# Patient Record
Sex: MALE | Race: WHITE | Employment: OTHER | URBAN - NONMETROPOLITAN AREA
[De-identification: names, ages, dates, MRNs, and addresses within clinical notes are randomized per-mention and may not be internally consistent; named-entity substitution may affect disease eponyms.]

---

## 2019-05-18 ENCOUNTER — HOSPITAL ENCOUNTER (EMERGENCY)
Age: 84
Discharge: HOME | End: 2019-05-18
Payer: MEDICARE

## 2019-05-18 VITALS
HEART RATE: 86 BPM | OXYGEN SATURATION: 94 % | HEIGHT: 69 IN | RESPIRATION RATE: 18 BRPM | WEIGHT: 203 LBS | BODY MASS INDEX: 30.07 KG/M2 | DIASTOLIC BLOOD PRESSURE: 83 MMHG | SYSTOLIC BLOOD PRESSURE: 150 MMHG | TEMPERATURE: 98.2 F

## 2019-05-18 DIAGNOSIS — L03.012 PARONYCHIA OF FINGER OF LEFT HAND: Primary | ICD-10-CM

## 2019-05-18 PROCEDURE — 87205 SMEAR GRAM STAIN: CPT

## 2019-05-18 PROCEDURE — 87186 SC STD MICRODIL/AGAR DIL: CPT

## 2019-05-18 PROCEDURE — 2709999900 HC NON-CHARGEABLE SUPPLY

## 2019-05-18 PROCEDURE — 87070 CULTURE OTHR SPECIMN AEROBIC: CPT

## 2019-05-18 PROCEDURE — 87184 SC STD DISK METHOD PER PLATE: CPT

## 2019-05-18 PROCEDURE — 99204 OFFICE O/P NEW MOD 45 MIN: CPT

## 2019-05-18 PROCEDURE — 10060 I&D ABSCESS SIMPLE/SINGLE: CPT

## 2019-05-18 PROCEDURE — 26010 DRAINAGE OF FINGER ABSCESS: CPT | Performed by: NURSE PRACTITIONER

## 2019-05-18 PROCEDURE — 87077 CULTURE AEROBIC IDENTIFY: CPT

## 2019-05-18 RX ORDER — LISINOPRIL 20 MG/1
20 TABLET ORAL 2 TIMES DAILY
Status: ON HOLD | COMMUNITY
End: 2020-07-23 | Stop reason: HOSPADM

## 2019-05-18 RX ORDER — CLONIDINE HYDROCHLORIDE 0.3 MG/1
0.3 TABLET ORAL 2 TIMES DAILY
Status: ON HOLD | COMMUNITY
End: 2020-07-23 | Stop reason: HOSPADM

## 2019-05-18 RX ORDER — METOPROLOL SUCCINATE 100 MG/1
100 TABLET, EXTENDED RELEASE ORAL 2 TIMES DAILY
Status: ON HOLD | COMMUNITY
End: 2020-07-23 | Stop reason: HOSPADM

## 2019-05-18 RX ORDER — SIMVASTATIN 40 MG
40 TABLET ORAL NIGHTLY
COMMUNITY

## 2019-05-18 RX ORDER — CEPHALEXIN 500 MG/1
500 CAPSULE ORAL 3 TIMES DAILY
Qty: 30 CAPSULE | Refills: 0 | Status: SHIPPED | OUTPATIENT
Start: 2019-05-18 | End: 2019-05-21

## 2019-05-18 RX ORDER — METFORMIN HYDROCHLORIDE 750 MG/1
1500 TABLET, EXTENDED RELEASE ORAL
COMMUNITY

## 2019-05-18 ASSESSMENT — PAIN - FUNCTIONAL ASSESSMENT: PAIN_FUNCTIONAL_ASSESSMENT: ACTIVITIES ARE NOT PREVENTED

## 2019-05-18 ASSESSMENT — PAIN DESCRIPTION - ORIENTATION: ORIENTATION: LEFT

## 2019-05-18 ASSESSMENT — ENCOUNTER SYMPTOMS
DIARRHEA: 0
VOMITING: 0

## 2019-05-18 ASSESSMENT — PAIN SCALES - WONG BAKER: WONGBAKER_NUMERICALRESPONSE: 4

## 2019-05-18 ASSESSMENT — PAIN DESCRIPTION - PAIN TYPE: TYPE: ACUTE PAIN

## 2019-05-18 ASSESSMENT — PAIN DESCRIPTION - LOCATION: LOCATION: FINGER (COMMENT WHICH ONE)

## 2019-05-18 NOTE — ED PROVIDER NOTES
[facility] 36  Urgent Care Encounter      CHIEF COMPLAINT       Chief Complaint   Patient presents with    Other     redness, swelling and white area around nail on left ring finger       Nurses Notes reviewed and I agree except as noted in the HPI. HISTORY OF PRESENT ILLNESS   Yuri Wilson is a 81 y.o. male who presents with pain, redness, and swelling around the base of the finger nail on the left hand 4th digit. Patient denies any injury or trauma prior to the onset of the symptoms. States he was working in the bushes over the weekend but does not recall any injury. States the area is tender and throbbing. It is red and swollen. He noticed a white patch along the nail cuticle. Denies any fever, nausea, vomiting, diarrhea. Denies any numbness or tingling in the left hand or the digits of the left hand. He has not taken any medications for his symptoms. They have been present for the past couple of days, but the white spot appeared this morning. REVIEW OF SYSTEMS     Review of Systems   Constitutional: Negative for fever. Gastrointestinal: Negative for diarrhea and vomiting. Musculoskeletal: Negative for joint swelling. Skin:        Redness and swelling around the nailbed of the 4th digit left hand   Neurological: Negative for numbness. PAST MEDICAL HISTORY         Diagnosis Date    Cerebral artery occlusion with cerebral infarction [facility])        SURGICAL HISTORY     Patient  has no past surgical history on file.     CURRENT MEDICATIONS       Discharge Medication List as of 5/18/2019  2:21 PM      CONTINUE these medications which have NOT CHANGED    Details   metoprolol succinate (TOPROL XL) 100 MG extended release tablet Take 100 mg by mouth 2 times dailyHistorical Med      lisinopril (PRINIVIL;ZESTRIL) 20 MG tablet Take 20 mg by mouth 2 times dailyHistorical Med      simvastatin (ZOCOR) 40 MG tablet Take 40 mg by mouth nightlyHistorical Med      cloNIDine (CATAPRES) 0.3 MG tablet Take 0.3 mg by mouth 2 times dailyHistorical Med      Pioglitazone HCl (ACTOS PO) Take by mouthHistorical Med      metFORMIN (GLUCOPHAGE-XR) 750 MG extended release tablet Take 750 mg by mouth daily (with breakfast)Historical Med             ALLERGIES     Patient is has No Known Allergies. FAMILY HISTORY     Patient'sfamily history is not on file. SOCIAL HISTORY     Patient  reports that he has never smoked. He has never used smokeless tobacco. He reports that he drinks alcohol. He reports that he does not use drugs. PHYSICAL EXAM     ED TRIAGE VITALS  BP: (!) 150/83, Temp: 98.2 °F (36.8 °C), Pulse: 86, Resp: 18, SpO2: 94 %  Physical Exam   Constitutional: He is oriented to person, place, and time. He appears well-developed and well-nourished. HENT:   Head: Normocephalic and atraumatic. Cardiovascular: Normal rate, regular rhythm and normal heart sounds. Pulmonary/Chest: Effort normal and breath sounds normal.   Musculoskeletal:        Hands:  Neurological: He is alert and oriented to person, place, and time. Nursing note and vitals reviewed. DIAGNOSTIC RESULTS   Labs: No results found for this visit on 05/18/19.     IMAGING:    URGENT CARE COURSE:     Vitals:    05/18/19 1348   BP: (!) 150/83   Pulse: 86   Resp: 18   Temp: 98.2 °F (36.8 °C)   TempSrc: Oral   SpO2: 94%   Weight: 203 lb (92.1 kg)   Height: 5' 9\" (1.753 m)       Medications - No data to display  PROCEDURES:  Incision/Drainage  Date/Time: 5/18/2019 2:37 PM  Performed by: FINN Waters CNP  Authorized by: FINN Waters CNP     Consent:     Consent obtained:  Verbal    Consent given by:  Patient    Risks discussed:  Bleeding, incomplete drainage, pain and infection    Alternatives discussed:  Alternative treatment, no treatment and observation  Location:     Type:  Abscess    Location:  Upper extremity    Upper extremity location:  Finger    Finger location:  L ring finger  Pre-procedure details: Skin preparation:  Betadine  Anesthesia (see MAR for exact dosages): Anesthesia method:  None  Procedure type:     Complexity:  Simple  Procedure details:     Incision types:  Stab incision    Incision depth:  Dermal    Scalpel blade:  11    Drainage:  Purulent    Drainage amount:  Scant    Wound treatment:  Wound left open    Packing materials:  None  Post-procedure details:     Patient tolerance of procedure: Tolerated well, no immediate complications        FINAL IMPRESSION      1. Paronychia of finger of left hand        DISPOSITION/PLAN   DISPOSITION Decision To Discharge 05/18/2019 02:19:45 PM    Patient will be discharged home. He is to keep the area clean and dry. Monitor for worsening infection, which was reviewed in detail. Take medication as prescribed. Reviewed signs and symptoms that require immediate emergency room evaluation and treatment. Patient voiced understanding of all information and is agreeable to the treatment plan.      PATIENT REFERRED TO:  [address] [place] [address]    Schedule an appointment as soon as possible for a visit in 3 days  As needed, If symptoms worsen go to emergency room    DISCHARGE MEDICATIONS:  Discharge Medication List as of 5/18/2019  2:21 PM      START taking these medications    Details   cephALEXin (KEFLEX) 500 MG capsule Take 1 capsule by mouth 3 times daily for 10 days, Disp-30 capsule, R-0Normal           Discharge Medication List as of 5/18/2019  2:21 PM          Kasia Mcgee, [facility] 105, APRN - CNP  05/31/19 9945

## 2019-05-20 LAB
AEROBIC CULTURE: ABNORMAL
AEROBIC CULTURE: ABNORMAL
GRAM STAIN RESULT: ABNORMAL
ORGANISM: ABNORMAL

## 2019-05-21 RX ORDER — SULFAMETHOXAZOLE AND TRIMETHOPRIM 800; 160 MG/1; MG/1
1 TABLET ORAL 2 TIMES DAILY
Qty: 20 TABLET | Refills: 0 | Status: SHIPPED | OUTPATIENT
Start: 2019-05-21 | End: 2019-05-31

## 2020-02-18 ENCOUNTER — OFFICE VISIT (OUTPATIENT)
Dept: UROLOGY | Age: 85
End: 2020-02-18
Payer: MEDICARE

## 2020-02-18 VITALS
SYSTOLIC BLOOD PRESSURE: 112 MMHG | BODY MASS INDEX: 30.62 KG/M2 | WEIGHT: 202 LBS | HEIGHT: 68 IN | DIASTOLIC BLOOD PRESSURE: 64 MMHG

## 2020-02-18 LAB
BILIRUBIN URINE: NEGATIVE
BLOOD URINE, POC: NEGATIVE
CHARACTER, URINE: CLEAR
COLOR, URINE: YELLOW
GLUCOSE URINE: 100 MG/DL
KETONES, URINE: ABNORMAL
LEUKOCYTE CLUMPS, URINE: NEGATIVE
NITRITE, URINE: NEGATIVE
PH, URINE: 5 (ref 5–9)
POST VOID RESIDUAL (PVR): 218 ML
PROTEIN, URINE: NEGATIVE MG/DL
SPECIFIC GRAVITY, URINE: 1.02 (ref 1–1.03)
UROBILINOGEN, URINE: 1 EU/DL (ref 0–1)

## 2020-02-18 PROCEDURE — 99204 OFFICE O/P NEW MOD 45 MIN: CPT | Performed by: UROLOGY

## 2020-02-18 PROCEDURE — 81003 URINALYSIS AUTO W/O SCOPE: CPT | Performed by: UROLOGY

## 2020-02-18 PROCEDURE — 51798 US URINE CAPACITY MEASURE: CPT | Performed by: UROLOGY

## 2020-02-18 RX ORDER — GLIMEPIRIDE 2 MG/1
2 TABLET ORAL
Status: ON HOLD | COMMUNITY
End: 2020-07-23 | Stop reason: HOSPADM

## 2020-02-18 NOTE — PROGRESS NOTES
Alvin Navarro MD        [address] [facility] [address]  Dept: [phone]  Dept Fax: [phone]: [address] [place] [address] Urology Office Note -     Patient:  Jake Zaragoza  YOB: 1935  Date: 2/29/2020    The patient is a 81 y.o. male who presents today for evaluation of the following problems: gross hematuria  Chief Complaint   Patient presents with    Advice Only     New Patient - Gross hematuria     referred/consultation requested by Tahira Reyes DO.    HISTORY OF PRESENT ILLNESS:     Gross hematuria  Onset was  Months ago  Overall, the problem(s) are unchanged. Severity is described as moderate. Associated Symptoms: No dysuria, ++ gross hematuria. Current Pain Severity: 0    Intermittent gross hematuria in jan  Improving in feb.  Less hematuria episodes  pvr 200>+  Former smoker  No problems voiding    Ct urogram  Cystoscopy in office            Requested/reviewed records from Tahira Reyes DO office and/or outside [de-identified]    (Patient's old records have been requested, reviewed and pertinent findings summarized in today's note.)    Procedures Today: N/A      Last several PSA's:  No results found for: PSA    Last total testosterone:  No results found for: TESTOSTERONE    Urinalysis today:  Results for POC orders placed in visit on 02/18/20   POCT Urinalysis No Micro (Auto)   Result Value Ref Range    Glucose, Ur 100 (A) NEGATIVE mg/dl    Bilirubin Urine Negative     Ketones, Urine Trace (A) NEGATIVE    Specific Gravity, Urine 1.025 1.002 - 1.03    Blood, UA POC Negative NEGATIVE    pH, Urine 5.00 5.0 - 9.0    Protein, Urine Negative NEGATIVE mg/dl    Urobilinogen, Urine 1.00 0.0 - 1.0 eu/dl    Nitrite, Urine Negative NEGATIVE    Leukocyte Clumps, Urine Negative NEGATIVE    Color, Urine Yellow YELLOW-STR    Character, Urine Clear [email or web address]   poct post void residual Result Value Ref Range    post void residual 218 ml       Last BUN and creatinine:  No results found for: BUN  No results found for: CREATININE      Imaging Reviewed during this Office Visit:   Adali Singleton MD independently reviewed the images and verified the radiology reports from:    No results found. PAST MEDICAL, FAMILY AND SOCIAL HISTORY:  Past Medical History:   Diagnosis Date    [facility]     DM (diabetes mellitus) [facility]     Hypertension     Stroke [facility])      History reviewed. No pertinent surgical history. No family history on file. Outpatient Medications Marked as Taking for the 2/18/20 encounter (Office Visit) with Netta Escobar MD   Medication Sig Dispense Refill    glimepiride (AMARYL) 2 MG tablet Take 2 mg by mouth every morning (before breakfast)      metoprolol succinate (TOPROL XL) 100 MG extended release tablet Take 100 mg by mouth 2 times daily      lisinopril (PRINIVIL;ZESTRIL) 20 MG tablet Take 20 mg by mouth 2 times daily      simvastatin (ZOCOR) 40 MG tablet Take 40 mg by mouth nightly      cloNIDine (CATAPRES) 0.3 MG tablet Take 0.3 mg by mouth 2 times daily      Pioglitazone HCl (ACTOS PO) Take by mouth      metFORMIN (GLUCOPHAGE-XR) 750 MG extended release tablet Take 750 mg by mouth daily (with breakfast)         Patient has no known allergies. Social History     Tobacco Use   Smoking Status Never Smoker   Smokeless Tobacco Never Used      (If patient a smoker, smoking cessation counseling offered)   Social History     Substance and Sexual Activity   Alcohol Use Yes    Comment: once a year       REVIEW OF SYSTEMS:  Constitutional: negative  Eyes: negative  Respiratory: negative  Cardiovascular: negative  Gastrointestinal: negative  Genitourinary: see HPI  Musculoskeletal: negative  Skin: negative   Neurological: negative  Hematological/Lymphatic: negative  Psychological: negative    Physical Exam:    This a 81 y.o.

## 2020-02-20 LAB
ORGANISM: ABNORMAL
URINE CULTURE, ROUTINE: ABNORMAL

## 2020-02-26 ENCOUNTER — HOSPITAL ENCOUNTER (OUTPATIENT)
Dept: CT IMAGING | Age: 85
Discharge: HOME | End: 2020-02-26
Payer: MEDICARE

## 2020-02-26 PROCEDURE — 3209999900 CT COMPARISON OF OUTSIDE FILMS

## 2020-03-03 ENCOUNTER — TELEPHONE (OUTPATIENT)
Dept: UROLOGY | Age: 85
End: 2020-03-03

## 2020-03-03 ENCOUNTER — PROCEDURE VISIT (OUTPATIENT)
Dept: UROLOGY | Age: 85
End: 2020-03-03
Payer: MEDICARE

## 2020-03-03 ENCOUNTER — INITIAL CONSULT (OUTPATIENT)
Dept: NEUROLOGY | Age: 85
End: 2020-03-03
Payer: MEDICARE

## 2020-03-03 VITALS
HEIGHT: 67 IN | BODY MASS INDEX: 31.74 KG/M2 | HEART RATE: 68 BPM | WEIGHT: 202.2 LBS | DIASTOLIC BLOOD PRESSURE: 62 MMHG | SYSTOLIC BLOOD PRESSURE: 116 MMHG

## 2020-03-03 VITALS
BODY MASS INDEX: 31.86 KG/M2 | WEIGHT: 203 LBS | DIASTOLIC BLOOD PRESSURE: 62 MMHG | SYSTOLIC BLOOD PRESSURE: 110 MMHG | HEIGHT: 67 IN

## 2020-03-03 LAB
BILIRUBIN URINE: NEGATIVE
BLOOD URINE, POC: NEGATIVE
CHARACTER, URINE: CLEAR
COLOR, URINE: YELLOW
GLUCOSE URINE: NEGATIVE MG/DL
KETONES, URINE: NEGATIVE
LEUKOCYTE CLUMPS, URINE: NEGATIVE
NITRITE, URINE: NEGATIVE
PH, URINE: 5.5 (ref 5–9)
PROTEIN, URINE: NEGATIVE MG/DL
SPECIFIC GRAVITY, URINE: >= 1.03 (ref 1–1.03)
UROBILINOGEN, URINE: 1 EU/DL (ref 0–1)

## 2020-03-03 PROCEDURE — 52000 CYSTOURETHROSCOPY: CPT | Performed by: UROLOGY

## 2020-03-03 PROCEDURE — 81003 URINALYSIS AUTO W/O SCOPE: CPT | Performed by: UROLOGY

## 2020-03-03 PROCEDURE — 99204 OFFICE O/P NEW MOD 45 MIN: CPT | Performed by: PSYCHIATRY & NEUROLOGY

## 2020-03-03 NOTE — PATIENT INSTRUCTIONS
1. Neuropsychology evaluation  2. Vitamin B12, folate  3. Vitamin B6 level  4. Vitamin D level  5. Call with any new symptoms or concerns.    6. Follow up after completion of evaluation with neuropsychology

## 2020-03-04 LAB
FOLATE: 18.4 NG/ML
VITAMIN B-12: 160 PG/ML (ref 180–914)
VITAMIN B6: 38 NMOL/L (ref 20–125)
VITAMIN D 25-HYDROXY: 12.3 NG/ML (ref 30–100)

## 2020-03-05 LAB
ORGANISM: ABNORMAL
URINE CULTURE, ROUTINE: ABNORMAL

## 2020-03-09 ENCOUNTER — TELEPHONE (OUTPATIENT)
Dept: UROLOGY | Age: 85
End: 2020-03-09

## 2020-03-09 ENCOUNTER — TELEPHONE (OUTPATIENT)
Dept: NEUROLOGY | Age: 85
End: 2020-03-09

## 2020-03-10 ENCOUNTER — TELEPHONE (OUTPATIENT)
Dept: UROLOGY | Age: 85
End: 2020-03-10

## 2020-03-19 NOTE — PROGRESS NOTES
Cystoscopy Operative Note    Patient:  Trice Brown  MRN: [id number]  YOB: 1935  Surgeon: Christi Stapleton MD  Anesthesia: Urethral 2% Xylocaine   Indications: gross hematuria  Position: Supine    Findings:   The patient was prepped and draped in the usual sterile fashion. The flexible cystoscope was advanced through the urethra and into the bladder. The bladder was thoroughly inspected and the following was noted:    Residual Urine:  Significant  Urethra: No abnormalities of the urethra are noted. Prostate: bilobar hyperplasia++  Bladder: No tumors or CIS noted. No bladder diverticulum. significant trabeculation noted. Ureters: Clear efflux from both ureters. Orifices with normal configuration and location. The cystoscope was removed. The patient tolerated the procedure well.   Will schedule for cystoscopy with greenlight

## 2020-04-07 ENCOUNTER — TELEPHONE (OUTPATIENT)
Dept: UROLOGY | Age: 85
End: 2020-04-07

## 2020-04-07 NOTE — TELEPHONE ENCOUNTER
[place] medical benefits [phone]. Per electronic message, no precert is req for the outpatient [id number]. Scheduled with Dr Loulou Javier at [facility] on 4/21/20.

## 2020-04-08 ENCOUNTER — TELEPHONE (OUTPATIENT)
Dept: UROLOGY | Age: 85
End: 2020-04-08

## 2020-05-18 ENCOUNTER — TELEPHONE (OUTPATIENT)
Dept: UROLOGY | Age: 85
End: 2020-05-18

## 2020-05-19 ENCOUNTER — TELEPHONE (OUTPATIENT)
Dept: UROLOGY | Age: 85
End: 2020-05-19

## 2020-05-19 LAB
A/G RATIO: NORMAL
ALBUMIN SERPL-MCNC: 4.3 G/DL
ALP BLD-CCNC: 38 U/L
ALT SERPL-CCNC: 16 U/L
AST SERPL-CCNC: 17 U/L
AVERAGE GLUCOSE: 166
BILIRUB SERPL-MCNC: 0.6 MG/DL (ref 0.1–1.4)
BILIRUBIN DIRECT: 0.1 MG/DL
BILIRUBIN, INDIRECT: NORMAL
BUN BLDV-MCNC: 18 MG/DL
CALCIUM SERPL-MCNC: 9.4 MG/DL
CHLORIDE BLD-SCNC: 104 MMOL/L
CHOLESTEROL, TOTAL: 147 MG/DL
CHOLESTEROL/HDL RATIO: 4
CO2: 26 MMOL/L
CREAT SERPL-MCNC: 0.85 MG/DL
GFR CALCULATED: 60
GLOBULIN: NORMAL
GLUCOSE BLD-MCNC: 141 MG/DL
HBA1C MFR BLD: 7.4 %
HDLC SERPL-MCNC: 37 MG/DL (ref 35–70)
LDL CHOLESTEROL CALCULATED: 75 MG/DL (ref 0–160)
POTASSIUM SERPL-SCNC: 4.1 MMOL/L
PROSTATE SPECIFIC ANTIGEN: 0.88 NG/ML
PROTEIN TOTAL: 7.4 G/DL
SODIUM BLD-SCNC: 142 MMOL/L
TRIGL SERPL-MCNC: 177 MG/DL
VLDLC SERPL CALC-MCNC: 35 MG/DL

## 2020-05-19 NOTE — TELEPHONE ENCOUNTER
SURGERY 8[address]  [address] [place] [address] [address], [address]      Phone *[phone]   Surgical Scheduling Direct Line Phone *[phone] Fax *[phone]      Phuc Lowe 1935 male    [address] [place] [address]   Marital Status:          Home Phone: [phone]      Cell Phone:    Telephone Information:   Mobile [phone]          Surgeon: Dr. Nerissa Mckeon  Surgery Date: 05-    Time: [facility] [place]    Procedure: Cystoscopy with Greenlight photovaporization of prostate    Diagnosis: BPH with obstruction     Important Medical History: In [facility]    Special Inst/Equip: NATALIE Farmer Conf# [id number] per Unruly Ford    CPT Codes:    74936  Latex Allergy:  No     Cardiac Device:  No     Anesthesia:  Anesthesiologist (General/Spinal)          Admission Type:  Same Day                             Admit Prior to Day of Surgery: No    Case Location:  Main OR           Preadmission Testing:Phone Call              PAT Date and Time:______________________________________________________    PAT Confirmation #: ______________________________________________________    Post Op Visit: ___________________________________________________________    Need Preop Cardiac Clearance: No    Does Patient have Cardiologist/physician?      Medical clearance per [facility]    Surgery Confirmation #: __________________________________________________    Martin Bowen: ________________________   Date: __________________________     [employer] Name: [facility]

## 2020-05-21 ENCOUNTER — PREP FOR PROCEDURE (OUTPATIENT)
Dept: UROLOGY | Age: 85
End: 2020-05-21

## 2020-05-21 RX ORDER — SODIUM CHLORIDE 9 MG/ML
INJECTION, SOLUTION INTRAVENOUS CONTINUOUS
Status: CANCELLED | OUTPATIENT
Start: 2020-05-30

## 2020-05-27 ENCOUNTER — HOSPITAL ENCOUNTER (OUTPATIENT)
Age: 85
Discharge: HOME | End: 2020-05-27
Payer: MEDICARE

## 2020-05-27 ENCOUNTER — HOSPITAL ENCOUNTER (OUTPATIENT)
Dept: GENERAL RADIOLOGY | Age: 85
Discharge: HOME | End: 2020-05-27
Payer: MEDICARE

## 2020-05-27 LAB
BILIRUBIN URINE: NEGATIVE
BLOOD, URINE: NEGATIVE
CHARACTER, URINE: CLEAR
COLOR: YELLOW
EKG ATRIAL RATE: 66 BPM
EKG P AXIS: 75 DEGREES
EKG P-R INTERVAL: 156 MS
EKG Q-T INTERVAL: 446 MS
EKG QRS DURATION: 86 MS
EKG QTC CALCULATION (BAZETT): 467 MS
EKG R AXIS: 53 DEGREES
EKG T AXIS: 26 DEGREES
EKG VENTRICULAR RATE: 66 BPM
GLUCOSE URINE: NEGATIVE MG/DL
KETONES, URINE: NEGATIVE
LEUKOCYTE ESTERASE, URINE: NEGATIVE
NITRITE, URINE: NEGATIVE
PH UA: 5 (ref 5–9)
PROTEIN UA: NEGATIVE
SPECIFIC GRAVITY, URINE: 1.02 (ref 1–1.03)
UROBILINOGEN, URINE: 1 EU/DL (ref 0–1)

## 2020-05-27 PROCEDURE — 81003 URINALYSIS AUTO W/O SCOPE: CPT

## 2020-05-27 PROCEDURE — 93005 ELECTROCARDIOGRAM TRACING: CPT

## 2020-05-27 PROCEDURE — 93010 ELECTROCARDIOGRAM REPORT: CPT | Performed by: INTERNAL MEDICINE

## 2020-05-27 PROCEDURE — 71046 X-RAY EXAM CHEST 2 VIEWS: CPT

## 2020-05-27 PROCEDURE — U0002 COVID-19 LAB TEST NON-CDC: HCPCS

## 2020-05-28 ENCOUNTER — TELEPHONE (OUTPATIENT)
Dept: UROLOGY | Age: 85
End: 2020-05-28

## 2020-05-28 LAB
PERFORMING LAB: NORMAL
REPORT: NORMAL
SARS-COV-2: NOT DETECTED

## 2020-05-28 NOTE — TELEPHONE ENCOUNTER
Patient notified of Dr. Nicole Hard appointment on 05- at 9:30am.  He voiced understanding and will be there 15-minutes early.

## 2020-05-29 ENCOUNTER — OFFICE VISIT (OUTPATIENT)
Dept: CARDIOLOGY CLINIC | Age: 85
End: 2020-05-29
Payer: MEDICARE

## 2020-05-29 VITALS
DIASTOLIC BLOOD PRESSURE: 72 MMHG | HEIGHT: 67 IN | BODY MASS INDEX: 31.52 KG/M2 | HEART RATE: 84 BPM | SYSTOLIC BLOOD PRESSURE: 136 MMHG | WEIGHT: 200.8 LBS

## 2020-05-29 PROCEDURE — 99204 OFFICE O/P NEW MOD 45 MIN: CPT | Performed by: NUCLEAR MEDICINE

## 2020-05-29 ASSESSMENT — ENCOUNTER SYMPTOMS
CHEST TIGHTNESS: 0
PHOTOPHOBIA: 0
SHORTNESS OF BREATH: 1
ABDOMINAL PAIN: 0
VOMITING: 0
BLOOD IN STOOL: 0
ABDOMINAL DISTENTION: 0
DIARRHEA: 0
BACK PAIN: 0
NAUSEA: 0
ANAL BLEEDING: 0
RECTAL PAIN: 0
CONSTIPATION: 0
COLOR CHANGE: 0

## 2020-05-29 NOTE — TELEPHONE ENCOUNTER
Patient's surgery needs cancelled for tomorrow. Dr. Elena Catherine wants patient to have Stress test and echo done.

## 2020-05-29 NOTE — PROGRESS NOTES
[address] [facility],[address]  [address] [place] [address]  Dept: [phone]  Dept Fax: [phone]  Loc: [phone]    Visit Date: 2020    Michelle Smyth is a 81 y.o. male who presents todayfor:  Chief Complaint   Patient presents with    New Patient     abnormal ekg    Establish Cardiologist    Cardiac Clearance    Hypertension    Diabetes    Hyperlipidemia     Here for the first time  Going for prostate surgery   To be Done tomorrow  Had abnormal ECG   Does have st t changes and septal q waves  Does have dyspnea on exertion   Gradual worsening   Denies chest pain   No jaw pain   Very in active  Severe obesity   Possible sleep apnea  Does have HTn for many years   Does have Dm for many years  Does have previous stroke a while back   Does have hyperlipidemia  No know CAD before  No stents before  Does have family history of CAD  No smoking       HPI:  HPI  Past Medical History:   Diagnosis Date    [facility]     DM [facility]diabetes mellitus) [facility]     Hyperlipidemia     Hypertension     Stroke [facility])       Past Surgical History:   Procedure Laterality Date    KNEE ARTHROPLASTY Right      Family History   Problem Relation Age of Onset    Heart Disease Father     Heart Attack Father     High Blood Pressure Sister     Cancer Neg Hx     Diabetes Neg Hx     Stroke Neg Hx      Social History     Tobacco Use    Smoking status: Former Smoker     Last attempt to quit: 1980     Years since quittin.4    Smokeless tobacco: Never Used   Substance Use Topics    Alcohol use: Yes     Comment: rare      Current Outpatient Medications   Medication Sig Dispense Refill    Cholecalciferol (VITAMIN D3) 50 MCG ( UT) CAPS Take by mouth three times daily      pioglitazone (ACTOS) 30 MG tablet Take 30 mg by mouth daily      Cyanocobalamin (VITAMIN B-12 IJ) Inject as directed every 30 days      glimepiride (AMARYL) 2 MG tablet Take 2 mg by mouth every morning (before breakfast)      metoprolol succinate (TOPROL XL) 100 MG extended release tablet Take 100 mg by mouth 2 times daily      lisinopril (PRINIVIL;ZESTRIL) 20 MG tablet Take 20 mg by mouth 2 times daily      simvastatin (ZOCOR) 40 MG tablet Take 40 mg by mouth nightly      cloNIDine (CATAPRES) 0.3 MG tablet Take 0.3 mg by mouth 2 times daily      metFORMIN (GLUCOPHAGE-XR) 750 MG extended release tablet Take by mouth daily (with breakfast) 2 tab       No current facility-administered medications for this visit. No Known Allergies  Health Maintenance   Topic Date Due    DTaP/Tdap/Td vaccine (1 - Tdap) 10/13/1955    Shingles Vaccine (1 of 2) 10/13/1986    Pneumococcal 65+ years Vaccine (1 of 1 - PPSV23) 10/13/2001    Annual Wellness Visit (AWV)  06/23/2019    Flu vaccine (Season Ended) 09/01/2020    Lipid screen  05/19/2021    Potassium monitoring  05/19/2021    Creatinine monitoring  05/19/2021    Hepatitis A vaccine  Aged Out    Hepatitis B vaccine  Aged Out    Hib vaccine  Aged Out    Meningococcal (ACWY) vaccine  Aged Out       Subjective:  Review of Systems   Constitutional: Positive for fatigue. Negative for diaphoresis. HENT: Negative for ear pain and nosebleeds. Eyes: Negative for photophobia. Respiratory: Positive for shortness of breath. Negative for chest tightness. Cardiovascular: Negative for chest pain, palpitations and leg swelling. Gastrointestinal: Negative for abdominal distention, abdominal pain, anal bleeding, blood in stool, constipation, diarrhea, nausea, rectal pain and vomiting. Endocrine: Negative for polyphagia. Genitourinary: Negative for enuresis and hematuria. Musculoskeletal: Negative for arthralgias, back pain, gait problem, joint swelling, myalgias, neck pain and neck stiffness. Skin: Negative for color change, pallor, rash and wound.    Neurological: Negative for dizziness, syncope and risk patient  Abnormal ECG   Likely underlying CAD  Will need ischemia work up before surgery   Symptoms as above    Plan:  No follow-ups on file. Discussed  Would hold on surgery   Non invasive cardiac testing will be ordered to further evaluate for any ischemic or structural heart disease as a cause of the patient symptoms. We will proceed with a Stress Cardiolite test and echo soon. Continue risk factor modification and medical management  Thank you for allowing me to participate in the care of your patient. Please don't hesitate to contact me regarding any further issues related to the patient care    Orders Placed:  No orders of the defined types were placed in this encounter. Medications Prescribed:  No orders of the defined types were placed in this encounter. Discussed use, benefit, and side effects of prescribed medications. All patient questions answered. Pt voicedunderstanding. Instructed to continue current medications, diet and exercise. Continue risk factor modification and medical management. Patient agreed with treatment plan. Follow up as directed.     Electronically signedby Sinan Vega MD on 5/29/2020 at 9:46 AM

## 2020-05-29 NOTE — TELEPHONE ENCOUNTER
Reached out to patient to advise that once Clementine Mazariegos clears him for surgery I will reschedule him.

## 2020-06-15 ENCOUNTER — HOSPITAL ENCOUNTER (OUTPATIENT)
Dept: NON INVASIVE DIAGNOSTICS | Age: 85
Discharge: HOME | End: 2020-06-15
Payer: MEDICARE

## 2020-06-15 ENCOUNTER — TELEPHONE (OUTPATIENT)
Dept: CARDIOLOGY CLINIC | Age: 85
End: 2020-06-15

## 2020-06-15 LAB
LV EF: 60 %
LVEF MODALITY: NORMAL

## 2020-06-15 PROCEDURE — 93017 CV STRESS TEST TRACING ONLY: CPT | Performed by: NUCLEAR MEDICINE

## 2020-06-15 PROCEDURE — A9500 TC99M SESTAMIBI: HCPCS | Performed by: NUCLEAR MEDICINE

## 2020-06-15 PROCEDURE — 6360000002 HC RX W HCPCS

## 2020-06-15 PROCEDURE — 78452 HT MUSCLE IMAGE SPECT MULT: CPT

## 2020-06-15 PROCEDURE — 3430000000 HC RX DIAGNOSTIC RADIOPHARMACEUTICAL: Performed by: NUCLEAR MEDICINE

## 2020-06-15 PROCEDURE — 93306 TTE W/DOPPLER COMPLETE: CPT

## 2020-06-15 RX ADMIN — Medication 9 MILLICURIE: at 12:37

## 2020-06-15 RX ADMIN — Medication 34.3 MILLICURIE: at 13:31

## 2020-06-16 ENCOUNTER — HOSPITAL ENCOUNTER (OUTPATIENT)
Age: 85
Discharge: HOME | End: 2020-06-16
Payer: MEDICARE

## 2020-06-16 PROCEDURE — U0002 COVID-19 LAB TEST NON-CDC: HCPCS

## 2020-06-17 ENCOUNTER — PRE-PROCEDURE TELEPHONE (OUTPATIENT)
Dept: INPATIENT UNIT | Age: 85
End: 2020-06-17

## 2020-06-17 LAB
PERFORMING LAB: NORMAL
REPORT: NORMAL
SARS-COV-2: NOT DETECTED

## 2020-06-18 ENCOUNTER — PREP FOR PROCEDURE (OUTPATIENT)
Dept: CARDIOLOGY | Age: 85
End: 2020-06-18

## 2020-06-18 RX ORDER — NITROGLYCERIN 0.4 MG/1
0.4 TABLET SUBLINGUAL EVERY 5 MIN PRN
Status: CANCELLED | OUTPATIENT
Start: 2020-06-18

## 2020-06-18 RX ORDER — SODIUM CHLORIDE 0.9 % (FLUSH) 0.9 %
10 SYRINGE (ML) INJECTION EVERY 12 HOURS SCHEDULED
Status: CANCELLED | OUTPATIENT
Start: 2020-06-18

## 2020-06-18 RX ORDER — SODIUM CHLORIDE 9 MG/ML
INJECTION, SOLUTION INTRAVENOUS CONTINUOUS
Status: CANCELLED | OUTPATIENT
Start: 2020-06-18

## 2020-06-18 RX ORDER — ASPIRIN 325 MG
325 TABLET ORAL ONCE
Status: CANCELLED | OUTPATIENT
Start: 2020-06-18 | End: 2020-06-18

## 2020-06-18 RX ORDER — SODIUM CHLORIDE 0.9 % (FLUSH) 0.9 %
10 SYRINGE (ML) INJECTION PRN
Status: CANCELLED | OUTPATIENT
Start: 2020-06-18

## 2020-06-18 RX ORDER — DIPHENHYDRAMINE HYDROCHLORIDE 50 MG/ML
50 INJECTION INTRAMUSCULAR; INTRAVENOUS ONCE
Status: CANCELLED | OUTPATIENT
Start: 2020-06-18 | End: 2020-06-18

## 2020-06-19 ENCOUNTER — HOSPITAL ENCOUNTER (OUTPATIENT)
Dept: INPATIENT UNIT | Age: 85
Discharge: HOME | End: 2020-06-19
Attending: NUCLEAR MEDICINE | Admitting: NUCLEAR MEDICINE
Payer: MEDICARE

## 2020-06-19 VITALS
DIASTOLIC BLOOD PRESSURE: 76 MMHG | OXYGEN SATURATION: 93 % | TEMPERATURE: 98.3 F | WEIGHT: 200 LBS | HEART RATE: 100 BPM | RESPIRATION RATE: 20 BRPM | HEIGHT: 67 IN | SYSTOLIC BLOOD PRESSURE: 128 MMHG | BODY MASS INDEX: 31.39 KG/M2

## 2020-06-19 LAB
ABO: NORMAL
ANION GAP SERPL CALCULATED.3IONS-SCNC: 11 MEQ/L (ref 8–16)
ANTIBODY SCREEN: NORMAL
APTT: 31.4 SECONDS (ref 22–38)
BUN BLDV-MCNC: 14 MG/DL (ref 7–22)
CALCIUM SERPL-MCNC: 9.4 MG/DL (ref 8.5–10.5)
CHLORIDE BLD-SCNC: 103 MEQ/L (ref 98–111)
CHOLESTEROL, TOTAL: 158 MG/DL (ref 100–199)
CO2: 25 MEQ/L (ref 23–33)
CREAT SERPL-MCNC: 0.6 MG/DL (ref 0.4–1.2)
EKG ATRIAL RATE: 66 BPM
EKG P AXIS: 40 DEGREES
EKG P-R INTERVAL: 160 MS
EKG Q-T INTERVAL: 438 MS
EKG QRS DURATION: 90 MS
EKG QTC CALCULATION (BAZETT): 459 MS
EKG R AXIS: 9 DEGREES
EKG T AXIS: 57 DEGREES
EKG VENTRICULAR RATE: 66 BPM
ERYTHROCYTE [DISTWIDTH] IN BLOOD BY AUTOMATED COUNT: 12.7 % (ref 11.5–14.5)
ERYTHROCYTE [DISTWIDTH] IN BLOOD BY AUTOMATED COUNT: 47.8 FL (ref 35–45)
GFR SERPL CREATININE-BSD FRML MDRD: > 90 ML/MIN/1.73M2
GLUCOSE BLD-MCNC: 132 MG/DL (ref 70–108)
HCT VFR BLD CALC: 40.2 % (ref 42–52)
HDLC SERPL-MCNC: 38 MG/DL
HEMOGLOBIN: 12.8 GM/DL (ref 14–18)
INR BLD: 0.97 (ref 0.85–1.13)
LDL CHOLESTEROL CALCULATED: 81 MG/DL
MCH RBC QN AUTO: 32.3 PG (ref 26–33)
MCHC RBC AUTO-ENTMCNC: 31.8 GM/DL (ref 32.2–35.5)
MCV RBC AUTO: 101.5 FL (ref 80–94)
PLATELET # BLD: 290 THOU/MM3 (ref 130–400)
PMV BLD AUTO: 9.9 FL (ref 9.4–12.4)
POTASSIUM REFLEX MAGNESIUM: 4.1 MEQ/L (ref 3.5–5.2)
RBC # BLD: 3.96 MILL/MM3 (ref 4.7–6.1)
RH FACTOR: NORMAL
SODIUM BLD-SCNC: 139 MEQ/L (ref 135–145)
TRIGL SERPL-MCNC: 197 MG/DL (ref 0–199)
WBC # BLD: 9 THOU/MM3 (ref 4.8–10.8)

## 2020-06-19 PROCEDURE — 85027 COMPLETE CBC AUTOMATED: CPT

## 2020-06-19 PROCEDURE — 36415 COLL VENOUS BLD VENIPUNCTURE: CPT

## 2020-06-19 PROCEDURE — C1725 CATH, TRANSLUMIN NON-LASER: HCPCS

## 2020-06-19 PROCEDURE — 6370000000 HC RX 637 (ALT 250 FOR IP): Performed by: NURSE PRACTITIONER

## 2020-06-19 PROCEDURE — 2709999900 HC NON-CHARGEABLE SUPPLY

## 2020-06-19 PROCEDURE — 93005 ELECTROCARDIOGRAM TRACING: CPT | Performed by: NURSE PRACTITIONER

## 2020-06-19 PROCEDURE — 85610 PROTHROMBIN TIME: CPT

## 2020-06-19 PROCEDURE — 6360000002 HC RX W HCPCS

## 2020-06-19 PROCEDURE — 2500000003 HC RX 250 WO HCPCS

## 2020-06-19 PROCEDURE — 93458 L HRT ARTERY/VENTRICLE ANGIO: CPT | Performed by: NUCLEAR MEDICINE

## 2020-06-19 PROCEDURE — 86900 BLOOD TYPING SEROLOGIC ABO: CPT

## 2020-06-19 PROCEDURE — 6360000004 HC RX CONTRAST MEDICATION: Performed by: NUCLEAR MEDICINE

## 2020-06-19 PROCEDURE — 80048 BASIC METABOLIC PNL TOTAL CA: CPT

## 2020-06-19 PROCEDURE — 86901 BLOOD TYPING SEROLOGIC RH(D): CPT

## 2020-06-19 PROCEDURE — 80061 LIPID PANEL: CPT

## 2020-06-19 PROCEDURE — C1894 INTRO/SHEATH, NON-LASER: HCPCS

## 2020-06-19 PROCEDURE — 86850 RBC ANTIBODY SCREEN: CPT

## 2020-06-19 PROCEDURE — C1769 GUIDE WIRE: HCPCS

## 2020-06-19 PROCEDURE — 85730 THROMBOPLASTIN TIME PARTIAL: CPT

## 2020-06-19 PROCEDURE — 2580000003 HC RX 258: Performed by: NURSE PRACTITIONER

## 2020-06-19 RX ORDER — SODIUM CHLORIDE 9 MG/ML
INJECTION, SOLUTION INTRAVENOUS CONTINUOUS
Status: DISCONTINUED | OUTPATIENT
Start: 2020-06-19 | End: 2020-06-19 | Stop reason: SDUPTHER

## 2020-06-19 RX ORDER — ACETAMINOPHEN 325 MG/1
650 TABLET ORAL EVERY 4 HOURS PRN
Status: DISCONTINUED | OUTPATIENT
Start: 2020-06-19 | End: 2020-06-20 | Stop reason: HOSPADM

## 2020-06-19 RX ORDER — ATROPINE SULFATE 0.4 MG/ML
0.5 AMPUL (ML) INJECTION
Status: DISCONTINUED | OUTPATIENT
Start: 2020-06-19 | End: 2020-06-19 | Stop reason: HOSPADM

## 2020-06-19 RX ORDER — DIPHENHYDRAMINE HYDROCHLORIDE 50 MG/ML
50 INJECTION INTRAMUSCULAR; INTRAVENOUS ONCE
Status: DISCONTINUED | OUTPATIENT
Start: 2020-06-19 | End: 2020-06-20 | Stop reason: HOSPADM

## 2020-06-19 RX ORDER — SODIUM CHLORIDE 0.9 % (FLUSH) 0.9 %
10 SYRINGE (ML) INJECTION PRN
Status: DISCONTINUED | OUTPATIENT
Start: 2020-06-19 | End: 2020-06-19 | Stop reason: SDUPTHER

## 2020-06-19 RX ORDER — SODIUM CHLORIDE 0.9 % (FLUSH) 0.9 %
10 SYRINGE (ML) INJECTION EVERY 12 HOURS SCHEDULED
Status: DISCONTINUED | OUTPATIENT
Start: 2020-06-19 | End: 2020-06-19 | Stop reason: SDUPTHER

## 2020-06-19 RX ORDER — SODIUM CHLORIDE 0.9 % (FLUSH) 0.9 %
10 SYRINGE (ML) INJECTION PRN
Status: DISCONTINUED | OUTPATIENT
Start: 2020-06-19 | End: 2020-06-20 | Stop reason: HOSPADM

## 2020-06-19 RX ORDER — ASPIRIN 325 MG
325 TABLET ORAL ONCE
Status: COMPLETED | OUTPATIENT
Start: 2020-06-19 | End: 2020-06-19

## 2020-06-19 RX ORDER — SODIUM CHLORIDE 0.9 % (FLUSH) 0.9 %
10 SYRINGE (ML) INJECTION EVERY 12 HOURS SCHEDULED
Status: DISCONTINUED | OUTPATIENT
Start: 2020-06-19 | End: 2020-06-20 | Stop reason: HOSPADM

## 2020-06-19 RX ORDER — SODIUM CHLORIDE 9 MG/ML
INJECTION, SOLUTION INTRAVENOUS CONTINUOUS
Status: DISCONTINUED | OUTPATIENT
Start: 2020-06-19 | End: 2020-06-20 | Stop reason: HOSPADM

## 2020-06-19 RX ORDER — NITROGLYCERIN 0.4 MG/1
0.4 TABLET SUBLINGUAL EVERY 5 MIN PRN
Status: DISCONTINUED | OUTPATIENT
Start: 2020-06-19 | End: 2020-06-20 | Stop reason: HOSPADM

## 2020-06-19 RX ADMIN — ASPIRIN 325 MG: 325 TABLET, FILM COATED ORAL at 15:17

## 2020-06-19 RX ADMIN — IOPAMIDOL 130 ML: 755 INJECTION, SOLUTION INTRAVENOUS at 17:02

## 2020-06-19 RX ADMIN — SODIUM CHLORIDE: 9 INJECTION, SOLUTION INTRAVENOUS at 14:54

## 2020-06-19 ASSESSMENT — PAIN SCALES - GENERAL
PAINLEVEL_OUTOF10: 0
PAINLEVEL_OUTOF10: 0

## 2020-06-19 NOTE — H&P
hydrocortisone sodium succinate PF (SOLU-CORTEF) injection 200 mg, 200 mg, Intravenous, Once, FINN Lopez CNP    nitroGLYCERIN (NITROSTAT) SL tablet 0.4 mg, 0.4 mg, Sublingual, Q5 Min PRN, FINN Lopez CNP    sodium chloride flush 0.9 % injection 10 mL, 10 mL, Intravenous, 2 times per day, FINN Lopez CNP    sodium chloride flush 0.9 % injection 10 mL, 10 mL, Intravenous, PRN, FINN Lopez CNP  Prior to Admission medications    Medication Sig Start Date End Date Taking?  Authorizing Provider   Cholecalciferol (VITAMIN D3) 50 MCG (2000 UT) CAPS Take by mouth three times daily   Yes Historical Provider, MD   pioglitazone (ACTOS) 30 MG tablet Take 30 mg by mouth daily   Yes Historical Provider, MD   Cyanocobalamin (VITAMIN B-12 IJ) Inject as directed every 30 days   Yes Historical Provider, MD   glimepiride (AMARYL) 2 MG tablet Take 2 mg by mouth every morning (before breakfast)   Yes Historical Provider, MD   metoprolol succinate (TOPROL XL) 100 MG extended release tablet Take 100 mg by mouth 2 times daily   Yes Historical Provider, MD   lisinopril (PRINIVIL;ZESTRIL) 20 MG tablet Take 20 mg by mouth 2 times daily   Yes Historical Provider, MD   simvastatin (ZOCOR) 40 MG tablet Take 40 mg by mouth nightly   Yes Historical Provider, MD   cloNIDine (CATAPRES) 0.3 MG tablet Take 0.3 mg by mouth 2 times daily   Yes Historical Provider, MD   metFORMIN (GLUCOPHAGE-XR) 750 MG extended release tablet Take 750 mg by mouth 2 times daily 2 tab   Yes Historical Provider, MD     Additional information:       VITAL SIGNS   Vitals:    06/19/20 1456   SpO2: 96%       PHYSICAL:   General: No acute distress  HEENT:  Unremarkable for age  Neck: without increased JVD, carotid pulses 2+ bilaterally without bruits  Heart: RRR, S1 & S2 WNL, S4 gallop, without murmurs or rubs    Lungs: Clear to auscultation    Abdomen: BS present, without HSM, masses, or tenderness    Extremities: without C,C,E.  Pulses 2+ bilaterally  Mental Status: Alert & Oriented        PLANNED PROCEDURE   [x]Cath  []PCI                []Pacemaker/AICD  []RAS             []Cardioversion []Peripheral angiography/PTA  []Other:      SEDATION  Planned agent:[x]Midazolam []Meperidine [x]Sublimaze []Morphine  []Diazepam  []Other:     ASA Classification:  []1 [x]2 []3 []4 []5  Class 1: A normal healthy patient  Class 2: Pt with mild to moderate systemic disease  Class 3: Severe systemic disease or disturbance  Class 4: Severe systemic disorders that are already life threatening. Class 5: Moribund pt with little chances of survival, for more than 24 hours. Mallampati I Airway Classification:   []1 [x]2 []3 []4    [x]Pre-procedure diagnostic studies complete and results available. Comment:    [x]Previous sedation/anesthesia experiences assessed. Comment:    [x]The patient is an appropriate candidate to undergo the planned procedure sedation and anesthesia. (Refer to nursing sedation/analgesia documentation record)  [x]Formulation and discussion of sedation/procedure plan, risks, and expectations with patient and/or responsible adult completed. [x]Patient examined immediately prior to the procedure.  (Refer to nursing sedation/analgesia documentation record)    Allyson Ortega MD [facility]  Electronically signed 6/19/2020 at 3:29 PM

## 2020-06-19 NOTE — PROCEDURES
stenosis which is  possibly moderate 50% to 60%. 2.  Normal LV function. 3.  No complications. RECOMMENDATIONS:  At this point, case discussed at length with  interventional team, Dr. Geovanna Lopez was present. It was felt that the LAD  disease was not significantly diseased, especially that there was no  significant ischemia in the LAD territory on the stress test.  At this  point, the plan was to continue with medical therapy, give the patient  clearance for his surgery, reassess clinically and decide accordingly. He will be considered for further assessment of the LAD if clinically  indicated.         Will Link M.D.    D: 06/19/2020 17:01:40       T: 06/19/2020 17:11:00     [email or web address]  Job#: [id number]     Doc#: [id number]    CC:

## 2020-06-22 ENCOUNTER — TELEPHONE (OUTPATIENT)
Dept: CARDIOLOGY CLINIC | Age: 85
End: 2020-06-22

## 2020-06-22 NOTE — TELEPHONE ENCOUNTER
Patient had a heart cath 06/19/20 and daughter Matias López is requesting a follow up appointment.  Please advise

## 2020-07-02 ENCOUNTER — TELEPHONE (OUTPATIENT)
Dept: NEUROLOGY | Age: 85
End: 2020-07-02

## 2020-07-02 ENCOUNTER — OFFICE VISIT (OUTPATIENT)
Dept: CARDIOLOGY CLINIC | Age: 85
End: 2020-07-02
Payer: MEDICARE

## 2020-07-02 VITALS
WEIGHT: 199.8 LBS | BODY MASS INDEX: 29.59 KG/M2 | DIASTOLIC BLOOD PRESSURE: 70 MMHG | HEIGHT: 69 IN | SYSTOLIC BLOOD PRESSURE: 124 MMHG | HEART RATE: 66 BPM

## 2020-07-02 PROCEDURE — 99213 OFFICE O/P EST LOW 20 MIN: CPT | Performed by: NUCLEAR MEDICINE

## 2020-07-02 NOTE — TELEPHONE ENCOUNTER
[facility], pt's referral is deferred due to COVID, LM for pt to call back to let us know if he would like us to send referral somewhere else.

## 2020-07-02 NOTE — PROGRESS NOTES
[address] [facility],[place] [address]  [address] [facility] [address]  [facility] [place] [address]  Dept: [phone]  Dept Fax: [phone]  Loc: [phone]    Visit Date: 2020    Kaylyn Felipe is a 81 y.o. male who presents todayfor:  Chief Complaint   Patient presents with    Coronary Artery Disease    Hypertension    Hyperlipidemia   had a cath   Moderate CAD in the LAD   No stents  Going for prostate surgery   No cath complications  Does have Dm and HTN   Numbers are stable  No dizziness  No syncope        HPI:  HPI  Past Medical History:   Diagnosis Date    [facility]     DM [facility]diabetes mellitus) [facility]     Hyperlipidemia     Hypertension     Stroke ([facility]       Past Surgical History:   Procedure Laterality Date    CARDIOVASCULAR STRESS TEST      EYE SURGERY      KNEE ARTHROPLASTY Right      Family History   Problem Relation Age of Onset    Heart Disease Father     Heart Attack Father     High Blood Pressure Sister     Cancer Neg Hx     Diabetes Neg Hx     Stroke Neg Hx      Social History     Tobacco Use    Smoking status: Former Smoker     Types: Pipe     Last attempt to quit: 1980     Years since quittin.5    Smokeless tobacco: Never Used   Substance Use Topics    Alcohol use: Yes     Comment: rare      Current Outpatient Medications   Medication Sig Dispense Refill    Cholecalciferol (VITAMIN D3) 50 MCG (2000) CAPS Take by mouth three times daily      pioglitazone (ACTOS) 30 MG tablet Take 30 mg by mouth daily      Cyanocobalamin (VITAMIN B-12 IJ) Inject as directed every 30 days      glimepiride (AMARYL) 2 MG tablet Take 2 mg by mouth every morning (before breakfast)      metoprolol succinate (TOPROL XL) 100 MG extended release tablet Take 100 mg by mouth 2 times daily      lisinopril (PRINIVIL;ZESTRIL) 20 MG tablet Take 20 mg by mouth 2 times daily      simvastatin (ZOCOR) 40 MG tablet Take 40 mg by mouth modification and medical management  Thank you for allowing me to participate in the care of your patient. Please don't hesitate to contact me regarding any further issues related to the patient care    Orders Placed:  No orders of the defined types were placed in this encounter. Medications Prescribed:  No orders of the defined types were placed in this encounter. Discussed use, benefit, and side effects of prescribed medications. All patient questions answered. Pt voicedunderstanding. Instructed to continue current medications, diet and exercise. Continue risk factor modification and medical management. Patient agreed with treatment plan. Follow up as directed.     Electronically signedby Pema Borjas MD on 7/2/2020 at 9:48 AM

## 2020-07-13 ENCOUNTER — TELEPHONE (OUTPATIENT)
Dept: UROLOGY | Age: 85
End: 2020-07-13

## 2020-07-13 NOTE — TELEPHONE ENCOUNTER
SURGERY [address], [address]      Phone *[phone] *[phone]   Surgical Scheduling Direct Line Phone *[phone] Fax *[phone]      [facility] Guard 1935 male    [address]  [place] [address]   Marital Status:          Home Phone: [phone]      Cell Phone:    Telephone Information:   Mobile [phone]          Surgeon: Dr. Phuong Gomez  Surgery Date: 08-   Time: Luc Yeboah     Procedure: Cystoscopy, Greenlight photovaporization of prostate    Diagnosis: Urinary frequency/ Incomplete bladder emptying     Important Medical History: In [facility]    Special Inst/Equip: NATALIE Farmer Conf# [id number] per Regis Mcgrath    CPT Codes:    36501  Latex Allergy:  No     Cardiac Device:  No     Anesthesia:  Anesthesiologist (General/Spinal)          Admission Type:  Same Day                             Admit Prior to Day of Surgery: No    Case Location:  Main OR           Preadmission Testing:Phone Call              PAT Date and Time:______________________________________________________    PAT Confirmation #: ______________________________________________________    Post Op Visit: ___________________________________________________________    Need Preop Cardiac Clearance: Yes    Does Patient have Cardiologist/physician?      Done per Dennie Beals    Surgery Confirmation #: __________________________________________________    Keisha Sharp: ________________________   Date: __________________________     [employer] Name: [facility]

## 2020-07-13 NOTE — TELEPHONE ENCOUNTER
DO NOT TAKE ASPIRIN, MULTIVITAMINS, IBUPROFEN OR MOTRIN-LIKE DRUGS 5 DAYS PRIOR TO SURGERY AND 3 DAYS FOLLOWING    HOLD YOUR GLIMEPIRIDE THE MORNING OF SURGERY     Cosme Champagne 1935 Diagnosis: Incomplete bladder emptying/ urinary frequency     Surgical Physician: Dr. Rocha Anchors have been scheduled for the procedure marked below:      Surgery: Cystoscopy, Greenlight photovaporization of prostate         Date: 08-     Anesthesia: Anesthesiologist (General/Spinal)     Place of Service: [address] [place] [address] 49 Second Floor Same Day Surgery           Arrive to same day surgery by:  8:45am  (Surgery time is subject to change)      INSTRUCTIONS AS MARKED BELOW:    1. DO NOT eat or drink anything after midnight before surgery. 2. We prefer you shower or bathe with an antibacterial soap (Dial) the morning of surgery. 3.  Please ensure to have a  with you to transport you home. 4.  Please bring a current medication list, photo ID and insurance card(s) with you  5. Okay to take Tylenol  6. If you take Glucophage, Metformin or Janumet, hold 48-hours prior to surgery  7. Take blood pressure or heart medication as directed, if taken in the morning take with a small sip of water  8. PLEASE BRING THIS LETTER WITH YOU AND SHOW IT TO THE  AT [place] [address]. 9.  You are scheduled for an appointment to have your catheter removed on 08- at 10:15am.     On 07- you will need to report to [facility] to have a Covid-19 screening and urinalysis done.     (Covid-19 screening is date sensitive and can only be done 5 to 7 days prior to your procedure)    Date: 7/13/2020

## 2020-07-13 NOTE — TELEPHONE ENCOUNTER
Patient is scheduled for surgery with Dr. Joellen Sandifer on 08- at 10:45am with an arrival of 8:45am.  Preop orders and instructions mailed to patient.   Surgery consent to be signed on arrival.

## 2020-07-17 ENCOUNTER — TELEPHONE (OUTPATIENT)
Dept: UROLOGY | Age: 85
End: 2020-07-17

## 2020-07-17 ENCOUNTER — OFFICE VISIT (OUTPATIENT)
Dept: UROLOGY | Age: 85
End: 2020-07-17
Payer: MEDICARE

## 2020-07-17 ENCOUNTER — APPOINTMENT (OUTPATIENT)
Dept: GENERAL RADIOLOGY | Age: 85
DRG: 871 | End: 2020-07-17
Payer: MEDICARE

## 2020-07-17 ENCOUNTER — HOSPITAL ENCOUNTER (INPATIENT)
Age: 85
LOS: 6 days | Discharge: HOME HEALTH | DRG: 871 | End: 2020-07-23
Attending: EMERGENCY MEDICINE | Admitting: OPHTHALMOLOGY
Payer: MEDICARE

## 2020-07-17 VITALS — BODY MASS INDEX: 30.31 KG/M2 | TEMPERATURE: 97.9 F | WEIGHT: 193.1 LBS | HEIGHT: 67 IN

## 2020-07-17 PROBLEM — A41.9 SEPSIS SECONDARY TO UTI (HCC): Status: ACTIVE | Noted: 2020-07-17

## 2020-07-17 PROBLEM — R41.0 DELIRIOUS: Status: ACTIVE | Noted: 2020-07-17

## 2020-07-17 PROBLEM — R79.89 ELEVATED LACTIC ACID LEVEL: Status: ACTIVE | Noted: 2020-07-17

## 2020-07-17 PROBLEM — N39.0 SEPSIS SECONDARY TO UTI (HCC): Status: ACTIVE | Noted: 2020-07-17

## 2020-07-17 LAB
ALBUMIN SERPL-MCNC: 4 G/DL (ref 3.5–5.1)
ALP BLD-CCNC: 82 U/L (ref 38–126)
ALT SERPL-CCNC: 15 U/L (ref 11–66)
ANION GAP SERPL CALCULATED.3IONS-SCNC: 17 MEQ/L (ref 8–16)
AST SERPL-CCNC: 23 U/L (ref 5–40)
BACTERIA: ABNORMAL /HPF
BASE EXCESS (CALCULATED): -2.2 MMOL/L (ref -2.5–2.5)
BASOPHILS # BLD: 0.1 %
BASOPHILS ABSOLUTE: 0 THOU/MM3 (ref 0–0.1)
BILIRUB SERPL-MCNC: 0.5 MG/DL (ref 0.3–1.2)
BILIRUBIN DIRECT: 0.3 MG/DL (ref 0–0.3)
BILIRUBIN URINE: NEGATIVE
BLOOD, URINE: ABNORMAL
BUN BLDV-MCNC: 29 MG/DL (ref 7–22)
C-REACTIVE PROTEIN: 1.66 MG/DL (ref 0–1)
CALCIUM SERPL-MCNC: 9.3 MG/DL (ref 8.5–10.5)
CASTS 2: ABNORMAL /LPF
CASTS UA: ABNORMAL /LPF
CHARACTER, URINE: ABNORMAL
CHLORIDE BLD-SCNC: 101 MEQ/L (ref 98–111)
CO2: 19 MEQ/L (ref 23–33)
COLLECTED BY:: ABNORMAL
COLOR: YELLOW
CREAT SERPL-MCNC: 1.1 MG/DL (ref 0.4–1.2)
CRYSTALS, UA: ABNORMAL
DEVICE: ABNORMAL
EKG ATRIAL RATE: 144 BPM
EKG Q-T INTERVAL: 350 MS
EKG QRS DURATION: 70 MS
EKG QTC CALCULATION (BAZETT): 547 MS
EKG R AXIS: -9 DEGREES
EKG T AXIS: 54 DEGREES
EKG VENTRICULAR RATE: 147 BPM
EOSINOPHIL # BLD: 0.2 %
EOSINOPHILS ABSOLUTE: 0 THOU/MM3 (ref 0–0.4)
EPITHELIAL CELLS, UA: ABNORMAL /HPF
ERYTHROCYTE [DISTWIDTH] IN BLOOD BY AUTOMATED COUNT: 13.4 % (ref 11.5–14.5)
ERYTHROCYTE [DISTWIDTH] IN BLOOD BY AUTOMATED COUNT: 49.8 FL (ref 35–45)
GFR SERPL CREATININE-BSD FRML MDRD: 64 ML/MIN/1.73M2
GLUCOSE BLD-MCNC: 160 MG/DL (ref 70–108)
GLUCOSE URINE: NEGATIVE MG/DL
HCO3: 21 MMOL/L (ref 23–28)
HCT VFR BLD CALC: 38.7 % (ref 42–52)
HEMOCCULT STL QL: POSITIVE
HEMOGLOBIN: 12.3 GM/DL (ref 14–18)
IFIO2: 2
IMMATURE GRANS (ABS): 0.03 THOU/MM3 (ref 0–0.07)
IMMATURE GRANULOCYTES: 0.3 %
KETONES, URINE: NEGATIVE
LACTIC ACID: 4 MMOL/L (ref 0.5–2.2)
LEUKOCYTE ESTERASE, URINE: ABNORMAL
LIPASE: 132.4 U/L (ref 5.6–51.3)
LYMPHOCYTES # BLD: 4 %
LYMPHOCYTES ABSOLUTE: 0.4 THOU/MM3 (ref 1–4.8)
MAGNESIUM: 1.7 MG/DL (ref 1.6–2.4)
MCH RBC QN AUTO: 32.4 PG (ref 26–33)
MCHC RBC AUTO-ENTMCNC: 31.8 GM/DL (ref 32.2–35.5)
MCV RBC AUTO: 101.8 FL (ref 80–94)
MISCELLANEOUS 2: ABNORMAL
MONOCYTES # BLD: 0.6 %
MONOCYTES ABSOLUTE: 0.1 THOU/MM3 (ref 0.4–1.3)
NITRITE, URINE: POSITIVE
NUCLEATED RED BLOOD CELLS: 0 /100 WBC
O2 SATURATION: 96 %
OSMOLALITY CALCULATION: 283.1 MOSMOL/KG (ref 275–300)
PCO2: 29 MMHG (ref 35–45)
PH BLOOD GAS: 7.46 (ref 7.35–7.45)
PH UA: 5 (ref 5–9)
PLATELET # BLD: 361 THOU/MM3 (ref 130–400)
PMV BLD AUTO: 9.4 FL (ref 9.4–12.4)
PO2: 74 MMHG (ref 71–104)
POST VOID RESIDUAL (PVR): 208 ML
POTASSIUM SERPL-SCNC: 3.9 MEQ/L (ref 3.5–5.2)
PROCALCITONIN: 1.51 NG/ML (ref 0.01–0.09)
PROTEIN UA: 100
RBC # BLD: 3.8 MILL/MM3 (ref 4.7–6.1)
RBC URINE: > 200 /HPF
RENAL EPITHELIAL, UA: ABNORMAL
SARS-COV-2, NAAT: NOT DETECTED
SEG NEUTROPHILS: 94.8 %
SEGMENTED NEUTROPHILS ABSOLUTE COUNT: 9.3 THOU/MM3 (ref 1.8–7.7)
SODIUM BLD-SCNC: 137 MEQ/L (ref 135–145)
SPECIFIC GRAVITY, URINE: 1.01 (ref 1–1.03)
TOTAL PROTEIN: 7.4 G/DL (ref 6.1–8)
TROPONIN T: < 0.01 NG/ML
UROBILINOGEN, URINE: 1 EU/DL (ref 0–1)
WBC # BLD: 9.8 THOU/MM3 (ref 4.8–10.8)
WBC UA: > 200 /HPF
YEAST: ABNORMAL

## 2020-07-17 PROCEDURE — 51798 US URINE CAPACITY MEASURE: CPT | Performed by: UROLOGY

## 2020-07-17 PROCEDURE — 87077 CULTURE AEROBIC IDENTIFY: CPT

## 2020-07-17 PROCEDURE — U0002 COVID-19 LAB TEST NON-CDC: HCPCS

## 2020-07-17 PROCEDURE — 71045 X-RAY EXAM CHEST 1 VIEW: CPT

## 2020-07-17 PROCEDURE — 84484 ASSAY OF TROPONIN QUANT: CPT

## 2020-07-17 PROCEDURE — 6360000002 HC RX W HCPCS: Performed by: EMERGENCY MEDICINE

## 2020-07-17 PROCEDURE — 87186 SC STD MICRODIL/AGAR DIL: CPT

## 2020-07-17 PROCEDURE — 99285 EMERGENCY DEPT VISIT HI MDM: CPT

## 2020-07-17 PROCEDURE — 93005 ELECTROCARDIOGRAM TRACING: CPT | Performed by: NURSE PRACTITIONER

## 2020-07-17 PROCEDURE — 83735 ASSAY OF MAGNESIUM: CPT

## 2020-07-17 PROCEDURE — 87500 VANOMYCIN DNA AMP PROBE: CPT

## 2020-07-17 PROCEDURE — 82803 BLOOD GASES ANY COMBINATION: CPT

## 2020-07-17 PROCEDURE — 36415 COLL VENOUS BLD VENIPUNCTURE: CPT

## 2020-07-17 PROCEDURE — 36600 WITHDRAWAL OF ARTERIAL BLOOD: CPT

## 2020-07-17 PROCEDURE — 87641 MR-STAPH DNA AMP PROBE: CPT

## 2020-07-17 PROCEDURE — 87081 CULTURE SCREEN ONLY: CPT

## 2020-07-17 PROCEDURE — 6370000000 HC RX 637 (ALT 250 FOR IP): Performed by: NURSE PRACTITIONER

## 2020-07-17 PROCEDURE — 99223 1ST HOSP IP/OBS HIGH 75: CPT | Performed by: OPHTHALMOLOGY

## 2020-07-17 PROCEDURE — 2060000000 HC ICU INTERMEDIATE R&B

## 2020-07-17 PROCEDURE — 83690 ASSAY OF LIPASE: CPT

## 2020-07-17 PROCEDURE — 2580000003 HC RX 258: Performed by: EMERGENCY MEDICINE

## 2020-07-17 PROCEDURE — 99213 OFFICE O/P EST LOW 20 MIN: CPT | Performed by: UROLOGY

## 2020-07-17 PROCEDURE — 81001 URINALYSIS AUTO W/SCOPE: CPT

## 2020-07-17 PROCEDURE — 86140 C-REACTIVE PROTEIN: CPT

## 2020-07-17 PROCEDURE — 83605 ASSAY OF LACTIC ACID: CPT

## 2020-07-17 PROCEDURE — 82272 OCCULT BLD FECES 1-3 TESTS: CPT

## 2020-07-17 PROCEDURE — 83615 LACTATE (LD) (LDH) ENZYME: CPT

## 2020-07-17 PROCEDURE — 87086 URINE CULTURE/COLONY COUNT: CPT

## 2020-07-17 PROCEDURE — 82248 BILIRUBIN DIRECT: CPT

## 2020-07-17 PROCEDURE — 84145 PROCALCITONIN (PCT): CPT

## 2020-07-17 PROCEDURE — 87040 BLOOD CULTURE FOR BACTERIA: CPT

## 2020-07-17 PROCEDURE — 80053 COMPREHEN METABOLIC PANEL: CPT

## 2020-07-17 PROCEDURE — 96365 THER/PROPH/DIAG IV INF INIT: CPT

## 2020-07-17 PROCEDURE — 51701 INSERT BLADDER CATHETER: CPT

## 2020-07-17 PROCEDURE — 87801 DETECT AGNT MULT DNA AMPLI: CPT

## 2020-07-17 PROCEDURE — 85025 COMPLETE CBC W/AUTO DIFF WBC: CPT

## 2020-07-17 PROCEDURE — 2580000003 HC RX 258: Performed by: NURSE PRACTITIONER

## 2020-07-17 PROCEDURE — 2580000003 HC RX 258: Performed by: OPHTHALMOLOGY

## 2020-07-17 RX ORDER — 0.9 % SODIUM CHLORIDE 0.9 %
500 INTRAVENOUS SOLUTION INTRAVENOUS ONCE
Status: COMPLETED | OUTPATIENT
Start: 2020-07-17 | End: 2020-07-17

## 2020-07-17 RX ORDER — IPRATROPIUM BROMIDE AND ALBUTEROL SULFATE 2.5; .5 MG/3ML; MG/3ML
1 SOLUTION RESPIRATORY (INHALATION)
Status: DISCONTINUED | OUTPATIENT
Start: 2020-07-18 | End: 2020-07-23 | Stop reason: HOSPADM

## 2020-07-17 RX ORDER — 0.9 % SODIUM CHLORIDE 0.9 %
30 INTRAVENOUS SOLUTION INTRAVENOUS ONCE
Status: COMPLETED | OUTPATIENT
Start: 2020-07-17 | End: 2020-07-17

## 2020-07-17 RX ORDER — ACETAMINOPHEN 650 MG/1
650 SUPPOSITORY RECTAL ONCE
Status: COMPLETED | OUTPATIENT
Start: 2020-07-17 | End: 2020-07-17

## 2020-07-17 RX ORDER — ACETAMINOPHEN 650 MG/1
SUPPOSITORY RECTAL
Status: DISPENSED
Start: 2020-07-17 | End: 2020-07-18

## 2020-07-17 RX ORDER — SODIUM CHLORIDE 9 MG/ML
INJECTION, SOLUTION INTRAVENOUS CONTINUOUS
Status: DISCONTINUED | OUTPATIENT
Start: 2020-07-17 | End: 2020-07-20

## 2020-07-17 RX ORDER — DIAPER,BRIEF,INFANT-TODD,DISP
EACH MISCELLANEOUS
Status: DISPENSED
Start: 2020-07-17 | End: 2020-07-18

## 2020-07-17 RX ORDER — LISINOPRIL 10 MG/1
20 TABLET ORAL 2 TIMES DAILY
Status: DISCONTINUED | OUTPATIENT
Start: 2020-07-17 | End: 2020-07-23 | Stop reason: HOSPADM

## 2020-07-17 RX ORDER — ACETAMINOPHEN 325 MG/1
650 TABLET ORAL EVERY 4 HOURS PRN
Status: DISCONTINUED | OUTPATIENT
Start: 2020-07-17 | End: 2020-07-23 | Stop reason: HOSPADM

## 2020-07-17 RX ORDER — ONDANSETRON 2 MG/ML
4 INJECTION INTRAMUSCULAR; INTRAVENOUS EVERY 6 HOURS PRN
Status: DISCONTINUED | OUTPATIENT
Start: 2020-07-17 | End: 2020-07-23 | Stop reason: HOSPADM

## 2020-07-17 RX ORDER — METOPROLOL SUCCINATE 100 MG/1
100 TABLET, EXTENDED RELEASE ORAL 2 TIMES DAILY
Status: DISCONTINUED | OUTPATIENT
Start: 2020-07-17 | End: 2020-07-23 | Stop reason: HOSPADM

## 2020-07-17 RX ADMIN — ACETAMINOPHEN 650 MG: 650 SUPPOSITORY RECTAL at 20:48

## 2020-07-17 RX ADMIN — SODIUM CHLORIDE 1983 ML: 900 INJECTION, SOLUTION INTRAVENOUS at 21:26

## 2020-07-17 RX ADMIN — CEFEPIME HYDROCHLORIDE 2 G: 2 INJECTION, POWDER, FOR SOLUTION INTRAVENOUS at 21:25

## 2020-07-17 RX ADMIN — SODIUM CHLORIDE: 9 INJECTION, SOLUTION INTRAVENOUS at 23:18

## 2020-07-17 RX ADMIN — SODIUM CHLORIDE 500 ML: 900 INJECTION, SOLUTION INTRAVENOUS at 20:16

## 2020-07-17 ASSESSMENT — PAIN SCALES - GENERAL
PAINLEVEL_OUTOF10: 0

## 2020-07-17 NOTE — PROGRESS NOTES
Tyra Loo MD        [address] [place], [place] [address] [address] [place] [address]  Dept: [phone]  Dept Fax: [phone]: [address] [place] [address] Urology Office Note -     Patient:  Lila Houston  YOB: 1935    The patient is a 81 y.o. male who presents today for evaluation of the following problems: difficulty urinating  Chief Complaint   Patient presents with    Follow-up     unable to urinate- patient states he has urinated since he called         HISTORY OF PRESENT ILLNESS:     Difficulty urinating  Onset was months ago  Overall, the problem(s) are worse. Severity is described as moderate. Associated Symptoms: No dysuria, no gross hematuria. Current Pain Severity: 4        PVR today is 208 mL. Patient has a Greenlight scheduled for 8/4/2020. We will send urine for culture today. Patient is experiencing abdominal pain. We will give wife some straight catheters to do when he feels discomfort. We will teach her how to do that today. Requested/reviewed records from Duy West DO office and/or outside [de-identified]    (Patient's old records have been requested, reviewed and pertinent findings summarized in today's note.)    Procedures Today: N/A    Last several PSA's:  Lab Results   Component Value Date    PSA 0.88 05/19/2020       Last total testosterone:  No results found for: TESTOSTERONE    Urinalysis today:  Results for POC orders placed in visit on 07/17/20   poct post void residual   Result Value Ref Range    post void residual 208 ml       Last BUN and creatinine:  Lab Results   Component Value Date    BUN 14 06/19/2020     Lab Results   Component Value Date    CREATININE 0.6 06/19/2020       Imaging Reviewed during this Office Visit:   Tyra Loo MD independently reviewed the images and verified the radiology reports from:    No results found.     PAST MEDICAL, FAMILY AND SOCIAL negative  Eyes: negative  Respiratory: negative  Cardiovascular: negative  Gastrointestinal: negative  Genitourinary: see HPI  Musculoskeletal: negative  Skin: negative   Neurological: negative  Hematological/Lymphatic: negative  Psychological: negative          Physical Exam:    This a 81 y.o. male  Vitals:    07/17/20 1146   Temp: 97.9 °F (36.6 °C)     Body mass index is 30.24 kg/m². Constitutional: Patient in no acute distress;       Assessment and Plan        1. Urinary retention               Plan:        Urinary retention send urine for culture. Hold off on jorge today. Teach wife self cath today for pt when he feels discomfort. Continue with Greenlight on 8/4/2020        Prescriptions Ordered:  No orders of the defined types were placed in this encounter.      Orders Placed:  Orders Placed This Encounter   Procedures    poct post void residual     Bladder scan            PATRICK Dale MD

## 2020-07-17 NOTE — ED NOTES
Bed: 015A  Expected date: 7/17/20  Expected time: 7:35 PM  Means of arrival: [facility]  Comments:     Jacey Manuel RN  07/17/20 1939

## 2020-07-18 PROBLEM — N40.0 BPH (BENIGN PROSTATIC HYPERPLASIA): Status: ACTIVE | Noted: 2020-07-18

## 2020-07-18 PROBLEM — D72.829 LEUKOCYTOSIS: Status: ACTIVE | Noted: 2020-07-18

## 2020-07-18 PROBLEM — R33.8 URINARY RETENTION DUE TO BENIGN PROSTATIC HYPERPLASIA: Status: ACTIVE | Noted: 2020-07-18

## 2020-07-18 PROBLEM — N40.1 URINARY RETENTION DUE TO BENIGN PROSTATIC HYPERPLASIA: Status: ACTIVE | Noted: 2020-07-18

## 2020-07-18 LAB
ACINETOBACTER BAUMANNII FILM ARRAY: NOT DETECTED
ANION GAP SERPL CALCULATED.3IONS-SCNC: 14 MEQ/L (ref 8–16)
BOTTLE TYPE: ABNORMAL
BUN BLDV-MCNC: 25 MG/DL (ref 7–22)
CALCIUM SERPL-MCNC: 8.7 MG/DL (ref 8.5–10.5)
CANDIDA ALBICANS FILM ARRAY: NOT DETECTED
CANDIDA GLABRATA FILM ARRAY: NOT DETECTED
CANDIDA KRUSEI FILM ARRAY: NOT DETECTED
CANDIDA PARAPSILOSIS FILM ARRAY: NOT DETECTED
CANDIDA TROPICALIS FILM ARRAY: NOT DETECTED
CARBAPENEM RESITANT FILM ARRAY: NOT DETECTED
CHLORIDE BLD-SCNC: 106 MEQ/L (ref 98–111)
CO2: 22 MEQ/L (ref 23–33)
CREAT SERPL-MCNC: 0.9 MG/DL (ref 0.4–1.2)
ENTERBACTER CLOACAE FILM ARRAY: NOT DETECTED
ENTERBACTERIACEAE FILM ARRAY: DETECTED
ENTEROCOCCUS FILM ARRAY: NOT DETECTED
ERYTHROCYTE [DISTWIDTH] IN BLOOD BY AUTOMATED COUNT: 13.4 % (ref 11.5–14.5)
ERYTHROCYTE [DISTWIDTH] IN BLOOD BY AUTOMATED COUNT: 50.3 FL (ref 35–45)
ESCHERICHIA COLI FILM ARRAY: DETECTED
GFR SERPL CREATININE-BSD FRML MDRD: 80 ML/MIN/1.73M2
GLUCOSE BLD-MCNC: 147 MG/DL (ref 70–108)
GLUCOSE BLD-MCNC: 150 MG/DL (ref 70–108)
GLUCOSE BLD-MCNC: 156 MG/DL (ref 70–108)
GLUCOSE BLD-MCNC: 165 MG/DL (ref 70–108)
GLUCOSE BLD-MCNC: 194 MG/DL (ref 70–108)
GLUCOSE BLD-MCNC: 264 MG/DL (ref 70–108)
HAEMOPHILUS INFLUENZA FILM ARRAY: NOT DETECTED
HCT VFR BLD CALC: 34 % (ref 42–52)
HEMOGLOBIN: 10.9 GM/DL (ref 14–18)
KLEBSIELLA OXYTOCA FILM ARRAY: NOT DETECTED
KLEBSIELLA PNEUMONIAE FILM ARRAY: NOT DETECTED
LACTIC ACID: 3.3 MMOL/L (ref 0.5–2.2)
LACTIC ACID: 4.8 MMOL/L (ref 0.5–2.2)
LD: 179 U/L (ref 100–190)
LIPASE: 41.3 U/L (ref 5.6–51.3)
LISTERIA MONOCYTOGENES FILM ARRAY: NOT DETECTED
MAGNESIUM: 1.8 MG/DL (ref 1.6–2.4)
MCH RBC QN AUTO: 32.6 PG (ref 26–33)
MCHC RBC AUTO-ENTMCNC: 32.1 GM/DL (ref 32.2–35.5)
MCV RBC AUTO: 101.8 FL (ref 80–94)
METHICILLIN RESISTANT FILM ARRAY: ABNORMAL
MRSA SCREEN RT-PCR: NEGATIVE
NEISSERIA MENIGITIDIS FILM ARRAY: NOT DETECTED
PLATELET # BLD: 289 THOU/MM3 (ref 130–400)
PMV BLD AUTO: 9.4 FL (ref 9.4–12.4)
POTASSIUM SERPL-SCNC: 3.8 MEQ/L (ref 3.5–5.2)
PROTEUS FILM ARRAY: NOT DETECTED
PSEUDOMONAS AERUGINOSA FILM ARRAY: NOT DETECTED
RBC # BLD: 3.34 MILL/MM3 (ref 4.7–6.1)
SERRATIA MARCESCENS FILM ARRAY: NOT DETECTED
SODIUM BLD-SCNC: 142 MEQ/L (ref 135–145)
SOURCE OF BLOOD CULTURE: ABNORMAL
STAPH AUREUS FILM ARRAY: NOT DETECTED
STAPHYLOCOCCUS FILM ARRAY: NOT DETECTED
STREP AGALACTIAE FILM ARRAY: NOT DETECTED
STREP PNEUMONIAE FILM ARRAY: NOT DETECTED
STREP PYOCGENES FILM ARRAY: NOT DETECTED
STREPTOCOCCUS FILM ARRAY: NOT DETECTED
TROPONIN T: < 0.01 NG/ML
VANCOMYCIN RESISTANT ENTEROCOCCUS: NEGATIVE
VANCOMYCIN RESISTANT FILM ARRAY: ABNORMAL
WBC # BLD: 21.2 THOU/MM3 (ref 4.8–10.8)

## 2020-07-18 PROCEDURE — 83735 ASSAY OF MAGNESIUM: CPT

## 2020-07-18 PROCEDURE — 93010 ELECTROCARDIOGRAM REPORT: CPT | Performed by: NUCLEAR MEDICINE

## 2020-07-18 PROCEDURE — 2060000000 HC ICU INTERMEDIATE R&B

## 2020-07-18 PROCEDURE — 6360000002 HC RX W HCPCS: Performed by: OPHTHALMOLOGY

## 2020-07-18 PROCEDURE — 83690 ASSAY OF LIPASE: CPT

## 2020-07-18 PROCEDURE — 94760 N-INVAS EAR/PLS OXIMETRY 1: CPT

## 2020-07-18 PROCEDURE — 85027 COMPLETE CBC AUTOMATED: CPT

## 2020-07-18 PROCEDURE — 6360000002 HC RX W HCPCS: Performed by: STUDENT IN AN ORGANIZED HEALTH CARE EDUCATION/TRAINING PROGRAM

## 2020-07-18 PROCEDURE — 99233 SBSQ HOSP IP/OBS HIGH 50: CPT | Performed by: INTERNAL MEDICINE

## 2020-07-18 PROCEDURE — 36415 COLL VENOUS BLD VENIPUNCTURE: CPT

## 2020-07-18 PROCEDURE — 2580000003 HC RX 258: Performed by: OPHTHALMOLOGY

## 2020-07-18 PROCEDURE — 2580000003 HC RX 258: Performed by: STUDENT IN AN ORGANIZED HEALTH CARE EDUCATION/TRAINING PROGRAM

## 2020-07-18 PROCEDURE — 80048 BASIC METABOLIC PNL TOTAL CA: CPT

## 2020-07-18 PROCEDURE — 82948 REAGENT STRIP/BLOOD GLUCOSE: CPT

## 2020-07-18 PROCEDURE — 51798 US URINE CAPACITY MEASURE: CPT

## 2020-07-18 PROCEDURE — 6370000000 HC RX 637 (ALT 250 FOR IP): Performed by: OPHTHALMOLOGY

## 2020-07-18 PROCEDURE — 94640 AIRWAY INHALATION TREATMENT: CPT

## 2020-07-18 PROCEDURE — 84484 ASSAY OF TROPONIN QUANT: CPT

## 2020-07-18 PROCEDURE — 6370000000 HC RX 637 (ALT 250 FOR IP): Performed by: STUDENT IN AN ORGANIZED HEALTH CARE EDUCATION/TRAINING PROGRAM

## 2020-07-18 PROCEDURE — 83605 ASSAY OF LACTIC ACID: CPT

## 2020-07-18 RX ORDER — PIOGLITAZONEHYDROCHLORIDE 30 MG/1
30 TABLET ORAL DAILY
Status: DISCONTINUED | OUTPATIENT
Start: 2020-07-18 | End: 2020-07-23 | Stop reason: HOSPADM

## 2020-07-18 RX ORDER — DEXTROSE MONOHYDRATE 50 MG/ML
100 INJECTION, SOLUTION INTRAVENOUS PRN
Status: DISCONTINUED | OUTPATIENT
Start: 2020-07-18 | End: 2020-07-23 | Stop reason: HOSPADM

## 2020-07-18 RX ORDER — DEXTROSE MONOHYDRATE 25 G/50ML
12.5 INJECTION, SOLUTION INTRAVENOUS PRN
Status: DISCONTINUED | OUTPATIENT
Start: 2020-07-18 | End: 2020-07-23 | Stop reason: HOSPADM

## 2020-07-18 RX ORDER — HYDRALAZINE HYDROCHLORIDE 20 MG/ML
5 INJECTION INTRAMUSCULAR; INTRAVENOUS EVERY 4 HOURS PRN
Status: DISCONTINUED | OUTPATIENT
Start: 2020-07-18 | End: 2020-07-19

## 2020-07-18 RX ORDER — NICOTINE POLACRILEX 4 MG
15 LOZENGE BUCCAL PRN
Status: DISCONTINUED | OUTPATIENT
Start: 2020-07-18 | End: 2020-07-23 | Stop reason: HOSPADM

## 2020-07-18 RX ORDER — GLIMEPIRIDE 2 MG/1
2 TABLET ORAL
Status: DISCONTINUED | OUTPATIENT
Start: 2020-07-19 | End: 2020-07-23 | Stop reason: HOSPADM

## 2020-07-18 RX ORDER — ATORVASTATIN CALCIUM 40 MG/1
40 TABLET, FILM COATED ORAL DAILY
Status: DISCONTINUED | OUTPATIENT
Start: 2020-07-18 | End: 2020-07-23 | Stop reason: HOSPADM

## 2020-07-18 RX ORDER — AMLODIPINE BESYLATE 2.5 MG/1
2.5 TABLET ORAL DAILY
Status: DISCONTINUED | OUTPATIENT
Start: 2020-07-19 | End: 2020-07-19

## 2020-07-18 RX ORDER — 0.9 % SODIUM CHLORIDE 0.9 %
500 INTRAVENOUS SOLUTION INTRAVENOUS ONCE
Status: COMPLETED | OUTPATIENT
Start: 2020-07-18 | End: 2020-07-18

## 2020-07-18 RX ADMIN — INSULIN LISPRO 1 UNITS: 100 INJECTION, SOLUTION INTRAVENOUS; SUBCUTANEOUS at 20:33

## 2020-07-18 RX ADMIN — METOPROLOL SUCCINATE 100 MG: 100 TABLET, FILM COATED, EXTENDED RELEASE ORAL at 20:30

## 2020-07-18 RX ADMIN — PIOGLITAZONE 30 MG: 30 TABLET ORAL at 15:44

## 2020-07-18 RX ADMIN — ENOXAPARIN SODIUM 40 MG: 40 INJECTION SUBCUTANEOUS at 08:24

## 2020-07-18 RX ADMIN — PIPERACILLIN AND TAZOBACTAM 3.38 G: 3; .375 INJECTION, POWDER, FOR SOLUTION INTRAVENOUS at 08:24

## 2020-07-18 RX ADMIN — SODIUM CHLORIDE: 9 INJECTION, SOLUTION INTRAVENOUS at 15:45

## 2020-07-18 RX ADMIN — CLONIDINE HYDROCHLORIDE 0.3 MG: 0.2 TABLET ORAL at 00:52

## 2020-07-18 RX ADMIN — IPRATROPIUM BROMIDE AND ALBUTEROL SULFATE 1 AMPULE: .5; 3 SOLUTION RESPIRATORY (INHALATION) at 13:27

## 2020-07-18 RX ADMIN — ACETAMINOPHEN 650 MG: 325 TABLET ORAL at 08:25

## 2020-07-18 RX ADMIN — METOPROLOL SUCCINATE 100 MG: 100 TABLET, FILM COATED, EXTENDED RELEASE ORAL at 00:51

## 2020-07-18 RX ADMIN — IPRATROPIUM BROMIDE AND ALBUTEROL SULFATE 1 AMPULE: .5; 3 SOLUTION RESPIRATORY (INHALATION) at 09:10

## 2020-07-18 RX ADMIN — PIPERACILLIN AND TAZOBACTAM 3.38 G: 3; .375 INJECTION, POWDER, FOR SOLUTION INTRAVENOUS at 00:34

## 2020-07-18 RX ADMIN — LISINOPRIL 20 MG: 20 TABLET ORAL at 22:17

## 2020-07-18 RX ADMIN — LISINOPRIL 20 MG: 20 TABLET ORAL at 08:24

## 2020-07-18 RX ADMIN — INSULIN LISPRO 2 UNITS: 100 INJECTION, SOLUTION INTRAVENOUS; SUBCUTANEOUS at 00:55

## 2020-07-18 RX ADMIN — IPRATROPIUM BROMIDE AND ALBUTEROL SULFATE 1 AMPULE: .5; 3 SOLUTION RESPIRATORY (INHALATION) at 16:55

## 2020-07-18 RX ADMIN — HYDRALAZINE HYDROCHLORIDE 5 MG: 20 INJECTION, SOLUTION INTRAMUSCULAR; INTRAVENOUS at 23:41

## 2020-07-18 RX ADMIN — METOPROLOL SUCCINATE 100 MG: 100 TABLET, FILM COATED, EXTENDED RELEASE ORAL at 08:24

## 2020-07-18 RX ADMIN — SODIUM CHLORIDE 500 ML: 9 INJECTION, SOLUTION INTRAVENOUS at 14:07

## 2020-07-18 RX ADMIN — CLONIDINE HYDROCHLORIDE 0.3 MG: 0.2 TABLET ORAL at 08:24

## 2020-07-18 RX ADMIN — IPRATROPIUM BROMIDE AND ALBUTEROL SULFATE 1 AMPULE: .5; 3 SOLUTION RESPIRATORY (INHALATION) at 21:18

## 2020-07-18 RX ADMIN — CEFTRIAXONE 2 G: 2 INJECTION, POWDER, FOR SOLUTION INTRAMUSCULAR; INTRAVENOUS at 15:41

## 2020-07-18 RX ADMIN — LISINOPRIL 20 MG: 20 TABLET ORAL at 00:51

## 2020-07-18 RX ADMIN — ATORVASTATIN CALCIUM 40 MG: 40 TABLET, FILM COATED ORAL at 15:44

## 2020-07-18 ASSESSMENT — PAIN SCALES - GENERAL
PAINLEVEL_OUTOF10: 0

## 2020-07-18 NOTE — ED PROVIDER NOTES
ATTENDING NOTE:    I performed a history and physical examination of the patient and supervised and discussed the management with the NP. I discussed the care/management plan with the NP. I reviewed the NP's note and agree with the documented findings and plan of care. Patient complained of  Fever, Intermittent confusion, Ho Covid + contact, Recently started straight cath urine. Exam showed  Ill appearing elderly gentlemen, Pleasant. Alert to person. Tachycardia 140s and variable. Appears Dry, Lungs CTA, no Peripheral edema      Pertinent tests done in ED:      Sepsis Work-up, Covid,       Plan / Disposition:    Empiric Abx, IVF, Trend lactate  Supportive care for fever. Monitor progression.      Electronically verified by Kerire Chavez DO  07/18/20 0001

## 2020-07-18 NOTE — ED NOTES
Patient resting in bed with wife at bedside. Patient denies pain. Call light within reach. Will reassess.       Beckie Riley RN  07/17/20 7844

## 2020-07-18 NOTE — PROGRESS NOTES
Explained patients right to have family, representative or physician notified of their admission. Patient has Declined for physician to be notified. Patient has N/A for family/representative to be notified. Wife was with patient upon arrival to ER and returned home just prior to patient being transported up to 4K09. Pt arrived in 9 from ED. Complaints: None. IV normal saline infusing into the hand right, condition patent and no redness at a rate of 999 mls/ hour with about 400 mls remaining in the bag. The best day to schedule a follow up Dr appointment is: ask at discharge.       The patient is interested in [facility]. [facility] meds to beds program?:  No

## 2020-07-18 NOTE — FLOWSHEET NOTE
07/18/20 1109   Encounter Summary   Services provided to: Patient and family together   Referral/Consult From: [address] [place] [address] Drive; Family members   Continue Visiting Yes  (7/18 )   Complexity of Encounter Moderate   Length of Encounter 15 minutes   Routine   Type Initial   Assessment Calm; Approachable   Intervention Nurtured hope   Outcome Comfort   Advance Directives (For Healthcare)   Healthcare Directive No, patient does not have an advance directive for healthcare treatment   Advance Directives Documents given   Assessment:  During my encounter with the 81  yr old patient, I gave the patient a copy of the Advance Directive as requested. I assess the pt's spiritual needs. I also came to assess the patient and his spouse with any spiritual needs. The pt's spouse is hard of hearing so you have to speak louder. Interventions:  I gave the pt the requested documents and gave a brochure and asked if the patient had any questions. I offered words of comfort and prayer. Outcomes: The pt and the family were thankful for the spiritual support. They shared that they would complete the forms after reviewed. Plan:  Chaplains will follow-up at a later time in order to assist the patient as they complete the Advance Directive documents.

## 2020-07-18 NOTE — PROGRESS NOTES
Pharmacy Note  BioFire Result    Lillian Moscoso is a 81 y.o. male, with a positive blood culture result    [employer] message received from [place] , laboratory employee on 7/18/2020 at 1:16 PM    First Gram stain result: gram negative bacilli    BioFire BCID result: E. Coli, KPC not detected    BioFire BCID and gram stain congruent? Yes    Suspected source? urine    Patient chart reviewed for signs/symptoms of infection: Yes  BP (!) 100/59   Pulse 77   Temp 97.7 °F (36.5 °C) (Oral)   Resp 18   Ht 5' 7\" (1.702 m)   Wt 195 lb 12.8 oz (88.8 kg)   SpO2 95%   BMI 30.67 kg/m²   Lab Results   Component Value Date    WBC 21.2 (H) 07/18/2020     Allergies reviewed  Patient has no known allergies. Renal function reviewed  Estimated Creatinine Clearance: 66 mL/min (based on SCr of 0.9 mg/dL). Current antibiotic regimen: Zosyn    Intervention needed: Yes, de-escalation    Individual contacted: Provider Dr. Nelli Ramirez    Recommendations: Switch to ceftriaxone 2 g IV every 24 hours. Recommendations accepted?  Yes    Zoe Wilson, PharmD, BCPS   7/18/2020 1:18 PM

## 2020-07-18 NOTE — ED NOTES
Pt medicated at this time per provider order. Pt restless at this time. Pt adjusted in bed at this time. Pt remains tachycardic at this time. Pt denies pain. Pt and wife updated on POC. Pt has side rails up x2, call light in reach and posey alarm in place.       Randall Burton RN  07/17/20 6980

## 2020-07-18 NOTE — ED NOTES
Patient resting in bed. Respirations unlabored and regular. Patient denies pain at this time. Second bag of Fluid started on patient. AOx4, vitals stable. Will reassess.       James Rios RN  07/17/20 0292

## 2020-07-18 NOTE — ED NOTES
ED to inpatient nurses report    No chief complaint on file. Present to ED from home  LOC: alert and orientated to name, place, date  Vital signs   Vitals:    07/17/20 2004 07/17/20 2047 07/17/20 2147 07/17/20 2148   BP:  (!) 154/94 (!) 162/84 130/77   Pulse:  132 119 121   Resp:  25 19 28   Temp: 104 °F (40 °C)   (S) 102.3 °F (39.1 °C)   TempSrc: Rectal   Rectal   SpO2:  93% 95% 93%   Weight:       Height:          Oxygen Baseline room air    Current needs required 2L Bipap/Cpap No  LDAs:   Peripheral IV 07/17/20 Right Hand (Active)   Site Assessment Clean; Intact;Dry 07/17/20 2110   Line Status Normal saline locked 07/17/20 2110   Dressing Status Clean;Dry; Intact 07/17/20 2110     Mobility: Requires assistance * 1  Pending ED orders: Remainder of sepsis bolus  Present condition: pt stable.      Electronically signed by Christopher Butterfield RN on 7/17/2020 at 10:02 PM       Christopher Butterfield RN  07/17/20 2202

## 2020-07-18 NOTE — ED NOTES
Elissa, daughter updated at this time per permission from patient and wife.       Olivier Tenorio RN  07/17/20 2603

## 2020-07-18 NOTE — H&P
History & Physical        Patient:  Estuardo Perry  YOB: 1935    MRN: [id number]   Acct:  [de-identified]   Primary Care Physician: Vnicent Cardona DO       Chief Complaint:  fever    History of Present Illness:   History obtained from chart review and the patient. The patient is a 81 y.o. male who presented to [address] [place] [address] with fever. Patient was seen by urology earlier today for difficulty urination. Wife was taught to do straight cathing. Patient started to have confusion and fever at home. Patient was brought to ER by EMS. Patient had fever of 103 today. He was sating 92% on RA in the ER. Patient had a contact with daughter who was testing + for COVID 19. However, ER testing was -ve. Patient reported to me that he is feeling well. He reported some lower abdominal pain earlier which resolved. No family was present today. Wife reported earlier some confusion. Past Medical History:        Diagnosis Date    [facility]     [facility]     Hyperlipidemia     Hypertension     Stroke [facility])        Past Surgical History:        Procedure Laterality Date    CARDIAC CATHETERIZATION  06/19/2020    CARDIOVASCULAR STRESS TEST      EYE SURGERY      KNEE ARTHROPLASTY Right        Home Medications:    Prior to Admission medications    Medication Sig Start Date End Date Taking? Authorizing Provider   AMLODIPINE BESYLATE PO Take by mouth 1 tab a day- wife unsure of dose.     Historical Provider, MD   Cholecalciferol (VITAMIN D3) 50 MCG (2000 UT) CAPS Take by mouth three times daily    Historical Provider, MD   pioglitazone (ACTOS) 30 MG tablet Take 30 mg by mouth daily    Historical Provider, MD   Cyanocobalamin (VITAMIN B-12 IJ) Inject as directed every 30 days    Historical Provider, MD   glimepiride (AMARYL) 2 MG tablet Take 2 mg by mouth every morning (before breakfast)    Historical Provider, MD metoprolol succinate (TOPROL XL) 100 MG extended release tablet Take 100 mg by mouth 2 times daily    Historical Provider, MD   lisinopril (PRINIVIL;ZESTRIL) 20 MG tablet Take 20 mg by mouth 2 times daily    Historical Provider, MD   simvastatin (ZOCOR) 40 MG tablet Take 40 mg by mouth nightly    Historical Provider, MD   cloNIDine (CATAPRES) 0.3 MG tablet Take 0.3 mg by mouth 2 times daily    Historical Provider, MD   metFORMIN (GLUCOPHAGE-XR) 750 MG extended release tablet Take 750 mg by mouth 2 times daily 2 tab    Historical Provider, MD     Allergies:  Patient has no known allergies. Social History:    reports that he quit smoking about 40 years ago. His smoking use included pipe. He has never used smokeless tobacco. He reports current alcohol use. He reports that he does not use drugs. Family History:   Reviewed:       Problem Relation Age of Onset    Heart Disease Father     Heart Attack Father     High Blood Pressure Sister     Cancer Neg Hx     Diabetes Neg Hx     Stroke Neg Hx        Review of Systems:    Pertinent items are noted in HPI. Comprehensive review of systems was obtained . Review of Systems   Constitutional: + Fatigue. HENT: Negative for ear pain, sore throat, rhinorrhea and neck pain. Eyes: Negative for pain, discharge and visual disturbance. Respiratory: Negative for cough, shortness of breath. no  wheezing. Cardiovascular: Negative for chest pain, palpitations and leg swelling. Gastrointestinal: Negative for nausea, vomiting, abdominal pain. no diarrhea. Genitourinary:+ pelvic pain. Musculoskeletal: . Negative for back pain, joint swelling and arthralgias. Skin: Negative for rash. Neurological: Negative for dizziness, seizures, syncope and headaches. Hematological: Negative for adenopathy. Psychiatric/Behavioral: Negative for suicidal ideas and dysphoric mood. The patient is not nervous/anxious.      Physical Exam:  /77   Pulse 121   Temp (S) 102.3 °F (39.1 °C) (Rectal)   Resp 28   Ht 5' 7\" (1.702 m)   Wt 195 lb (88.5 kg)   SpO2 93%   BMI 30.54 kg/m²   General appearance:  No apparent distress, appears stated age and cooperative. HEENT:  Normal cephalic, atraumatic without obvious deformity. Pupils equal, round, and reactive to light. Extra ocular muscles intact. Conjunctivae/corneas clear. Neck: Supple, with full range of motion. No jugular venous distention. Trachea midline. Respiratory:  Normal respiratory effort. Decreased BS at bases bilaterally  without Rales/Wheezes/Rhonchi. Cardiovascular:  Regular rate and regular regular rhythm with normal S1/S2 without murmurs, rubs or gallops. Abdomen: Soft, non-tender, non-distended with normal bowel sounds. Musculoskeletal:  No clubbing, cyanosis. No edema bilaterally. Full range of motion without deformity. Skin: Skin color, texture, turgor normal.  No rashes or lesions. Neurologic:  Neurovascularly intact without any focal sensory/motor deficits. grossly non-focal.  Psychiatric:  Alert and oriented, thought content appropriate, normal insight  Capillary Refill: Brisk,< 3 seconds   Peripheral Pulses: +2 palpable, equal bilaterally     Review of Labs and Diagnostic Testing:  Labs:     Recent Labs     07/17/20 2018   WBC 9.8   HGB 12.3*   HCT 38.7*        Recent Labs     07/17/20 2018      K 3.9      CO2 19*   BUN 29*   CREATININE 1.1   CALCIUM 9.3     Recent Labs     07/17/20 2018   AST 23   ALT 15   BILIDIR 0.3   BILITOT 0.5   ALKPHOS 82     No results found for: AMYLASE  No results for input(s): INR in the last 72 hours. Recent Labs     07/17/20 2018   TROPONINT < 0.010     No results for input(s): BNP in the last 72 hours.   Recent Labs     07/17/20  2018   LACTA 4.0*     Lab Results   Component Value Date    PROCAL 1.51 07/17/2020     Lab Results   Component Value Date    LABA1C 7.4 05/19/2020     No results found for: TSH    Urinalysis:   Lab Results Component Value Date    NITRU POSITIVE 07/17/2020    WBCUA > 200 07/17/2020    BACTERIA MANY 07/17/2020    RBCUA > 200 07/17/2020    BLOODU LARGE 07/17/2020    GLUCOSEU NEGATIVE 07/17/2020     Radiology:   Reviewed: see report for details  CXR: I have reviewed the report  EKG:  No acute changes:   XR CHEST PORTABLE   Final Result   No acute findings               **This report has been created using voice recognition software. It may contain minor errors which are inherent in voice recognition technology. **      Final report electronically signed by Dr. Kylah Charles on 7/17/2020 9:08 PM          Code Status: Prior    Assessment/Plan:  Active Problems:    Sepsis secondary to UTI [facility])  Resolved Problems:    * No resolved hospital problems. *     1-Urospesis: fever, urinary retention, + U/A, elevated lactic acid: IV Abx/IVF: cultures are pending. 2-Mental status changes: Patient was seen in the past by Dr. Darrell Briones for memory loss: workup was started on 3/23/20 for possible Lewy body dementia. sec to sepsis:he had low B12  patient follows commands and oriented: monitor  3-CAD: saw Dr. Joselo Manzanares on 7/2/20: had a cath 6/19/20: \"moderate CAD in the LAD\". 4-DM: will hold his oral meds for now: SSI  5-HTN: meds with parameters  6-COVID-19 exposure: was exposed to granddaughter on 7/10/20. She was tested + on 7/13/20. Test was -ve in ER but patient should stay in isolation for 14 days since exposure. I had PPE for my interview.        PT/OT Eval Status: PT/OT  DVT prophylaxis: [x] Lovenox                                 [] SCDs                                 [] SQ Heparin                                 [] Encourage ambulation           [] Already on Anticoagulation    Madeline Balbuena MD on 7/17/2020 at 10:04 PM  Admitting Hospitalist

## 2020-07-18 NOTE — PROGRESS NOTES
Hospitalist Progress Note    Patient: Lillian Moscoso  : 1935  MRN#: [id number]  2020 10:19 AM  ADMISSION DAY:  2020  7:38 PM   Resuscitation/\"Code\" Status   [x] Full resuscitation [] DNR-Comfort Care-Arrest  [] DNR-Comfort Care   [] Limited Resuscitation   [] No ET intubation   [] No CPR   [] No shock for non-perfusing rhythm   [] No post-arrest resuscitative medications   [] Other:     Patient Vitals for the past 8 hrs:   BP Temp Temp src Pulse Resp SpO2   20 0910 -- -- -- -- 18 92 %   20 0815 (!) 173/82 97.9 °F (36.6 °C) Oral 84 19 95 %   20 0345 (!) 104/57 97.8 °F (36.6 °C) Oral 95 18 93 %       I/O last 3 completed shifts: In: 193 [P.O.:200; I.V.:1736]  Out: 300 [Urine:300]   Date 20 0000 - 20 2359   Shift 5744-3250 4331-4314 4869-2025 24 Hour Total   INTAKE   P.O.(mL/kg/hr) 325(0.5)   325   I. V.(mL/kg) [id number])   [id number])   Shift Total(mL/kg) 3509(51.4)   7515(12.8)   OUTPUT   Urine(mL/kg/hr) 300(0.4)   300   Emesis/NG output(mL/kg) 0(0)   0(0)   Other(mL/kg) 0(0)   0(0)   Stool(mL/kg) 0(0)   0(0)   Blood(mL/kg) 0(0)   0(0)   Shift Total(mL/kg) 300(3.4)   300(3.4)   Weight (kg) 88.8 88.8 88.8 88.8     Wt Readings from Last 3 Encounters:   20 195 lb 12.8 oz (88.8 kg)   20 193 lb 1.6 oz (87.6 kg)   20 199 lb 12.8 oz (90.6 kg)      Body mass index is 30.67 kg/m². Patient Summary   Patient overview/Events leading to admission/Hospital course & Response to treatment:  Mr. Janelle Turner is an 81 y.o.  male who presented to [facility] Emergency Department via EMS with fever and altered mental status on 2020. He was advised to present to the ED after seeing his urologist, Dr. Eddy Sarkar, for difficulty urinating earlier that morning. The patient stated that he had progressively worsening urine retention over the past month with increased pain.  Urine sample were collected for cultures at Dr. Foote Rota office. In the Emergency Department the patient met 3/4 SIRS criteria, blood cultures were obtained, and broad spectrum antibiotics started. 7/18/2020  Overnight the patient was afebrile with fluctuations in blood pressures and lekocytosis (WBC:21.2). The patient was sitting comfortable in the bedside chair with his wife present during interview and exam. He stated that he felt great and had little complaint. He stated that he was unsure why he was at the hospital but after continued discussion he was able to recall the events leading up to his admission. The patient did eventually admit that he was having trouble urinating and that he was scheduled for a urological procedure but did not recall what the procedure was. Bladder scan revealed post-void residual of 240ml. His wife was concerned that his mental status had worsened and that this has been progressive over the past few months. She retold a recent account where he left the house and could not remember where he lived and had gotten lost.    Upon review of the chart there were inconsistencies between home and hospital medications. I spoke with Mr. Nica Contreras PCP, Dr. Ariella David, who confirmed that Mr. Miah Sin has uncontrolled HTN, DM, h/o CVA, HLD, BPH, and possibly progressing dementia. Reconciliation of the medications was conducted to ensure proper management. Discontinued Clonidine and restarted home diabetes medications    Blood and urine cultures revealed gram negative bacilli growth. Antibiotic coverage changed from Cefepime and Zosyn to Rocephin.     Examination:    General appearance - alert, well appearing, and in no distress and oriented to person, place, and time  Chest - clear to auscultation, no wheezes, rales or rhonchi, symmetric air entry  Heart - normal rate, regular rhythm, normal S1, S2, no murmurs, rubs, clicks or gallops  Abdomen - soft, nontender, distended, no masses or organomegaly, no rebound tenderness noted. Bowel sounds normal. Bladder distention noted.   Obese: Yes; Protuberant: Yes and No   Neurological - alert, oriented, normal speech, no focal findings or movement disorder noted  Extremities - peripheral pulses normal, no pedal edema, no clubbing or cyanosis  Skin - normal coloration and turgor, no rashes, no suspicious skin lesions noted    Scheduled Meds:   metoprolol succinate  100 mg Oral BID    lisinopril  20 mg Oral BID    cloNIDine  0.3 mg Oral BID    ipratropium-albuterol  1 ampule Inhalation Q4H WA    piperacillin-tazobactam  3.375 g Intravenous Q8H    enoxaparin  40 mg Subcutaneous Daily    insulin lispro  0-6 Units Subcutaneous TID WC    insulin lispro  0-3 Units Subcutaneous Nightly     Continuous Infusions:   dextrose      sodium chloride 125 mL/hr at 07/18/20 0600     PRN Meds:glucose, 15 g, PRN  dextrose, 12.5 g, PRN  glucagon (rDNA), 1 mg, PRN  dextrose, 100 mL/hr, PRN  acetaminophen, 650 mg, Q4H PRN  ondansetron, 4 mg, Q6H PRN      SUPPORT DEVICES:  [] ETT []Oral / [] Nasal  [] Gastric Tube [] OG / [] NG    [] Central Venous Line (Specify Site):  [] Urinary Catheter  [] Arterial Line (Specify Site)  [x] Peripheral IV access: Right hand  [] Other:     PROPHYLAXIS/THERAPY:   VTE:                [x] Enoxaparin    [] Warfarin [] DOAC [] PCD Device:Bilat LE                          [] Heparin: [] Subcut / [] IV     NUTRITION SUPPORT:  Carb control    Oxygen Delivery - O2 Flow Rate (L/min): 2 L/min    DATA:    CBC:   Recent Labs     07/17/20 2018 07/18/20  0601   WBC 9.8 21.2*   RBC 3.80* 3.34*   HGB 12.3* 10.9*   HCT 38.7* 34.0*   .8* 101.8*   MCH 32.4 32.6   MCHC 31.8* 32.1*    289   MPV 9.4 9.4      BMP/CMP:   Recent Labs     07/17/20 2018 07/18/20  0601    142   K 3.9 3.8    106   CO2 19* 22*   ANIONGAP 17.0* 14.0   BUN 29* 25*   CREATININE 1.1 0.9   GLUCOSE 160* 156*   CALCIUM 9.3 8.7   PROT 7.4  --    LABALBU 4.0  --    BILITOT 0.5  -- ALKPHOS 82  --    AST 23  --    ALT 15  --       Other Electrolytes:   Recent Labs     20  0601   MG 1.7 1.8     Serum Osmolality  No results for input(s): OSMOMEASER in the last 72 hours. Procalcitonin:  Recent Labs     20   PROCAL 1.51*     Cardiac:   Recent Labs     20  0601   TROPONINT < 0.010 < 0.010     Lipids: No results for input(s): CHOL, HDL in the last 72 hours. Invalid input(s): LDLCALCU  Coagulation: No results for input(s): INR in the last 72 hours. Lactic Acid:   Recent Labs     20  0149 20  0602   LACTA 4.0* 4.8* 3.3*      ABGs:   Lab Results   Component Value Date    PH 7.46 2020    PCO2 29 2020    PO2 74 2020    HCO3 21 2020    O2SAT 96 2020     Lab Results   Component Value Date    IFIO2 2 2020       Radiology/Imagin2020  XR CHEST PORTABLE - No acute findings    KEY ISSUES/FINDINGS/DISCUSSION / RECOMMENDATIONS BY SYSTEMS:  SYSTEMS ASSESSMENT AND PLANS     Action List   To Do List - Pending data and patient needs Situational Awareness & Contingency Planning:  Response to treatment  Uncertainties in diagnosis or treatment plan  Recent or ongoing changes (if applicable) Safety  Critical Lab/Data Needing Follow-Up:     Neuropsychiatric   [] No active issues  [x] Other:    Delerium: Likely secondary to UTI, however spouse stated that he has been progressively worsening with his memory. Respiratory   [x] No active issues  [] Other:    Cardiovascular   [] No active issues  [x] Other:    Hypertension: Uncontrolled. Reconciliation of home medications revealed the patient has not been compliant with prescribed hypertensive medications. Will discontinue Clonidine and restart Amlodipine daily.   Gastrointestinal   [x] No active issues:  [] Other:    Fluid/Electrolytes-Endocrine/Nutrition   [] No active issues:  [x] Other:    Diabetes Type 2: Continue home medications, low dose sliding scale, POCT glucose 4x daily ACHS, hypoglycemia protocol. Infectious Disease   [] No active issues  [x] Other:    Sepsis secondary to UTI: Gram negative bacilli cultures grown on blood and urine samples. Discontinued Zosyn and cefepime. Started Rocephin. CBC, BMP, Lactic acid ordered    Leukocytosis: Secondary to sepsis and UTI. CBC ordered  Hematology/Oncology   [x] No active issues  [] Other:    Renal/   [] No active issues  [x] Other:    Urinary Retention Secondary to BPH: Post-void residual on bladder scan revealed 240mL. Monitor in's/out's, repeat bladder scan, and place Dejesus catheter if needed. Surgical   [x] No active issues  [] Other:    Rehabilitation/Mobility   [x] No active issues  [] Other:    Social/Spiritual/DNR/Disposition/Miscellaneous   [x] No active issues  [] Other:    Disposition Issues - Needs To Be Addressed To Facilitate Discharge   [x] No active issues  [] Other:    Planned Placement   [] Home  [] LTAC  [] Rehabilitation Unit/Facility  [] Transitional Care  [] Extended Care Facility  [x] Unknown at this time        Reviewed with Collaborative Staff     Reviewed with Physician Staff  Meets Continued ICU stepdown Level Care Criteria:    [x] Yes - Indication: Sepsis secondary to UTI    [] No - Transfer Planned to listed location:    Anticipated discharge:  [] Yes: in - day(s)  [x] No, continued therapy needed.       Electronically signed by Taunya Kanner, DO on 7/18/2020 at 2:55 PM

## 2020-07-18 NOTE — ED NOTES
Patient resting in bed with wife at bedside. Medication given per [facility]. Patient restless at this time. Denies pain. Vital signs stable, but remains tachycardic. Call light within reach. Will reassess.       Sarah Ramesh RN  07/17/20 5229

## 2020-07-19 LAB
ANION GAP SERPL CALCULATED.3IONS-SCNC: 16 MEQ/L (ref 8–16)
BUN BLDV-MCNC: 13 MG/DL (ref 7–22)
CALCIUM SERPL-MCNC: 8.8 MG/DL (ref 8.5–10.5)
CHLORIDE BLD-SCNC: 102 MEQ/L (ref 98–111)
CO2: 19 MEQ/L (ref 23–33)
CREAT SERPL-MCNC: 0.6 MG/DL (ref 0.4–1.2)
ERYTHROCYTE [DISTWIDTH] IN BLOOD BY AUTOMATED COUNT: 13.2 % (ref 11.5–14.5)
ERYTHROCYTE [DISTWIDTH] IN BLOOD BY AUTOMATED COUNT: 48.3 FL (ref 35–45)
GFR SERPL CREATININE-BSD FRML MDRD: > 90 ML/MIN/1.73M2
GLUCOSE BLD-MCNC: 151 MG/DL (ref 70–108)
GLUCOSE BLD-MCNC: 162 MG/DL (ref 70–108)
GLUCOSE BLD-MCNC: 175 MG/DL (ref 70–108)
GLUCOSE BLD-MCNC: 182 MG/DL (ref 70–108)
GLUCOSE BLD-MCNC: 185 MG/DL (ref 70–108)
HCT VFR BLD CALC: 38 % (ref 42–52)
HEMOGLOBIN: 12.3 GM/DL (ref 14–18)
LACTIC ACID: 2.3 MMOL/L (ref 0.5–2.2)
MCH RBC QN AUTO: 32.3 PG (ref 26–33)
MCHC RBC AUTO-ENTMCNC: 32.4 GM/DL (ref 32.2–35.5)
MCV RBC AUTO: 99.7 FL (ref 80–94)
MRSA SCREEN: NORMAL
ORGANISM: ABNORMAL
ORGANISM: ABNORMAL
PLATELET # BLD: 339 THOU/MM3 (ref 130–400)
PMV BLD AUTO: 9.6 FL (ref 9.4–12.4)
POTASSIUM SERPL-SCNC: 3.6 MEQ/L (ref 3.5–5.2)
RBC # BLD: 3.81 MILL/MM3 (ref 4.7–6.1)
SODIUM BLD-SCNC: 137 MEQ/L (ref 135–145)
URINE CULTURE REFLEX: ABNORMAL
URINE CULTURE, ROUTINE: ABNORMAL
WBC # BLD: 16.7 THOU/MM3 (ref 4.8–10.8)

## 2020-07-19 PROCEDURE — 2060000000 HC ICU INTERMEDIATE R&B

## 2020-07-19 PROCEDURE — 85027 COMPLETE CBC AUTOMATED: CPT

## 2020-07-19 PROCEDURE — 6370000000 HC RX 637 (ALT 250 FOR IP): Performed by: OPHTHALMOLOGY

## 2020-07-19 PROCEDURE — 6370000000 HC RX 637 (ALT 250 FOR IP): Performed by: FAMILY MEDICINE

## 2020-07-19 PROCEDURE — 94760 N-INVAS EAR/PLS OXIMETRY 1: CPT

## 2020-07-19 PROCEDURE — 6370000000 HC RX 637 (ALT 250 FOR IP): Performed by: STUDENT IN AN ORGANIZED HEALTH CARE EDUCATION/TRAINING PROGRAM

## 2020-07-19 PROCEDURE — 99233 SBSQ HOSP IP/OBS HIGH 50: CPT | Performed by: INTERNAL MEDICINE

## 2020-07-19 PROCEDURE — 2580000003 HC RX 258: Performed by: STUDENT IN AN ORGANIZED HEALTH CARE EDUCATION/TRAINING PROGRAM

## 2020-07-19 PROCEDURE — 80048 BASIC METABOLIC PNL TOTAL CA: CPT

## 2020-07-19 PROCEDURE — 83605 ASSAY OF LACTIC ACID: CPT

## 2020-07-19 PROCEDURE — 2580000003 HC RX 258: Performed by: OPHTHALMOLOGY

## 2020-07-19 PROCEDURE — 94640 AIRWAY INHALATION TREATMENT: CPT

## 2020-07-19 PROCEDURE — 6360000002 HC RX W HCPCS: Performed by: STUDENT IN AN ORGANIZED HEALTH CARE EDUCATION/TRAINING PROGRAM

## 2020-07-19 PROCEDURE — 82948 REAGENT STRIP/BLOOD GLUCOSE: CPT

## 2020-07-19 PROCEDURE — 36415 COLL VENOUS BLD VENIPUNCTURE: CPT

## 2020-07-19 PROCEDURE — 6360000002 HC RX W HCPCS: Performed by: OPHTHALMOLOGY

## 2020-07-19 RX ORDER — HYDRALAZINE HYDROCHLORIDE 20 MG/ML
10 INJECTION INTRAMUSCULAR; INTRAVENOUS EVERY 4 HOURS PRN
Status: DISCONTINUED | OUTPATIENT
Start: 2020-07-19 | End: 2020-07-23 | Stop reason: HOSPADM

## 2020-07-19 RX ORDER — AMLODIPINE BESYLATE 10 MG/1
10 TABLET ORAL DAILY
Status: DISCONTINUED | OUTPATIENT
Start: 2020-07-20 | End: 2020-07-23 | Stop reason: HOSPADM

## 2020-07-19 RX ORDER — CLONIDINE HYDROCHLORIDE 0.2 MG/1
0.2 TABLET ORAL 2 TIMES DAILY
Status: DISCONTINUED | OUTPATIENT
Start: 2020-07-19 | End: 2020-07-19

## 2020-07-19 RX ADMIN — METOPROLOL SUCCINATE 100 MG: 100 TABLET, FILM COATED, EXTENDED RELEASE ORAL at 08:21

## 2020-07-19 RX ADMIN — IPRATROPIUM BROMIDE AND ALBUTEROL SULFATE 1 AMPULE: .5; 3 SOLUTION RESPIRATORY (INHALATION) at 12:01

## 2020-07-19 RX ADMIN — SODIUM CHLORIDE: 9 INJECTION, SOLUTION INTRAVENOUS at 17:51

## 2020-07-19 RX ADMIN — CLONIDINE HYDROCHLORIDE 0.2 MG: 0.2 TABLET ORAL at 03:06

## 2020-07-19 RX ADMIN — ACETAMINOPHEN 650 MG: 325 TABLET ORAL at 23:33

## 2020-07-19 RX ADMIN — SODIUM CHLORIDE: 9 INJECTION, SOLUTION INTRAVENOUS at 08:38

## 2020-07-19 RX ADMIN — PIOGLITAZONE 30 MG: 30 TABLET ORAL at 08:22

## 2020-07-19 RX ADMIN — IPRATROPIUM BROMIDE AND ALBUTEROL SULFATE 1 AMPULE: .5; 3 SOLUTION RESPIRATORY (INHALATION) at 22:21

## 2020-07-19 RX ADMIN — IPRATROPIUM BROMIDE AND ALBUTEROL SULFATE 1 AMPULE: .5; 3 SOLUTION RESPIRATORY (INHALATION) at 15:55

## 2020-07-19 RX ADMIN — INSULIN LISPRO 1 UNITS: 100 INJECTION, SOLUTION INTRAVENOUS; SUBCUTANEOUS at 20:36

## 2020-07-19 RX ADMIN — LISINOPRIL 20 MG: 20 TABLET ORAL at 08:22

## 2020-07-19 RX ADMIN — HYDRALAZINE HYDROCHLORIDE 5 MG: 20 INJECTION, SOLUTION INTRAMUSCULAR; INTRAVENOUS at 04:29

## 2020-07-19 RX ADMIN — ENOXAPARIN SODIUM 40 MG: 40 INJECTION SUBCUTANEOUS at 08:22

## 2020-07-19 RX ADMIN — GLIMEPIRIDE 2 MG: 2 TABLET ORAL at 08:22

## 2020-07-19 RX ADMIN — CEFTRIAXONE 2 G: 2 INJECTION, POWDER, FOR SOLUTION INTRAMUSCULAR; INTRAVENOUS at 13:56

## 2020-07-19 RX ADMIN — ATORVASTATIN CALCIUM 40 MG: 40 TABLET, FILM COATED ORAL at 08:21

## 2020-07-19 RX ADMIN — CLONIDINE HYDROCHLORIDE 0.2 MG: 0.2 TABLET ORAL at 08:21

## 2020-07-19 RX ADMIN — LISINOPRIL 20 MG: 20 TABLET ORAL at 20:35

## 2020-07-19 RX ADMIN — AMLODIPINE BESYLATE 7.5 MG: 5 TABLET ORAL at 16:49

## 2020-07-19 RX ADMIN — HYDRALAZINE HYDROCHLORIDE 10 MG: 20 INJECTION, SOLUTION INTRAMUSCULAR; INTRAVENOUS at 15:20

## 2020-07-19 RX ADMIN — AMLODIPINE BESYLATE 2.5 MG: 2.5 TABLET ORAL at 08:22

## 2020-07-19 RX ADMIN — METOPROLOL SUCCINATE 100 MG: 100 TABLET, FILM COATED, EXTENDED RELEASE ORAL at 20:35

## 2020-07-19 ASSESSMENT — PAIN DESCRIPTION - DESCRIPTORS
DESCRIPTORS: ACHING
DESCRIPTORS: HEADACHE

## 2020-07-19 ASSESSMENT — PAIN DESCRIPTION - ONSET: ONSET: ON-GOING

## 2020-07-19 ASSESSMENT — PAIN SCALES - GENERAL
PAINLEVEL_OUTOF10: 6
PAINLEVEL_OUTOF10: 2

## 2020-07-19 ASSESSMENT — PAIN DESCRIPTION - LOCATION
LOCATION: HEAD
LOCATION: ABDOMEN

## 2020-07-19 ASSESSMENT — PAIN DESCRIPTION - PROGRESSION: CLINICAL_PROGRESSION: NOT CHANGED

## 2020-07-19 ASSESSMENT — PAIN DESCRIPTION - PAIN TYPE
TYPE: ACUTE PAIN
TYPE: ACUTE PAIN

## 2020-07-19 ASSESSMENT — PAIN DESCRIPTION - ORIENTATION: ORIENTATION: LEFT;LOWER

## 2020-07-19 ASSESSMENT — PAIN DESCRIPTION - FREQUENCY: FREQUENCY: CONTINUOUS

## 2020-07-19 ASSESSMENT — PAIN - FUNCTIONAL ASSESSMENT: PAIN_FUNCTIONAL_ASSESSMENT: ACTIVITIES ARE NOT PREVENTED

## 2020-07-19 NOTE — PLAN OF CARE
Problem: Impaired respiratory status  Goal: Clear lung sounds  7/19/2020 1558 by Shiva Paiz RCP  Outcome: Ongoing   Continue aerosols as ordered to improve breath sounds

## 2020-07-19 NOTE — PROGRESS NOTES
Hospitalist Progress Note    Patient: Gabo Bernal  : 1935  MRN#: [id number]  2020 10:36 AM  ADMISSION DAY:  2020  7:38 PM   Resuscitation/\"Code\" Status   [x] Full resuscitation [] DNR-Comfort Care-Arrest  [] DNR-Comfort Care   [] Limited Resuscitation   [] No ET intubation   [] No CPR   [] No shock for non-perfusing rhythm   [] No post-arrest resuscitative medications   [] Other:     Patient Vitals for the past 8 hrs:   BP Temp Temp src Pulse Resp SpO2   20 0815 (!) 181/98 98.4 °F (36.9 °C) Oral 103 16 95 %   20 0350 (!) 169/103 98 °F (36.7 °C) Oral 103 18 96 %       I/O last 3 completed shifts: In: 4198.5 [P.O.:1935; I.V.:2263.5]  Out: 0168 [Urine:2575]   Date 20 0000 - 20 2359   Shift 3523-6170 7638-9121 1971-8754 24 Hour Total   INTAKE   P.O.(mL/kg/hr) 150(0.2) 80  230   I. V.(mL/kg) 788.5(8.9)   788.5(8.9)   Shift Total(mL/kg) 938.5(10.6) 80(0.9)  1018.5(11.5)   OUTPUT   Urine(mL/kg/hr) 1250(1.8)   1250   Shift Total(mL/kg) 1250(14.1)   1250(14.1)   Weight (kg) 88.8 88.8 88.8 88.8     Wt Readings from Last 3 Encounters:   20 195 lb 12.8 oz (88.8 kg)   20 193 lb 1.6 oz (87.6 kg)   20 199 lb 12.8 oz (90.6 kg)      Body mass index is 30.67 kg/m². Patient Summary   Patient overview/Events leading to admission/Hospital course & Response to treatment:  Mr. Janelle [facility] is an 81 y.o.  male who presented to [facility] Emergency Department via EMS with fever and altered mental status on 2020. He was advised to present to the ED after seeing his urologist, Dr. Kaela Little, for difficulty urinating earlier that morning. The patient stated that he had progressively worsening urine retention over the past month with increased pain. Urine sample were collected for cultures at Dr. Bobby Mireles office.  In the Emergency Department the patient met 3/4 SIRS criteria, blood cultures were obtained, and broad spectrum antibiotics started. 7/18/2020  Overnight the patient was afebrile with fluctuations in blood pressures and lekocytosis (WBC:21.2). The patient was sitting comfortable in the bedside chair with his wife present during interview and exam. He stated that he felt great and had little complaint. He stated that he was unsure why he was at the hospital but after continued discussion he was able to recall the events leading up to his admission. The patient did eventually admit that he was having trouble urinating and that he was scheduled for a urological procedure but did not recall what the procedure was. Bladder scan revealed post-void residual of 240ml. His wife was concerned that his mental status had worsened and that this has been progressive over the past few months. She retold a recent account where he left the house and could not remember where he lived and had gotten lost.    Upon review of the chart there were inconsistencies between home and hospital medications. I spoke with Mr. Zettie Angelucci PCP, Dr. Chris Kincaid, who confirmed that Mr. Janelle Turner has uncontrolled HTN, DM, h/o CVA, HLD, BPH, and possibly progressing dementia. Reconciliation of the medications was conducted to ensure proper management. Discontinued Clonidine and restarted home diabetes medications    Blood and urine cultures revealed gram negative bacilli growth. Antibiotic coverage changed from Cefepime and Zosyn to Rocephin. Past 24 hours:  Overnight, the Patient's blood pressure was uncontrolled. He was agitated. He was given Hydralazine with no help, and Hydroxyzine. Clonidine was resumed at 0.2mg BID (less than home dose). Blood pressure now 181/98. This morning, the Patient states he feels well, but feels \"mentally unwell\". When pressed, he states he knows he has been forgetting important information recently, and recounts an event where he forgot where he lived and was found by the .   He states he has difficulty starting a stream, and that he continues to have pain on urination. He states that he has prostate surgery on the 4th of August.  He also explains that he wants to do exercises to help taper him off of wearing diapers. He states mild swelling in his legs. He denies chest pain, shortness of breath, nausea, vomiting, or diarrhea. He is oriented to person, place, and time, but is unaware of his treatment plan. His wife is in the room with him. Examination:    General appearance - alert, well appearing, and in no distress and oriented to person, place, and time  Chest - clear to auscultation, no wheezes, rales or rhonchi, symmetric air entry  Heart - normal rate, regular rhythm, normal S1, S2, no murmurs, rubs, clicks or gallops  Abdomen - soft, nontender, distended, no masses or organomegaly, no rebound tenderness noted. Bowel sounds normal. Bladder distention noted.   Obese: Yes; Protuberant: Yes and No   Neurological - alert, oriented, normal speech, no focal findings or movement disorder noted  Extremities - peripheral pulses normal, no pedal edema, no clubbing or cyanosis  Skin - normal coloration and turgor, no rashes, no suspicious skin lesions noted    Scheduled Meds:   cloNIDine  0.2 mg Oral BID    amLODIPine  2.5 mg Oral Daily    atorvastatin  40 mg Oral Daily    glimepiride  2 mg Oral Daily with breakfast    pioglitazone  30 mg Oral Daily    cefTRIAXone (ROCEPHIN) IV  2 g Intravenous Q24H    metoprolol succinate  100 mg Oral BID    lisinopril  20 mg Oral BID    ipratropium-albuterol  1 ampule Inhalation Q4H WA    enoxaparin  40 mg Subcutaneous Daily    insulin lispro  0-6 Units Subcutaneous TID     insulin lispro  0-3 Units Subcutaneous Nightly     Continuous Infusions:   dextrose      sodium chloride 125 mL/hr at 07/19/20 0838     PRN Meds:glucose, 15 g, PRN  dextrose, 12.5 g, PRN  glucagon (rDNA), 1 mg, PRN  dextrose, 100 mL/hr, PRN  hydrALAZINE, 5 mg, Q4H PRN  acetaminophen, 650 mg, Q4H PRN  ondansetron, 4 mg, Q6H PRN      SUPPORT DEVICES:  [] ETT []Oral / [] Nasal  [] Gastric Tube [] OG / [] NG    [] Central Venous Line (Specify Site):  [] Urinary Catheter  [] Arterial Line (Specify Site)  [x] Peripheral IV access: Right hand  [] Other:     PROPHYLAXIS/THERAPY:   VTE:                [x] Enoxaparin    [] Warfarin [] DOAC [] PCD Device:Bilat LE                          [] Heparin: [] Subcut / [] IV     NUTRITION SUPPORT:  Carb control    Oxygen Delivery - O2 Flow Rate (L/min): 2 L/min    DATA:    CBC:   Recent Labs     07/17/20 2018 07/18/20  0601 07/19/20  0601   WBC 9.8 21.2* 16.7*   RBC 3.80* 3.34* 3.81*   HGB 12.3* 10.9* 12.3*   HCT 38.7* 34.0* 38.0*   .8* 101.8* 99.7*   MCH 32.4 32.6 32.3   MCHC 31.8* 32.1* 32.4    289 339   MPV 9.4 9.4 9.6      BMP/CMP:   Recent Labs     07/17/20 2018 07/18/20  0601 07/19/20  0601    142 137   K 3.9 3.8 3.6    106 102   CO2 19* 22* 19*   ANIONGAP 17.0* 14.0 16.0   BUN 29* 25* 13   CREATININE 1.1 0.9 0.6   GLUCOSE 160* 156* 182*   CALCIUM 9.3 8.7 8.8   PROT 7.4  --   --    LABALBU 4.0  --   --    BILITOT 0.5  --   --    ALKPHOS 82  --   --    AST 23  --   --    ALT 15  --   --       Other Electrolytes:   Recent Labs     07/17/20 2018 07/18/20  0601   MG 1.7 1.8     Serum Osmolality  No results for input(s): OSMOMEASER in the last 72 hours. Procalcitonin:  Recent Labs     07/17/20  2018   PROCAL 1.51*     Cardiac:   Recent Labs     07/17/20 2018 07/18/20  0601   TROPONINT < 0.010 < 0.010     Lipids: No results for input(s): CHOL, HDL in the last 72 hours. Invalid input(s): LDLCALCU  Coagulation: No results for input(s): INR in the last 72 hours.   Lactic Acid:   Recent Labs     07/18/20  0149 07/18/20  0602 07/19/20  0601   LACTA 4.8* 3.3* 2.3*      ABGs:   Lab Results   Component Value Date    PH 7.46 07/17/2020    PCO2 29 07/17/2020    PO2 74 07/17/2020    HCO3 21 07/17/2020    O2SAT 96 2020     Lab Results   Component Value Date    IFIO2 2 2020       Radiology/Imagin2020  XR CHEST PORTABLE - No acute findings    KEY ISSUES/FINDINGS/DISCUSSION / RECOMMENDATIONS BY SYSTEMS:  SYSTEMS ASSESSMENT AND PLANS     Action List   To Do List - Pending data and patient needs Situational Awareness & Contingency Planning:  Response to treatment  Uncertainties in diagnosis or treatment plan  Recent or ongoing changes (if applicable) Safety  Critical Lab/Data Needing Follow-Up:     Neuropsychiatric   [] No active issues  [x] Other:    Delerium: Likely secondary to UTI, however spouse stated that he has been progressively worsening with his memory. Respiratory   [x] No active issues  [] Other:    Cardiovascular   [] No active issues  [x] Other:    Hypertension: Uncontrolled. Reconciliation of home medications revealed the patient has not been compliant with prescribed hypertensive medications. Clonidine restarted at a lower dose, discontinued today. Increased Hydralazine PRN to 10mg, Norvasc to 10mg. Gastrointestinal   [x] No active issues:  [] Other:    Fluid/Electrolytes-Endocrine/Nutrition   [] No active issues:  [x] Other:    Diabetes Type 2: Continue home medications, low dose sliding scale, POCT glucose 4x daily ACHS, hypoglycemia protocol. Infectious Disease   [] No active issues  [x] Other:    Sepsis secondary to UTI: Gram negative bacilli cultures grown on blood and urine samples. Discontinued Zosyn and cefepime. Started Rocephin. CBC, BMP, Lactic acid ordered    Leukocytosis: Secondary to sepsis and UTI. CBC ordered  Hematology/Oncology   [x] No active issues  [] Other:    Renal/   [] No active issues  [x] Other:    Urinary Retention Secondary to BPH: Post-void residual on bladder scan revealed 240mL. Monitor in's/out's, repeat bladder scan, and place Dejesus catheter if needed.   Surgical   [x] No active issues  [] Other:    Rehabilitation/Mobility   [x] No active issues  [] Other:    Social/Spiritual/DNR/Disposition/Miscellaneous   [x] No active issues  [] Other:    Disposition Issues - Needs To Be Addressed To Facilitate Discharge   [x] No active issues  [] Other:    Planned Placement   [] Home  [] LTAC  [] Rehabilitation Unit/Facility  [] Transitional Care  [] Extended Care Facility  [x] Unknown at this time        Reviewed with Collaborative Staff     Reviewed with Physician Staff  Meets Continued ICU stepdown Level Care Criteria:    [x] Yes - Indication: Sepsis secondary to UTI    [] No - Transfer Planned to listed location:    Anticipated discharge:  [] Yes: in - day(s)  [x] No, continued therapy needed.       Electronically signed by Na Reynolds DO on 7/19/2020 at 10:36 AM

## 2020-07-20 LAB
ANION GAP SERPL CALCULATED.3IONS-SCNC: 15 MEQ/L (ref 8–16)
BLOOD CULTURE, ROUTINE: ABNORMAL
BUN BLDV-MCNC: 9 MG/DL (ref 7–22)
CALCIUM SERPL-MCNC: 9.1 MG/DL (ref 8.5–10.5)
CHLORIDE BLD-SCNC: 103 MEQ/L (ref 98–111)
CO2: 20 MEQ/L (ref 23–33)
CREAT SERPL-MCNC: 0.6 MG/DL (ref 0.4–1.2)
ERYTHROCYTE [DISTWIDTH] IN BLOOD BY AUTOMATED COUNT: 13.2 % (ref 11.5–14.5)
ERYTHROCYTE [DISTWIDTH] IN BLOOD BY AUTOMATED COUNT: 48.7 FL (ref 35–45)
GFR SERPL CREATININE-BSD FRML MDRD: > 90 ML/MIN/1.73M2
GLUCOSE BLD-MCNC: 142 MG/DL (ref 70–108)
GLUCOSE BLD-MCNC: 166 MG/DL (ref 70–108)
GLUCOSE BLD-MCNC: 172 MG/DL (ref 70–108)
GLUCOSE BLD-MCNC: 221 MG/DL (ref 70–108)
GLUCOSE BLD-MCNC: 250 MG/DL (ref 70–108)
HCT VFR BLD CALC: 38.1 % (ref 42–52)
HEMOGLOBIN: 12 GM/DL (ref 14–18)
LACTIC ACID: 1.6 MMOL/L (ref 0.5–2.2)
MCH RBC QN AUTO: 31.8 PG (ref 26–33)
MCHC RBC AUTO-ENTMCNC: 31.5 GM/DL (ref 32.2–35.5)
MCV RBC AUTO: 101.1 FL (ref 80–94)
ORGANISM: ABNORMAL
PLATELET # BLD: 332 THOU/MM3 (ref 130–400)
PMV BLD AUTO: 9.5 FL (ref 9.4–12.4)
POTASSIUM SERPL-SCNC: 3.4 MEQ/L (ref 3.5–5.2)
RBC # BLD: 3.77 MILL/MM3 (ref 4.7–6.1)
SODIUM BLD-SCNC: 138 MEQ/L (ref 135–145)
WBC # BLD: 10.2 THOU/MM3 (ref 4.8–10.8)

## 2020-07-20 PROCEDURE — 6360000002 HC RX W HCPCS: Performed by: STUDENT IN AN ORGANIZED HEALTH CARE EDUCATION/TRAINING PROGRAM

## 2020-07-20 PROCEDURE — 36415 COLL VENOUS BLD VENIPUNCTURE: CPT

## 2020-07-20 PROCEDURE — 85027 COMPLETE CBC AUTOMATED: CPT

## 2020-07-20 PROCEDURE — 2060000000 HC ICU INTERMEDIATE R&B

## 2020-07-20 PROCEDURE — 94640 AIRWAY INHALATION TREATMENT: CPT

## 2020-07-20 PROCEDURE — 6370000000 HC RX 637 (ALT 250 FOR IP): Performed by: STUDENT IN AN ORGANIZED HEALTH CARE EDUCATION/TRAINING PROGRAM

## 2020-07-20 PROCEDURE — 6370000000 HC RX 637 (ALT 250 FOR IP): Performed by: OPHTHALMOLOGY

## 2020-07-20 PROCEDURE — 80048 BASIC METABOLIC PNL TOTAL CA: CPT

## 2020-07-20 PROCEDURE — 82948 REAGENT STRIP/BLOOD GLUCOSE: CPT

## 2020-07-20 PROCEDURE — 99232 SBSQ HOSP IP/OBS MODERATE 35: CPT | Performed by: INTERNAL MEDICINE

## 2020-07-20 PROCEDURE — 2580000003 HC RX 258: Performed by: STUDENT IN AN ORGANIZED HEALTH CARE EDUCATION/TRAINING PROGRAM

## 2020-07-20 PROCEDURE — 83605 ASSAY OF LACTIC ACID: CPT

## 2020-07-20 PROCEDURE — 94760 N-INVAS EAR/PLS OXIMETRY 1: CPT

## 2020-07-20 PROCEDURE — 97162 PT EVAL MOD COMPLEX 30 MIN: CPT

## 2020-07-20 PROCEDURE — 6360000002 HC RX W HCPCS: Performed by: OPHTHALMOLOGY

## 2020-07-20 PROCEDURE — 97110 THERAPEUTIC EXERCISES: CPT

## 2020-07-20 RX ORDER — POTASSIUM CHLORIDE 20 MEQ/1
40 TABLET, EXTENDED RELEASE ORAL ONCE
Status: COMPLETED | OUTPATIENT
Start: 2020-07-20 | End: 2020-07-20

## 2020-07-20 RX ORDER — CHLORTHALIDONE 25 MG/1
25 TABLET ORAL DAILY
Status: DISCONTINUED | OUTPATIENT
Start: 2020-07-20 | End: 2020-07-23 | Stop reason: HOSPADM

## 2020-07-20 RX ADMIN — INSULIN LISPRO 1 UNITS: 100 INJECTION, SOLUTION INTRAVENOUS; SUBCUTANEOUS at 20:56

## 2020-07-20 RX ADMIN — ATORVASTATIN CALCIUM 40 MG: 40 TABLET, FILM COATED ORAL at 08:58

## 2020-07-20 RX ADMIN — ACETAMINOPHEN 650 MG: 325 TABLET ORAL at 10:39

## 2020-07-20 RX ADMIN — CEFTRIAXONE 2 G: 2 INJECTION, POWDER, FOR SOLUTION INTRAMUSCULAR; INTRAVENOUS at 14:15

## 2020-07-20 RX ADMIN — IPRATROPIUM BROMIDE AND ALBUTEROL SULFATE 1 AMPULE: .5; 3 SOLUTION RESPIRATORY (INHALATION) at 08:32

## 2020-07-20 RX ADMIN — PIOGLITAZONE 30 MG: 30 TABLET ORAL at 08:58

## 2020-07-20 RX ADMIN — LISINOPRIL 20 MG: 20 TABLET ORAL at 08:58

## 2020-07-20 RX ADMIN — POTASSIUM CHLORIDE 40 MEQ: 1500 TABLET, EXTENDED RELEASE ORAL at 14:15

## 2020-07-20 RX ADMIN — IPRATROPIUM BROMIDE AND ALBUTEROL SULFATE 1 AMPULE: .5; 3 SOLUTION RESPIRATORY (INHALATION) at 20:14

## 2020-07-20 RX ADMIN — LISINOPRIL 20 MG: 20 TABLET ORAL at 20:55

## 2020-07-20 RX ADMIN — GLIMEPIRIDE 2 MG: 2 TABLET ORAL at 08:58

## 2020-07-20 RX ADMIN — ENOXAPARIN SODIUM 40 MG: 40 INJECTION SUBCUTANEOUS at 09:04

## 2020-07-20 RX ADMIN — AMLODIPINE BESYLATE 10 MG: 10 TABLET ORAL at 08:58

## 2020-07-20 RX ADMIN — METOPROLOL SUCCINATE 100 MG: 100 TABLET, FILM COATED, EXTENDED RELEASE ORAL at 08:58

## 2020-07-20 RX ADMIN — CHLORTHALIDONE 25 MG: 25 TABLET ORAL at 14:15

## 2020-07-20 RX ADMIN — METOPROLOL SUCCINATE 100 MG: 100 TABLET, FILM COATED, EXTENDED RELEASE ORAL at 20:55

## 2020-07-20 RX ADMIN — IPRATROPIUM BROMIDE AND ALBUTEROL SULFATE 1 AMPULE: .5; 3 SOLUTION RESPIRATORY (INHALATION) at 16:51

## 2020-07-20 RX ADMIN — IPRATROPIUM BROMIDE AND ALBUTEROL SULFATE 1 AMPULE: .5; 3 SOLUTION RESPIRATORY (INHALATION) at 13:30

## 2020-07-20 ASSESSMENT — PAIN SCALES - GENERAL
PAINLEVEL_OUTOF10: 0
PAINLEVEL_OUTOF10: 0
PAINLEVEL_OUTOF10: 2
PAINLEVEL_OUTOF10: 0

## 2020-07-20 NOTE — PROGRESS NOTES
[facility] PHYSICAL THERAPY  EVALUATION  STR ICU STEPDOWN TELEMETRY 4K - [id number]    Time In: 1146  Time Out: 1206  Timed Code Treatment Minutes: 8 Minutes  Minutes: 20          Date: 2020  Patient Name: Frankie Orozco,  Gender:  male        MRN: [id number]  : 1935  (81 y.o.)      Referring Practitioner: Jimmy Higgins MD  Diagnosis: Sepsis secondary to UTI  Additional Pertinent Hx: Mr. Janelle Turner is an 81 y.o.  male who presented to [facility] Emergency Department via EMS with fever and altered mental status on 2020. He was advised to present to the ED after seeing his urologist, Dr. Roark Klinefelter, for difficulty urinating earlier that morning. The patient stated that he had progressively worsening urine retention over the past month with increased pain. Urine sample were collected for cultures at Dr. Angelo Ramos office. In the Emergency Department the patient met 3/4 SIRS criteria, blood cultures were obtained, and broad spectrum antibiotics started. Restrictions/Precautions:  Restrictions/Precautions: Fall Risk  Position Activity Restriction  Other position/activity restrictions: h/o dementia    Subjective:  Chart Reviewed: Yes  Patient assessed for rehabilitation services?: Yes  Family / Caregiver Present: No  Subjective: RN approved session, pt is supine in bed, very pleasant, agreeable to PT.     General:  Overall Orientation Status: Impaired  Orientation Level: Oriented to place, Oriented to situation, Oriented to person, Disoriented to time  Follows Commands: Within Functional Limits    Vision: Within Functional Limits    Hearing: Within functional limits         Pain: denies    Social/Functional History:    Lives With: Spouse  Type of Home: House  Home Layout: One level  Home Equipment: Cane, 4 wheeled walker      Ambulation Assistance: Independent  Transfer Assistance: Independent     Occupation: Retired  Type of occupation:  professor at [employer]  Additional Comments: Pt amb without AD, rollator outside    OBJECTIVE:  Range of Motion:  Bilateral Lower Extremity: WFL    Strength:  Bilateral Lower Extremity: Impaired - grossly 4/5    Balance:  Static Standing Balance: Stand By Assistance  Dynamic Standing Balance: Contact Guard Assistance    Bed Mobility:  Supine to Sit: Stand By Assistance    Transfers:  Sit to Stand: Contact Guard Assistance  Stand to [employer] Assistance    Ambulation:  Contact Guard Assistance  Distance: ~110 feet  Surface: Level Tile  Device:No Device  Gait Deviations:  Slow Glenda, Decreased Step Length Bilaterally and Decreased Gait Speed  **Slow pace, steady    Exercise:  Patient was guided in 1 set(s) 10 reps of exercise to both lower extremities. Ankle pumps, Seated marches and Long arc quads. Exercises were completed for increased independence with functional mobility. Functional Outcome Measures: Not completed     ASSESSMENT:  Activity Tolerance:  Patient tolerance of  treatment: good. Treatment Initiated: Treatment and education initiated within context of evaluation. Evaluation time included review of current medical information, gathering information related to past medical, social and functional history, completion of standardized testing, formal and informal observation of tasks, assessment of data and development of plan of care and goals. Treatment time included skilled education and facilitation of tasks to increase safety and independence with functional mobility for improved independence and quality of life. See above exercises. Assessment: Body structures, Functions, Activity limitations: Decreased functional mobility , Decreased balance, Decreased strength, Decreased cognition  Assessment: Pt tolerates session well, amb without AD with CGA, no LOB. PT to continue to progress strength and functional mobility.   Prognosis: Good    REQUIRES PT FOLLOW UP: Yes    Discharge Recommendations:  Discharge Recommendations: Home with assist PRN    Patient Education:  PT Education: PT Role, Plan of Care    Equipment Recommendations:  Equipment Needed: No    Plan:  Times per week: 3-5x GM  Current Treatment Recommendations: Strengthening, Home Exercise Program, Safety Education & Training, Balance Training, Functional Mobility Training, Transfer Training, Gait Training, Stair training, Patient/Caregiver Education & Training    Goals:  Patient goals : to go home  Short term goals  Time Frame for Short term goals: by discharge  Short term goal 1: Pt to transfer supine <--> sit S to enable pt to get in/out of bed. Short term goal 2: Pt to transfer sit <--> stand S for increased functional mobility. Short term goal 3: Pt to ambulate >200 feet without AD SBA for household ambulation. Long term goals  Time Frame for Long term goals : NA due to short length of stay. Following session, patient left in safe position with all fall risk precautions in place.

## 2020-07-20 NOTE — PROGRESS NOTES
Comprehensive Nutrition Assessment    Type and Reason for Visit:  Initial, Consult(poor intake/appetite)    Nutrition Recommendations/Plan: Will send Glucerna TID as pt. Agreeable. Recommend recheck Vitamin B12 and D level (was low 3/20). Suggest resume Vitamin B12 and Vitamin D (on pta). Recommend MVI. Nutrition Assessment:    Pt. nutritionally compromised AEB poor appetite since admission. At risk for further nutrition compromise r/t sepsis, UTI and underlying medical condition (hx CVA, dementia, DM). Nutrition recommendations/interventions as per above. Malnutrition Assessment:  Malnutrition Status:  Insufficient data    Context:  Acute Illness     Findings of the 6 clinical characteristics of malnutrition:  Energy Intake:  Mild decrease in energy intake (Comment)  Weight Loss:  1 - 5% over 1 month(-6% in 2 months per EMR)     Body Fat Loss:  Unable to assess     Muscle Mass Loss:  Unable to assess    Fluid Accumulation:  No significant fluid accumulation     Strength:  Not Performed    Estimated Daily Nutrient Needs:  Energy (kcal):  7803-6041 kcals (20-25 kcals/kgm wt of 86 kgm); Weight Used for Energy Requirements:  Current     Protein (g):  87+ grams/day (1.3+ grams/kgm IBW); Weight Used for Protein Requirements:  Ideal          Nutrition Related Findings:  pt. seen - pleasant; reports poor appetite since admission; states good appetite pta; consumes 3 meals/day; reports some trouble swallowing pills; denies any nausea or abdominal pain; 7/20: Glucose 172; 3/3/20: Vitamin B12 160, Vitamin D 12.3; Rx includes Actos, Amaryl, Insulin; agrees to Glucerna ONS; denies any questions on diabetic diet    Wounds:  None       Current Nutrition Therapies:    DIET CARB CONTROL;   Dietary Nutrition Supplements: Diabetic Oral Supplement    Anthropometric Measures:  · Height: 5' 7\" (170.2 cm)  · Current Body Weight: 188 lb 11.4 oz (85.6 kg)(7/20, bedscale, no edema)   · Admission Body Weight: 195 lb 12.8 oz (88.8 kg)(7/17, +1 edema)    · Usual Body Weight: (200# per pt; per EMR: 5/29/20: 200# 12.8 oz, 7/17/20: 193# 1.6 oz)     · Ideal Body Weight: 148 lbs;   · BMI: 29.5  · BMI Categories: Overweight (BMI 25.0-29. 9)       Nutrition Diagnosis:   · Inadequate oral intake related to inadequate protein-energy intake(poor appetite) as evidenced by intake 0-25%, intake 26-50%      Nutrition Interventions:   Food and/or Nutrient Delivery:  Continue Current Diet, Start Oral Nutrition Supplement  Nutrition Education/Counseling:  Education initiated(7/20 Encouraged po intake at best efforts.)   Coordination of Nutrition Care:  Continued Inpatient Monitoring    Goals:  Pt. will tolerate and consume 75% or more of meals during LOS. Nutrition Monitoring and Evaluation:   Food/Nutrient Intake Outcomes:  Food and Nutrient Intake, Supplement Intake  Physical Signs/Symptoms Outcomes:  Biochemical Data, Chewing or Swallowing, Nutrition Focused Physical Findings, Skin, Weight     Discharge Planning:     Too soon to determine     Electronically signed by Van Younger RD, LD on 7/20/20 at 3:37 PM EDT    Contact: [phone]

## 2020-07-20 NOTE — PLAN OF CARE
Problem: Falls - Risk of:  Goal: Will remain free from falls  Description: Will remain free from falls  7/19/2020 2351 by Philip Ballesteros RN  Outcome: Ongoing  Note: No falls this shift. Telesitter at bedside for patient safety. Patient educated on not getting up without help. Call light in reach. Bed in lowest position. Falling star protocol in place. Bed alarm set. Patient visually checked on hourly rounds. Problem: Falls - Risk of:  Goal: Absence of physical injury  Description: Absence of physical injury  Outcome: Ongoing  Note: Pt remains free from physical injury this shift. Problem: Pain:  Goal: Pain level will decrease  Description: Pain level will decrease  7/19/2020 2351 by Philip Ballesteros RN  Outcome: Ongoing  Note: Pt complains of a headache this shift. Pt's pain goal is no pain. Problem: Pain:  Goal: Control of acute pain  Description: Control of acute pain  Outcome: Ongoing     Problem: Pain:  Goal: Control of chronic pain  Description: Control of chronic pain  Outcome: Ongoing     Problem: Skin Integrity:  Goal: Will show no infection signs and symptoms  Description: Will show no infection signs and symptoms  7/19/2020 2351 by Philip Ballesteros RN  Outcome: Ongoing  Note: No new skin breakdown noted this shift. Pt able to reposition self. Repositions frequently. Problem: Skin Integrity:  Goal: Absence of new skin breakdown  Description: Absence of new skin breakdown  Outcome: Ongoing     Problem: Discharge Planning:  Goal: Discharged to appropriate level of care  Description: Discharged to appropriate level of care  7/19/2020 2351 by Philip Ballesteros RN  Outcome: Ongoing  Note: Pt plans on returning home with wife when he is medically stable.       Problem: Infection, Septic Shock:  Goal: Will show no infection signs and symptoms  Description: Will show no infection signs and symptoms  7/19/2020 2351 by Philip Ballesteros RN  Outcome: Ongoing  Note: Pt afebrile this

## 2020-07-20 NOTE — CARE COORDINATION
20, 9:26 AM EDT  DISCHARGE PLANNING EVALUATION:    Cristiano Márquez       Admitted from: Carina Bower 2020/ [facility] day: 3   Location: [facility]- Reason for admit: Sepsis secondary to UTI ([facility] [A41.9, N39.0] Status: IP  Admit order signed?: yes  PMH:  has a past medical history of Cerebral artery occlusion with cerebral infarction ([facility] [address] DM (diabetes mellitus) ([facility] Hyperlipidemia, Hypertension, and Stroke ([facility] Procedure: none  Pertinent abnormal Imagin/17 CXR: no acute findings. Medications:  Scheduled Meds:   amLODIPine  10 mg Oral Daily    atorvastatin  40 mg Oral Daily    glimepiride  2 mg Oral Daily with breakfast    pioglitazone  30 mg Oral Daily    cefTRIAXone (ROCEPHIN) IV  2 g Intravenous Q24H    metoprolol succinate  100 mg Oral BID    lisinopril  20 mg Oral BID    ipratropium-albuterol  1 ampule Inhalation Q4H WA    enoxaparin  40 mg Subcutaneous Daily    insulin lispro  0-6 Units Subcutaneous TID WC    insulin lispro  0-3 Units Subcutaneous Nightly     Continuous Infusions:   dextrose      sodium chloride 50 mL/hr at 20 1751      Pertinent Info/Orders/Treatment Plan:  Presented to ED for AMS, fever 103. Scheduled for prostate surgery later in week. Recent exposure to COVID, patient tested negative. Sepsis r/t UTI. Hospitalist following. PT/OT. Dietitian consult. Jorge care. Home with jorge vs straight cath. IVF. IV rocephin. Diet: DIET CARB CONTROL;   Smoking status:  reports that he quit smoking about 40 years ago. His smoking use included pipe.  He has never used smokeless tobacco.   PCP: Madhu Arreola DO  Readmission 30 days or less: no  Readmission Risk Score: 11%    Discharge Planning Evaluation  Current Residence:  Private Residence  Living Arrangements:  Spouse/Significant Other   Support Systems:  Spouse/Significant Other, Children  Current Services PTA:     Potential Assistance Needed:  N/A  Potential Assistance Purchasing Medications:  No  Does patient want to participate in local refill/ meds to beds program?  No  Type of Home Care Services:  None  Patient expects to be discharged to:  home  Expected Discharge date:  07/21/20  Follow Up Appointment: Best Day/ Time: Monday AM(ask at discharge)    Patient Goals/Plan/Treatment Preferences: Patient is from home with his wife, has dementia. Per report, wife is also confused. SW consulted for needs. Transportation/Food Security/Housekeeping Addressed:  No issues identified.     Evaluation: yes

## 2020-07-20 NOTE — PLAN OF CARE
Problem: Nutrition  Goal: Optimal nutrition therapy  Outcome: Ongoing  Nutrition Problem #1: Inadequate oral intake  Intervention: Food and/or Nutrient Delivery: Continue Current Diet, Start Oral Nutrition Supplement  Nutritional Goals: Pt. will tolerate and consume 75% or more of meals during LOS.

## 2020-07-20 NOTE — PLAN OF CARE
Problem: Impaired respiratory status  Goal: Clear lung sounds  7/19/2020 2225 by Jose Lee  Outcome: Ongoing  7/19/2020 1558 by Shilpa Hou RCP  Outcome: Ongoing  7/19/2020 1038 by Nya Art RN  Outcome: Ongoing  Note: Lung sounds clear, on room air. Denies shortness of breath.

## 2020-07-20 NOTE — PROGRESS NOTES
Hospitalist Progress Note    Patient: Wilma Beckett  : 1935  MRN#: [id number]  2020 10:21 AM  ADMISSION DAY:  2020  7:38 PM   Resuscitation/\"Code\" Status   [x] Full resuscitation [] DNR-Comfort Care-Arrest  [] DNR-Comfort Care   [] Limited Resuscitation   [] No ET intubation   [] No CPR   [] No shock for non-perfusing rhythm   [] No post-arrest resuscitative medications   [] Other:     Patient Vitals for the past 8 hrs:   BP Temp Temp src Pulse Resp SpO2 Weight   20 0900 (!) 175/99 -- -- -- -- -- --   20 0845 (!) 182/98 98.9 °F (37.2 °C) Skin 122 18 96 % --   20 0832 -- -- -- -- -- 93 % --   20 0338 (!) 175/95 99 °F (37.2 °C) Oral 110 18 94 % 188 lb 11.4 oz (85.6 kg)       I/O last 3 completed shifts: In: 2207.3 [P.O.:430; I.V.:1777.3]  Out: 2750 [Urine:2750]   Date 20 0000 - 20   Shift 7312-5155 6051-1953 0091-1041 24 Hour Total   INTAKE   P.O.(mL/kg/hr) 150(0.2)   150   I. V.(mL/kg) 353. 3(4.1)   353. 3(4.1)   Shift Total(mL/kg) 503.3(5.9)   503.3(5.9)   OUTPUT   Urine(mL/kg/hr) 775(1.1)   775   Shift Total(mL/kg) 775(9.1)   775(9.1)   Weight (kg) 85.6 85.6 85.6 85.6     Wt Readings from Last 3 Encounters:   20 188 lb 11.4 oz (85.6 kg)   20 193 lb 1.6 oz (87.6 kg)   20 199 lb 12.8 oz (90.6 kg)      Body mass index is 29.56 kg/m². Patient Summary   Patient overview/Events leading to admission/Hospital course & Response to treatment:  Mr. Janelle Turner is an 81 y.o.  male who presented to [facility] Emergency Department via EMS with fever and altered mental status on 2020. He was advised to present to the ED after seeing his urologist, Dr. Jericho Martinez, for difficulty urinating earlier that morning. The patient stated that he had progressively worsening urine retention over the past month with increased pain.  Urine sample were collected for cultures at Dr. Foote Rota office. In the Emergency Department the patient met 3/4 SIRS criteria, blood cultures were obtained, and broad spectrum antibiotics started. 7/18/2020  Overnight the patient was afebrile with fluctuations in blood pressures and lekocytosis (WBC:21.2). The patient was sitting comfortable in the bedside chair with his wife present during interview and exam. He stated that he felt great and had little complaint. He stated that he was unsure why he was at the hospital but after continued discussion he was able to recall the events leading up to his admission. The patient did eventually admit that he was having trouble urinating and that he was scheduled for a urological procedure but did not recall what the procedure was. Bladder scan revealed post-void residual of 240ml. His wife was concerned that his mental status had worsened and that this has been progressive over the past few months. She retold a recent account where he left the house and could not remember where he lived and had gotten lost.    Upon review of the chart there were inconsistencies between home and hospital medications. I spoke with Mr. Nica Contreras PCP, Dr. Ariella David, who confirmed that Mr. Miah Sin has uncontrolled HTN, DM, h/o CVA, HLD, BPH, and possibly progressing dementia. Reconciliation of the medications was conducted to ensure proper management. Discontinued Clonidine and restarted home diabetes medications    Blood and urine cultures revealed gram negative bacilli growth. Antibiotic coverage changed from Cefepime and Zosyn to Rocephin. 7/19/2020  From previous note: \"Overnight, the Patient's blood pressure was uncontrolled. He was agitated. He was given Hydralazine with no help, and Hydroxyzine. Clonidine was resumed at 0.2mg BID (less than home dose). Blood pressure now 181/98. \"    7/20/20/20  Patient was still experiencing HTN overnight. Clonidine discontinued, Hydralazine increased to 10mg PRN.  Today the patient's HTN is still uncontrolled and initiation of triple therapy was started. Today Mr. Radha Olsen had complaint of pain with urination with external catheter, but denies pain with urination without the catheter. Recommended continued assessment of this complaint with nursing and bladder scan PRN. The patient is still quite confused without improvement from admission. I spoke with the daughter, Milena Mckenzie, via telephone who stated that her father has been in a confused state since June of 2019. She stated he was being followed by Dr. Khris Mijares for his confusion, memory loss, and visual hallucinations. I will attempt to acquire outside records for these visit. Examination:    General appearance - alert, well appearing, and in no distress and oriented to person, place, and time  Chest - clear to auscultation, no wheezes, rales or rhonchi, symmetric air entry  Heart - normal rate, regular rhythm, normal S1, S2, no murmurs, rubs, clicks or gallops  Abdomen - soft, nontender, distended, no masses or organomegaly, no rebound tenderness noted. Bowel sounds normal. Bladder distention noted.   Obese: Yes; Protuberant: Yes and No   Neurological - alert, oriented, normal speech, no focal findings or movement disorder noted  Extremities - peripheral pulses normal, no pedal edema, no clubbing or cyanosis  Skin - normal coloration and turgor, no rashes, no suspicious skin lesions noted    Scheduled Meds:   amLODIPine  10 mg Oral Daily    atorvastatin  40 mg Oral Daily    glimepiride  2 mg Oral Daily with breakfast    pioglitazone  30 mg Oral Daily    cefTRIAXone (ROCEPHIN) IV  2 g Intravenous Q24H    metoprolol succinate  100 mg Oral BID    lisinopril  20 mg Oral BID    ipratropium-albuterol  1 ampule Inhalation Q4H WA    enoxaparin  40 mg Subcutaneous Daily    insulin lispro  0-6 Units Subcutaneous TID WC    insulin lispro  0-3 Units Subcutaneous Nightly     Continuous Infusions:   dextrose      sodium chloride 50 mL/hr at 07/19/20 1751     PRN Meds:hydrALAZINE, 10 mg, Q4H PRN  glucose, 15 g, PRN  dextrose, 12.5 g, PRN  glucagon (rDNA), 1 mg, PRN  dextrose, 100 mL/hr, PRN  acetaminophen, 650 mg, Q4H PRN  ondansetron, 4 mg, Q6H PRN      SUPPORT DEVICES:  [] ETT []Oral / [] Nasal  [] Gastric Tube [] OG / [] NG    [] Central Venous Line (Specify Site):  [] Urinary Catheter  [] Arterial Line (Specify Site)  [x] Peripheral IV access: Right hand  [] Other:     PROPHYLAXIS/THERAPY:   VTE:                [x] Enoxaparin    [] Warfarin [] DOAC [] PCD Device:Bilat LE                          [] Heparin: [] Subcut / [] IV     NUTRITION SUPPORT:  Carb control    Oxygen Delivery - O2 Flow Rate (L/min): 2 L/min    DATA:    CBC:   Recent Labs     07/18/20 0601 07/19/20  0601 07/20/20  0530   WBC 21.2* 16.7* 10.2   RBC 3.34* 3.81* 3.77*   HGB 10.9* 12.3* 12.0*   HCT 34.0* 38.0* 38.1*   .8* 99.7* 101.1*   MCH 32.6 32.3 31.8   MCHC 32.1* 32.4 31.5*    339 332   MPV 9.4 9.6 9.5      BMP/CMP:   Recent Labs     07/17/20 2018 07/18/20  0601 07/19/20  0601 07/20/20  0530    142 137 138   K 3.9 3.8 3.6 3.4*    106 102 103   CO2 19* 22* 19* 20*   ANIONGAP 17.0* 14.0 16.0 15.0   BUN 29* 25* 13 9   CREATININE 1.1 0.9 0.6 0.6   GLUCOSE 160* 156* 182* 172*   CALCIUM 9.3 8.7 8.8 9.1   PROT 7.4  --   --   --    LABALBU 4.0  --   --   --    BILITOT 0.5  --   --   --    ALKPHOS 82  --   --   --    AST 23  --   --   --    ALT 15  --   --   --       Other Electrolytes:   Recent Labs     07/17/20 2018 07/18/20  0601   MG 1.7 1.8     Serum Osmolality  No results for input(s): OSMOMEASER in the last 72 hours. Procalcitonin:  Recent Labs     07/17/20  2018   PROCAL 1.51*     Cardiac:   Recent Labs     07/17/20 2018 07/18/20  0601   TROPONINT < 0.010 < 0.010     Lipids: No results for input(s): CHOL, HDL in the last 72 hours. Invalid input(s): LDLCALCU  Coagulation: No results for input(s): INR in the last 72 hours.   Lactic Acid:   Recent Labs issues  [x] Other:    Urinary Retention Secondary to BPH: Monitor in's/out's, repeat bladder scan PRN, and place Dejesus catheter if needed. TURP procedure scheduled for 8/4/2020  Surgical   [x] No active issues  [] Other:    Rehabilitation/Mobility   [x] No active issues  [] Other:    Social/Spiritual/DNR/Disposition/Miscellaneous   [x] No active issues  [] Other:    Disposition Issues - Needs To Be Addressed To Facilitate Discharge   [x] No active issues  [] Other:    Planned Placement   [] Home  [] LTAC  [] Rehabilitation Unit/Facility  [] Transitional Care  [] Extended Care Facility  [x] Unknown at this time        Reviewed with Collaborative Staff     Reviewed with Physician Staff  Meets Continued ICU stepdown Level Care Criteria:    [x] Yes - Indication: Sepsis secondary to UTI    [] No - Transfer Planned to listed location:    Anticipated discharge:  [] Yes: in - day(s)  [x] No, continued therapy needed.       Electronically signed by Jm Mario DO on 7/20/2020 at 10:21 AM

## 2020-07-20 NOTE — PLAN OF CARE
Problem: Impaired respiratory status  Goal: Clear lung sounds  7/20/2020 0834 by Octavio Pitts RCP  Outcome: Ongoing  Continue therapy as ordered to improve breath sounds/aeration.

## 2020-07-20 NOTE — CARE COORDINATION
7/20/20, 1:33 PM EDT    DISCHARGE PLANNING EVALUATION      SW received order but patient is confused.   Message was left for his daughter, Holland Buitrago, await return call

## 2020-07-20 NOTE — ED PROVIDER NOTES
[address]  Pt Name: Estuardo Perry  MRN: [id number]  [place] 1935  Date of evaluation: 7/17/2020  Provider: FINN Obrien CNP    CHIEF COMPLAINT     No chief complaint on file. Nurses Notes reviewed and I agree except as noted in the HPI. HISTORY OF PRESENT ILLNESS    Estuardo Perry is a 81 y.o. male whopresents to the emergency department from home for AMS. The patient was eating supper earlier and was fine and then had a rapid decline. He has dementia so he occasionally is altered but his wife states he is much worse now. He was found to have a fever of 103 by EMS. No cough or congestion. No nausea or vomiting. He has surgery scheduled on his prostate later this week. He was with his granddaughter this weekend who tested positive for COVID on Monday. HPI was provided by the patient. Triage notes and Nursing notes were reviewed by myself. Any discrepancies are addressed above. REVIEW OF SYSTEMS     Review of Systems   Unable to perform ROS: Mental status change   Constitutional: Positive for fever. Psychiatric/Behavioral: Positive for confusion. All pertinent systems were reviewed and are negative unless indicated in the HPI. PAST MEDICAL HISTORY    has a past medical history of Cerebral artery occlusion with cerebral infarction ([facility] [address] DM (diabetes mellitus) ([facility] [address] Hyperlipidemia, Hypertension, and Stroke ([facility] [address] SURGICAL HISTORY      has a past surgical history that includes Knee Arthroplasty (Right); cardiovascular stress test; eye surgery; and Cardiac catheterization (06/19/2020). CURRENT MEDICATIONS       Current Discharge Medication List      CONTINUE these medications which have NOT CHANGED    Details   AMLODIPINE BESYLATE PO Take by mouth 1 tab a day- wife unsure of dose.       Cholecalciferol (VITAMIN D3) 50 MCG (2000 UT) CAPS Take by mouth three times daily      pioglitazone (ACTOS) 30 MG tablet Take 30 mg by mouth daily      glimepiride (AMARYL) 2 MG tablet Take 2 mg by mouth every morning (before breakfast)      metoprolol succinate (TOPROL XL) 100 MG extended release tablet Take 100 mg by mouth 2 times daily      lisinopril (PRINIVIL;ZESTRIL) 20 MG tablet Take 20 mg by mouth 2 times daily      simvastatin (ZOCOR) 40 MG tablet Take 40 mg by mouth nightly      cloNIDine (CATAPRES) 0.3 MG tablet Take 0.3 mg by mouth 2 times daily      metFORMIN (GLUCOPHAGE-XR) 750 MG extended release tablet Take 750 mg by mouth 2 times daily 2 tab      Cyanocobalamin (VITAMIN B-12 IJ) Inject as directed every 30 days             ALLERGIES     has No Known Allergies. FAMILY HISTORY     He indicated that his mother is . He indicated that his father is . He indicated that the status of his sister is unknown. He indicated that the status of his neg hx is unknown.   family history includes Heart Attack in his father; Heart Disease in his father; High Blood Pressure in his sister. SOCIAL HISTORY      reports that he quit smoking about 40 years ago. His smoking use included pipe. He has never used smokeless tobacco. He reports current alcohol use. He reports that he does not use drugs. PHYSICAL EXAM     INITIAL VITALS:  height is 5' 7\" (1.702 m) and weight is 195 lb 12.8 oz (88.8 kg). His oral temperature is 98 °F (36.7 °C). His blood pressure is 169/85 (abnormal) and his pulse is 110. His respiration is 18 and oxygen saturation is 94%. Physical Exam  Constitutional:       Appearance: He is well-developed. He is ill-appearing. HENT:      Head: Normocephalic and atraumatic. Eyes:      Conjunctiva/sclera: Conjunctivae normal.   Cardiovascular:      Rate and Rhythm: Tachycardia present. Pulmonary:      Effort: Pulmonary effort is normal.   Abdominal:      Palpations: Abdomen is soft. Musculoskeletal: Normal range of motion. Skin:     General: Skin is warm and dry.       Capillary Refill: Capillary refill takes less than 2 seconds. Neurological:      Mental Status: He is alert. He is disoriented. Psychiatric:         Behavior: Behavior normal.       Limited physical exam 2/2 infection control purposes. DIFFERENTIAL DIAGNOSIS:   Including but not limited to COVID 19, UTI, Sepsis    DIAGNOSTIC RESULTS     EKG: AllEKG's are interpreted by the Emergency Department Physician who either signs or Co-signs this chart in the absence of a cardiologist.     EKG 12 Lead (Final result)    Component (Lab Inquiry)   Collection Time  Result Time  Ventricular Rate  Atrial Rate  QRS Duration  Q-T Interval  QTc Calculation (Bazett)  R Axis  T Axis    07/17/20 19:42:45  07/17/20 19:42:45  147  144  70  350  547  -9  54    Previous Results    06/19/20 14:32:45  06/19/20 14:32:45  66  66  90  438  459  9  57    05/27/20 13:25:05  05/27/20 13:25:05  66  66  86  446  467  53  26           Final result                 Narrative:      Poor data quality, interpretation may be adversely affected   Supraventricular tachycardia   Inferior infarct (cited on or before 27-MAY-2020)   Abnormal ECG   When compared with ECG of 19-JUN-2020 14:32,   Ventricular rate has increased BY  81 BPM   Questionable change in initial forces of Inferior leads   ST now depressed in Anterior leads   T wave inversion now evident in Anterior leads   Confirmed by Beth Marquis ([id number]) on 7/18/2020 8:07:08 AM                 RADIOLOGY: non-plain film images(s) such as CT, Ultrasound and MRI are read by the radiologist.  Plain radiographic images are visualized and preliminarily interpreted by the emergencyphysician unless otherwise stated below. XR CHEST PORTABLE   Final Result   No acute findings               **This report has been created using voice recognition software. It may contain minor errors which are inherent in voice recognition technology. **      Final report electronically signed by Dr. Viktoria Jacobsen on 7/17/2020 9:08 PM            LABS:   Labs Reviewed CULTURE, BLOOD 1 - Abnormal; Notable for the following components:       Result Value    Blood Culture, Routine No growth-preliminary  (*)     Organism gram negative bacilli (*)     All other components within normal limits    Narrative:     Source: blood-Adult-suboptimal <5.5oz./set volume       Site: (single bottle)Peripheral;            Current Antibiotics: not stated   CULTURE, REFLEXED, URINE - Abnormal; Notable for the following components:    Organism Escherichia coli (*)     All other components within normal limits    Narrative:     Source: urine       Site: unknown collect          Current Antibiotics: Ceftriaxone   BASIC METABOLIC PANEL - Abnormal; Notable for the following components:    CO2 19 (*)     Glucose 160 (*)     BUN 29 (*)     All other components within normal limits   CBC WITH AUTO DIFFERENTIAL - Abnormal; Notable for the following components:    RBC 3.80 (*)     Hemoglobin 12.3 (*)     Hematocrit 38.7 (*)     .8 (*)     MCHC 31.8 (*)     RDW-SD 49.8 (*)     Segs Absolute 9.3 (*)     Lymphocytes Absolute 0.4 (*)     Monocytes Absolute 0.1 (*)     All other components within normal limits   C-REACTIVE PROTEIN - Abnormal; Notable for the following components:    CRP 1.66 (*)     All other components within normal limits   LACTIC ACID, PLASMA - Abnormal; Notable for the following components:    Lactic Acid 4.0 (*)     All other components within normal limits   LIPASE - Abnormal; Notable for the following components:    Lipase 132.4 (*)     All other components within normal limits   PROCALCITONIN - Abnormal; Notable for the following components:    Procalcitonin 1.51 (*)     All other components within normal limits   BLOOD GAS, ARTERIAL - Abnormal; Notable for the following components:    pH, Blood Gas 7.46 (*)     PCO2 29 (*)     HCO3 21 (*)     All other components within normal limits   URINE WITH REFLEXED MICRO - Abnormal; Notable for the following components:    Blood, Urine Abnormal; Notable for the following components:    Lactic Acid 2.3 (*)     All other components within normal limits   POCT GLUCOSE - Abnormal; Notable for the following components:    POC Glucose 264 (*)     All other components within normal limits   POCT GLUCOSE - Abnormal; Notable for the following components:    POC Glucose 150 (*)     All other components within normal limits   POCT GLUCOSE - Abnormal; Notable for the following components:    POC Glucose 194 (*)     All other components within normal limits   POCT GLUCOSE - Abnormal; Notable for the following components:    POC Glucose 165 (*)     All other components within normal limits   POCT GLUCOSE - Abnormal; Notable for the following components:    POC Glucose 147 (*)     All other components within normal limits   POCT GLUCOSE - Abnormal; Notable for the following components:    POC Glucose 175 (*)     All other components within normal limits   POCT GLUCOSE - Abnormal; Notable for the following components:    POC Glucose 162 (*)     All other components within normal limits   POCT GLUCOSE - Abnormal; Notable for the following components:    POC Glucose 151 (*)     All other components within normal limits   POCT GLUCOSE - Abnormal; Notable for the following components:    POC Glucose 185 (*)     All other components within normal limits   CULTURE, BLOOD 2    Narrative:     Source: blood-Adult-suboptimal <5.5oz./set volume       Site: Peripheral Vein            Current Antibiotics: not stated   VRE SCREEN BY PCR   CULTURE, MRSA, SCREENING    Narrative:     Source: rectal       Site:           Current Antibiotics: Cefepime   HEPATIC FUNCTION PANEL   LACTATE DEHYDROGENASE   TROPONIN   COVID-19   OSMOLALITY   BLOOD OCCULT STOOL SCREEN #1   MAGNESIUM   LIPASE   MAGNESIUM   TROPONIN   MRSA BY PCR   ANION GAP   ANION GAP   GLOMERULAR FILTRATION RATE, ESTIMATED   BASIC METABOLIC PANEL   LACTIC ACID, PLASMA   CBC   POCT GLUCOSE         EMERGENCYDEPARTMENT COURSE AND MEDICAL DECISION MAKING:   Vitals:    Vitals:    07/19/20 1649 07/19/20 2026 07/19/20 2222 07/19/20 2329   BP: (!) 169/84 (!) 164/93  (!) 169/85   Pulse:  110  110   Resp:  18 18 18   Temp:  98.2 °F (36.8 °C)  98 °F (36.7 °C)   TempSrc:  Oral  Oral   SpO2:  94% 94% 94%   Weight:       Height:             Pertinent Labs & Imaging studies reviewed. (See chart for details)           Controlled Substances Monitoring:     No flowsheet data found. The patient was seen and evaluated in atimely manner for AMS with confusion. Appropriate labs and imaging are ordered and reviewed. Patient is placed in negative pressure for risk of COVID. COVID is negative. Patient has a nitrite positive UTI. Meets sepsis criteria. Is given 30 ml/kg bolus. Fever is treated. Given Cefepime for his UTI. Patient also seen by Dr. Monie Saleh. Refer to his note. Consulted with Dr. Tanvi Pina who agrees to admit. Updated patient's wife. Strict return precautions and follow up instructions were discussed with the patient with which the patient agrees     Physical assessment findings, diagnostic testing(s) if applicable, and vital signs reviewed with patient/patient representative. Questions answered. Medications asdirected, including OTC medications for supportive care. Education provided on medications. Differential diagnosis(s) discussed with patient/patient representative. Home care/self care instructions reviewed withpatient/patient representative. Patient is to follow-up with family care provider in 2-3 days if no improvement. Patient is to go to the emergency department if symptoms worsen. Patient/patient representative isaware of care plan, questions answered, verbalizes understanding and is in agreement.      ED Medications administered this visit:   Medications   metoprolol succinate (TOPROL XL) extended release tablet 100 mg (100 mg Oral Given 7/19/20 2035)   lisinopril (PRINIVIL;ZESTRIL) tablet 20 and they did verbalize understanding. Patient was seen independently by myself. The Patient's final impression and disposition and plan was determined by myself. CRITICAL CARE:   none    CONSULTS:  None    PROCEDURES:  None    FINAL IMPRESSION      1. Sepsis without acute organ dysfunction, due to unspecified organism ([facility] [address]    2.  Urinary tract infection with hematuria, site unspecified          DISPOSITION/PLAN   DISPOSITION Admitted 07/17/2020 09:52:08 PM        PATIENT REFERRED TO:  Fransisca Simon., DO  [address] [phone]            DISCHARGE MEDICATIONS:  Current Discharge Medication List          (Please note that portions of this note were completed with a voice recognition program.  Efforts were made to edit thedictations but occasionally words are mis-transcribed.)    Glenys Goldberg, APRN - CNP Glenys Goldberg, APRN - CNP  07/20/20 9378

## 2020-07-20 NOTE — CARE COORDINATION
DISCHARGE/PLANNING EVALUATION  7/20/20, 3:18 PM EDT    Reason for Referral:  \"Home needs, ECF vs HH\"  Mental Status:  Alert but confused  Decision Making:  Makes decisions with his family  Family/Social/Home Environment:  SW spoke to patient and his wife also present. They live in a street level apartment. He states they have been back for about a month. Prior to this they lived at another address in [place] for 55 years. He also then stated they were at a son's home in [place] for about 6 to 7 months, stating this was because of the virus. He was not too certain about his statements thou. He states they missed Ada so came back. She stated that it was not a \"happy situation\" but did not explain this further. They have 4 children with 2 in [place] and one in [facility] [place] and the other in [place]. 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 He states he no longer drives as he was having hallucinations and his Dr nor his wife wanted him to quit too. He has 3 types of walkers, standard, wheels and one with a seat. He uses the later when outside  Current Services including food security, transportation and housekeeping:  See above, never had [facility]  Current Equipment: see above  Payment Source: [employer] Medicare  Concerns or Barriers to Discharge: The wife would like to take him home. She mentioned twice to him during out conversation that he had said something wrong--that he had been forgetful. She states their daughter, Sommer Robert is helpful, GAYE had left a message earlier for her  Post acute provider list with quality measures, geographic area and applicable managed care information provided. Questions regarding selection process answered:  Not at this time    Teach Back Method used with patient and wife regarding care plan and needs  Patient and wife verbalize understanding of the plan of care and contribute to goal setting.        Patient goals, treatment preferences and

## 2020-07-21 LAB
ANION GAP SERPL CALCULATED.3IONS-SCNC: 14 MEQ/L (ref 8–16)
ANION GAP SERPL CALCULATED.3IONS-SCNC: 17 MEQ/L (ref 8–16)
BUN BLDV-MCNC: 12 MG/DL (ref 7–22)
BUN BLDV-MCNC: 21 MG/DL (ref 7–22)
CALCIUM SERPL-MCNC: 9.6 MG/DL (ref 8.5–10.5)
CALCIUM SERPL-MCNC: 9.7 MG/DL (ref 8.5–10.5)
CHLORIDE BLD-SCNC: 101 MEQ/L (ref 98–111)
CHLORIDE BLD-SCNC: 101 MEQ/L (ref 98–111)
CO2: 20 MEQ/L (ref 23–33)
CO2: 24 MEQ/L (ref 23–33)
CREAT SERPL-MCNC: 0.6 MG/DL (ref 0.4–1.2)
CREAT SERPL-MCNC: 0.9 MG/DL (ref 0.4–1.2)
ERYTHROCYTE [DISTWIDTH] IN BLOOD BY AUTOMATED COUNT: 13.1 % (ref 11.5–14.5)
ERYTHROCYTE [DISTWIDTH] IN BLOOD BY AUTOMATED COUNT: 13.2 % (ref 11.5–14.5)
ERYTHROCYTE [DISTWIDTH] IN BLOOD BY AUTOMATED COUNT: 47.6 FL (ref 35–45)
ERYTHROCYTE [DISTWIDTH] IN BLOOD BY AUTOMATED COUNT: 47.9 FL (ref 35–45)
GFR SERPL CREATININE-BSD FRML MDRD: 80 ML/MIN/1.73M2
GFR SERPL CREATININE-BSD FRML MDRD: > 90 ML/MIN/1.73M2
GLUCOSE BLD-MCNC: 147 MG/DL (ref 70–108)
GLUCOSE BLD-MCNC: 158 MG/DL (ref 70–108)
GLUCOSE BLD-MCNC: 175 MG/DL (ref 70–108)
GLUCOSE BLD-MCNC: 175 MG/DL (ref 70–108)
GLUCOSE BLD-MCNC: 182 MG/DL (ref 70–108)
GLUCOSE BLD-MCNC: 218 MG/DL (ref 70–108)
HCT VFR BLD CALC: 38.9 % (ref 42–52)
HCT VFR BLD CALC: 41.3 % (ref 42–52)
HEMOGLOBIN: 12.6 GM/DL (ref 14–18)
HEMOGLOBIN: 13.4 GM/DL (ref 14–18)
MCH RBC QN AUTO: 32.2 PG (ref 26–33)
MCH RBC QN AUTO: 32.2 PG (ref 26–33)
MCHC RBC AUTO-ENTMCNC: 32.4 GM/DL (ref 32.2–35.5)
MCHC RBC AUTO-ENTMCNC: 32.4 GM/DL (ref 32.2–35.5)
MCV RBC AUTO: 99.3 FL (ref 80–94)
MCV RBC AUTO: 99.5 FL (ref 80–94)
PLATELET # BLD: 393 THOU/MM3 (ref 130–400)
PLATELET # BLD: 395 THOU/MM3 (ref 130–400)
PMV BLD AUTO: 9.2 FL (ref 9.4–12.4)
PMV BLD AUTO: 9.5 FL (ref 9.4–12.4)
POTASSIUM SERPL-SCNC: 3.4 MEQ/L (ref 3.5–5.2)
POTASSIUM SERPL-SCNC: 3.8 MEQ/L (ref 3.5–5.2)
PROCALCITONIN: 2.82 NG/ML (ref 0.01–0.09)
RBC # BLD: 3.91 MILL/MM3 (ref 4.7–6.1)
RBC # BLD: 4.16 MILL/MM3 (ref 4.7–6.1)
SODIUM BLD-SCNC: 138 MEQ/L (ref 135–145)
SODIUM BLD-SCNC: 139 MEQ/L (ref 135–145)
VITAMIN B-12: 596 PG/ML (ref 211–911)
VITAMIN D 25-HYDROXY: 32 NG/ML (ref 30–100)
WBC # BLD: 10.9 THOU/MM3 (ref 4.8–10.8)
WBC # BLD: 11.6 THOU/MM3 (ref 4.8–10.8)

## 2020-07-21 PROCEDURE — 80048 BASIC METABOLIC PNL TOTAL CA: CPT

## 2020-07-21 PROCEDURE — 6360000002 HC RX W HCPCS: Performed by: STUDENT IN AN ORGANIZED HEALTH CARE EDUCATION/TRAINING PROGRAM

## 2020-07-21 PROCEDURE — 2060000000 HC ICU INTERMEDIATE R&B

## 2020-07-21 PROCEDURE — 6370000000 HC RX 637 (ALT 250 FOR IP): Performed by: OPHTHALMOLOGY

## 2020-07-21 PROCEDURE — 97166 OT EVAL MOD COMPLEX 45 MIN: CPT

## 2020-07-21 PROCEDURE — 36415 COLL VENOUS BLD VENIPUNCTURE: CPT

## 2020-07-21 PROCEDURE — 82306 VITAMIN D 25 HYDROXY: CPT

## 2020-07-21 PROCEDURE — 6370000000 HC RX 637 (ALT 250 FOR IP): Performed by: STUDENT IN AN ORGANIZED HEALTH CARE EDUCATION/TRAINING PROGRAM

## 2020-07-21 PROCEDURE — 84145 PROCALCITONIN (PCT): CPT

## 2020-07-21 PROCEDURE — 6360000002 HC RX W HCPCS: Performed by: OPHTHALMOLOGY

## 2020-07-21 PROCEDURE — 82607 VITAMIN B-12: CPT

## 2020-07-21 PROCEDURE — 85027 COMPLETE CBC AUTOMATED: CPT

## 2020-07-21 PROCEDURE — 82948 REAGENT STRIP/BLOOD GLUCOSE: CPT

## 2020-07-21 PROCEDURE — 99233 SBSQ HOSP IP/OBS HIGH 50: CPT | Performed by: INTERNAL MEDICINE

## 2020-07-21 PROCEDURE — 97530 THERAPEUTIC ACTIVITIES: CPT

## 2020-07-21 PROCEDURE — 2580000003 HC RX 258: Performed by: STUDENT IN AN ORGANIZED HEALTH CARE EDUCATION/TRAINING PROGRAM

## 2020-07-21 PROCEDURE — 94640 AIRWAY INHALATION TREATMENT: CPT

## 2020-07-21 RX ORDER — POTASSIUM CHLORIDE 20 MEQ/1
40 TABLET, EXTENDED RELEASE ORAL ONCE
Status: COMPLETED | OUTPATIENT
Start: 2020-07-21 | End: 2020-07-21

## 2020-07-21 RX ORDER — 0.9 % SODIUM CHLORIDE 0.9 %
1000 INTRAVENOUS SOLUTION INTRAVENOUS ONCE
Status: COMPLETED | OUTPATIENT
Start: 2020-07-21 | End: 2020-07-21

## 2020-07-21 RX ADMIN — IPRATROPIUM BROMIDE AND ALBUTEROL SULFATE 1 AMPULE: .5; 3 SOLUTION RESPIRATORY (INHALATION) at 08:27

## 2020-07-21 RX ADMIN — METOPROLOL SUCCINATE 100 MG: 100 TABLET, FILM COATED, EXTENDED RELEASE ORAL at 22:30

## 2020-07-21 RX ADMIN — CHLORTHALIDONE 25 MG: 25 TABLET ORAL at 08:43

## 2020-07-21 RX ADMIN — ATORVASTATIN CALCIUM 40 MG: 40 TABLET, FILM COATED ORAL at 08:43

## 2020-07-21 RX ADMIN — METOPROLOL SUCCINATE 100 MG: 100 TABLET, FILM COATED, EXTENDED RELEASE ORAL at 08:43

## 2020-07-21 RX ADMIN — CEFTRIAXONE 2 G: 2 INJECTION, POWDER, FOR SOLUTION INTRAMUSCULAR; INTRAVENOUS at 14:08

## 2020-07-21 RX ADMIN — SODIUM CHLORIDE 1000 ML: 9 INJECTION, SOLUTION INTRAVENOUS at 11:43

## 2020-07-21 RX ADMIN — INSULIN LISPRO 1 UNITS: 100 INJECTION, SOLUTION INTRAVENOUS; SUBCUTANEOUS at 22:32

## 2020-07-21 RX ADMIN — LISINOPRIL 20 MG: 20 TABLET ORAL at 08:43

## 2020-07-21 RX ADMIN — ENOXAPARIN SODIUM 40 MG: 40 INJECTION SUBCUTANEOUS at 08:43

## 2020-07-21 RX ADMIN — IPRATROPIUM BROMIDE AND ALBUTEROL SULFATE 1 AMPULE: .5; 3 SOLUTION RESPIRATORY (INHALATION) at 17:09

## 2020-07-21 RX ADMIN — LISINOPRIL 20 MG: 20 TABLET ORAL at 22:30

## 2020-07-21 RX ADMIN — AMLODIPINE BESYLATE 10 MG: 10 TABLET ORAL at 08:43

## 2020-07-21 RX ADMIN — HYDRALAZINE HYDROCHLORIDE 10 MG: 20 INJECTION, SOLUTION INTRAMUSCULAR; INTRAVENOUS at 04:11

## 2020-07-21 RX ADMIN — IPRATROPIUM BROMIDE AND ALBUTEROL SULFATE 1 AMPULE: .5; 3 SOLUTION RESPIRATORY (INHALATION) at 13:36

## 2020-07-21 RX ADMIN — PIOGLITAZONE 30 MG: 30 TABLET ORAL at 08:43

## 2020-07-21 RX ADMIN — POTASSIUM CHLORIDE 40 MEQ: 1500 TABLET, EXTENDED RELEASE ORAL at 09:33

## 2020-07-21 RX ADMIN — ACETAMINOPHEN 650 MG: 325 TABLET ORAL at 04:11

## 2020-07-21 RX ADMIN — GLIMEPIRIDE 2 MG: 2 TABLET ORAL at 08:43

## 2020-07-21 ASSESSMENT — PAIN DESCRIPTION - DESCRIPTORS: DESCRIPTORS: HEADACHE

## 2020-07-21 ASSESSMENT — PAIN DESCRIPTION - ORIENTATION: ORIENTATION: RIGHT;LEFT

## 2020-07-21 ASSESSMENT — PAIN DESCRIPTION - ONSET: ONSET: GRADUAL

## 2020-07-21 ASSESSMENT — PAIN SCALES - GENERAL
PAINLEVEL_OUTOF10: 0
PAINLEVEL_OUTOF10: 0
PAINLEVEL_OUTOF10: 1

## 2020-07-21 ASSESSMENT — PAIN DESCRIPTION - FREQUENCY: FREQUENCY: INTERMITTENT

## 2020-07-21 ASSESSMENT — PAIN DESCRIPTION - PAIN TYPE: TYPE: ACUTE PAIN

## 2020-07-21 ASSESSMENT — PAIN DESCRIPTION - PROGRESSION: CLINICAL_PROGRESSION: GRADUALLY WORSENING

## 2020-07-21 ASSESSMENT — PAIN DESCRIPTION - LOCATION: LOCATION: HEAD

## 2020-07-21 NOTE — PLAN OF CARE
Problem: Falls - Risk of:  Goal: Will remain free from falls  Description: Will remain free from falls  Outcome: Ongoing  Note:   Patient free from falls this shift. Patient continues on falling star program. Encouraged patient to wear non-skid slippers when ambulating. Patient is 2 assist. Side rails up x3. Continuing hourly checks, 5 p's monitored. Call light within reach. Bed alarm on. Telesitter continued due to high fall risk and confusion. Problem: Falls - Risk of:  Goal: Absence of physical injury  Description: Absence of physical injury  Outcome: Ongoing  Note: Free from physical injury this shift. Problem: Impaired respiratory status  Goal: Clear lung sounds  7/21/2020 0516 by Ignacio Estrada RN  Outcome: Ongoing  Note: Continues on room air, lungs diminished this shift. Q4 duonebs when awake. Will continue monitoring. Problem: Pain:  Goal: Pain level will decrease  Description: Pain level will decrease  Outcome: Ongoing  Note: Patient voices headache pain 1/10. Patients pain goal is 0/10. PRN pain medications given as ordered. Non-pharmacological interventions include: rest, repositioning, and pillow support. Problem: Pain:  Goal: Control of acute pain  Description: Control of acute pain  Outcome: Ongoing  Note: PRN medication. See MAR. Problem: Pain:  Goal: Control of chronic pain  Description: Control of chronic pain  Outcome: Ongoing  Note: Denies chronic pain. Problem: Skin Integrity:  Goal: Will show no infection signs and symptoms  Description: Will show no infection signs and symptoms  Outcome: Ongoing  Note: Patient remains afebrile this shift. PRN tylenol on if needed for fever. On IV antibiotics. Continues to be tachycardic, on telemetry. Will continue to monitor. Problem: Skin Integrity:  Goal: Absence of new skin breakdown  Description: Absence of new skin breakdown  Outcome: Ongoing  Note: Patient able to turn self in bed.  Encouraged to reposition during

## 2020-07-21 NOTE — PROGRESS NOTES
Hospitalist Progress Note    Patient: Dominick Paz  : 1935  MRN#: [id number]  2020 11:43 AM  ADMISSION DAY:  2020  7:38 PM   Resuscitation/\"Code\" Status   [x] Full resuscitation [] DNR-Comfort Care-Arrest  [] DNR-Comfort Care   [] Limited Resuscitation   [] No ET intubation   [] No CPR   [] No shock for non-perfusing rhythm   [] No post-arrest resuscitative medications   [] Other:     Patient Vitals for the past 8 hrs:   BP Temp Temp src Pulse Resp SpO2 Weight   20 1110 (!) 84/56 98.2 °F (36.8 °C) Oral 122 20 94 % --   20 0830 137/85 98.6 °F (37 °C) Oral 116 18 91 % --   20 0441 (!) 150/82 -- -- 110 18 -- --   20 0404 (!) 181/90 98.2 °F (36.8 °C) Oral 108 16 94 % 184 lb 9.6 oz (83.7 kg)       I/O last 3 completed shifts: In: 1157.5 [P.O.:910; I.V.:247.5]  Out: 1750 [Urine:1750]   Date 20 - 20   Shift 0379-9115 0738-7346 7229-8058 24 Hour Total   INTAKE   P.O.(mL/kg/hr) 0(0)   0   I. V.(mL/kg) 0(0)   0(0)   Shift Total(mL/kg) 0(0)   0(0)   OUTPUT   Urine(mL/kg/hr) 450(0.7)   450   Shift Total(mL/kg) 450(5.4)   450(5.4)   Weight (kg) 83.7 83.7 83.7 83.7     Wt Readings from Last 3 Encounters:   20 184 lb 9.6 oz (83.7 kg)   20 193 lb 1.6 oz (87.6 kg)   20 199 lb 12.8 oz (90.6 kg)      Body mass index is 28.91 kg/m². Patient Summary   Patient overview/Events leading to admission/Hospital course & Response to treatment:  Mr. Laverne Bill is an 81 y.o.  male who presented to [facility] Emergency Department via EMS with fever and altered mental status on 2020. He was advised to present to the ED after seeing his urologist, Dr. Miriam Martinez, for difficulty urinating earlier that morning. The patient stated that he had progressively worsening urine retention over the past month with increased pain.  Urine sample were collected for cultures at Dr. Kamari Hutchison office. In the Emergency Department the patient met 3/4 SIRS criteria, blood cultures were obtained, and broad spectrum antibiotics started. 7/18/2020  Overnight the patient was afebrile with fluctuations in blood pressures and lekocytosis (WBC:21.2). The patient was sitting comfortable in the bedside chair with his wife present during interview and exam. He stated that he felt great and had little complaint. He stated that he was unsure why he was at the hospital but after continued discussion he was able to recall the events leading up to his admission. The patient did eventually admit that he was having trouble urinating and that he was scheduled for a urological procedure but did not recall what the procedure was. Bladder scan revealed post-void residual of 240ml. His wife was concerned that his mental status had worsened and that this has been progressive over the past few months. She retold a recent account where he left the house and could not remember where he lived and had gotten lost.    Upon review of the chart there were inconsistencies between home and hospital medications. I spoke with Mr. Sravanthi Harp PCP, Dr. Ariadne Tapia, who confirmed that Mr. Sanjiv Pearson has uncontrolled HTN, DM, h/o CVA, HLD, BPH, and possibly progressing dementia. Reconciliation of the medications was conducted to ensure proper management. Discontinued Clonidine and restarted home diabetes medications    Blood and urine cultures revealed gram negative bacilli growth. Antibiotic coverage changed from Cefepime and Zosyn to Rocephin. 7/19/2020  From previous note: \"Overnight, the Patient's blood pressure was uncontrolled. He was agitated. He was given Hydralazine with no help, and Hydroxyzine. Clonidine was resumed at 0.2mg BID (less than home dose). Blood pressure now 181/98. \"    7/20/2020  Patient was still experiencing HTN overnight. Clonidine discontinued, Hydralazine increased to 10mg PRN.  Today the patient's HTN is still uncontrolled and initiation of triple therapy was started. Today Mr. [employer] had complaint of pain with urination with external catheter, but denies pain with urination without the catheter. Recommended continued assessment of this complaint with nursing and bladder scan PRN. The patient is still quite confused without improvement from admission. I spoke with the daughter, Micky Blair, via telephone who stated that her father has been in a confused state since June of 2019. She stated he was being followed by Dr. Marta Medel for his confusion, memory loss, and visual hallucinations. I will attempt to acquire outside records for these visit. 7/21/2020  The patient was experiencing HTN overnight. During interview today Mr. [employer] stated he was experiencing visual hallucinations of seeing dogs and red fire trucks in his room. Upon further conversation he did know that those hallucinations were not real and he had proper reasoning. However he did admit to seeing dogs on top of the buildings outside his window but could not justify if they were real or not because they were outside. He experienced an event of hypotension (84/56) with tachycardia during the day. Orthostatics performed and 1L bolus was initiated. Recheck of blood pressure normalized. This event occurred after discussion with the patient about his hallucinations and continued memory loss which made him very emotional.    The patient had increased procalcitonin with increased WBC despite antibiotic therapy. Consulted ID for recommendations. Examination:    General appearance - alert, well appearing, and in no distress and oriented to person, place, and time  Chest - clear to auscultation, no wheezes, rales or rhonchi, symmetric air entry  Heart - normal rate, regular rhythm, normal S1, S2, no murmurs, rubs, clicks or gallops  Abdomen - soft, nontender, distended, no masses or organomegaly, no rebound tenderness noted.  Bowel sounds normal. Bladder distention noted.   Obese: Yes; Protuberant: Yes and No   Neurological - alert, oriented, normal speech, no focal findings or movement disorder noted  Extremities - peripheral pulses normal, no pedal edema, no clubbing or cyanosis  Skin - normal coloration and turgor, no rashes, no suspicious skin lesions noted    Scheduled Meds:   sodium chloride  1,000 mL Intravenous Once    chlorthalidone  25 mg Oral Daily    amLODIPine  10 mg Oral Daily    atorvastatin  40 mg Oral Daily    glimepiride  2 mg Oral Daily with breakfast    pioglitazone  30 mg Oral Daily    cefTRIAXone (ROCEPHIN) IV  2 g Intravenous Q24H    metoprolol succinate  100 mg Oral BID    lisinopril  20 mg Oral BID    ipratropium-albuterol  1 ampule Inhalation Q4H WA    enoxaparin  40 mg Subcutaneous Daily    insulin lispro  0-6 Units Subcutaneous TID WC    insulin lispro  0-3 Units Subcutaneous Nightly     Continuous Infusions:   dextrose       PRN Meds:hydrALAZINE, 10 mg, Q4H PRN  glucose, 15 g, PRN  dextrose, 12.5 g, PRN  glucagon (rDNA), 1 mg, PRN  dextrose, 100 mL/hr, PRN  acetaminophen, 650 mg, Q4H PRN  ondansetron, 4 mg, Q6H PRN      SUPPORT DEVICES:  [] ETT []Oral / [] Nasal  [] Gastric Tube [] OG / [] NG    [] Central Venous Line (Specify Site):  [] Urinary Catheter  [] Arterial Line (Specify Site)  [x] Peripheral IV access: Right hand  [] Other:     PROPHYLAXIS/THERAPY:   VTE:                [x] Enoxaparin    [] Warfarin [] DOAC [] PCD Device:Bilat LE                          [] Heparin: [] Subcut / [] IV     NUTRITION SUPPORT:  Carb control    Oxygen Delivery - O2 Flow Rate (L/min): 2 L/min    DATA:    CBC:   Recent Labs     07/19/20  0601 07/20/20  0530 07/21/20  0541   WBC 16.7* 10.2 11.6*   RBC 3.81* 3.77* 4.16*   HGB 12.3* 12.0* 13.4*   HCT 38.0* 38.1* 41.3*   MCV 99.7* 101.1* 99.3*   MCH 32.3 31.8 32.2   MCHC 32.4 31.5* 32.4    332 393   MPV 9.6 9.5 9.5      BMP/CMP:   Recent Labs     07/19/20  0601 07/20/20  0530 20  0541    138 138   K 3.6 3.4* 3.4*    103 101   CO2 19* 20* 20*   ANIONGAP 16.0 15.0 17.0*   BUN 13 9 12   CREATININE 0.6 0.6 0.6   GLUCOSE 182* 172* 182*   CALCIUM 8.8 9.1 9.7      Other Electrolytes:   No results for input(s): CAION, PHOS, MG in the last 72 hours. Serum Osmolality  No results for input(s): OSMOMEASER in the last 72 hours. Procalcitonin:  Recent Labs     20  0541   PROCAL 2.82*     Cardiac:   No results for input(s): TROPONINT in the last 72 hours. Lipids: No results for input(s): CHOL, HDL in the last 72 hours. Invalid input(s): LDLCALCU  Coagulation: No results for input(s): INR in the last 72 hours. Lactic Acid:   Recent Labs     20  0601 20  0530   LACTA 2.3* 1.6      ABGs:   Lab Results   Component Value Date    PH 7.46 2020    PCO2 29 2020    PO2 74 2020    HCO3 21 2020    O2SAT 96 2020     Lab Results   Component Value Date    IFIO2 2 2020       Radiology/Imagin2020  XR CHEST PORTABLE - No acute findings    KEY ISSUES/FINDINGS/DISCUSSION / RECOMMENDATIONS BY SYSTEMS:  SYSTEMS ASSESSMENT AND PLANS     Action List   To Do List - Pending data and patient needs Situational Awareness & Contingency Planning:  Response to treatment  Uncertainties in diagnosis or treatment plan  Recent or ongoing changes (if applicable) Safety  Critical Lab/Data Needing Follow-Up:     Neuropsychiatric   [] No active issues  [x] Other:    Delerium: Likely secondary to UTI, however spouse stated that he has been progressively worsening with his memory. Concern for encephalopathy secondary to UTI is unlikely given that his memory loss, confusion, and visual hallucinations have been ongoing since mid-2019. Visual Hallucination: Unlikely due to infectious process. Will consider consulting psychiatry for evaluation.   Respiratory   [x] No active issues  [] Other:    Cardiovascular   [] No active issues  [x] Other:    Hypertension: Uncontrolled. Continue triple therapy (ACE, CCB, Thiazide) for better control. Gastrointestinal   [x] No active issues:  [] Other:    Fluid/Electrolytes-Endocrine/Nutrition   [] No active issues:  [x] Other:    Diabetes Type 2: Continue home medications, low dose sliding scale, POCT glucose 4x daily ACHS, hypoglycemia protocol. Infectious Disease   [] No active issues  [x] Other:    Sepsis secondary to UTI: Gram negative bacilli cultures grown on blood and urine samples. Continue Rocephin and consulted ID for increased WBC and procalcitonin. Leukocytosis: Resolved  Hematology/Oncology   [x] No active issues  [] Other:    Renal/   [] No active issues  [x] Other:    Urinary Retention Secondary to BPH: Monitor in's/out's, repeat bladder scan PRN, and place Dejesus catheter if needed. TURP procedure scheduled for 8/4/2020  Surgical   [x] No active issues  [] Other:    Rehabilitation/Mobility   [x] No active issues  [] Other:    Social/Spiritual/DNR/Disposition/Miscellaneous   [x] No active issues  [] Other:    Disposition Issues - Needs To Be Addressed To Facilitate Discharge   [x] No active issues  [] Other:    Planned Placement   [] Home  [] LTAC  [] Rehabilitation Unit/Facility  [] Transitional Care  [] Extended Care Facility  [x] Unknown at this time        Reviewed with Collaborative Staff     Reviewed with Physician Staff  Meets Continued ICU stepdown Level Care Criteria:    [x] Yes - Indication: Sepsis secondary to UTI    [] No - Transfer Planned to listed location:    Anticipated discharge:  [] Yes: in - day(s)  [x] No, continued therapy needed.       Electronically signed by Sidra Almendarez DO on 7/21/2020 at 5:04 PM

## 2020-07-21 NOTE — CONSULTS
 metoprolol succinate  100 mg Oral BID    lisinopril  20 mg Oral BID    ipratropium-albuterol  1 ampule Inhalation Q4H WA    enoxaparin  40 mg Subcutaneous Daily    insulin lispro  0-6 Units Subcutaneous TID WC    insulin lispro  0-3 Units Subcutaneous Nightly     Continuous Infusions:   dextrose       PRN Meds:hydrALAZINE, glucose, dextrose, glucagon (rDNA), dextrose, acetaminophen, ondansetron  Allergies:   ALLERGIES:    Patient has no known allergies. SOCIAL HISTORY:     TOBACCO:   reports that he quit smoking about 40 years ago. His smoking use included pipe. He has never used smokeless tobacco.     ETOH:   reports current alcohol use. Patient currently lives with family       FAMILY HISTORY:         Problem Relation Age of Onset    Heart Disease Father     Heart Attack Father     High Blood Pressure Sister     Cancer Neg Hx     Diabetes Neg Hx     Stroke Neg Hx        REVIEW OF SYSTEMS:     Constitutional: no fever, no night sweats, no fatigue, no weight loss. Head: no head ache , no head injury, no migranes. Eye: no eye discharge, blurring of vision, no double vision,no eye pain. Ears: no hearing difficulty, no tinnitus  Mouth/throat: no ulceration, dental caries , dysphagia, no hoarseness and voice change  Respiratory: no cough no chest pain,no shortness of breath,no wheezing  CVS: no palpitation, no chest pain,   GI: no abdominal pain, no nausea , no vomiting, no constipation,no diarrhea. JIN: He had difficulty with urination   musculoskeletal: no joint pain, swelling , stiffness,  Endocrine: no polyuria, polydipsia, no cold or heat intolerance  Hematology: no anemia, no easy brusing or bleeding, no hx of clotting disorder  Dermatology: no skin rash, no skin lesions, no pruritis,  Neurological:no headaches,no dizziness, no seizure, no numbness.   Psychiatry: History of declining memory  PHYSICAL EXAM:     /75   Pulse 115   Temp 98.2 °F (36.8 °C) (Oral)   Resp 18   Ht 5' 7\" (1.702 m)   Wt 184 lb 9.6 oz (83.7 kg)   SpO2 93%   BMI 28.91 kg/m²   General apperance:  Awake, alert, not in distress. HEENT: pink conjunctiva, unicteric sclera, moist oral mucosa. Chest: Bilateral air entry. Cardiovascular:  RRR ,S1S2, no murmur or gallop. Abdomen:  Soft, non tender to palpation. Extremities: No skin rash no edema  Skin:  Warm and dry. CNS: Awake and oriented. Genitourinary system has Dejesus catheter in place, draining clear urine. LABS:     CBC:   Recent Labs     07/20/20  0530 07/21/20  0541 07/21/20  1720   WBC 10.2 11.6* 10.9*   HGB 12.0* 13.4* 12.6*    393 395     BMP:    Recent Labs     07/20/20  0530 07/21/20  0541 07/21/20  1720    138 139   K 3.4* 3.4* 3.8    101 101   CO2 20* 20* 24   BUN 9 12 21   CREATININE 0.6 0.6 0.9   GLUCOSE 172* 182* 158*     Calcium:  Recent Labs     07/21/20  1720   CALCIUM 9.6     Recent Labs     07/21/20  0643 07/21/20  1111 07/21/20  1618   POCGLU 175* 218* 175*   Amylase and Lipase:  Recent Labs     07/20/20  0530   LACTA 1.6     Lactic Acid:   Recent Labs     07/20/20  0530   LACTA 1.6     Micro:   Lab Results   Component Value Date    BC No growth-preliminary  07/17/2020       Problem list of patient      Patient Active Problem List   Diagnosis Code    Abnormal stress test R94.39    Sepsis secondary to UTI ([facility] [address] A41.9, N39.0    Elevated lactic acid level R79.89    Delirious R41.0    Leukocytosis D72.829    BPH (benign prostatic hyperplasia) N40.0    Urinary retention due to benign prostatic hyperplasia N40.1, R33.8    Sepsis without acute organ dysfunction (HCC) A41.9           Impression and Recommendation:   Sepsis due to urinary tract infection. Blood culture is positive for E. coli. He is responding to the current IV ceftriaxone. Will transition to oral antibiotics in 1 to 2 days.     . infection Control:   Standard precautions  Discharge Planning (Coordination of out patient care:   Home with family  Thank you Amita Galindo DO for allowing me to participate in this patient's care.     Alise Lemus MD,FACP 7/21/2020 6:17 PM

## 2020-07-21 NOTE — PROGRESS NOTES
Yoni Chambers 61  INPATIENT OCCUPATIONAL THERAPY  [facility] ICU STEPDOWN TELEMETRY 4K  EVALUATION    Time:   Time In: 1309  Time Out: 1328  Timed Code Treatment Minutes: 8 Minutes  Minutes: 19          Date: 2020  Patient Name: Estela Melgar,   Gender: male      MRN: [id number]  : 1935  (81 y.o.)  Referring Practitioner: aSvi Edwards MD  Diagnosis: sepsis due to UTI  Additional Pertinent Hx: Per H&P:Patient was brought to ER by EMS. Patient had fever of 103 today. He was sating 92% on RA in the ER. Patient had a contact with daughter who was testing + for COVID 19. However, ER testing was -ve. + U/A    Restrictions/Precautions:  Restrictions/Precautions: General Precautions, Fall Risk  Position Activity Restriction  Other position/activity restrictions: h/o dementia    Subjective  Chart Reviewed: Yes, Orders, Progress Notes, History and Physical  Patient assessed for rehabilitation services?: Yes  Family / Caregiver Present: Yes(wife)    Subjective: Pt supine in bed upon arrival, agreeable to OT session. Comments: RN reported pt had low BP earlier this date, although since has received fluid bolus and BP is back to his baseline.     Pain:  Pain Assessment  Patient Currently in Pain: Denies    Social/Functional History:  Lives With: Spouse  Type of Home: House  Home Layout: One level  Home Equipment: Cane, 4 wheeled walker, Rolling walker   Bathroom Equipment: (none reported)       ADL Assistance: Independent  Ambulation Assistance: Independent  Transfer Assistance: Independent    Active : No  Occupation: Retired  Type of occupation:  professor at [employer]  Additional Comments: Pt amb without AD inside, rollator outside    VISION:WFL    HEARING:  WFL    COGNITION: Decreased Recall, Decreased Insight, Impaired Memory, Decreased Problem Solving and Decreased Safety Awareness    RANGE OF MOTION:  Bilateral Upper Extremity:  WFL    ADL:   No ADL's completed this safety and independence with ADL's for improved functional independence and quality of life. Discharge Recommendations:  24 hour supervision or assist, Home with Home health OT    Patient Education:  OT Education: OT Role, Plan of Care, Transfer Training  Patient Education: safety with mobility, walking in [place]opez with staff 3x/day  Barriers to Learning: cognition    Equipment Recommendations: Other: will continue to assess    Plan:  Times per week: 3-5x  Current Treatment Recommendations: Strengthening, Balance Training, Functional Mobility Training, Endurance Training, Safety Education & Training, Patient/Caregiver Education & Training, Equipment Evaluation, Education, & procurement, Self-Care / ADL. See long-term goal time frame for expected duration of plan of care. If no long-term goals established, a short length of stay is anticipated. Goals:     Short term goals  Time Frame for Short term goals: by discharge  Short term goal 1: Pt will safely navigate to/from bathroom and household distances with supervision and <3 cues for safety in prep for ADL completion. Short term goal 2: Pt will complete functional transfers with supervision in prep for toilet/shower transfers. Short term goal 3: Pt will complete BADL tasks with supervision and <3 cues for safety to increase independence with self care tasks. Following session, patient left in safe position with all fall risk precautions in place.

## 2020-07-21 NOTE — PLAN OF CARE
Problem: Impaired respiratory status  Goal: Clear lung sounds  7/20/2020 2019 by Katy Wills RCP  Outcome: Ongoing

## 2020-07-21 NOTE — PLAN OF CARE
Problem: Falls - Risk of:  Goal: Will remain free from falls  Description: Will remain free from falls  7/21/2020 1531 by Hayley Piper RN  Outcome: Ongoing  Note: Patient confused at times. Call light in reach. Bed alarm on. Telesitter in room. Will continue to hourly round. Problem: Falls - Risk of:  Goal: Absence of physical injury  Description: Absence of physical injury  7/21/2020 1531 by Hayley Piper RN  Outcome: Ongoing  Note: No injury noted. Problem: Impaired respiratory status  Goal: Clear lung sounds  7/21/2020 1531 by Hayley Piper RN  Outcome: Ongoing  Note: Lung sounds clear and diminished throughout. Problem: Pain:  Goal: Pain level will decrease  Description: Pain level will decrease  7/21/2020 1531 by Hayley Piper RN  Outcome: Ongoing  Note: Patient has denied pain this shift. Problem: Skin Integrity:  Goal: Will show no infection signs and symptoms  Description: Will show no infection signs and symptoms  7/21/2020 1531 by Hayley Piper RN  Outcome: Ongoing  Note: Patient receiving IV antibiotics. Remains afebrile. Problem: Discharge Planning:  Goal: Discharged to appropriate level of care  Description: Discharged to appropriate level of care  7/21/2020 1531 by Hayley Piper RN  Outcome: Ongoing  Note: From home with wife. Possible home health at discharge? Problem: Infection, Septic Shock:  Goal: Will show no infection signs and symptoms  Description: Will show no infection signs and symptoms  7/21/2020 1531 by Hayley Piper RN  Outcome: Ongoing  Note: Patient remains afebrile. Receiving IV antibiotics. Care plan reviewed with patient and family. Patient and family verbalizes understanding of the plan of care and contributes to goal setting.

## 2020-07-21 NOTE — CARE COORDINATION
7/21/20, 1:58 PM EDT    DISCHARGE ON GOING [address] day: 4  Location: [facility]09/009- Reason for admit: [facility] [A41.9, N39.0]   Procedure: none  Treatment Plan of Care: Hospitalist following. Procal 2.82. WBC 11.6. IV rocephin. ID consult for procal and leukocytosis. PT/OT. Dietitian. Telemetry. Barriers to Discharge: Await medical clearance. PCP: Vincent Cardona DO  Readmission Risk Score: 11%  Patient Goals/Plan/Treatment Preferences: Declines ECF. Plan to return home with wife, new HH. SW following.

## 2020-07-22 ENCOUNTER — APPOINTMENT (OUTPATIENT)
Dept: CT IMAGING | Age: 85
DRG: 871 | End: 2020-07-22
Payer: MEDICARE

## 2020-07-22 LAB
ANION GAP SERPL CALCULATED.3IONS-SCNC: 14 MEQ/L (ref 8–16)
BUN BLDV-MCNC: 21 MG/DL (ref 7–22)
CALCIUM SERPL-MCNC: 10 MG/DL (ref 8.5–10.5)
CHLORIDE BLD-SCNC: 103 MEQ/L (ref 98–111)
CO2: 25 MEQ/L (ref 23–33)
CREAT SERPL-MCNC: 0.7 MG/DL (ref 0.4–1.2)
ERYTHROCYTE [DISTWIDTH] IN BLOOD BY AUTOMATED COUNT: 13.3 % (ref 11.5–14.5)
ERYTHROCYTE [DISTWIDTH] IN BLOOD BY AUTOMATED COUNT: 48.6 FL (ref 35–45)
GFR SERPL CREATININE-BSD FRML MDRD: > 90 ML/MIN/1.73M2
GLUCOSE BLD-MCNC: 148 MG/DL (ref 70–108)
GLUCOSE BLD-MCNC: 155 MG/DL (ref 70–108)
GLUCOSE BLD-MCNC: 155 MG/DL (ref 70–108)
GLUCOSE BLD-MCNC: 165 MG/DL (ref 70–108)
GLUCOSE BLD-MCNC: 216 MG/DL (ref 70–108)
HCT VFR BLD CALC: 41.7 % (ref 42–52)
HEMOGLOBIN: 13.5 GM/DL (ref 14–18)
MCH RBC QN AUTO: 32.4 PG (ref 26–33)
MCHC RBC AUTO-ENTMCNC: 32.4 GM/DL (ref 32.2–35.5)
MCV RBC AUTO: 100 FL (ref 80–94)
PLATELET # BLD: 437 THOU/MM3 (ref 130–400)
PMV BLD AUTO: 9.5 FL (ref 9.4–12.4)
POTASSIUM SERPL-SCNC: 4.4 MEQ/L (ref 3.5–5.2)
RBC # BLD: 4.17 MILL/MM3 (ref 4.7–6.1)
SODIUM BLD-SCNC: 142 MEQ/L (ref 135–145)
WBC # BLD: 11.5 THOU/MM3 (ref 4.8–10.8)

## 2020-07-22 PROCEDURE — 87040 BLOOD CULTURE FOR BACTERIA: CPT

## 2020-07-22 PROCEDURE — 80048 BASIC METABOLIC PNL TOTAL CA: CPT

## 2020-07-22 PROCEDURE — 85027 COMPLETE CBC AUTOMATED: CPT

## 2020-07-22 PROCEDURE — 82607 VITAMIN B-12: CPT

## 2020-07-22 PROCEDURE — 6370000000 HC RX 637 (ALT 250 FOR IP): Performed by: STUDENT IN AN ORGANIZED HEALTH CARE EDUCATION/TRAINING PROGRAM

## 2020-07-22 PROCEDURE — 99232 SBSQ HOSP IP/OBS MODERATE 35: CPT | Performed by: INTERNAL MEDICINE

## 2020-07-22 PROCEDURE — 2580000003 HC RX 258: Performed by: STUDENT IN AN ORGANIZED HEALTH CARE EDUCATION/TRAINING PROGRAM

## 2020-07-22 PROCEDURE — 36415 COLL VENOUS BLD VENIPUNCTURE: CPT

## 2020-07-22 PROCEDURE — 6370000000 HC RX 637 (ALT 250 FOR IP): Performed by: OPHTHALMOLOGY

## 2020-07-22 PROCEDURE — 70450 CT HEAD/BRAIN W/O DYE: CPT

## 2020-07-22 PROCEDURE — 6360000002 HC RX W HCPCS: Performed by: STUDENT IN AN ORGANIZED HEALTH CARE EDUCATION/TRAINING PROGRAM

## 2020-07-22 PROCEDURE — 2060000000 HC ICU INTERMEDIATE R&B

## 2020-07-22 PROCEDURE — 82948 REAGENT STRIP/BLOOD GLUCOSE: CPT

## 2020-07-22 PROCEDURE — 6360000002 HC RX W HCPCS: Performed by: OPHTHALMOLOGY

## 2020-07-22 PROCEDURE — 6370000000 HC RX 637 (ALT 250 FOR IP): Performed by: INTERNAL MEDICINE

## 2020-07-22 PROCEDURE — 82746 ASSAY OF FOLIC ACID SERUM: CPT

## 2020-07-22 PROCEDURE — 90792 PSYCH DIAG EVAL W/MED SRVCS: CPT | Performed by: PSYCHIATRY & NEUROLOGY

## 2020-07-22 PROCEDURE — 94640 AIRWAY INHALATION TREATMENT: CPT

## 2020-07-22 PROCEDURE — 94760 N-INVAS EAR/PLS OXIMETRY 1: CPT

## 2020-07-22 RX ADMIN — ATORVASTATIN CALCIUM 40 MG: 40 TABLET, FILM COATED ORAL at 07:59

## 2020-07-22 RX ADMIN — CEFTRIAXONE 2 G: 2 INJECTION, POWDER, FOR SOLUTION INTRAMUSCULAR; INTRAVENOUS at 13:07

## 2020-07-22 RX ADMIN — ENOXAPARIN SODIUM 40 MG: 40 INJECTION SUBCUTANEOUS at 08:00

## 2020-07-22 RX ADMIN — GLIMEPIRIDE 2 MG: 2 TABLET ORAL at 07:58

## 2020-07-22 RX ADMIN — AMLODIPINE BESYLATE 10 MG: 10 TABLET ORAL at 07:58

## 2020-07-22 RX ADMIN — IPRATROPIUM BROMIDE AND ALBUTEROL SULFATE 1 AMPULE: .5; 3 SOLUTION RESPIRATORY (INHALATION) at 22:10

## 2020-07-22 RX ADMIN — IPRATROPIUM BROMIDE AND ALBUTEROL SULFATE 1 AMPULE: .5; 3 SOLUTION RESPIRATORY (INHALATION) at 17:01

## 2020-07-22 RX ADMIN — PIOGLITAZONE 30 MG: 30 TABLET ORAL at 07:58

## 2020-07-22 RX ADMIN — INSULIN LISPRO 1 UNITS: 100 INJECTION, SOLUTION INTRAVENOUS; SUBCUTANEOUS at 20:02

## 2020-07-22 RX ADMIN — IPRATROPIUM BROMIDE AND ALBUTEROL SULFATE 1 AMPULE: .5; 3 SOLUTION RESPIRATORY (INHALATION) at 09:16

## 2020-07-22 RX ADMIN — LISINOPRIL 20 MG: 20 TABLET ORAL at 07:59

## 2020-07-22 RX ADMIN — METOPROLOL SUCCINATE 100 MG: 100 TABLET, FILM COATED, EXTENDED RELEASE ORAL at 07:59

## 2020-07-22 RX ADMIN — LISINOPRIL 20 MG: 20 TABLET ORAL at 19:49

## 2020-07-22 RX ADMIN — IPRATROPIUM BROMIDE AND ALBUTEROL SULFATE 1 AMPULE: .5; 3 SOLUTION RESPIRATORY (INHALATION) at 13:37

## 2020-07-22 RX ADMIN — METOPROLOL SUCCINATE 100 MG: 100 TABLET, FILM COATED, EXTENDED RELEASE ORAL at 19:49

## 2020-07-22 RX ADMIN — CHLORTHALIDONE 25 MG: 25 TABLET ORAL at 08:00

## 2020-07-22 ASSESSMENT — PAIN SCALES - GENERAL
PAINLEVEL_OUTOF10: 0

## 2020-07-22 NOTE — CONSULTS
Department of Psychiatry  Consult Service   Psychiatric Assessment      Thank you very much for allowing us to participate in the care of this patient. Reason for Consult:  Visual hallucinations, altered mental status    HISTORY OF PRESENT ILLNESS:          The patient is a 81 y.o. male with significant history of neurocognitive disorder, HTN, CAD, DM type 2 who is admitted medically for sepsis secondary to a UTI. He presented to [address] [place] [address] with a fever. Patient was seen by urology earlier the same day for difficulty urination. Wife was taught to do straight cathing. Patient started to have confusion and fever at home. Patient was brought to ER by EMS. Patient had fever of 103. Psychiatry was consulted due to visual hallucinations and worsening mentation. Patient is pleasant and cooperative on examination. He is fully oriented. He states that his visual hallucinations started back in April. He states they stopped for 2-3 months but have recently come back. He reports he has been seeing animals and is not bothered by the hallucinations. \"I actually find it entertaining. \"     His wife is present at bedside. She states that he has never been formally diagnosed with dementia. She states he has been seen in the past by Dr. Cullen Majano for memory loss. Per the medical record, a workup was started on 3/23/20 for possible Lewy body dementia. She is concerned that his mental status has progressively worsened over the past few months. She retold a recent account where he left the house and could not remember where he lived and had gotten lost. She reports he has been having visual hallucinations on and off for the past year. She reports while in the hospital, he saw a man with a gun outside his window and 6 dogs. She states at home, he saw an ostrich running on the street and a gorilla chasing a cat.  She states prior to a year ago, he would wake up in the middle of the night asking her if she had tapped him on the shoulder. His wife reports that his hallucinations are worse during the day. She denies the patient exhibiting paranoia. She reports he has been sleeping pretty good at night and takes 2-3 naps during the day. She states he has not been driving for the past few months. He continues to manage his own finances. She states he enjoys playing tennis and golfing but is not able to do those things anymore. He has no psychiatric history. Has never seen an outpatient psychiatric provider or take psychotropic medications. He denies past suicide attempts and inpatient psychiatric admissions. Reports occasional alcohol use. Denies illicit drug use. PSYCHIATRIC HISTORY:      · Outpatient psychiatric provider:  Denies  · Suicide attempts: Denies  · Inpatient psychiatric admissions: Denies    Past psychiatric medications includes:     Denies  Adverse reactions from psychotropic medications:    Denies      Lifetime Psychiatric Review of Systems      ·    Obsessions and Compulsions: Denies    ·    Cee or Hypomania: Denies  ·    Hallucinations: Denies  ·    Panic Attacks:  Denies  ·    Delusions:  Denies  ·    Phobias:  Denies  ·    Trauma: Denies    Prior to Admission medications    Medication Sig Start Date End Date Taking? Authorizing Provider   AMLODIPINE BESYLATE PO Take 2.5 mg by mouth daily as needed Patient takes only if SBP is above a certain number, but is unsure of what number.    Yes Historical Provider, MD   Cholecalciferol (VITAMIN D3) 50 MCG (2000 UT) CAPS Take 2,000 Units by mouth daily    Yes Historical Provider, MD   pioglitazone (ACTOS) 30 MG tablet Take 30 mg by mouth daily   Yes Historical Provider, MD   glimepiride (AMARYL) 2 MG tablet Take 2 mg by mouth every morning (before breakfast)   Yes Historical Provider, MD   metoprolol succinate (TOPROL XL) 100 MG extended release tablet Take 100 mg by mouth 2 times daily   Yes Historical Provider, MD   lisinopril (PRINIVIL;ZESTRIL) 20 MG tablet Take 20 mg by mouth 2 times daily   Yes Historical Provider, MD   simvastatin (ZOCOR) 40 MG tablet Take 40 mg by mouth nightly   Yes Historical Provider, MD   cloNIDine (CATAPRES) 0.3 MG tablet Take 0.3 mg by mouth 2 times daily   Yes Historical Provider, MD   metFORMIN (GLUCOPHAGE-XR) 750 MG extended release tablet Take 1,500 mg by mouth Daily with supper 2 tab   Yes Historical Provider, MD   Cyanocobalamin (VITAMIN B-12 IJ) Inject as directed every 30 days    Historical Provider, MD        Medications:    Current Facility-Administered Medications: chlorthalidone (HYGROTON) tablet 25 mg, 25 mg, Oral, Daily  hydrALAZINE (APRESOLINE) injection 10 mg, 10 mg, Intravenous, Q4H PRN  amLODIPine (NORVASC) tablet 10 mg, 10 mg, Oral, Daily  glucose (GLUTOSE) 40 % oral gel 15 g, 15 g, Oral, PRN  dextrose 50 % IV solution, 12.5 g, Intravenous, PRN  glucagon (rDNA) injection 1 mg, 1 mg, Intramuscular, PRN  dextrose 5 % solution, 100 mL/hr, Intravenous, PRN  atorvastatin (LIPITOR) tablet 40 mg, 40 mg, Oral, Daily  glimepiride (AMARYL) tablet 2 mg, 2 mg, Oral, Daily with breakfast  pioglitazone (ACTOS) tablet 30 mg, 30 mg, Oral, Daily  cefTRIAXone (ROCEPHIN) 2 g IVPB in D5W 50ml minibag, 2 g, Intravenous, Q24H  metoprolol succinate (TOPROL XL) extended release tablet 100 mg, 100 mg, Oral, BID  lisinopril (PRINIVIL;ZESTRIL) tablet 20 mg, 20 mg, Oral, BID  ipratropium-albuterol (DUONEB) nebulizer solution 1 ampule, 1 ampule, Inhalation, Q4H WA  enoxaparin (LOVENOX) injection 40 mg, 40 mg, Subcutaneous, Daily  acetaminophen (TYLENOL) tablet 650 mg, 650 mg, Oral, Q4H PRN  ondansetron (ZOFRAN) injection 4 mg, 4 mg, Intravenous, Q6H PRN  insulin lispro (HUMALOG) injection vial 0-6 Units, 0-6 Units, Subcutaneous, TID WC  insulin lispro (HUMALOG) injection vial 0-3 Units, 0-3 Units, Subcutaneous, Nightly     Past Medical History:        Diagnosis Date    [facility]ere[facility] occlusion [facility] infarction [facility]     DM [facility]diabetes mellitus) [facility]     Hyperlipidemia     Hypertension     Stroke [facility])        Past Surgical History:        Procedure Laterality Date    CARDIAC CATHETERIZATION  06/19/2020    CARDIOVASCULAR STRESS TEST      EYE SURGERY      KNEE ARTHROPLASTY Right        Allergies: Patient has no known allergies. Social History:      RESIDENCE: Born and raised in [place], [place]. Currently lives in [facility] with his wife  :     CHILDREN: 4 children  OCCUPATION: Retired  EDUCATION: Graduated HS    SUBSTANCE USE HISTORY: Occasional alcohol use. Denies tobacco and illicit drug use. Family Medical and Psychiatric History:     Denies any significant family psychiatric history        Problem Relation Age of Onset    Heart Disease Father     Heart Attack Father     High Blood Pressure Sister     Cancer Neg Hx     Diabetes Neg Hx     Stroke Neg Hx          Physical  /75   Pulse 107   Temp 98 °F (36.7 °C) (Oral)   Resp 18   Ht 5' 7\" (1.702 m)   Wt 183 lb 8 oz (83.2 kg)   SpO2 92%   BMI 28.74 kg/m²     General appearance - alert, well appearing, and in no distress and oriented to person, place, and time  Chest - clear to auscultation, no wheezes, rales or rhonchi, symmetric air entry  Heart - normal rate, regular rhythm, normal S1, S2, no murmurs, rubs, clicks or gallops  Abdomen - soft, nontender, distended, no masses or organomegaly, no rebound tenderness noted. Bowel sounds normal. Bladder distention noted.   Obese: Yes; Protuberant: Yes and No   Neurological - alert, oriented, normal speech, no focal findings or movement disorder noted  Extremities - peripheral pulses normal, no pedal edema, no clubbing or cyanosis  Skin - normal coloration and turgor, no rashes, no suspicious skin lesions noted    Mental Status Examination:  Level of consciousness:  Within normal limits  Appearance: hospital attire, lying in bed, fair grooming  Behavior/Motor:  no abnormalities noted  Attitude toward examiner:  cooperative, attentive and good eye contact  Speech:  Spontaneous, normal rate and volume  Mood:  Euthymic  Affect: mood congruent  Thought processes:  Linear, goal directed, coherent  Thought content: Denies suicidal ideations   Denies homicidal ideations    Visual hallucinations   No evidence of delusions  Cognition:  Oriented to self, situation, location, date  Concentration clinically adequate  Memory age appropriate  Insight & Judgment:  fair    DSM-5 DIAGNOSIS:      Delirium secondary to UTI  Mild neurocognitive disorder    General Medical Condition  Patient Active Problem List   Diagnosis Code    Abnormal stress test R94.39    Sepsis secondary to UTI ([facility] A41.9, N39.0    Elevated lactic acid level R79.89    Delirious R41.0    Leukocytosis D72.829    BPH (benign prostatic hyperplasia) N40.0    Urinary retention due to benign prostatic hyperplasia N40.1, R33.8    Sepsis (HCC) A41.9    E coli bacteremia R78.81    E. coli UTI N39.0, B96.20    Acute metabolic encephalopathy W88.42    Septic encephalopathy G93.41    Type 2 diabetes mellitus with other specified complication (HCC) R73.27    Hypokalemia E87.6    History of CVA (cerebrovascular accident) Z86.73       Stressors     Severity of stressors is moderate  Source of stressors include: Other psychosocial and environmental stressors    Recommendations:    Continue current medications as prescribed. Patient is not bothered by the visual hallucinations. Patient will benefit from neuropsych testing outpatient  Psychiatry will sign off at this point    Thank you very much for allowing us to participate in the care of this patient. Time spent 45 min.       Electronically signed by Eros Ramirez MD on 7/22/20 at 9:00 PM EDT

## 2020-07-22 NOTE — CARE COORDINATION
7/22/20, 1:10 PM EDT    DISCHARGE ON GOING [address] day: 5  Location: [facility]09/009-A Reason for admit: Sepsis secondary to [facility] ([facility] [A41.9, N39.0]   Procedure: none  Treatment Plan of Care:   Hospitalist and ID following. UTI, blood cultures + for ecoli. IV ceftriaxone. Plan to transition to PO atb in 1 to 2 days. Barriers to Discharge: Await medical clearance. PCP: Dilan Suárez DO  Readmission Risk Score: 11%  Patient Goals/Plan/Treatment Preferences: Declines ECF, prefers to return home with wife, Chas Joneshelena has been consulted. ? New .

## 2020-07-22 NOTE — PROGRESS NOTES
blood cultures were obtained, and broad spectrum antibiotics started. 7/18/2020  Overnight the patient was afebrile with fluctuations in blood pressures and lekocytosis (WBC:21.2). The patient was sitting comfortable in the bedside chair with his wife present during interview and exam. He stated that he felt great and had little complaint. He stated that he was unsure why he was at the hospital but after continued discussion he was able to recall the events leading up to his admission. The patient did eventually admit that he was having trouble urinating and that he was scheduled for a urological procedure but did not recall what the procedure was. Bladder scan revealed post-void residual of 240ml. His wife was concerned that his mental status had worsened and that this has been progressive over the past few months. She retold a recent account where he left the house and could not remember where he lived and had gotten lost.    Upon review of the chart there were inconsistencies between home and hospital medications. I spoke with Mr. Kathleen Kincaid PCP, Dr. Angel Tran, who confirmed that Mr. Sammy Bruno has uncontrolled HTN, DM, h/o CVA, HLD, BPH, and possibly progressing dementia. Reconciliation of the medications was conducted to ensure proper management. Discontinued Clonidine and restarted home diabetes medications    Blood and urine cultures revealed gram negative bacilli growth. Antibiotic coverage changed from Cefepime and Zosyn to Rocephin. 7/19/2020  From previous note: \"Overnight, the Patient's blood pressure was uncontrolled. He was agitated. He was given Hydralazine with no help, and Hydroxyzine. Clonidine was resumed at 0.2mg BID (less than home dose). Blood pressure now 181/98. \"    7/20/2020  Patient was still experiencing HTN overnight. Clonidine discontinued, Hydralazine increased to 10mg PRN. Today the patient's HTN is still uncontrolled and initiation of triple therapy was started. Today Mr. Sammy Bruno had complaint of pain with urination with external catheter, but denies pain with urination without the catheter. Recommended continued assessment of this complaint with nursing and bladder scan PRN. The patient is still quite confused without improvement from admission. I spoke with the daughter, Rodney Costa, via telephone who stated that her father has been in a confused state since June of 2019. She stated he was being followed by Dr. Dimple Rosales for his confusion, memory loss, and visual hallucinations. I will attempt to acquire outside records for these visit. 7/21/2020  The patient was experiencing HTN overnight. During interview today . Janelle Telles [facility] stated he was experiencing visual hallucinations of seeing dogs and red fire trucks in his room. Upon further conversation he did know that those hallucinations were not real and he had proper reasoning. However he did admit to seeing dogs on top of the buildings outside his window but could not justify if they were real or not because they were outside. He experienced an event of hypotension (84/56) with tachycardia during the day. Orthostatics performed and 1L bolus was initiated. Recheck of blood pressure normalized. This event occurred after discussion with the patient about his hallucinations and continued memory loss which made him very emotional.    The patient had increased procalcitonin with increased WBC despite antibiotic therapy. Consulted ID for recommendations. 7/22/2020:  Overnight nursing staff stated that he had only 1 event of hypertension and felt that this had been better controlled than the previous nights. Also, noted was that the patient had increased urine output this morning which has also been a change since previous days. Infectious disease consulted the patient and recommended continuation of antibiotic therapy with a possible change in 1-2 days to oral medication.  Today the patient stated that he slept well overnight which was a change for him. He was anticipating breakfast but had increased tangential thought as the discussion continued jumping from topic to topic. He also admitted to having continued visual hallucinations of seeing animals. He had requested we attempt to assist him in consulting for a neuropsychiatric evaluation. Examination:    General appearance - alert, well appearing, and in no distress and oriented to person, place, and time  Chest - clear to auscultation, no wheezes, rales or rhonchi, symmetric air entry  Heart - normal rate, regular rhythm, normal S1, S2, no murmurs, rubs, clicks or gallops  Abdomen - soft, nontender, distended, no masses or organomegaly, no rebound tenderness noted. Bowel sounds normal. Bladder distention noted.   Obese: Yes; Protuberant: Yes and No   Neurological - alert, oriented, normal speech, no focal findings or movement disorder noted  Extremities - peripheral pulses normal, no pedal edema, no clubbing or cyanosis  Skin - normal coloration and turgor, no rashes, no suspicious skin lesions noted    Scheduled Meds:   chlorthalidone  25 mg Oral Daily    amLODIPine  10 mg Oral Daily    atorvastatin  40 mg Oral Daily    glimepiride  2 mg Oral Daily with breakfast    pioglitazone  30 mg Oral Daily    cefTRIAXone (ROCEPHIN) IV  2 g Intravenous Q24H    metoprolol succinate  100 mg Oral BID    lisinopril  20 mg Oral BID    ipratropium-albuterol  1 ampule Inhalation Q4H WA    enoxaparin  40 mg Subcutaneous Daily    insulin lispro  0-6 Units Subcutaneous TID WC    insulin lispro  0-3 Units Subcutaneous Nightly     Continuous Infusions:   dextrose       PRN Meds:hydrALAZINE, 10 mg, Q4H PRN  glucose, 15 g, PRN  dextrose, 12.5 g, PRN  glucagon (rDNA), 1 mg, PRN  dextrose, 100 mL/hr, PRN  acetaminophen, 650 mg, Q4H PRN  ondansetron, 4 mg, Q6H PRN      SUPPORT DEVICES:  [] ETT []Oral / [] Nasal  [] Gastric Tube [] OG / [] NG    [] Central Venous Line (Specify Site):  [] Urinary Catheter  [] Arterial Line (Specify Site)  [x] Peripheral IV access: Right hand  [] Other:     PROPHYLAXIS/THERAPY:   VTE:                [x] Enoxaparin    [] Warfarin [] DOAC [] PCD Device:Bilat LE                          [] Heparin: [] Subcut / [] IV     NUTRITION SUPPORT:  Carb control    Oxygen Delivery - O2 Flow Rate (L/min): 2 L/min    DATA:    CBC:   Recent Labs     20  0530 20  0541 20  1720   WBC 10.2 11.6* 10.9*   RBC 3.77* 4.16* 3.91*   HGB 12.0* 13.4* 12.6*   HCT 38.1* 41.3* 38.9*   .1* 99.3* 99.5*   MCH 31.8 32.2 32.2   MCHC 31.5* 32.4 32.4    393 395   MPV 9.5 9.5 9.2*      BMP/CMP:   Recent Labs     20  0530 20  0541 20  1720    138 139   K 3.4* 3.4* 3.8    101 101   CO2 20* 20* 24   ANIONGAP 15.0 17.0* 14.0   BUN 9 12 21   CREATININE 0.6 0.6 0.9   GLUCOSE 172* 182* 158*   CALCIUM 9.1 9.7 9.6      Other Electrolytes:   No results for input(s): CAION, PHOS, MG in the last 72 hours. Serum Osmolality  No results for input(s): OSMOMEASER in the last 72 hours. Procalcitonin:  Recent Labs     20  0541   PROCAL 2.82*     Cardiac:   No results for input(s): TROPONINT in the last 72 hours. Lipids: No results for input(s): CHOL, HDL in the last 72 hours. Invalid input(s): LDLCALCU  Coagulation: No results for input(s): INR in the last 72 hours.   Lactic Acid:   Recent Labs     20  0530   LACTA 1.6      ABGs:   Lab Results   Component Value Date    PH 7.46 2020    PCO2 29 2020    PO2 74 2020    HCO3 21 2020    O2SAT 96 2020     Lab Results   Component Value Date    IFIO2 2 2020       Radiology/Imagin2020  XR CHEST PORTABLE - No acute findings    KEY ISSUES/FINDINGS/DISCUSSION / RECOMMENDATIONS BY SYSTEMS:  SYSTEMS ASSESSMENT AND PLANS     Action List   To Do List - Pending data and patient needs Situational Awareness & Contingency Planning:  Response to treatment  Uncertainties in diagnosis or treatment plan  Recent or ongoing changes (if applicable) Safety  Critical Lab/Data Needing Follow-Up:     Neuropsychiatric   [] No active issues  [x] Other:    Delerium: Likely secondary to UTI, however spouse stated that he has been progressively worsening with his memory. Concern for encephalopathy secondary to UTI is unlikely given that his memory loss, confusion, and visual hallucinations have been ongoing since mid-2019. Visual Hallucination: Unlikely due to infectious process. Consulted psychiatry for evaluation. Respiratory   [x] No active issues  [] Other:    Cardiovascular   [] No active issues  [x] Other:    Hypertension: Improving. Continue triple therapy regimen  Gastrointestinal   [x] No active issues:  [] Other:    Fluid/Electrolytes-Endocrine/Nutrition   [] No active issues:  [x] Other:    Diabetes Type 2: Continue home medications, low dose sliding scale, POCT glucose 4x daily ACHS, hypoglycemia protocol. Infectious Disease   [] No active issues  [x] Other:    Sepsis secondary to UTI: Gram negative bacilli cultures grown on blood and urine samples. Continue Rocephin per ID with consideration for transition to PO antibiotics in 1-2 days. Leukocytosis: Worsening today. Continue ABX therapy per ID recommendations. Hematology/Oncology   [x] No active issues  [] Other:    Renal/   [] No active issues  [x] Other:    Urinary Retention Secondary to BPH: Monitor in's/out's, repeat bladder scan PRN, and place Dejesus catheter if needed.  TURP procedure scheduled for 8/4/2020 per patient  Surgical   [x] No active issues  [] Other:    Rehabilitation/Mobility   [x] No active issues  [] Other:    Social/Spiritual/DNR/Disposition/Miscellaneous   [x] No active issues  [] Other:    Disposition Issues - Needs To Be Addressed To Facilitate Discharge   [x] No active issues  [] Other:    Planned Placement   [] Home  [] LTAC  [] Rehabilitation Unit/Facility  [] Transitional Care  [] Extended Care Facility  [x] Unknown at this time        Reviewed with Collaborative Staff     Reviewed with Physician Staff  Meets Continued ICU stepdown Level Care Criteria:    [x] Yes - Indication: Sepsis secondary to UTI    [] No - Transfer Planned to listed location:    Anticipated discharge:  [x] Yes: in 2 day(s)  [] No, continued therapy needed.       Electronically signed by Alberto Mcintosh DO on 7/22/2020 at 8:00 AM

## 2020-07-22 NOTE — PLAN OF CARE
Problem: Falls - Risk of:  Goal: Will remain free from falls  Description: Will remain free from falls  7/22/2020 0058 by Mihir Chambers RN  Outcome: Ongoing  Note: Free of falls this shift. Call light within reach. Bed alarm on. Falling star program in place. Problem: Falls - Risk of:  Goal: Absence of physical injury  Description: Absence of physical injury  7/22/2020 0058 by Mihir Chambers RN  Outcome: Ongoing  Note: Free of falls this shift. Call light within reach. Bed alarm on. Falling star program in place. Problem: Impaired respiratory status  Goal: Clear lung sounds  7/22/2020 0058 by Mihir Chambers RN  Outcome: Ongoing  Note: Lung sounds clear and diminished. Problem: Pain:  Goal: Pain level will decrease  Description: Pain level will decrease  7/22/2020 0058 by Mihir Chambers RN  Outcome: Ongoing  Note: Patient denies pain at this time. Problem: Pain:  Goal: Control of acute pain  Description: Control of acute pain  7/22/2020 0058 by Mihir Chambers RN  Outcome: Ongoing  Note: Patient denies pain at this time. Problem: Pain:  Goal: Control of chronic pain  Description: Control of chronic pain  7/22/2020 0058 by Mihir Chambers RN  Outcome: Ongoing  Note: Patient denies pain at this time. Problem: Skin Integrity:  Goal: Will show no infection signs and symptoms  Description: Will show no infection signs and symptoms  7/22/2020 0058 by Mihir Chambers RN  Outcome: Ongoing  Note: No new signs or symptoms of infection noted. Will continue to monitor. Problem: Skin Integrity:  Goal: Absence of new skin breakdown  Description: Absence of new skin breakdown  7/22/2020 0058 by Mihir Chambers RN  Outcome: Ongoing  Note: No new skin breakdown noted. Will continue to monitor.       Problem: Discharge Planning:  Goal: Discharged to appropriate level of care  Description: Discharged to appropriate level of care  7/22/2020 0058 by Mihir Chambers

## 2020-07-22 NOTE — FLOWSHEET NOTE
Advance Directive Consult: Patient was not available. Left Advance Directive booklet with Spiritual Care contact information.       07/22/20 [id number]   Encounter Summary   Services provided to: Patient   Spiritual/Sikh   Type Spiritual support

## 2020-07-23 VITALS
RESPIRATION RATE: 12 BRPM | BODY MASS INDEX: 28.97 KG/M2 | OXYGEN SATURATION: 90 % | WEIGHT: 184.56 LBS | TEMPERATURE: 97.8 F | HEIGHT: 67 IN | DIASTOLIC BLOOD PRESSURE: 62 MMHG | SYSTOLIC BLOOD PRESSURE: 98 MMHG | HEART RATE: 101 BPM

## 2020-07-23 PROBLEM — N39.0 SEPSIS SECONDARY TO UTI (HCC): Status: RESOLVED | Noted: 2020-07-17 | Resolved: 2020-07-23

## 2020-07-23 PROBLEM — R79.89 ELEVATED LACTIC ACID LEVEL: Status: RESOLVED | Noted: 2020-07-17 | Resolved: 2020-07-23

## 2020-07-23 PROBLEM — A41.9 SEPSIS SECONDARY TO UTI (HCC): Status: RESOLVED | Noted: 2020-07-17 | Resolved: 2020-07-23

## 2020-07-23 LAB
ANION GAP SERPL CALCULATED.3IONS-SCNC: 14 MEQ/L (ref 8–16)
BLOOD CULTURE, ROUTINE: NORMAL
BUN BLDV-MCNC: 30 MG/DL (ref 7–22)
CALCIUM SERPL-MCNC: 9.8 MG/DL (ref 8.5–10.5)
CHLORIDE BLD-SCNC: 104 MEQ/L (ref 98–111)
CO2: 23 MEQ/L (ref 23–33)
CREAT SERPL-MCNC: 0.7 MG/DL (ref 0.4–1.2)
ERYTHROCYTE [DISTWIDTH] IN BLOOD BY AUTOMATED COUNT: 13.2 % (ref 11.5–14.5)
ERYTHROCYTE [DISTWIDTH] IN BLOOD BY AUTOMATED COUNT: 49.3 FL (ref 35–45)
FOLATE: 12.2 NG/ML (ref 4.8–24.2)
GFR SERPL CREATININE-BSD FRML MDRD: > 90 ML/MIN/1.73M2
GLUCOSE BLD-MCNC: 153 MG/DL (ref 70–108)
GLUCOSE BLD-MCNC: 157 MG/DL (ref 70–108)
GLUCOSE BLD-MCNC: 177 MG/DL (ref 70–108)
HCT VFR BLD CALC: 40.4 % (ref 42–52)
HEMOGLOBIN: 12.9 GM/DL (ref 14–18)
MCH RBC QN AUTO: 32.7 PG (ref 26–33)
MCHC RBC AUTO-ENTMCNC: 31.9 GM/DL (ref 32.2–35.5)
MCV RBC AUTO: 102.5 FL (ref 80–94)
PLATELET # BLD: 397 THOU/MM3 (ref 130–400)
PMV BLD AUTO: 9.8 FL (ref 9.4–12.4)
POTASSIUM SERPL-SCNC: 3.5 MEQ/L (ref 3.5–5.2)
RBC # BLD: 3.94 MILL/MM3 (ref 4.7–6.1)
SODIUM BLD-SCNC: 141 MEQ/L (ref 135–145)
VITAMIN B-12: 607 PG/ML (ref 211–911)
WBC # BLD: 11 THOU/MM3 (ref 4.8–10.8)

## 2020-07-23 PROCEDURE — 82948 REAGENT STRIP/BLOOD GLUCOSE: CPT

## 2020-07-23 PROCEDURE — 2580000003 HC RX 258: Performed by: STUDENT IN AN ORGANIZED HEALTH CARE EDUCATION/TRAINING PROGRAM

## 2020-07-23 PROCEDURE — 94760 N-INVAS EAR/PLS OXIMETRY 1: CPT

## 2020-07-23 PROCEDURE — 6370000000 HC RX 637 (ALT 250 FOR IP): Performed by: STUDENT IN AN ORGANIZED HEALTH CARE EDUCATION/TRAINING PROGRAM

## 2020-07-23 PROCEDURE — 36415 COLL VENOUS BLD VENIPUNCTURE: CPT

## 2020-07-23 PROCEDURE — 99239 HOSP IP/OBS DSCHRG MGMT >30: CPT | Performed by: INTERNAL MEDICINE

## 2020-07-23 PROCEDURE — 80048 BASIC METABOLIC PNL TOTAL CA: CPT

## 2020-07-23 PROCEDURE — 85027 COMPLETE CBC AUTOMATED: CPT

## 2020-07-23 PROCEDURE — 6370000000 HC RX 637 (ALT 250 FOR IP): Performed by: OPHTHALMOLOGY

## 2020-07-23 PROCEDURE — 6360000002 HC RX W HCPCS: Performed by: OPHTHALMOLOGY

## 2020-07-23 PROCEDURE — 6360000002 HC RX W HCPCS: Performed by: STUDENT IN AN ORGANIZED HEALTH CARE EDUCATION/TRAINING PROGRAM

## 2020-07-23 PROCEDURE — 6370000000 HC RX 637 (ALT 250 FOR IP): Performed by: INTERNAL MEDICINE

## 2020-07-23 PROCEDURE — 94640 AIRWAY INHALATION TREATMENT: CPT

## 2020-07-23 RX ORDER — AMLODIPINE BESYLATE 10 MG/1
10 TABLET ORAL DAILY
Qty: 30 TABLET | Refills: 3 | Status: SHIPPED | OUTPATIENT
Start: 2020-07-24 | End: 2020-07-23

## 2020-07-23 RX ORDER — AMLODIPINE BESYLATE 2.5 MG/1
2.5 TABLET ORAL DAILY PRN
Qty: 30 TABLET | Refills: 0 | COMMUNITY
Start: 2020-07-23 | End: 2020-07-23

## 2020-07-23 RX ORDER — CHLORTHALIDONE 25 MG/1
25 TABLET ORAL DAILY
Qty: 30 TABLET | Refills: 3 | Status: SHIPPED | OUTPATIENT
Start: 2020-07-24 | End: 2021-02-09

## 2020-07-23 RX ORDER — ATORVASTATIN CALCIUM 40 MG/1
40 TABLET, FILM COATED ORAL DAILY
Qty: 30 TABLET | Refills: 3 | Status: SHIPPED | OUTPATIENT
Start: 2020-07-24 | End: 2020-07-23 | Stop reason: HOSPADM

## 2020-07-23 RX ORDER — GLIMEPIRIDE 2 MG/1
2 TABLET ORAL
Qty: 30 TABLET | Refills: 3 | Status: SHIPPED | OUTPATIENT
Start: 2020-07-24

## 2020-07-23 RX ORDER — LISINOPRIL 20 MG/1
20 TABLET ORAL 2 TIMES DAILY
Qty: 30 TABLET | Refills: 3 | Status: SHIPPED | OUTPATIENT
Start: 2020-07-23 | End: 2020-10-02 | Stop reason: DRUGHIGH

## 2020-07-23 RX ORDER — AMLODIPINE BESYLATE 5 MG/1
5 TABLET ORAL DAILY
Qty: 30 TABLET | Refills: 0 | Status: SHIPPED | OUTPATIENT
Start: 2020-07-23 | End: 2020-07-29

## 2020-07-23 RX ORDER — METOPROLOL SUCCINATE 100 MG/1
100 TABLET, EXTENDED RELEASE ORAL 2 TIMES DAILY
Qty: 30 TABLET | Refills: 3 | Status: SHIPPED | OUTPATIENT
Start: 2020-07-23 | End: 2020-12-03

## 2020-07-23 RX ORDER — CEFDINIR 300 MG/1
300 CAPSULE ORAL 2 TIMES DAILY
Qty: 14 CAPSULE | Refills: 0 | Status: SHIPPED | OUTPATIENT
Start: 2020-07-24 | End: 2020-07-30

## 2020-07-23 RX ORDER — GREEN TEA/HOODIA GORDONII 315-12.5MG
1 CAPSULE ORAL DAILY
Qty: 30 TABLET | Refills: 0 | Status: SHIPPED | OUTPATIENT
Start: 2020-07-24 | End: 2020-08-23

## 2020-07-23 RX ADMIN — PIOGLITAZONE 30 MG: 30 TABLET ORAL at 08:23

## 2020-07-23 RX ADMIN — LISINOPRIL 20 MG: 20 TABLET ORAL at 08:23

## 2020-07-23 RX ADMIN — AMLODIPINE BESYLATE 10 MG: 10 TABLET ORAL at 08:23

## 2020-07-23 RX ADMIN — METOPROLOL SUCCINATE 100 MG: 100 TABLET, FILM COATED, EXTENDED RELEASE ORAL at 08:23

## 2020-07-23 RX ADMIN — CEFTRIAXONE 2 G: 2 INJECTION, POWDER, FOR SOLUTION INTRAMUSCULAR; INTRAVENOUS at 14:15

## 2020-07-23 RX ADMIN — ATORVASTATIN CALCIUM 40 MG: 40 TABLET, FILM COATED ORAL at 08:23

## 2020-07-23 RX ADMIN — CHLORTHALIDONE 25 MG: 25 TABLET ORAL at 08:23

## 2020-07-23 RX ADMIN — GLIMEPIRIDE 2 MG: 2 TABLET ORAL at 08:23

## 2020-07-23 RX ADMIN — IPRATROPIUM BROMIDE AND ALBUTEROL SULFATE 1 AMPULE: .5; 3 SOLUTION RESPIRATORY (INHALATION) at 12:45

## 2020-07-23 RX ADMIN — ENOXAPARIN SODIUM 40 MG: 40 INJECTION SUBCUTANEOUS at 08:23

## 2020-07-23 RX ADMIN — IPRATROPIUM BROMIDE AND ALBUTEROL SULFATE 1 AMPULE: .5; 3 SOLUTION RESPIRATORY (INHALATION) at 08:52

## 2020-07-23 ASSESSMENT — PAIN SCALES - GENERAL: PAINLEVEL_OUTOF10: 0

## 2020-07-23 NOTE — PLAN OF CARE
Problem: Falls - Risk of:  Goal: Will remain free from falls  Description: Will remain free from falls  Outcome: Ongoing  Note: Pt free from fall and injury this shift. Bed in lowest position. Call light in reach. Will continue to hourly round and monitor. Problem: Falls - Risk of:  Goal: Absence of physical injury  Description: Absence of physical injury  Outcome: Ongoing  Note: Pt free from fall and injury this shift. Bed in lowest position. Call light in reach. Will continue to hourly round and monitor. Problem: Impaired respiratory status  Goal: Clear lung sounds  7/22/2020 1959 by Jonathon Kirkland RN  Outcome: Ongoing  Note: Lung sounds clear and diminished. Patient not coughing at this time. Educated patient on respiratory orders. Will continue to monitor. Problem: Pain:  Goal: Pain level will decrease  Description: Pain level will decrease  Outcome: Ongoing  Note: Educated patient on pain rating scale 0-10. Patient states understanding of pain scale and goal of pain a zero. Patient is satisfied with pain level at this time. Will continue to hourly round and monitor. Problem: Pain:  Goal: Control of acute pain  Description: Control of acute pain  Outcome: Ongoing  Note: Educated patient on pain rating scale 0-10. Patient states understanding of pain scale and goal of pain a zero. Patient is satisfied with pain level at this time. Will continue to hourly round and monitor. Problem: Pain:  Goal: Control of chronic pain  Description: Control of chronic pain  Outcome: Ongoing  Note: Educated patient on pain rating scale 0-10. Patient states understanding of pain scale and goal of pain a zero. Patient is satisfied with pain level at this time. Will continue to hourly round and monitor.        Problem: Discharge Planning:  Goal: Discharged to appropriate level of care  Description: Discharged to appropriate level of care  Outcome: Ongoing  Note: Educated patient on plan of

## 2020-07-23 NOTE — PROGRESS NOTES
Progress note: Infectious diseases    Patient - Nikkie Murray,  Age - 81 y.o.    - 1935      Room Number - 2J-60/721-B   MRN -  [id number]   Acct # - [de-identified]  Date of Admission -  2020  7:38 PM    SUBJECTIVE:   He feels much better, no fever or chills  OBJECTIVE   VITALS    height is 5' 7\" (1.702 m) and weight is 184 lb 9 oz (83.7 kg). His oral temperature is 98.1 °F (36.7 °C). His blood pressure is 121/77 and his pulse is 100. His respiration is 18 and oxygen saturation is 95%.        Wt Readings from Last 3 Encounters:   20 184 lb 9 oz (83.7 kg)   20 193 lb 1.6 oz (87.6 kg)   20 199 lb 12.8 oz (90.6 kg)       I/O (24 Hours)    Intake/Output Summary (Last 24 hours) at 2020 1037  Last data filed at 2020 0932  Gross per 24 hour   Intake 1090.7 ml   Output 775 ml   Net 315.7 ml       General Appearance  Awake, alert, oriented,    HEENT - normocephalic, atraumatic, pink conjunctiva,  anicteric sclera  Neck - Supple, no mass  Lungs -  Bilateral good air entry, no rhonchi, no wheeze  Cardiovascular - Heart sounds are normal.     Abdomen - soft, not distended, nontender,   Neurologic -oriented  Skin - No bruising or bleeding  Extremities - No edema, no cyanosis, clubbing     MEDICATIONS:      chlorthalidone  25 mg Oral Daily    amLODIPine  10 mg Oral Daily    atorvastatin  40 mg Oral Daily    glimepiride  2 mg Oral Daily with breakfast    pioglitazone  30 mg Oral Daily    cefTRIAXone (ROCEPHIN) IV  2 g Intravenous Q24H    metoprolol succinate  100 mg Oral BID    lisinopril  20 mg Oral BID    ipratropium-albuterol  1 ampule Inhalation Q4H WA    enoxaparin  40 mg Subcutaneous Daily    insulin lispro  0-6 Units Subcutaneous TID WC    insulin lispro  0-3 Units Subcutaneous Nightly      dextrose       hydrALAZINE, glucose, dextrose, glucagon (rDNA), dextrose, acetaminophen, ondansetron      LABS:     CBC:   Recent Labs     07/21/20  1720 07/22/20  0748 07/23/20  0601   WBC 10.9* 11.5* 11.0*   HGB 12.6* 13.5* 12.9*    437* 397     BMP:    Recent Labs     07/21/20  1720 07/22/20  0748 07/23/20  0601    142 141   K 3.8 4.4 3.5    103 104   CO2 24 25 23   BUN 21 21 30*   CREATININE 0.9 0.7 0.7   GLUCOSE 158* 165* 153*     Calcium:  Recent Labs     07/23/20  0601   CALCIUM 9.8    Glucose:  Recent Labs     07/22/20  1620 07/22/20  1945 07/23/20  0615   POCGLU 148* 216* 157*            Problem list of patient:     Patient Active Problem List   Diagnosis Code    Abnormal stress test R94.39    Sepsis secondary to UTI ([facility] [address] A41.9, N39.0    Elevated lactic acid level R79.89    Delirious R41.0    Leukocytosis D72.829    BPH (benign prostatic hyperplasia) N40.0    Urinary retention due to benign prostatic hyperplasia N40.1, R33.8    Sepsis (HCC) A41.9    E coli bacteremia R78.81    E. coli UTI N39.0, B96.20    Acute metabolic encephalopathy T02.62    Septic encephalopathy G93.41    Type 2 diabetes mellitus with other specified complication (HCC) J34.84    Hypokalemia E87.6    History of CVA (cerebrovascular accident) Z86.73         ASSESSMENT/PLAN   Sepsis due to UTI  BPH  Continue current treatment  Transition to oral antibiotic at am for discharge.   Discussed with his wife      Kennedi Morales MD, [address] 7/23/2020 10:37 AM

## 2020-07-23 NOTE — CARE COORDINATION
7/23/20, 10:17 AM EDT    DISCHARGE PLANNING EVALUATION    Spoke with patient and wife and also called daughter. In agreement with patient going home with new home health. Daughter stated that they have been dealing with his confusion prior to now and nothing is different. Also made aware that patient insurance is a pre-cert and he walked 517 feet so may not get approved for ECF. Daughter denied it needing an ECF. Prefers [facility] for home health services. Will need an order. Made referral to [facility].

## 2020-07-23 NOTE — DISCHARGE SUMMARY
Hospital Medicine Discharge Summary      Patient:  Margie Ferrer  YOB: 1935  MRN: [id number]   PCP: Mitesh Grimes DO         Acct: [de-identified]     Admit Date: 7/17/2020    Discharge Date: 7/23/2020         Admitting Physician: Irma Villareal MD  Discharge Physician: Scott Stewart MD     Discharge Diagnoses     DISCHARGE DIAGNOSES:  1. Sepsis (POA) E. Coli bacteremia secondary to E. Coli UTI associated with acute metabolic/septic encephalopathy:   2. DM Type 2: uncontrolled  3. Hypokalemia  4. Reported history of CVA: on atorvastatin. Unclear why patient is not on aspirin  5. HTN: Multiple adjustments to patient's medications made during this hospitalization. See discharge med reconciliation. Apparently patient was taking the wrong anti-hypertensive medications at home- as Dr. Flavio Coyne discussed with his primary care physicain  6. COVID-19 exposure: COVID 19 negative in ED    Disposition:    [x] Home with home care         Condition at Discharge: Stable    The patient was seen and examined on day of discharge and this discharge summary is in conjunction with any daily progress note from day of discharge. Hospital course     Margie Ferrer is a 81 y.o. male who presented to [facility] on 7/17/2020 with fever following an appointment at the urologist's office for difficulty with urination. His symptoms were accompanied by altered mental status, fever and lower abdominal pain. On arrival to the ED his temperature was elevated to 103F. Patient's daughter was COVID 19 +. Patient's testing in the ED was however COVID-19 negative. Since admission, he has had positive blood cultures x 2 for E. Coli. The source has been identified as his urine. He has also had visual hallucinations during this hospitalization that were thought to be related to septic encephalopathy. This seems to be resolving. CT scan of the head was unremarkable.  He was seen by Psychiatry due to reported visual hallucinations with recommendation for Neurospych testing outpatient. He was seen in consultation by Dr. Kathy Fox. He was otherwise stable at the time of discharge and will be discharge with home care. I assumed care of this patient on 7/22. Following is an outline of the patient's hospital course. Repeat blood cultures from 7/22 are pending at the time of this note    Following is an outline of the patient's hospital course:    1. Sepsis (POA) E. Coli bacteremia secondary to E. Coli UTI associated with acute metabolic/septic encephalopathy: On Ceftriaxone. Vitamin B12, folate, unremarkable. Repeat blood cultures pending. Discharge on Cefdinir for a total of 14 days. Repeat CBC/ BMP in 1 week. CT head showed chronic infarcts  2. DM Type 2: uncontrolled. Last HgbA1C 7.4  3. Hypokalemia: Replace prn  4. Reported history of CVA: on atorvastatin. Unclear why patient is not on aspirin- CT scan of head during this admission showed chronic infarcts  5. HTN: BP medications changed during this admission ( unclear as to why they were changed- I assumed care on 07/22). Patient slightly hypotensive on the day of discharge. Amlodipine was continued at 5 mg daily instead of 10 mg daily. Chlorthalidone started during this admission and patient was continued on lisinopril and metoprolol. Patient to continue to monitor his BP twice daily at home. A follow up BMP is recommended in 1 week  6. History of urinary retention: Patient is supposed to straight cath at home, but has been having difficulty- Home nurses to educate  7.  COVID-19 exposure: COVID 19 negative in ED        Discharge Medications      Damon Patten   Home Medication Instructions ESC:[id number]    Printed on:07/23/20 1344   Medication Information                      amLODIPine (NORVASC) 5 MG tablet  Take 1 tablet by mouth daily             cefdinir (OMNICEF) 300 MG capsule  Take 1 capsule by mouth 2 times daily for 7 days             chlorthalidone (HYGROTON) 25 MG tablet  Take 1 tablet by mouth daily             Cholecalciferol (VITAMIN D3) 50 MCG (2000 UT) CAPS  Take 2,000 Units by mouth daily              Cyanocobalamin (VITAMIN B-12 IJ)  Inject as directed every 30 days             glimepiride (AMARYL) 2 MG tablet  Take 1 tablet by mouth daily (with breakfast)             lisinopril (PRINIVIL;ZESTRIL) 20 MG tablet  Take 1 tablet by mouth 2 times daily             metFORMIN (GLUCOPHAGE-XR) 750 MG extended release tablet  Take 1,500 mg by mouth Daily with supper 2 tab             metoprolol succinate (TOPROL XL) 100 MG extended release tablet  Take 1 tablet by mouth 2 times daily             pioglitazone (ACTOS) 30 MG tablet  Take 30 mg by mouth daily             Probiotic Acidophilus (FLORANEX) TABS  Take 1 tablet by mouth daily             simvastatin (ZOCOR) 40 MG tablet  Take 40 mg by mouth nightly                     Physical Examination     Vitals:  Vitals:    07/23/20 0259 07/23/20 0821 07/23/20 0852 07/23/20 1059   BP: 136/75 121/77  98/62   Pulse: 97 100  101   Resp:  18  12   Temp: 99 °F (37.2 °C) 98.1 °F (36.7 °C)  97.8 °F (36.6 °C)   TempSrc: Oral Oral  Oral   SpO2: 94% 96% 95% 90%   Weight: 184 lb 9 oz (83.7 kg)      Height:           Weight: Weight: 184 lb 9 oz (83.7 kg)     24 hour intake/output:    Intake/Output Summary (Last 24 hours) at 7/23/2020 1343  Last data filed at 7/23/2020 1200  Gross per 24 hour   Intake 1300 ml   Output 875 ml   Net 425 ml         General appearance:  No apparent distress. Patient is alert and oriented  HEENT: Normocephalic. Pupils equal, round, and reactive to light. Extraocular motion intact. Conjunctivae clear. Nose symmetric without evidence of discharge. Oral mucosa moist without erythema or exudate. Neck: Supple. Trachea midline. No thyromegaly. Cardiovascular:  Regular rate and rhythm with normal S1/S2. No murmurs, rubs or gallops.   Respiratory: Clear to auscultation, bilaterally without Rales/Wheezes/Rhonchi. Abdomen: Soft, non-tender, non-distended with normal bowel sounds. Musculoskeletal:  Full range of motion without deformity. Neurologic:  No focal sensory/motor deficits. Cranial nerves: II-XII intact. Lymphatic: Deferred  Psychiatric:  Alert and oriented to purpose, place, date  Vascular: Dorsalis pedis and radial pulses palpable bilaterally 2+. No peripheral edema. Genitourinary: Deferred. Skin: No visible rashes or lesions. Labs     Labs: For convenience and continuity at follow-up the following most recent labs are provided:      Recent Labs     07/21/20  1720 07/22/20  0748 07/23/20  0601   WBC 10.9* 11.5* 11.0*   HGB 12.6* 13.5* 12.9*   HCT 38.9* 41.7* 40.4*    437* 397     Recent Labs     07/21/20  1720 07/22/20  0748 07/23/20  0601    142 141   K 3.8 4.4 3.5    103 104   CO2 24 25 23   BUN 21 21 30*   CREATININE 0.9 0.7 0.7   CALCIUM 9.6 10.0 9.8     No results for input(s): AST, ALT, BILIDIR, BILITOT, ALKPHOS in the last 72 hours. No results for input(s): INR in the last 72 hours. No results for input(s): Dulcie Mean in the last 72 hours. Renal:    Lab Results   Component Value Date     07/23/2020    K 3.5 07/23/2020    K 4.1 06/19/2020     07/23/2020    CO2 23 07/23/2020    BUN 30 07/23/2020    CREATININE 0.7 07/23/2020    CALCIUM 9.8 07/23/2020           Radiology     Radiology:        Ct Head Wo Contrast    Result Date: 7/22/2020  PROCEDURE: CT HEAD WO CONTRAST CLINICAL INFORMATION: Hallucinations, Visual in natures. COMPARISON: No prior study. TECHNIQUE: Noncontrast 5 mm axial images were obtained through the brain. Sagittal and coronal reconstructions were obtained. All CT scans at this facility use dose modulation, iterative reconstruction, and/or weight-based dosing when appropriate to reduce radiation dose to as low as reasonably achievable.  FINDINGS: There is a moderate to large area of encephalomalacia involving the right parietal and temporal lobes. There is associated ex vacuo dilatation of the right atrium. There is a small area of encephalomalacia in the left frontal lobe. There are moderate patchy areas of hypodensity in the periventricular, subcortical deep white matter elsewhere. There is moderate volume loss. No intracranial hemorrhage, mass effect or midline shift is identified. The calvarium appears intact. The patient is status post bilateral lens extractions. Orbits are otherwise unremarkable. Visualized paranasal sinuses are clear. Mastoid air cells are clear. 1. No evidence of acute intracranial abnormality. 2. 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 **This report has been created using voice recognition software. It may contain minor errors which are inherent in voice recognition technology. ** Final report electronically signed by Dr. Janine Ozuna MD on 7/22/2020 4:06 PM    Xr Chest Portable    Result Date: 7/17/2020  PROCEDURE: XR CHEST PORTABLE CLINICAL INFORMATION: ams. COMPARISON: May 27, 2020 TECHNIQUE: Portable FINDINGS: Atherosclerotic changes aortic arch. No lobar consolidation. Costophrenic angles are preserved. Cardiomediastinal silhouette is within normal limits. No acute osseous findings. No acute findings **This report has been created using voice recognition software. It may contain minor errors which are inherent in voice recognition technology. ** Final report electronically signed by Dr. Annie Dick on 7/17/2020 9:08 PM        Consults     IP CONSULT TO SPIRITUAL SERVICES  IP CONSULT TO DIETITIAN  IP CONSULT TO SOCIAL WORK  IP CONSULT TO INFECTIOUS DISEASES  IP CONSULT TO PSYCHIATRY  IP CONSULT TO HOME CARE NEEDS          Discharge Instructions     Patient Instructions:    Discharge lab work None  Activity: As tolerated  Diet: DIET CARB CONTROL;   Dietary Nutrition Supplements: Diabetic Oral Supplement      Follow-up visits     Blue Malone Collin Chang DO  [address] [place] [address] [id number] [phone]    Schedule an appointment as soon as possible for a visit  Office is on vacation this week and will return on Monday. Please call on Monday to make a follow up appointment. [address]/[place] [address] [place] [address]  [phone]             Code status at time of discharge     Full Code       Time Spent on discharge is more than 53 minutes in the examination, evaluation, counseling and review of medications and discharge plan. Signed: Thank you Dani Araujo DO for the opportunity to be involved in this patient's care.     Electronically signed by Ankita Ramos MD on 7/23/2020 at 1:43 PM

## 2020-07-23 NOTE — CARE COORDINATION
7/23/20, 12:27 PM EDT    Patient goals/plan/ treatment preferences discussed by  and . Patient goals/plan/ treatment preferences reviewed with patient/ family. Patient/ family verbalize understanding of discharge plan and are in agreement with goal/plan/treatment preferences. Understanding was demonstrated using the teach back method. AVS provided by RN at time of discharge, which includes all necessary medical information pertaining to the patients current course of illness, treatment, post-discharge goals of care, and treatment preferences. IMM Letter  IMM Letter given to Patient/Family/Significant other/Guardian/POA/by[de-identified]   IMM Letter date given[de-identified] 07/23/20  IMM Letter time given[de-identified] 7856     Patient and family prefers [facility]. Order needed and MD aware. Ritchie Ellington at [facility] aware of discharge for today. No further needs voiced.

## 2020-07-23 NOTE — PLAN OF CARE
Problem: Impaired respiratory status  Goal: Clear lung sounds  7/22/2020 2213 by Fouzia Macias RCP  Outcome: Ongoing   Continue with Duoneb to achieve clear lung sounds.

## 2020-07-23 NOTE — PLAN OF CARE
Problem: Impaired respiratory status  Goal: Clear lung sounds  7/23/2020 0855 by Bernabe Fofana RCP  Outcome: Ongoing  Continue therapy as ordered to improve breath sounds/aeration.

## 2020-07-23 NOTE — FLOWSHEET NOTE
[facility] PROGRESS NOTE      Patient: Carli Landa  Room #: 2F-30/186-G            YOB: 1935  Age: 81 y.o. Gender: male            Admit Date & Time: 7/17/2020  7:38 PM    Assessment:  Patient who answers to Radha Lyn is sitting in his chair on 4k. He is sustained by the support of his wife and daughter. His wife was in the room with him. Pt may be discharged today. Yesterday, I left the AD documents for this family to fill out. Interventions:  Discussion of his medical condition and his discharge plan. Pt belongs to the [employer] and his Worship has been notified. Pt welcomed prayer for his continued healing. Outcomes:  Encouraged. AD follow up needed. Plan:  Patient's social worked is working with this family on his discharge plan. 1.  Follow up with future plans for his medical needs, AD is not completed yet.        Electronically signed by Laura Narayan, on 7/23/2020 at 10:37 AM.  [facility]  [phone]

## 2020-07-24 ENCOUNTER — TELEPHONE (OUTPATIENT)
Dept: UROLOGY | Age: 85
End: 2020-07-24

## 2020-07-24 NOTE — TELEPHONE ENCOUNTER
Perfect serve message sent to Gloria Jernigan. Male, 81 y. o., 1935  MRN:   [id number]  was discharged from the hospital and is scheduled for surgery on 8/4/2020 with you. His wife is calling to see if he should still self cath?      Please advise thank you

## 2020-07-27 ENCOUNTER — HOSPITAL ENCOUNTER (EMERGENCY)
Age: 85
Discharge: HOME | End: 2020-07-27
Payer: MEDICARE

## 2020-07-27 ENCOUNTER — TELEPHONE (OUTPATIENT)
Dept: CARDIOLOGY CLINIC | Age: 85
End: 2020-07-27

## 2020-07-27 ENCOUNTER — APPOINTMENT (OUTPATIENT)
Dept: CT IMAGING | Age: 85
End: 2020-07-27
Payer: MEDICARE

## 2020-07-27 VITALS
TEMPERATURE: 97.6 F | HEART RATE: 93 BPM | OXYGEN SATURATION: 99 % | SYSTOLIC BLOOD PRESSURE: 146 MMHG | RESPIRATION RATE: 17 BRPM | HEIGHT: 67 IN | DIASTOLIC BLOOD PRESSURE: 86 MMHG | BODY MASS INDEX: 28.72 KG/M2 | WEIGHT: 183 LBS

## 2020-07-27 LAB
ALBUMIN SERPL-MCNC: 3.9 G/DL (ref 3.5–5.1)
ALP BLD-CCNC: 51 U/L (ref 38–126)
ALT SERPL-CCNC: 25 U/L (ref 11–66)
ANION GAP SERPL CALCULATED.3IONS-SCNC: 14 MEQ/L (ref 8–16)
AST SERPL-CCNC: 23 U/L (ref 5–40)
BASOPHILS # BLD: 0.3 %
BASOPHILS ABSOLUTE: 0 THOU/MM3 (ref 0–0.1)
BILIRUB SERPL-MCNC: 0.4 MG/DL (ref 0.3–1.2)
BILIRUBIN URINE: NEGATIVE
BLOOD, URINE: NEGATIVE
BUN BLDV-MCNC: 46 MG/DL (ref 7–22)
CALCIUM SERPL-MCNC: 10.2 MG/DL (ref 8.5–10.5)
CHARACTER, URINE: CLEAR
CHLORIDE BLD-SCNC: 96 MEQ/L (ref 98–111)
CO2: 25 MEQ/L (ref 23–33)
COLOR: YELLOW
CREAT SERPL-MCNC: 1.4 MG/DL (ref 0.4–1.2)
EKG ATRIAL RATE: 87 BPM
EKG P AXIS: 12 DEGREES
EKG P-R INTERVAL: 154 MS
EKG Q-T INTERVAL: 388 MS
EKG QRS DURATION: 84 MS
EKG QTC CALCULATION (BAZETT): 466 MS
EKG R AXIS: 3 DEGREES
EKG T AXIS: 35 DEGREES
EKG VENTRICULAR RATE: 87 BPM
EOSINOPHIL # BLD: 1.5 %
EOSINOPHILS ABSOLUTE: 0.2 THOU/MM3 (ref 0–0.4)
ERYTHROCYTE [DISTWIDTH] IN BLOOD BY AUTOMATED COUNT: 13.3 % (ref 11.5–14.5)
ERYTHROCYTE [DISTWIDTH] IN BLOOD BY AUTOMATED COUNT: 49.1 FL (ref 35–45)
GFR SERPL CREATININE-BSD FRML MDRD: 48 ML/MIN/1.73M2
GLUCOSE BLD-MCNC: 135 MG/DL (ref 70–108)
GLUCOSE URINE: NEGATIVE MG/DL
HCT VFR BLD CALC: 37.9 % (ref 42–52)
HEMOGLOBIN: 12.2 GM/DL (ref 14–18)
IMMATURE GRANS (ABS): 0.06 THOU/MM3 (ref 0–0.07)
IMMATURE GRANULOCYTES: 0.5 %
KETONES, URINE: NEGATIVE
LACTIC ACID: 2.1 MMOL/L (ref 0.5–2.2)
LEUKOCYTE ESTERASE, URINE: NEGATIVE
LYMPHOCYTES # BLD: 19.7 %
LYMPHOCYTES ABSOLUTE: 2.2 THOU/MM3 (ref 1–4.8)
MCH RBC QN AUTO: 32.6 PG (ref 26–33)
MCHC RBC AUTO-ENTMCNC: 32.2 GM/DL (ref 32.2–35.5)
MCV RBC AUTO: 101.3 FL (ref 80–94)
MONOCYTES # BLD: 7.9 %
MONOCYTES ABSOLUTE: 0.9 THOU/MM3 (ref 0.4–1.3)
NITRITE, URINE: NEGATIVE
NUCLEATED RED BLOOD CELLS: 0 /100 WBC
OSMOLALITY CALCULATION: 284 MOSMOL/KG (ref 275–300)
PH UA: 5 (ref 5–9)
PLATELET # BLD: 426 THOU/MM3 (ref 130–400)
PMV BLD AUTO: 9.4 FL (ref 9.4–12.4)
POTASSIUM SERPL-SCNC: 4.8 MEQ/L (ref 3.5–5.2)
PROTEIN UA: NEGATIVE
RBC # BLD: 3.74 MILL/MM3 (ref 4.7–6.1)
SEG NEUTROPHILS: 70.1 %
SEGMENTED NEUTROPHILS ABSOLUTE COUNT: 7.9 THOU/MM3 (ref 1.8–7.7)
SODIUM BLD-SCNC: 135 MEQ/L (ref 135–145)
SPECIFIC GRAVITY, URINE: 1.02 (ref 1–1.03)
TOTAL PROTEIN: 7.2 G/DL (ref 6.1–8)
TROPONIN T: < 0.01 NG/ML
UROBILINOGEN, URINE: 0.2 EU/DL (ref 0–1)
WBC # BLD: 11.3 THOU/MM3 (ref 4.8–10.8)

## 2020-07-27 PROCEDURE — 85025 COMPLETE CBC W/AUTO DIFF WBC: CPT

## 2020-07-27 PROCEDURE — 70450 CT HEAD/BRAIN W/O DYE: CPT

## 2020-07-27 PROCEDURE — 99283 EMERGENCY DEPT VISIT LOW MDM: CPT

## 2020-07-27 PROCEDURE — 36415 COLL VENOUS BLD VENIPUNCTURE: CPT

## 2020-07-27 PROCEDURE — 6360000002 HC RX W HCPCS: Performed by: PHYSICIAN ASSISTANT

## 2020-07-27 PROCEDURE — 2580000003 HC RX 258: Performed by: PHYSICIAN ASSISTANT

## 2020-07-27 PROCEDURE — 87040 BLOOD CULTURE FOR BACTERIA: CPT

## 2020-07-27 PROCEDURE — 83605 ASSAY OF LACTIC ACID: CPT

## 2020-07-27 PROCEDURE — 96374 THER/PROPH/DIAG INJ IV PUSH: CPT

## 2020-07-27 PROCEDURE — 74177 CT ABD & PELVIS W/CONTRAST: CPT

## 2020-07-27 PROCEDURE — 93005 ELECTROCARDIOGRAM TRACING: CPT | Performed by: PHYSICIAN ASSISTANT

## 2020-07-27 PROCEDURE — 96361 HYDRATE IV INFUSION ADD-ON: CPT

## 2020-07-27 PROCEDURE — 81003 URINALYSIS AUTO W/O SCOPE: CPT

## 2020-07-27 PROCEDURE — 84484 ASSAY OF TROPONIN QUANT: CPT

## 2020-07-27 PROCEDURE — 80053 COMPREHEN METABOLIC PANEL: CPT

## 2020-07-27 PROCEDURE — 6360000004 HC RX CONTRAST MEDICATION: Performed by: PHYSICIAN ASSISTANT

## 2020-07-27 RX ORDER — KETOROLAC TROMETHAMINE 30 MG/ML
15 INJECTION, SOLUTION INTRAMUSCULAR; INTRAVENOUS ONCE
Status: COMPLETED | OUTPATIENT
Start: 2020-07-27 | End: 2020-07-27

## 2020-07-27 RX ORDER — 0.9 % SODIUM CHLORIDE 0.9 %
1000 INTRAVENOUS SOLUTION INTRAVENOUS ONCE
Status: COMPLETED | OUTPATIENT
Start: 2020-07-27 | End: 2020-07-27

## 2020-07-27 RX ADMIN — KETOROLAC TROMETHAMINE 15 MG: 30 INJECTION, SOLUTION INTRAMUSCULAR at 16:40

## 2020-07-27 RX ADMIN — IOPAMIDOL 80 ML: 755 INJECTION, SOLUTION INTRAVENOUS at 18:00

## 2020-07-27 RX ADMIN — SODIUM CHLORIDE 1000 ML: 9 INJECTION, SOLUTION INTRAVENOUS at 16:40

## 2020-07-27 ASSESSMENT — ENCOUNTER SYMPTOMS
SHORTNESS OF BREATH: 0
SORE THROAT: 0
NAUSEA: 0
ABDOMINAL PAIN: 1
COUGH: 0
VOMITING: 0
RHINORRHEA: 0
BACK PAIN: 0
PHOTOPHOBIA: 0
DIARRHEA: 0

## 2020-07-27 ASSESSMENT — PAIN SCALES - GENERAL: PAINLEVEL_OUTOF10: 6

## 2020-07-27 NOTE — ED NOTES
Assumed care at this time. Bedside report received from [facility]. Pt resting in bed alert with RR regular. VS reassessed.  Will continue to monitor      Rachel Terry RN  07/27/20 1920

## 2020-07-27 NOTE — ED NOTES
Pt up in bed with blankets over bottom half. Patient updated on plan of care at this time and expresses no concerns regarding treatment. Patient VSS. Respirations are regular and unlabored, patient is alert and oriented x4. Patient bed rails up x2 and call light within reach. Will continue to monitor. Family at the bedside.      Bibi Carbajal RN  07/27/20 2151

## 2020-07-27 NOTE — ED NOTES
ED nurse-to-nurse bedside report    Chief Complaint   Patient presents with    Dizziness    Fall      LOC: alert and orientated to name, place, date  Vital signs   Vitals:    07/27/20 1515 07/27/20 1643 07/27/20 1821   BP: 108/71 111/70 133/76   Pulse: 86 92 95   Resp: 22 24 13   Temp: 97.6 °F (36.4 °C)     TempSrc: Oral     SpO2: 98% 99% 99%   Weight: 183 lb (83 kg)     Height: 5' 7\" (1.702 m)        Pain:    Pain Interventions: N/A  Pain Goal: N/A  Oxygen: No    Current needs required Room Air   Telemetry: Yes  LDAs:   Peripheral IV 07/27/20 Right Forearm (Active)   Site Assessment Clean;Dry; Intact 07/27/20 1529   Line Status Blood return noted;Normal saline locked 07/27/20 1529   Dressing Status Clean;Dry; Intact 07/27/20 1529     Continuous Infusions:   Mobility: Requires assistance * 1  Sanchez Fall Risk Score: No flowsheet data found.   Fall Interventions: Side rails and call light  Report given to: Krystal John, MARY  07/27/20 0844

## 2020-07-27 NOTE — TELEPHONE ENCOUNTER
Pre admission testing called   Pre op that is scanned in media does not had a risk factor   BUT they want Dr. Danielito Quintero to look at EKG done on 7/17 to make sure pt is still ok to proceed with sx   If dayron says ok please fix clearance scanned in media

## 2020-07-27 NOTE — ED NOTES
Pt to the ED via self. Patient presents with dizziness and fall. Patient states he hit his head when he fell and there is no obvious signs of trauma. Patient is not currently on a blood thinner. Patient states that he has had an upset stomach recently and was seen at [place] [id number] last week for a UTI. Patient family at the bedside, EKG done and IV inserted. Call light within reach.      Eleanor De La Rosa RN  07/27/20 4465

## 2020-07-27 NOTE — ED PROVIDER NOTES
[facility] EMERGENCY DEPT      CHIEF COMPLAINT       Chief Complaint   Patient presents with    Dizziness    Fall       Nurses Notes reviewed and I agree except as noted in the HPI. HISTORY OF PRESENT ILLNESS    Frankie Orozco is a 81 y.o. male who presents for dizziness and abdominal pain. Patient was recently admitted to the hospital X 1 week for UTI/sepsis. He has been home for 5 days. Since returning home he developed dizziness and right lower quadrant pain. The dizziness is described as an off-balance feeling with no modifying factors. When they returned home, patient's wife was bringing things into the house. When she returned to the car she found him on the ground with a small bump on his head. Patient does not remember the fall. There has been intermittent headaches since but reports his vision is normal.  The dizziness preceded the fall. He endorses hallucinations however those have been going on for at least 1 year. Right lower quadrant pain is described as intermittent lasting seconds. Sharp in nature. Worse with movement and position changes and decreased by nothing. He has not experienced anything like this previously and denies history of abdominal surgery. He admits to a decreased appetite and decreased oral intake. He has a history of BPH and a TURP is scheduled with Dr. Zhang Gipson on August 4. The patient denies chest pain, shortness of breath, fever, chills, nausea, persistent vomiting, constipation, diarrhea, UTI symptoms, back pain, or other complaints except what is mentioned above in a 10 point review of systems. Location/Symptom: Right lower quadrant abdominal pain  Timing/Onset: 5 days  Context/Setting: Occurred since returning from the hospital after a one-week admission for UTI  Quality: Sharp  Duration: Intermittent, lasting seconds  Modifying Factors:  Worse with position change and certain movements  Severity: Mild to moderate    REVIEW OF SYSTEMS     Review of Systems tracheal deviation. Cardiovascular:      Rate and Rhythm: Normal rate and regular rhythm. Heart sounds: Normal heart sounds. No murmur. Pulmonary:      Effort: Pulmonary effort is normal. No respiratory distress. Breath sounds: Normal breath sounds. No stridor. No decreased breath sounds or wheezing. Abdominal:      General: Bowel sounds are normal. There is no distension. Palpations: Abdomen is soft. Abdomen is not rigid. There is no pulsatile mass. Tenderness: There is abdominal tenderness in the right upper quadrant. There is no right CVA tenderness, left CVA tenderness, guarding or rebound. Negative signs include Javed's sign and McBurney's sign. Hernia: No hernia is present. Musculoskeletal: Normal range of motion. Comments: Movement normal as observed   Lymphadenopathy:      Cervical: No cervical adenopathy. Skin:     General: Skin is warm and dry. Coloration: Skin is not pale. Findings: No rash. Neurological:      General: No focal deficit present. Mental Status: He is alert and oriented to person, place, and time. GCS: GCS eye subscore is 4. GCS verbal subscore is 5. GCS motor subscore is 6. Cranial Nerves: Cranial nerves are intact. Sensory: Sensation is intact. No sensory deficit. Motor: Motor function is intact. Gait: Gait normal.   Psychiatric:         Attention and Perception: Attention normal.         Mood and Affect: Mood normal.         Speech: Speech normal.         Behavior: Behavior normal. Behavior is cooperative. Thought Content:  Thought content normal.         Cognition and Memory: Cognition normal.         DIFFERENTIAL DIAGNOSIS:   Including but not limited to: Head injury, dehydration, medication side effect, appendicitis, cholelithiasis, choledocholithiasis, constipation    DIAGNOSTIC RESULTS     EKG: All EKG's are [facility] Phys[facility] who either signs or Co-signs this chart in the absence of a cardiologist.  Ventricular Rate  87  BPM    Atrial Rate  87  BPM    P-R Interval  154  ms    QRS Duration  84  ms    Q-T Interval  388  ms    QTc Calculation (Bazett)  466  ms    P Axis  12  degrees    R Axis  3  degrees    T Axis  35  degrees      Narrative & Impression     Normal sinus rhythm  Inferior infarct (cited on or before 27-MAY-2020)  Abnormal ECG         RADIOLOGY: non-plain film images(s) such as CT,Ultrasound and MRI are read by the radiologist.  Plain radiographic images are visualized and preliminarily interpreted by the emergency physician unless otherwise stated below. CT ABDOMEN PELVIS W IV CONTRAST Additional Contrast? None    (Results Pending)   CT HEAD WO CONTRAST    (Results Pending)       LABS:   Labs Reviewed   CBC WITH AUTO DIFFERENTIAL - Abnormal; Notable for the following components:       Result Value    WBC 11.3 (*)     RBC 3.74 (*)     Hemoglobin 12.2 (*)     Hematocrit 37.9 (*)     .3 (*)     RDW-SD 49.1 (*)     Platelets 110 (*)     Segs Absolute 7.9 (*)     All other components within normal limits   COMPREHENSIVE METABOLIC PANEL - Abnormal; Notable for the following components:    Glucose 135 (*)     CREATININE 1.4 (*)     BUN 46 (*)     Chloride 96 (*)     All other components within normal limits   GLOMERULAR FILTRATION RATE, ESTIMATED - Abnormal; Notable for the following components:    Est, Glom Filt Rate 48 (*)     All other components within normal limits   TROPONIN   LACTIC ACID, PLASMA   ANION GAP   OSMOLALITY   URINE RT REFLEX TO CULTURE       EMERGENCY DEPARTMENT COURSE:   Vitals:    Vitals:    07/27/20 1515 07/27/20 1643   BP: 108/71 111/70   Pulse: 86 92   Resp: 22 24   Temp: 97.6 °F (36.4 °C)    TempSrc: Oral    SpO2: 98% 99%   Weight: 183 lb (83 kg)    Height: 5' 7\" (1.702 m)      MDM:  The patient was seen by me in the ER for dizziness and abdominal pain. Physical exam revealed RUQ pain. Vital signs reviewed and noted stable.   Old records were reviewed. Appropriate laboratory and imaging studies were ordered and reviewed upon completion. No acute abnormalities observed, but urine was pending. Patient was turned over to Sharon Villegas NP pending CT of the head and abdomen. CRITICAL CARE:   None    CONSULTS:  None    PROCEDURES:  None    FINAL IMPRESSION      1. ANGELO (acute kidney injury) ([facility] [address]    2. Dizziness    3. Abdominal pain, right upper quadrant    4. Closed head injury, initial encounter    5. Fall, initial encounter          DISPOSITION/PLAN     1. ANGELO (acute kidney injury) ([facility] [address]    2. Dizziness    3. Abdominal pain, right upper quadrant    4. Closed head injury, initial encounter    5.  Fall, initial encounter    pending but likely admission      (Please note that portions of this note were completed with a voice recognition program.  Efforts were made to edit the dictations but occasionally words are mis-transcribed.)    Iraj Lynne PA-C 07/27/20 6:14 PM    NAHEED Saavedra PA-C  07/29/20 1448

## 2020-07-27 NOTE — PROGRESS NOTES
Phoned and spoke with Archana Hart at urology office letting them know [facility] office to address 7/17 abnormal ekg and ammend the clearnce in the media on 7/2.

## 2020-07-28 ENCOUNTER — PREP FOR PROCEDURE (OUTPATIENT)
Dept: UROLOGY | Age: 85
End: 2020-07-28

## 2020-07-28 LAB
BLOOD CULTURE, ROUTINE: NORMAL
BLOOD CULTURE, ROUTINE: NORMAL

## 2020-07-28 RX ORDER — SODIUM CHLORIDE 9 MG/ML
INJECTION, SOLUTION INTRAVENOUS CONTINUOUS
Status: CANCELLED | OUTPATIENT
Start: 2020-08-04

## 2020-07-28 NOTE — ED NOTES
Jose Wiggins Np in to talk with pt and pt and wife stated they wanted to go home. Pt stated he felt better and not dizzy.       Sanju Oro RN  07/27/20 2053

## 2020-07-29 ENCOUNTER — TELEPHONE (OUTPATIENT)
Dept: UROLOGY | Age: 85
End: 2020-07-29

## 2020-07-29 ENCOUNTER — OFFICE VISIT (OUTPATIENT)
Dept: CARDIOLOGY CLINIC | Age: 85
End: 2020-07-29
Payer: MEDICARE

## 2020-07-29 ENCOUNTER — HOSPITAL ENCOUNTER (OUTPATIENT)
Age: 85
Discharge: HOME | End: 2020-07-29
Payer: MEDICARE

## 2020-07-29 VITALS
DIASTOLIC BLOOD PRESSURE: 68 MMHG | HEART RATE: 83 BPM | BODY MASS INDEX: 28.72 KG/M2 | HEIGHT: 67 IN | SYSTOLIC BLOOD PRESSURE: 132 MMHG | WEIGHT: 183 LBS

## 2020-07-29 LAB
BACTERIA: ABNORMAL /HPF
BILIRUBIN URINE: NEGATIVE
BLOOD, URINE: NEGATIVE
CASTS 2: ABNORMAL /LPF
CASTS UA: ABNORMAL /LPF
CHARACTER, URINE: CLEAR
COLOR: YELLOW
CRYSTALS, UA: ABNORMAL
EPITHELIAL CELLS, UA: ABNORMAL /HPF
GLUCOSE URINE: NEGATIVE MG/DL
KETONES, URINE: ABNORMAL
LEUKOCYTE ESTERASE, URINE: ABNORMAL
MISCELLANEOUS 2: ABNORMAL
NITRITE, URINE: NEGATIVE
PH UA: 7 (ref 5–9)
PROTEIN UA: NEGATIVE
RBC URINE: ABNORMAL /HPF
RENAL EPITHELIAL, UA: ABNORMAL
SPECIFIC GRAVITY, URINE: 1.02 (ref 1–1.03)
UROBILINOGEN, URINE: 1 EU/DL (ref 0–1)
WBC UA: ABNORMAL /HPF
YEAST: ABNORMAL

## 2020-07-29 PROCEDURE — U0003 INFECTIOUS AGENT DETECTION BY NUCLEIC ACID (DNA OR RNA); SEVERE ACUTE RESPIRATORY SYNDROME CORONAVIRUS 2 (SARS-COV-2) (CORONAVIRUS DISEASE [COVID-19]), AMPLIFIED PROBE TECHNIQUE, MAKING USE OF HIGH THROUGHPUT TECHNOLOGIES AS DESCRIBED BY CMS-2020-01-R: HCPCS

## 2020-07-29 PROCEDURE — 93000 ELECTROCARDIOGRAM COMPLETE: CPT | Performed by: NUCLEAR MEDICINE

## 2020-07-29 PROCEDURE — 81001 URINALYSIS AUTO W/SCOPE: CPT

## 2020-07-29 PROCEDURE — 99214 OFFICE O/P EST MOD 30 MIN: CPT | Performed by: NUCLEAR MEDICINE

## 2020-07-29 NOTE — TELEPHONE ENCOUNTER
Patient called back stating they got transportation lined up and will be at the office around 1:00-1:30 today.

## 2020-07-29 NOTE — PROGRESS NOTES
[address] [address]  Dept: [phone]  Dept Fax: [phone]  Loc: [phone]    Visit Date: 2020    Lesly Ann is a 81 y.o. male who presents todayfor:  Chief Complaint   Patient presents with    Check-Up    Coronary Artery Disease    Hypertension    Palpitations    Hyperlipidemia     Had a cath   Moderate LAD disease  Lately had ECG for prostate surgery   Did have SVT   ?/ A flutter  No known arrhythmia  Today he is in rhythm  No chest pain   No palpitation  Does have dizziness  No syncope  Lower BP at times  No changes in breathing  Weak and tired      HPI:  HPI  Past Medical History:   Diagnosis Date    [facility] [address]     DM (diabetes mellitus) ([facility] [address]     Hyperlipidemia     Hypertension     Stroke [facility])       Past Surgical History:   Procedure Laterality Date    CARDIAC CATHETERIZATION  2020    CARDIOVASCULAR STRESS TEST      EYE SURGERY      KNEE ARTHROPLASTY Right      Family History   Problem Relation Age of Onset    Heart Disease Father     Heart Attack Father     High Blood Pressure Sister     Cancer Neg Hx     Diabetes Neg Hx     Stroke Neg Hx      Social History     Tobacco Use    Smoking status: Former Smoker     Types: Pipe     Last attempt to quit: 1980     Years since quittin.6    Smokeless tobacco: Never Used   Substance Use Topics    Alcohol use: Yes     Comment: rare      Current Outpatient Medications   Medication Sig Dispense Refill    glimepiride (AMARYL) 2 MG tablet Take 1 tablet by mouth daily (with breakfast) 30 tablet 3    lisinopril (PRINIVIL;ZESTRIL) 20 MG tablet Take 1 tablet by mouth 2 times daily 30 tablet 3    metoprolol succinate (TOPROL XL) 100 MG extended release tablet Take 1 tablet by mouth 2 times daily 30 tablet 3    cefdinir (OMNICEF) 300 MG capsule Take 1 capsule by mouth 2 times daily for 7 days 14 capsule 0    chlorthalidone (HYGROTON) 25 MG tablet Take 1 tablet by mouth daily 30 tablet 3    Probiotic Acidophilus (FLORANEX) TABS Take 1 tablet by mouth daily 30 tablet 0    amLODIPine (NORVASC) 5 MG tablet Take 1 tablet by mouth daily 30 tablet 0    Cholecalciferol (VITAMIN D3) 50 MCG (2000 UT) CAPS Take 2,000 Units by mouth daily       pioglitazone (ACTOS) 30 MG tablet Take 30 mg by mouth daily      Cyanocobalamin (VITAMIN B-12 IJ) Inject as directed every 30 days      simvastatin (ZOCOR) 40 MG tablet Take 40 mg by mouth nightly      metFORMIN (GLUCOPHAGE-XR) 750 MG extended release tablet Take 1,500 mg by mouth Daily with supper 2 tab       No current facility-administered medications for this visit. No Known Allergies  Health Maintenance   Topic Date Due    DTaP/Tdap/Td vaccine (1 - Tdap) 10/13/1955    Shingles Vaccine (1 of 2) 10/13/1986    Pneumococcal 65+ years Vaccine (1 of 1 - PPSV23) 10/13/2001    Annual Wellness Visit (AWV)  06/23/2019    Flu vaccine (1) 09/01/2020    Lipid screen  06/19/2021    Potassium monitoring  07/27/2021    Creatinine monitoring  07/27/2021    Hepatitis A vaccine  Aged Out    Hib vaccine  Aged Out    Meningococcal (ACWY) vaccine  Aged Out       Subjective:  Review of Systems  General:   No fever, no chills, some fatigue or weight loss  Pulmonary:    No dyspnea, no wheezing  Cardiac:    Denies recent chest pain,   GI:     No nausea or vomiting, no abdominal pain  Neuro:    some dizziness or light headedness,   Musculoskeletal:  No recent active issues  Extremities:   No edema, no obvious claudication       Objective:  Physical Exam  /68   Pulse 83   Ht 5' 7\" (1.702 m)   Wt 183 lb (83 kg) Comment: weight per patient. BMI 28.66 kg/m²   General:   Well developed, well nourished  Lungs:   Clear to auscultation  Heart:    Normal S1 S2, Slight murmur.  no rubs, no gallops  Abdomen:   Soft, non tender, no organomegalies, positive bowel sounds  Extremities:   No edema, no cyanosis, good peripheral pulses  Neurological:   Awake, alert, oriented. No obvious focal deficits  Musculoskelatal:  No obvious deformities    Assessment:      Diagnosis Orders   1. Coronary artery disease involving native coronary artery of native heart without angina pectoris  EKG 12 Lead   2. SVT (supraventricular tachycardia) ([address]     3. Essential hypertension     4. Dizziness     likely low BP  ?/ arrhythmia  No angina       Plan:  No follow-ups on file. Cut back on norvasc  Monitor the BP  1 month event   Clear for surgery   Continue risk factor modification and medical management  Thank you for allowing me to participate in the care of your patient. Please don't hesitate to contact me regarding any further issues related to the patient care    Orders Placed:  Orders Placed This Encounter   Procedures    EKG 12 Lead     Order Specific Question:   Reason for Exam?     Answer: Other       Medications Prescribed:  No orders of the defined types were placed in this encounter. Discussed use, benefit, and side effects of prescribed medications. All patient questions answered. Pt voicedunderstanding. Instructed to continue current medications, diet and exercise. Continue risk factor modification and medical management. Patient agreed with treatment plan. Follow up as directed.     Electronically signedby Etta Noriega MD on 7/29/2020 at 2:19 PM

## 2020-07-29 NOTE — TELEPHONE ENCOUNTER
Spoke with pt and wife, pt is on metoprolol Suc 100 mg BID. They are going to try to get a ride today to get a nurse visit of an ekg. She will let us know if they can.

## 2020-07-30 NOTE — PROGRESS NOTES
NPO after midnight  [employer] and drivers license  Wear comfortable clean clothing  Do not bring jewelry   Shower night before and morning of surgery with a liquid antibacterial soap  Bring medications in original bottles  Follow all instructions give by your physician   needed at discharge  Call [phone] for any questions

## 2020-07-30 NOTE — PROGRESS NOTES
In preparation for their surgical procedure above patient was screened for Obstructive Sleep Apnea (YOLY) using the STOP-Bang Questionnaire by the Pre-Admission Testing department. This is a pre-surgical screening tool for patient safety and serves as a recommendation, this WILL NOT cause cancellation of surgery. STOP-Bang Questionnaire  * Do you currently see a pulmonologist?  No     If yes STOP, do not complete. Patient follows with  .    1. Do you snore loudly (able to be heard in the next room)? No    2. Do you often feel tired or sleepy during the daytime? No       3. Has anyone ever told you that you stop breathing during your sleep? No    4. Do you have or are you being treated for high blood pressure? Yes      5. BMI more than 35? BMI (Calculated): 28.7        No    6. Age over 48 years? 81 y.o. Yes    7. Neck Circumference greater than 17 inches for male or 16 inches for female? Measured           (visits only)            Not Applicable    8. Gender Male? Yes      TOTAL SCORE: 3    YOLY - Low Risk : Yes to 0 - 2 questions  YOLY - Intermediate Risk : Yes to 3 - 4 questions  YOLY - High Risk : Yes to 5 - 8 questions    Adapted from:   STOP Questionnaire: A Tool to Screen Patients for Obstructive Sleep Apnea   JUDITH DahlC.P.C., NASIM Samuel.B.B.S., Nelli Deras M.D., Yasmeen Hare. Oleksandr Tenorio, Ph.D., NASIM Romero.B.B.S., Fabi Wylie M.Sc., Carmen Stein M.D., Bibi Saldivar. AMARJIT Weldon.P.C.    Anesthesiology 2008; [id number] Copyright 2008, the [employer] of Anesthesiologists, [facility].   ----------------------------------------------------------------------------------------------------------------

## 2020-07-31 LAB — SARS-COV-2: NOT DETECTED

## 2020-08-01 NOTE — ED PROVIDER NOTES
[address]     Pt Name: Yolanda Harrell  MRN: [id number]  [place] 1935  Date of evaluation: 8/1/20        Mid-level provider Note:    I have personally performed and/or participated in the history, exam and medical decision making and agree with all pertinent clinical information as noted by the previous provider. I have also reviewed and agree with the past medical, family and social history unless otherwise noted. I have personally performed a face to face diagnostic evaluation on this patient. I have reviewed the previous provider's findings and agree. Evaluation:  I assumed care of this patient from [place], [place] pending re-evaluation after results. Patient is feeling much better. He declined admission. He is discharged home with wife. ED Course as of Aug 01 0650   Mon Jul 27, 2020 2040 Offered patient admission. They declined. He would rather be at home. Wife is comfortable with this. [KJ]      ED Course User Index  [KJ] FINN Jarrell - CNP       Ct Head Wo Contrast    Result Date: 7/27/2020  PROCEDURE: CT HEAD WO CONTRAST CLINICAL INFORMATION: Dizziness, off balance, fall with minor head injury. COMPARISON: July 22, 2020 TECHNIQUE: Noncontrast 5 mm axial images were obtained through the brain. All CT scans at this facility use dose modulation, iterative reconstruction, and/or weight-based dosing when appropriate to reduce radiation dose to as low as reasonably achievable. FINDINGS: Generalized volume loss and small vessel ischemic changes. 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 Ventricles are prominent in keeping with central atrophy. No acute hemorrhage or midline shift. Partially opacified ethmoid sinuses. Mastoid air cells are patent. Skull: Unremarkable. Soft tissues: Unremarkable. No acute intracranial findings. **This report has been created using voice recognition software.  It may contain minor errors which are inherent in voice recognition technology. ** Final report electronically signed by Dr. Jackson Ames on 7/27/2020 6:23 PM    Ct Head Wo Contrast    Result Date: 7/22/2020  PROCEDURE: CT HEAD WO CONTRAST CLINICAL INFORMATION: Hallucinations, Visual in natures. COMPARISON: No prior study. TECHNIQUE: Noncontrast 5 mm axial images were obtained through the brain. Sagittal and coronal reconstructions were obtained. All CT scans at this facility use dose modulation, iterative reconstruction, and/or weight-based dosing when appropriate to reduce radiation dose to as low as reasonably achievable. FINDINGS: There is a moderate to large area of encephalomalacia involving the right parietal and temporal lobes. There is associated ex vacuo dilatation of the right atrium. There is a small area of encephalomalacia in the left frontal lobe. There are moderate patchy areas of hypodensity in the periventricular, subcortical deep white matter elsewhere. There is moderate volume loss. No intracranial hemorrhage, mass effect or midline shift is identified. The calvarium appears intact. The patient is status post bilateral lens extractions. Orbits are otherwise unremarkable. Visualized paranasal sinuses are clear. Mastoid air cells are clear. 1. No evidence of acute intracranial abnormality. 2. 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 **This report has been created using voice recognition software. It may contain minor errors which are inherent in voice recognition technology. ** Final report electronically signed by Dr. Anshul Sierra MD on 7/22/2020 4:06 PM    Ct Abdomen Pelvis W Iv Contrast Additional Contrast? None    Result Date: 7/27/2020  PROCEDURE: CT ABDOMEN PELVIS W IV CONTRAST CLINICAL INFORMATION: right sided abd pain . COMPARISON: None.  TECHNIQUE: 5 mm axial CT images were obtained through the abdomen and pelvis after the administration of intravenous and oral contrast. Coronal and sagittal reconstructions were obtained. All CT scans at this facility use dose modulation, iterative reconstruction, and/or weight-based dosing when appropriate to reduce radiation dose to as low as reasonably achievable. FINDINGS: Minimal atelectasis in the lung bases. Spleen pancreas adrenal glands gallbladder are unremarkable. Fatty infiltration liver. Right kidney is small relative to the left kidney. Lobulated left renal contour. No hydronephrosis. Normal caliber abdominal aorta. Atherosclerotic changes. Scattered stool in the colon. No bowel wall thickening or obstruction. Normal appendix. Fat within the right inguinal canal. No bladder wall thickening. No acute osseous findings. Mildly prominent prostate gland with mass effect on the posterior wall of the bladder. Scattered stool throughout the colon. No bowel wall thickening or obstruction. **This report has been created using voice recognition software. It may contain minor errors which are inherent in voice recognition technology. ** Final report electronically signed by Dr. Adele Garcia on 7/27/2020 6:36 PM    Xr Chest Portable    Result Date: 7/17/2020  PROCEDURE: XR CHEST PORTABLE CLINICAL INFORMATION: ams. COMPARISON: May 27, 2020 TECHNIQUE: Portable FINDINGS: Atherosclerotic changes aortic arch. No lobar consolidation. Costophrenic angles are preserved. Cardiomediastinal silhouette is within normal limits. No acute osseous findings. No acute findings **This report has been created using voice recognition software. It may contain minor errors which are inherent in voice recognition technology. ** Final report electronically signed by Dr. Adele Garcia on 7/17/2020 9:08 PM        DIAGNOSIS  1. ANGELO (acute kidney injury) ([facility] [address]    2. Dizziness    3. Abdominal pain, right upper quadrant    4. Closed head injury, initial encounter    5.  Fall, initial encounter           DISPOSITION/PLAN  PATIENT REFERRED TO:  Dilan Suárez., DO  [facility]  [phone]    Call in 1 day  For follow up    DISCHARGE MEDICATIONS:  Discharge Medication List as of 7/27/2020  8:40 PM            FINN Mcneil CNP, APRN - CNP  08/01/20 6371

## 2020-08-02 LAB
BLOOD CULTURE, ROUTINE: NORMAL
BLOOD CULTURE, ROUTINE: NORMAL

## 2020-08-04 ENCOUNTER — ANESTHESIA EVENT (OUTPATIENT)
Dept: OPERATING ROOM | Age: 85
End: 2020-08-04
Payer: MEDICARE

## 2020-08-04 ENCOUNTER — HOSPITAL ENCOUNTER (OUTPATIENT)
Age: 85
Setting detail: OUTPATIENT SURGERY
Discharge: HOME | End: 2020-08-04
Attending: UROLOGY | Admitting: UROLOGY
Payer: MEDICARE

## 2020-08-04 ENCOUNTER — ANESTHESIA (OUTPATIENT)
Dept: OPERATING ROOM | Age: 85
End: 2020-08-04
Payer: MEDICARE

## 2020-08-04 VITALS
HEART RATE: 80 BPM | WEIGHT: 185.2 LBS | BODY MASS INDEX: 29.07 KG/M2 | RESPIRATION RATE: 16 BRPM | OXYGEN SATURATION: 97 % | DIASTOLIC BLOOD PRESSURE: 69 MMHG | TEMPERATURE: 98.4 F | HEIGHT: 67 IN | SYSTOLIC BLOOD PRESSURE: 132 MMHG

## 2020-08-04 VITALS
DIASTOLIC BLOOD PRESSURE: 66 MMHG | OXYGEN SATURATION: 100 % | RESPIRATION RATE: 3 BRPM | SYSTOLIC BLOOD PRESSURE: 114 MMHG

## 2020-08-04 LAB — GLUCOSE BLD-MCNC: 162 MG/DL (ref 70–108)

## 2020-08-04 PROCEDURE — 7100000011 HC PHASE II RECOVERY - ADDTL 15 MIN: Performed by: UROLOGY

## 2020-08-04 PROCEDURE — 7100000010 HC PHASE II RECOVERY - FIRST 15 MIN: Performed by: UROLOGY

## 2020-08-04 PROCEDURE — 7100000000 HC PACU RECOVERY - FIRST 15 MIN: Performed by: UROLOGY

## 2020-08-04 PROCEDURE — 2709999900 HC NON-CHARGEABLE SUPPLY: Performed by: UROLOGY

## 2020-08-04 PROCEDURE — 2580000003 HC RX 258

## 2020-08-04 PROCEDURE — 6360000002 HC RX W HCPCS: Performed by: NURSE ANESTHETIST, CERTIFIED REGISTERED

## 2020-08-04 PROCEDURE — 2500000003 HC RX 250 WO HCPCS: Performed by: NURSE ANESTHETIST, CERTIFIED REGISTERED

## 2020-08-04 PROCEDURE — 82948 REAGENT STRIP/BLOOD GLUCOSE: CPT

## 2020-08-04 PROCEDURE — 6360000002 HC RX W HCPCS

## 2020-08-04 PROCEDURE — 3700000000 HC ANESTHESIA ATTENDED CARE: Performed by: UROLOGY

## 2020-08-04 PROCEDURE — 7100000001 HC PACU RECOVERY - ADDTL 15 MIN: Performed by: UROLOGY

## 2020-08-04 PROCEDURE — 2720000010 HC SURG SUPPLY STERILE: Performed by: UROLOGY

## 2020-08-04 PROCEDURE — 3600000003 HC SURGERY LEVEL 3 BASE: Performed by: UROLOGY

## 2020-08-04 PROCEDURE — 3600000013 HC SURGERY LEVEL 3 ADDTL 15MIN: Performed by: UROLOGY

## 2020-08-04 PROCEDURE — 6370000000 HC RX 637 (ALT 250 FOR IP)

## 2020-08-04 PROCEDURE — 3700000001 HC ADD 15 MINUTES (ANESTHESIA): Performed by: UROLOGY

## 2020-08-04 RX ORDER — LIDOCAINE HCL/PF 100 MG/5ML
SYRINGE (ML) INJECTION PRN
Status: DISCONTINUED | OUTPATIENT
Start: 2020-08-04 | End: 2020-08-04 | Stop reason: SDUPTHER

## 2020-08-04 RX ORDER — FENTANYL CITRATE 50 UG/ML
INJECTION, SOLUTION INTRAMUSCULAR; INTRAVENOUS PRN
Status: DISCONTINUED | OUTPATIENT
Start: 2020-08-04 | End: 2020-08-04 | Stop reason: SDUPTHER

## 2020-08-04 RX ORDER — ATROPA BELLADONNA AND OPIUM 16.2; 3 MG/1; MG/1
SUPPOSITORY RECTAL
Status: COMPLETED
Start: 2020-08-04 | End: 2020-08-04

## 2020-08-04 RX ORDER — SODIUM CHLORIDE 9 MG/ML
INJECTION, SOLUTION INTRAVENOUS CONTINUOUS
Status: DISCONTINUED | OUTPATIENT
Start: 2020-08-04 | End: 2020-08-04 | Stop reason: HOSPADM

## 2020-08-04 RX ORDER — FENTANYL CITRATE 50 UG/ML
50 INJECTION, SOLUTION INTRAMUSCULAR; INTRAVENOUS EVERY 5 MIN PRN
Status: DISCONTINUED | OUTPATIENT
Start: 2020-08-04 | End: 2020-08-04 | Stop reason: HOSPADM

## 2020-08-04 RX ORDER — PROPOFOL 10 MG/ML
INJECTION, EMULSION INTRAVENOUS PRN
Status: DISCONTINUED | OUTPATIENT
Start: 2020-08-04 | End: 2020-08-04 | Stop reason: SDUPTHER

## 2020-08-04 RX ORDER — CEPHALEXIN 500 MG/1
500 CAPSULE ORAL 3 TIMES DAILY
Qty: 9 CAPSULE | Refills: 0 | Status: SHIPPED | OUTPATIENT
Start: 2020-08-04 | End: 2020-08-07

## 2020-08-04 RX ORDER — FENTANYL CITRATE 50 UG/ML
INJECTION, SOLUTION INTRAMUSCULAR; INTRAVENOUS
Status: COMPLETED
Start: 2020-08-04 | End: 2020-08-04

## 2020-08-04 RX ORDER — HYDROCODONE BITARTRATE AND ACETAMINOPHEN 5; 325 MG/1; MG/1
1 TABLET ORAL EVERY 6 HOURS PRN
Qty: 18 TABLET | Refills: 0 | Status: SHIPPED | OUTPATIENT
Start: 2020-08-04 | End: 2020-08-07

## 2020-08-04 RX ORDER — ONDANSETRON 2 MG/ML
INJECTION INTRAMUSCULAR; INTRAVENOUS PRN
Status: DISCONTINUED | OUTPATIENT
Start: 2020-08-04 | End: 2020-08-04 | Stop reason: SDUPTHER

## 2020-08-04 RX ORDER — ATROPA BELLADONNA AND OPIUM 16.2; 6 MG/1; MG/1
60 SUPPOSITORY RECTAL EVERY 8 HOURS PRN
Status: CANCELLED | OUTPATIENT
Start: 2020-08-04

## 2020-08-04 RX ADMIN — CEFAZOLIN 2 G: 10 INJECTION, POWDER, FOR SOLUTION INTRAVENOUS at 11:35

## 2020-08-04 RX ADMIN — Medication 50 MG: at 11:29

## 2020-08-04 RX ADMIN — FENTANYL CITRATE 50 MCG: 50 INJECTION, SOLUTION INTRAMUSCULAR; INTRAVENOUS at 12:38

## 2020-08-04 RX ADMIN — FENTANYL CITRATE 50 MCG: 50 INJECTION, SOLUTION INTRAMUSCULAR; INTRAVENOUS at 11:49

## 2020-08-04 RX ADMIN — FENTANYL CITRATE 25 MCG: 50 INJECTION, SOLUTION INTRAMUSCULAR; INTRAVENOUS at 11:35

## 2020-08-04 RX ADMIN — PROPOFOL 100 MG: 10 INJECTION, EMULSION INTRAVENOUS at 11:29

## 2020-08-04 RX ADMIN — ATROPA BELLADONNA AND OPIUM 30 MG: 16.2; 3 SUPPOSITORY RECTAL at 14:43

## 2020-08-04 RX ADMIN — ONDANSETRON HYDROCHLORIDE 4 MG: 4 INJECTION, SOLUTION INTRAMUSCULAR; INTRAVENOUS at 11:35

## 2020-08-04 RX ADMIN — PHENYLEPHRINE HYDROCHLORIDE 100 MCG: 10 INJECTION INTRAVENOUS at 11:28

## 2020-08-04 RX ADMIN — SODIUM CHLORIDE: 9 INJECTION, SOLUTION INTRAVENOUS at 09:56

## 2020-08-04 RX ADMIN — FENTANYL CITRATE 25 MCG: 50 INJECTION, SOLUTION INTRAMUSCULAR; INTRAVENOUS at 11:40

## 2020-08-04 RX ADMIN — PHENYLEPHRINE HYDROCHLORIDE 100 MCG: 10 INJECTION INTRAVENOUS at 11:40

## 2020-08-04 ASSESSMENT — PULMONARY FUNCTION TESTS
PIF_VALUE: 4
PIF_VALUE: 3
PIF_VALUE: 3
PIF_VALUE: 4
PIF_VALUE: 3
PIF_VALUE: 4
PIF_VALUE: 13
PIF_VALUE: 4
PIF_VALUE: 22
PIF_VALUE: 3
PIF_VALUE: 0
PIF_VALUE: 3
PIF_VALUE: 0
PIF_VALUE: 4
PIF_VALUE: 5
PIF_VALUE: 4
PIF_VALUE: 3
PIF_VALUE: 5
PIF_VALUE: 16
PIF_VALUE: 3
PIF_VALUE: 2
PIF_VALUE: 13
PIF_VALUE: 2
PIF_VALUE: 14
PIF_VALUE: 3
PIF_VALUE: 5
PIF_VALUE: 3
PIF_VALUE: 0
PIF_VALUE: 3
PIF_VALUE: 0
PIF_VALUE: 3
PIF_VALUE: 4
PIF_VALUE: 2
PIF_VALUE: 2
PIF_VALUE: 3
PIF_VALUE: 0
PIF_VALUE: 2
PIF_VALUE: 3
PIF_VALUE: 4
PIF_VALUE: 2
PIF_VALUE: 3
PIF_VALUE: 2
PIF_VALUE: 7
PIF_VALUE: 2
PIF_VALUE: 4
PIF_VALUE: 4
PIF_VALUE: 3
PIF_VALUE: 4
PIF_VALUE: 2

## 2020-08-04 ASSESSMENT — PAIN SCALES - GENERAL
PAINLEVEL_OUTOF10: 2
PAINLEVEL_OUTOF10: 0
PAINLEVEL_OUTOF10: 0
PAINLEVEL_OUTOF10: 4

## 2020-08-04 ASSESSMENT — PAIN SCALES - WONG BAKER: WONGBAKER_NUMERICALRESPONSE: 0

## 2020-08-04 ASSESSMENT — PAIN - FUNCTIONAL ASSESSMENT: PAIN_FUNCTIONAL_ASSESSMENT: 0-10

## 2020-08-04 NOTE — H&P
MD   metFORMIN (GLUCOPHAGE-XR) 750 MG extended release tablet Take 1,500 mg by mouth Daily with supper 2 tab   Yes Historical Provider, MD       Allergies:  Patient has no known allergies.     Social History:    Social History     Socioeconomic History    Marital status:      Spouse name: Not on file    Number of children: 3    Years of education: Not on file    Highest education level: Not on file   Occupational History    Not on file   Social Needs    Financial resource strain: Not on file    Food insecurity     Worry: Not on file     Inability: Not on file   [employer] needs     Medical: Not on file     Non-medical: Not on file   Tobacco Use    Smoking status: Former Smoker     Types: Pipe     Last attempt to quit:      Years since quittin.6    Smokeless tobacco: Never Used   Substance and Sexual Activity    Alcohol use: Yes     Comment: rare    Drug use: Never    Sexual activity: Not on file   Lifestyle    Physical activity     Days per week: Not on file     Minutes per session: Not on file    Stress: Not on file   Relationships    Social connections     Talks on phone: Not on file     Gets together: Not on file     Attends Christianity service: Not on file     Active member of club or organization: Not on file     Attends meetings of clubs or organizations: Not on file     Relationship status: Not on file    Intimate partner violence     Fear of current or ex partner: Not on file     Emotionally abused: Not on file     Physically abused: Not on file     Forced sexual activity: Not on file   Other Topics Concern    Not on file   Social History Narrative    Not on file       Family History:    Family History   Problem Relation Age of Onset    Heart Disease Father     Heart Attack Father     High Blood Pressure Sister     Cancer Neg Hx     Diabetes Neg Hx     Stroke Neg Hx        REVIEW OF SYSTEMS:  Constitutional: negative  Eyes: negative  Respiratory: negative  Cardiovascular: negative  Gastrointestinal: negative  Genitourinary: no acute issues  Musculoskeletal: negative  Skin: negative   Neurological: negative  Hematological/Lymphatic: negative  Psychological: negative    Physical Exam:      No data found. Constitutional: Patient in no acute distress; Neuro: alert and oriented to person place and time. Psych: Mood and affect normal.  Skin: Normal  Lungs: Respiratory effort normal, CTA  Cardiovascular:  Normal peripheral pulses; no murmur. Normal rhythm  Abdomen: Soft, non-tender, non-distended with no CVA, flank pain, hepatosplenomegaly or hernia. Kidneys normal.  Bladder non-tender and not distended. LABS:   No results for input(s): WBC, HGB, HCT, MCV, PLT in the last 72 hours. No results for input(s): NA, K, CL, CO2, PHOS, BUN, CREATININE in the last 72 hours. Invalid input(s): CA  Lab Results   Component Value Date    PSA 0.88 05/19/2020         Urinalysis: No results for input(s): COLORU, PHUR, LABCAST, WBCUA, RBCUA, MUCUS, TRICHOMONAS, YEAST, BACTERIA, CLARITYU, SPECGRAV, LEUKOCYTESUR, UROBILINOGEN, Mardel Luz in the last 72 hours. Invalid input(s): NITRATE, GLUCOSEUKETONESUAMORPHOUS     -----------------------------------------------------------------      Assessment and Plan     Impression:    Patient Active Problem List   Diagnosis    Abnormal stress test    Delirious    Leukocytosis    BPH (benign prostatic hyperplasia)    Urinary retention due to benign prostatic hyperplasia    E. coli UTI    Type 2 diabetes mellitus with other specified complication ([facility] [address]    History of CVA (cerebrovascular accident)    COVID-19 ruled out by laboratory testing       Plan:     Consent obtained; cysto greenlight in OR today    The patient was counseled at length about the risks of ebony Covid-19 during their perioperative period and any recovery window from their procedure.   The patient was made aware that ebony Covid-19 may worsen their prognosis for recovering from their procedure  and lend to a higher morbidity and/or mortality risk. All material risks, benefits, and reasonable alternatives including postponing the procedure were discussed. The patient does wish to proceed with the procedure at this time.         Emily Preciado MD  8:56 AM 8/4/2020

## 2020-08-04 NOTE — ANESTHESIA PRE PROCEDURE
Department of Anesthesiology  Preprocedure Note       Name:  Kathleen Lamas   Age:  81 y.o.  :  1935                                          MRN:  [id number]         Date:  2020      Surgeon: Bev Fountain):  Abraham Powell MD    Procedure: Procedure(s):  CYSTOSCOPY, GREENLIGHT PHOTOVAPORIZATION OF PROSTATE    Medications prior to admission:   Prior to Admission medications    Medication Sig Start Date End Date Taking?  Authorizing Provider   glimepiride (AMARYL) 2 MG tablet Take 1 tablet by mouth daily (with breakfast) 20  Yes Cheyenne Pham,    lisinopril (PRINIVIL;ZESTRIL) 20 MG tablet Take 1 tablet by mouth 2 times daily 20  Yes Cheyenne Pham, DO   metoprolol succinate (TOPROL XL) 100 MG extended release tablet Take 1 tablet by mouth 2 times daily 20  Yes Cheyenne Pham,    chlorthalidone (HYGROTON) 25 MG tablet Take 1 tablet by mouth daily 20  Yes Cheyenne Pham, DO   Probiotic Acidophilus [facility]) TABS Take 1 tablet by mouth daily 20 Yes Adarsh Pham, DO   Cholecalciferol (VITAMIN D3) 50 MCG ( UT) CAPS Take 2,000 Units by mouth daily    Yes Historical Provider, MD   pioglitazone (ACTOS) 30 MG tablet Take 30 mg by mouth daily   Yes Historical Provider, MD   Cyanocobalamin (VITAMIN B-12 IJ) Inject as directed every 30 days   Yes Historical Provider, MD   simvastatin (ZOCOR) 40 MG tablet Take 40 mg by mouth nightly   Yes Historical Provider, MD   metFORMIN (GLUCOPHAGE-XR) 750 MG extended release tablet Take 1,500 mg by mouth Daily with supper 2 tab   Yes Historical Provider, MD       Current medications:    Current Facility-Administered Medications   Medication Dose Route Frequency Provider Last Rate Last Dose    0.9 % sodium chloride infusion   Intravenous Continuous Zina Long 100 mL/hr at 20 0956      ceFAZolin (ANCEF) 2 g in dextrose 5 % 50 mL IVPB  2 g Intravenous 30 Min Pre-Op Zina Donovan           Allergies:  No Known Allergies    Problem List:    Patient Active Problem List   Diagnosis Code    Abnormal stress test R94.39    Delirious R41.0    Leukocytosis D72.829    BPH (benign prostatic hyperplasia) N40.0    Urinary retention due to benign prostatic hyperplasia N40.1, R33.8    E. coli UTI N39.0, B96.20    Type 2 diabetes mellitus with other specified complication (HCC) B69.73    History of CVA (cerebrovascular accident) Z80.78    COVID-19 ruled out by laboratory testing Z03.818       Past Medical History:        Diagnosis Date    Cerebral artery occlusion with cerebral infarction (HCC)     weakness right hand    DM (diabetes mellitus) ([facility] [address]     Hyperlipidemia     Hypertension     Stroke [facility])        Past Surgical History:        Procedure Laterality Date    CARDIAC CATHETERIZATION  2020    CARDIOVASCULAR STRESS TEST      EYE SURGERY      KNEE ARTHROPLASTY Right        Social History:    Social History     Tobacco Use    Smoking status: Former Smoker     Types: Pipe     Last attempt to quit: 1980     Years since quittin.6    Smokeless tobacco: Never Used   Substance Use Topics    Alcohol use: Yes     Comment: rare                                Counseling given: Not Answered      Vital Signs (Current):   Vitals:    20 1335 20 0926   BP:  110/64   Pulse:  85   Resp:  16   Temp:  97.9 °F (36.6 °C)   TempSrc:  Temporal   SpO2:  98%   Weight: 183 lb (83 kg) 185 lb 3.2 oz (84 kg)   Height: 5' 7\" (1.702 m) 5' 7\" (1.702 m)                                              BP Readings from Last 3 Encounters:   20 110/64   20 132/68   20 (!) 146/86       NPO Status: Time of last liquid consumption:                         Time of last solid consumption:                         Date of last liquid consumption: 20                        Date of last solid food consumption: 20    BMI:   Wt Readings from Last 3 Encounters:   20 185 lb 3.2 oz (84 kg)   20 183 lb (83 kg)   20 183 lb (83 kg)     Body mass index is 29.01 kg/m². CBC:   Lab Results   Component Value Date    WBC 11.3 07/27/2020    RBC 3.74 07/27/2020    HGB 12.2 07/27/2020    HCT 37.9 07/27/2020    .3 07/27/2020     07/27/2020       CMP:   Lab Results   Component Value Date     07/27/2020    K 4.8 07/27/2020    K 4.1 06/19/2020    CL 96 07/27/2020    CO2 25 07/27/2020    BUN 46 07/27/2020    CREATININE 1.4 07/27/2020    LABGLOM 48 07/27/2020    GLUCOSE 135 07/27/2020    PROT 7.2 07/27/2020    PROT 7.4 05/19/2020    CALCIUM 10.2 07/27/2020    BILITOT 0.4 07/27/2020    ALKPHOS 51 07/27/2020    AST 23 07/27/2020    ALT 25 07/27/2020       POC Tests: No results for input(s): POCGLU, POCNA, POCK, POCCL, POCBUN, POCHEMO, POCHCT in the last 72 hours. Coags:   Lab Results   Component Value Date    INR 0.97 06/19/2020    APTT 31.4 06/19/2020       HCG (If Applicable): No results found for: PREGTESTUR, PREGSERUM, HCG, HCGQUANT     ABGs: No results found for: PHART, PO2ART, TMJ9UBX, PYG1AGV, BEART, L3OWSHAX     Type & Screen (If Applicable):  Lab Results   Component Value Date    LABRH NEG 06/19/2020       Drug/Infectious Status (If Applicable):  No results found for: HIV, HEPCAB    COVID-19 Screening (If Applicable):   Lab Results   Component Value Date    COVID19 Not Detected 07/29/2020         Anesthesia Evaluation    Airway: Mallampati: II       Mouth opening: > = 3 FB Dental:          Pulmonary:                              Cardiovascular:    (+) hypertension:,       ECG reviewed                        Neuro/Psych:   (+) CVA:,             GI/Hepatic/Renal:             Endo/Other:    (+) Diabetes, . Abdominal:           Vascular:                                        Anesthesia Plan      general     ASA 4       Induction: intravenous. Anesthetic plan and risks discussed with patient. Plan discussed with CRNA.                   Aly Mooney MD   8/4/2020

## 2020-08-04 NOTE — PROGRESS NOTES
HOME GOING INSTRUCTIONS REVIEWED AND GIVEN TO PT AND FAMILY. PT AND WIFE INSTRUCTED ON HAAS CATHETER CARE. THEY VERBALIZED  UNDERSTANDING. PT DISCHARGED TO VEHICLE VIA W/C.

## 2020-08-04 NOTE — ANESTHESIA POSTPROCEDURE EVALUATION
Department of Anesthesiology  Postprocedure Note    Patient: Bennett Macdonald  MRN: [id number]  YOB: 1935  Date of evaluation: 8/4/2020  Time:  3:39 PM     Procedure Summary     Date:  08/04/20 Room / Location:  [facility] [place] AISSATOU Fong    Anesthesia Start:  5870 Anesthesia Stop:  7376    Procedure:  Isaias Neighbours OF PROSTATE (N/A Bladder) Diagnosis:  (URINARY FREQUENCY, INCOMPLETE BLADDER EMPTYING)    Surgeon:  Maria Alejandra Hinojosa MD Responsible Provider:  Duke Orozco MD    Anesthesia Type:  general ASA Status:  4          Anesthesia Type: general    Mateus Phase I: Mateus Score: 8    Mateus Phase II:      Last vitals: Reviewed and per EMR flowsheets.        Anesthesia Post Evaluation

## 2020-08-04 NOTE — PROGRESS NOTES
ADMITTED FROM PACU. FAMILY AT BEDSIDE AND CALL LIGHT WITHIN PT REACH. IV INFUSING WITH NO REDNESS OR SWELLING.

## 2020-08-05 ENCOUNTER — NURSE ONLY (OUTPATIENT)
Dept: UROLOGY | Age: 85
End: 2020-08-05

## 2020-08-05 VITALS — TEMPERATURE: 96.8 F

## 2020-08-05 NOTE — OP NOTE
Patient:  Lesly Ann  MRN: [id number]  YOB: 1935    FACILITY: [facility]    DATE: 8/4/2020    SURGEON: Chuy Lutz MD     ASSISTANT: none    PREOPERATIVE DIAGNOSIS: bph with obstruction    POSTOPERATIVE DIAGNOSIS: bph with obstruction    PROCEDURE PERFORMED: CYSTOSCOPY, GREENLIGHT PHOTOVAPORIZATION OF PROSTATE    ANESTHESIA: General    ESTIMATED BLOOD LOSS: 5 ml     COMPLICATIONS: None immediate    DRAINS: [address] 3 way urethral jorge    SPECIMENS: none    INDICATIONS FOR PROCEDURE:  The patient is a 81 y.o. male who presents today with URINARY FREQUENCY, INCOMPLETE BLADDER EMPTYING here for CYSTOSCOPY, GREENLIGHT PHOTOVAPORIZATION OF PROSTATE. After risks, benefits and alternatives of the procedure were discussed with the patient, the patient elected to proceed. OPERATIVE SUMMARY:  The risks and benefits of the procedure were explained to the patient in the preoperative area. After informed consent was obtained, the patient was taken back to the operating room. The patient was transferred to the operating table and placed in a supine position. General anesthesia was induced and the patient was placed in the dorsal lithotomy position. He was prepped and draped in a sterile fashion and a time-out was performed to confirm patient identity and procedure. Prior to induction of anesthesia the patient was administered preoperative antibiotics and EPC cuffs were on and functioning. Our continuous flow sheath with obturator and lens was inserted through the patient's urethra and into the bladder. Upon entering the bladder we located both ureteral orifices, they were at a safe distance from the vesical neck. There were no stones noted in the bladder. On evaluation of the prostate the patient was noted to have bilobar hyperplasia and a protruding median lobe.  The GreenLight fiber was then inserted after we removed our obturator and placed our working bridge through out continous flow sheath. GreenLight photovaporization was initiated beginning with the median lobe. After the median lobe was taken down, we then turned our attention to the patient's left lateral lobe. At the 5 O'clock position we made a groove from the vesical neck down to the level of the verumontanum, which was used as our distal landmark to protect the external sphincter. We vaporized adenomatous tissue down to the level of the capsule. During this part of the procedure the power level was increased to 180 rock. This was used as a guide of depth, and carried around in a counter-clockwise fashion to the 12 O'clock position. The process was then repeated on the contralateral side starting at the 7 O'clock position. We vaporized adenomatous tissue down to the level of the capsule, which again was used as a guide of depth, and carried around in a clockwise fashion to the 12 O'clock position. Finally we turned our attention to the apical tissue. Extensive photovaporization of the prostate was performed. We then incised the bladder neck with our laser. At this time the irrigation was turned off and the prostatic urethra appeared to be wide open and no longer obstructed. Hemostasis was achieved and persistent. Both ureteral orifices were noted to be uninvolved in the resection. There was no scatter of laser fiber into the bladder. We did not go distal to the verumontanum. We worked at 80 rock of power at the bladder neck and near the apex of the prostate. The scope was removed and a 22-British 3-way Dejesus catheter was inserted and irrigated to confirm position. Continuous bladder irrigation with normal saline was then initiated and 3 L of normal saline were allowed to infuse before the catheter was clamped. The patient was awoken and transferred to PACU. All instruments and equipment were accounted for at the end of the procedure. I was present and scrubbed for all critical portions procedure. DISPOSITION:  The patient was transferred to PACU in good condition.  He will be discharged home from the hospital per the PACU team. Appropriate follow up will be arranged for catheter removal

## 2020-08-05 NOTE — PROGRESS NOTES
Patient has given me verbal consent to perform jorge removal yes    10 cc of water deflated from jorge balloon. Fr jorge removed without difficulty. He will drink fluids and call in 4-6 hours if patient has not urinated.       F/u with provider in 4-6 weeks with Dr. Ruiz Part

## 2020-08-19 RX ORDER — AMLODIPINE BESYLATE 5 MG/1
TABLET ORAL
Qty: 30 TABLET | Refills: 0 | OUTPATIENT
Start: 2020-08-19

## 2020-09-08 ENCOUNTER — OFFICE VISIT (OUTPATIENT)
Dept: UROLOGY | Age: 85
End: 2020-09-08
Payer: MEDICARE

## 2020-09-08 VITALS — BODY MASS INDEX: 29.03 KG/M2 | TEMPERATURE: 97.8 F | WEIGHT: 185 LBS | HEIGHT: 67 IN

## 2020-09-08 LAB
BILIRUBIN URINE: NEGATIVE
BLOOD URINE, POC: ABNORMAL
CHARACTER, URINE: ABNORMAL
COLOR, URINE: ABNORMAL
GLUCOSE URINE: NEGATIVE MG/DL
KETONES, URINE: NEGATIVE
LEUKOCYTE CLUMPS, URINE: ABNORMAL
NITRITE, URINE: NEGATIVE
PH, URINE: 6 (ref 5–9)
POST VOID RESIDUAL (PVR): 153 ML
PROTEIN, URINE: 30 MG/DL
SPECIFIC GRAVITY, URINE: 1.02 (ref 1–1.03)
UROBILINOGEN, URINE: 1 EU/DL (ref 0–1)

## 2020-09-08 PROCEDURE — 51798 US URINE CAPACITY MEASURE: CPT | Performed by: UROLOGY

## 2020-09-08 PROCEDURE — 81003 URINALYSIS AUTO W/O SCOPE: CPT | Performed by: UROLOGY

## 2020-09-08 PROCEDURE — 99024 POSTOP FOLLOW-UP VISIT: CPT | Performed by: UROLOGY

## 2020-09-30 ENCOUNTER — TELEPHONE (OUTPATIENT)
Dept: CARDIOLOGY CLINIC | Age: 85
End: 2020-09-30

## 2020-09-30 NOTE — TELEPHONE ENCOUNTER
MIKE for patient to call office back. Patient was seen on 7-. Event monitor was ordered, but patient was going to have surgery first. Has patient had surgery? Event monitor order in Dr. Michel Runner box.

## 2020-10-02 ENCOUNTER — HOSPITAL ENCOUNTER (OUTPATIENT)
Dept: NON INVASIVE DIAGNOSTICS | Age: 85
Discharge: HOME | End: 2020-10-02
Payer: MEDICARE

## 2020-10-02 ENCOUNTER — TELEPHONE (OUTPATIENT)
Dept: CARDIOLOGY CLINIC | Age: 85
End: 2020-10-02

## 2020-10-02 PROCEDURE — 93270 REMOTE 30 DAY ECG REV/REPORT: CPT

## 2020-10-02 RX ORDER — LISINOPRIL 20 MG/1
20 TABLET ORAL 2 TIMES DAILY
COMMUNITY

## 2020-10-02 NOTE — PROCEDURES
30 day cardiac event monitor applied. Instructions given. Prep taught. Patient acknowledged he understood. But stated I will forget what you told me. Wife verbalized understands. No further questions.

## 2020-11-08 NOTE — PROCEDURES
[address] [id number]                                 EVENT MONITOR    PATIENT NAME: Linda MCGARRY                      :        1935  MED REC NO:   [id number]                           ROOM:  ACCOUNT NO:   [de-identified]                           ADMIT DATE: 10/02/2020  PROVIDER:     Angi Costa M.D. CLINICAL HISTORY AND INDICATION:  This is a patient with tachycardia. EVENT MONITOR DESCRIPTION:  Event monitor was attached to the patient  between 10/02/2020 and 10/31/2020. EVENT MONITOR FINDINGS:  Baseline rhythm showed sinus rhythm, with  multiple episodes of sinus tachycardia, and otherwise no arrhythmias. CONCLUSION:  1. Sinus rhythm. 2.  Several episodes of sinus tachycardia that needs to be correlated  with the patient's activity, which could be physiologic. Otherwise, no  pathologic arrhythmias noted. Clinical correlation is recommended.         Sharonda Stark M.D.    D: 2020 10:01:18       T: 2020 14:37:39     [id number]  Job#: [id number]     Doc#: [id number]    CC:

## 2020-12-03 ENCOUNTER — OFFICE VISIT (OUTPATIENT)
Dept: UROLOGY | Age: 85
End: 2020-12-03
Payer: MEDICARE

## 2020-12-03 VITALS — HEIGHT: 67 IN | TEMPERATURE: 97.2 F | WEIGHT: 205 LBS | BODY MASS INDEX: 32.18 KG/M2

## 2020-12-03 LAB
BILIRUBIN URINE: NEGATIVE
BLOOD URINE, POC: ABNORMAL
CHARACTER, URINE: CLEAR
COLOR, URINE: YELLOW
GLUCOSE URINE: NEGATIVE MG/DL
KETONES, URINE: NEGATIVE
LEUKOCYTE CLUMPS, URINE: ABNORMAL
NITRITE, URINE: NEGATIVE
PH, URINE: 5 (ref 5–9)
POST VOID RESIDUAL (PVR): 149 ML
PROTEIN, URINE: 30 MG/DL
SPECIFIC GRAVITY, URINE: 1.02 (ref 1–1.03)
UROBILINOGEN, URINE: 0.2 EU/DL (ref 0–1)

## 2020-12-03 PROCEDURE — 81003 URINALYSIS AUTO W/O SCOPE: CPT | Performed by: UROLOGY

## 2020-12-03 PROCEDURE — 99213 OFFICE O/P EST LOW 20 MIN: CPT | Performed by: UROLOGY

## 2020-12-03 PROCEDURE — 51798 US URINE CAPACITY MEASURE: CPT | Performed by: UROLOGY

## 2020-12-03 NOTE — PROGRESS NOTES
Sridevi Vasquez MD        [address] [facility] [address]  Dept: [phone]  Dept Fax: [phone]: [address] [address] Urology Office Note      Patient:  Joselyn Santana  YOB: 1935    The patient is a 81 y.o. male who presents today for evaluation of the following problems: difficulty urinating  Chief Complaint   Patient presents with    Follow-up    Benign Prostatic Hypertrophy        HISTORY OF PRESENT ILLNESS:     BPH  Onset was months ago  Overall, the problem(s) are better  Severity is described as moderate. Associated Symptoms: No dysuria, no gross hematuria.   Current Pain Severity: 0      Status post greenlight  Voiding better  Still some OAB symptoms  PVR today is 149            Requested/reviewed records from [address] [place] [address],[address], DO office and/or outside [de-identified]    (Patient's old records have been requested, reviewed and pertinent findings summarized in today's note.)    Procedures Today: N/A    Last several PSA's:  Lab Results   Component Value Date    PSA 0.88 05/19/2020       Last total testosterone:  No results found for: TESTOSTERONE    Urinalysis today:  Results for POC orders placed in visit on 12/03/20   POCT Urinalysis No Micro (Auto)   Result Value Ref Range    Glucose, Ur Negative NEGATIVE mg/dl    Bilirubin Urine Negative     Ketones, Urine Negative NEGATIVE    Specific Gravity, Urine 1.025 1.002 - 1.030    Blood, UA POC Trace-lysed NEGATIVE    pH, Urine 5.00 5.0 - 9.0    Protein, Urine 30 (A) NEGATIVE mg/dl    Urobilinogen, Urine 0.20 0.0 - 1.0 eu/dl    Nitrite, Urine Negative NEGATIVE    Leukocyte Clumps, Urine Small (A) NEGATIVE    Color, Urine Yellow YELLOW-STRAW    Character, Urine Clear CLR-SL.CLOUD   poct post void residual   Result Value Ref Range    post void residual 149 ml       Last BUN and creatinine:  Lab Results   Component Value Date    BUN 46 (H) 0.50    Years: 13.00    Pack years: 6.50    Types: Pipe, Cigarettes    Quit date: 0    Years since quittin.0   Smokeless Tobacco Never Used      (If patient a smoker, smoking cessation counseling offered)   Social History     Substance and Sexual Activity   Alcohol Use Yes    Comment: rare       REVIEW OF SYSTEMS:  Constitutional: negative  Eyes: negative  Respiratory: negative  Cardiovascular: negative  Gastrointestinal: negative  Genitourinary: see HPI  Musculoskeletal: negative  Skin: negative   Neurological: negative  Hematological/Lymphatic: negative  Psychological: negative          Physical Exam:    This a 81 y.o. male  Vitals:    20 1417   Temp: 97.2 °F (36.2 °C)     Body mass index is 32.11 kg/m². Constitutional: Patient in no acute distress;       Assessment and Plan        1. BPH with obstruction/lower urinary tract symptoms    2. OAB (overactive bladder)    3. Benign prostatic hyperplasia, unspecified whether lower urinary tract symptoms present    4. Urinary retention    5.  Urinary frequency               Plan:      S/p greenlight  Voiding with mod residual  Still bothersome OAB symptoms  Pt poor historian  Will start mirabegron   Follow-up 2 months    Prescriptions Ordered:  Orders Placed This Encounter   Medications    mirabegron (MYRBETRIQ) 50 MG TB24     Sig: Take 50 mg by mouth daily     Dispense:  30 tablet     Refill:  3      Orders Placed:  Orders Placed This Encounter   Procedures    POCT Urinalysis No Micro (Auto)    poct post void residual     Bladder scan            PATRICK Odell MD

## 2020-12-04 ENCOUNTER — TELEPHONE (OUTPATIENT)
Dept: UROLOGY | Age: 85
End: 2020-12-04

## 2020-12-04 NOTE — TELEPHONE ENCOUNTER
Pt calling stating Dr Mathew Person had prescribed a Medication for him yesterday, Mybertriq 50mg, and doctor told him to let him know if it is too expensive. The patient did purchase 30 tablets and it was $238. He will try it for 1 month, but it is too expensive. Talked with Faina about this and she advised me that we had a savings card for Myrbetriq and may also be able to offer samples. Called the patient and advised. Mailed savings card to patient.

## 2021-01-26 NOTE — PATIENT INSTRUCTIONS
Ultram  Last Written Prescription Date:  12.22.2020  Last Fill Quantity: 120,   # refills: 0  Last Office Visit: 10.29.2020  Future Office visit:       Routing refill request to provider for review/approval because:  Drug not on the [facility], P or [facility] refill protocol or controlled substance     You may receive a survey regarding the care you received during your visit. Your input is valuable to us. We encourage you to complete and return your survey. We hope you will choose us in the future for your healthcare needs.

## 2021-02-09 ENCOUNTER — OFFICE VISIT (OUTPATIENT)
Dept: UROLOGY | Age: 86
End: 2021-02-09
Payer: MEDICARE

## 2021-02-09 DIAGNOSIS — N13.8 BPH WITH OBSTRUCTION/LOWER URINARY TRACT SYMPTOMS: Primary | ICD-10-CM

## 2021-02-09 DIAGNOSIS — N32.81 OAB (OVERACTIVE BLADDER): ICD-10-CM

## 2021-02-09 DIAGNOSIS — N40.1 BPH WITH OBSTRUCTION/LOWER URINARY TRACT SYMPTOMS: Primary | ICD-10-CM

## 2021-02-09 LAB
BILIRUBIN URINE: NEGATIVE
BLOOD URINE, POC: NEGATIVE
CHARACTER, URINE: CLEAR
COLOR, URINE: YELLOW
GLUCOSE URINE: NEGATIVE MG/DL
KETONES, URINE: NEGATIVE
LEUKOCYTE CLUMPS, URINE: NEGATIVE
NITRITE, URINE: NEGATIVE
PH, URINE: 5.5 (ref 5–9)
POST VOID RESIDUAL (PVR): 142 ML
PROTEIN, URINE: NEGATIVE MG/DL
SPECIFIC GRAVITY, URINE: 1.02 (ref 1–1.03)
UROBILINOGEN, URINE: 1 EU/DL (ref 0–1)

## 2021-02-09 PROCEDURE — 51798 US URINE CAPACITY MEASURE: CPT | Performed by: UROLOGY

## 2021-02-09 PROCEDURE — 99214 OFFICE O/P EST MOD 30 MIN: CPT | Performed by: UROLOGY

## 2021-02-09 PROCEDURE — 81003 URINALYSIS AUTO W/O SCOPE: CPT | Performed by: UROLOGY

## 2021-02-09 NOTE — PROGRESS NOTES
Briseida Moreno MD        [address] [place] [address]  Dept: [phone]  Dept Fax: [phone]: [address] [address] Urology Office Note      Patient:  Dianna Cain  YOB: 1935    The patient is a 81 y.o. male who presents today for evaluation of the following problems: difficulty urinating  Chief Complaint   Patient presents with    Follow-up     bph with luts, post greenlight.          HISTORY OF PRESENT ILLNESS:     BPH  Status post greenlight  Voiding better  Still some OAB symptoms--incontinence  PVR today is 149cc              Requested/reviewed records from [address] [place] [address],[address], DO office and/or outside [de-identified]    (Patient's old records have been requested, reviewed and pertinent findings summarized in today's note.)    Procedures Today: N/A    Last several PSA's:  Lab Results   Component Value Date    PSA 0.88 05/19/2020       Last total testosterone:  No results found for: TESTOSTERONE    Urinalysis today:  Results for POC orders placed in visit on 02/09/21   POCT Urinalysis No Micro (Auto)   Result Value Ref Range    Glucose, Ur Negative NEGATIVE mg/dl    Bilirubin Urine Negative     Ketones, Urine Negative NEGATIVE    Specific Gravity, Urine 1.025 1.002 - 1.030    Blood, UA POC Negative NEGATIVE    pH, Urine 5.50 5.0 - 9.0    Protein, Urine Negative NEGATIVE mg/dl    Urobilinogen, Urine 1.00 0.0 - 1.0 eu/dl    Nitrite, Urine Negative NEGATIVE    Leukocyte Clumps, Urine Negative NEGATIVE    Color, Urine Yellow YELLOW-STRAW    Character, Urine Clear CLR-SL.CLOUD   poct post void residual   Result Value Ref Range    post void residual 142 ml       Last BUN and creatinine:  Lab Results   Component Value Date    BUN 46 (H) 07/27/2020     Lab Results   Component Value Date    CREATININE 1.4 (H) 07/27/2020       Imaging Reviewed during this Office Visit:   Briseida Moreno MD independently reviewed the images and verified the radiology reports from:    No results found. PAST MEDICAL, FAMILY AND SOCIAL HISTORY:  Past Medical History:   Diagnosis Date    Cerebral artery occlusion with cerebral infarction ([facility] [address]     weakness right hand    DM (diabetes mellitus) ([facility] [address]     Hyperlipidemia     Hypertension     Stroke [facility])      Past Surgical History:   Procedure Laterality Date    CARDIAC CATHETERIZATION  2020    CARDIOVASCULAR STRESS TEST      CYSTOSCOPY N/A 2020    CYSTOSCOPY, GREENLIGHT PHOTOVAPORIZATION OF PROSTATE performed by Shoaib Clarke MD at [facility] Right      Family History   Problem Relation Age of Onset    Heart Disease Father     Heart Attack Father     High Blood Pressure Sister     Cancer Neg Hx     Diabetes Neg Hx     Stroke Neg Hx      Outpatient Medications Marked as Taking for the 21 encounter (Office Visit) with Shoaib Clarke MD   Medication Sig Dispense Refill    lisinopril (PRINIVIL;ZESTRIL) 20 MG tablet Take 20 mg by mouth See Admin Instructions 1 tab in am and 1/2 tab in pm      glimepiride (AMARYL) 2 MG tablet Take 1 tablet by mouth daily (with breakfast) 30 tablet 3    Cholecalciferol (VITAMIN D3) 50 MCG ( UT) CAPS Take 2,000 Units by mouth daily       pioglitazone (ACTOS) 30 MG tablet Take 30 mg by mouth daily      Cyanocobalamin (VITAMIN B-12 IJ) Inject as directed every 30 days      simvastatin (ZOCOR) 40 MG tablet Take 40 mg by mouth nightly      metFORMIN (GLUCOPHAGE-XR) 750 MG extended release tablet Take 1,500 mg by mouth Daily with supper 2 tab         Patient has no known allergies.   Social History     Tobacco Use   Smoking Status Former Smoker    Packs/day: 0.50    Years: 13.00    Pack years: 6.50    Types: Pipe, Cigarettes    Quit date: Dimitri Schlatter Years since quittin.2   Smokeless Tobacco Never Used      (If patient a smoker, smoking cessation counseling offered)   Social History Substance and Sexual Activity   Alcohol Use Yes    Comment: rare       REVIEW OF SYSTEMS:  Constitutional: negative  Eyes: negative  Respiratory: negative  Cardiovascular: negative  Gastrointestinal: negative  Genitourinary: see HPI  Musculoskeletal: negative  Skin: negative   Neurological: negative  Hematological/Lymphatic: negative  Psychological: negative          Physical Exam:    This a 81 y.o. male  There were no vitals filed for this visit. There is no height or weight on file to calculate BMI. Constitutional: Patient in no acute distress;       Assessment and Plan        1. BPH with obstruction/lower urinary tract symptoms    2. OAB (overactive bladder)               Plan:      S/p greenlight  Voiding with moderate residual  Still bothersome OAB symptoms  Pt poor historian  Will start mirabegron for worsening OAB  Follow-up 2 months    Prescriptions Ordered:  No orders of the defined types were placed in this encounter.      Orders Placed:  Orders Placed This Encounter   Procedures    POCT Urinalysis No Micro (Auto)    poct post void residual     Bladder scan            PATRICK Torres MD

## 2021-04-14 ENCOUNTER — OFFICE VISIT (OUTPATIENT)
Dept: CARDIOLOGY CLINIC | Age: 86
End: 2021-04-14
Payer: MEDICARE

## 2021-04-14 ENCOUNTER — TELEPHONE (OUTPATIENT)
Dept: CARDIOLOGY CLINIC | Age: 86
End: 2021-04-14

## 2021-04-14 VITALS
WEIGHT: 204 LBS | HEIGHT: 69 IN | BODY MASS INDEX: 30.21 KG/M2 | SYSTOLIC BLOOD PRESSURE: 132 MMHG | DIASTOLIC BLOOD PRESSURE: 80 MMHG | HEART RATE: 88 BPM

## 2021-04-14 DIAGNOSIS — E78.01 FAMILIAL HYPERCHOLESTEROLEMIA: ICD-10-CM

## 2021-04-14 DIAGNOSIS — I25.10 CORONARY ARTERY DISEASE INVOLVING NATIVE CORONARY ARTERY OF NATIVE HEART WITHOUT ANGINA PECTORIS: Primary | ICD-10-CM

## 2021-04-14 DIAGNOSIS — I10 ESSENTIAL HYPERTENSION: ICD-10-CM

## 2021-04-14 PROCEDURE — 99213 OFFICE O/P EST LOW 20 MIN: CPT | Performed by: NUCLEAR MEDICINE

## 2021-04-14 RX ORDER — QUETIAPINE FUMARATE 25 MG/1
25 TABLET, FILM COATED ORAL NIGHTLY
COMMUNITY

## 2021-04-14 RX ORDER — DONEPEZIL HYDROCHLORIDE 10 MG/1
10 TABLET, ORALLY DISINTEGRATING ORAL EVERY MORNING
COMMUNITY

## 2021-04-14 NOTE — PROGRESS NOTES
[facility] [facility]  [address] [place] [address]  Dept: [phone]  Dept Fax: [phone]  Loc: [phone]    Visit Date: 2021    Cody Morin is a 81 y.o. male who presents todayfor:  Chief Complaint   Patient presents with    Check-Up    Coronary Artery Disease    Hypertension    Hyperlipidemia     Cath before  Moderate CAD  No chest pain  Baseline dyspnea  Not very active  Bop is running high   No dizziness  No syncope  No recent arrhythmia      HPI:  HPI  Past Medical History:   Diagnosis Date    Cerebral artery occlusion with cerebral infarction ([facility] [address]     weakness right hand    DM (diabetes mellitus) ([facility] [address]     Hyperlipidemia     Hypertension     Stroke [facility])       Past Surgical History:   Procedure Laterality Date    CARDIAC CATHETERIZATION  2020    CARDIOVASCULAR STRESS TEST      CYSTOSCOPY N/A 2020    CYSTOSCOPY, GREENLIGHT PHOTOVAPORIZATION OF PROSTATE performed by Lesly Palacio MD at [facility] Right      Family History   Problem Relation Age of Onset    Heart Disease Father     Heart Attack Father     High Blood Pressure Sister     Cancer Neg Hx     Diabetes Neg Hx     Stroke Neg Hx      Social History     Tobacco Use    Smoking status: Former Smoker     Packs/day: 0.50     Years: 13.00     Pack years: 6.50     Types: Pipe, Cigarettes     Quit date:      Years since quittin.3    Smokeless tobacco: Never Used   Substance Use Topics    Alcohol use: Yes     Comment: rare      Current Outpatient Medications   Medication Sig Dispense Refill    Metoprolol Succinate (TOPROL XL PO) Take by mouth      QUEtiapine (SEROQUEL) 25 MG tablet Take 25 mg by mouth nightly      donepezil (ARICEPT ODT) 10 MG disintegrating tablet Take 10 mg by mouth every morning      lisinopril (PRINIVIL;ZESTRIL) 20 MG tablet Take 20 mg by mouth See Admin Instructions 1 tab in am and 1/2 tab in pm      glimepiride (AMARYL) 2 MG tablet Take 1 tablet by mouth daily (with breakfast) 30 tablet 3    Cholecalciferol (VITAMIN D3) 50 MCG (2000 UT) CAPS Take 2,000 Units by mouth daily       pioglitazone (ACTOS) 30 MG tablet Take 30 mg by mouth daily      simvastatin (ZOCOR) 40 MG tablet Take 40 mg by mouth nightly      metFORMIN (GLUCOPHAGE-XR) 750 MG extended release tablet Take 1,500 mg by mouth Daily with supper 2 tab      Cyanocobalamin (VITAMIN B-12 IJ) Inject as directed every 30 days       No current facility-administered medications for this visit. No Known Allergies  Health Maintenance   Topic Date Due    COVID-19 Vaccine (1) Never done    DTaP/Tdap/Td vaccine (1 - Tdap) Never done    Shingles Vaccine (1 of 2) Never done    Pneumococcal 65+ years Vaccine (1 of 1 - PPSV23) Never done   [employer] Visit (AWV)  Never done    Lipid screen  06/19/2021    Potassium monitoring  07/27/2021    Creatinine monitoring  07/27/2021    Flu vaccine (Season Ended) 09/01/2021    Hepatitis A vaccine  Aged Out    Hib vaccine  Aged Out    Meningococcal (ACWY) vaccine  Aged Out       Subjective:  Review of Systems  General:   No fever, no chills, some fatigue or weight loss  Pulmonary:    baseline dyspnea, no wheezing  Cardiac:    Denies recent chest pain,   GI:     No nausea or vomiting, no abdominal pain  Neuro:    No dizziness or light headedness,   Musculoskeletal:  No recent active issues  Extremities:   No edema, no obvious claudication       Objective:  Physical Exam  /80   Pulse 88   Ht 5' 9\" (1.753 m)   Wt 204 lb (92.5 kg)   BMI 30.13 kg/m²   General:   Well developed, well nourished  Lungs:   Clear to auscultation  Heart:    Normal S1 S2, Slight murmur. no rubs, no gallops  Abdomen:   Soft, non tender, no organomegalies, positive bowel sounds  Extremities:   No edema, no cyanosis, good peripheral pulses  Neurological:   Awake, alert, oriented.  No obvious focal deficits  Musculoskelatal:  No obvious deformities    Assessment:      Diagnosis Orders   1. Coronary artery disease involving native coronary artery of native heart without angina pectoris     2. Essential hypertension     3. Familial hypercholesterolemia     as above  Cardiac fair   Higher BP    Plan:  No follow-ups on file. Need the dose of the metoprolol   Increase the dose   Continue risk factor modification and medical management  Thank you for allowing me to participate in the care of your patient. Please don't hesitate to contact me regarding any further issues related to the patient care    Orders Placed:  No orders of the defined types were placed in this encounter. Medications Prescribed:  No orders of the defined types were placed in this encounter. Discussed use, benefit, and side effects of prescribed medications. All patient questions answered. Pt voicedunderstanding. Instructed to continue current medications, diet and exercise. Continue risk factor modification and medical management. Patient agreed with treatment plan. Follow up as directed.     Electronically signedby Tyler Hathaway MD on 4/14/2021 at 12:51 PM

## 2021-04-14 NOTE — TELEPHONE ENCOUNTER
LM for patient to call office back. Grecia Dean from [facility] states they have never filled patient's metoprolol. Margareth's call back number [phone]. LM for patient to call office with Metoprolol dose.

## 2021-05-10 LAB
BASOPHILS ABSOLUTE: 0 /ΜL
BASOPHILS RELATIVE PERCENT: 0.8 %
BILIRUBIN URINE: NORMAL
BLOOD, URINE: NORMAL
BUN BLDV-MCNC: 16 MG/DL
CALCIUM SERPL-MCNC: 9.1 MG/DL
CHLORIDE BLD-SCNC: 103 MMOL/L
CLARITY: CLEAR
CO2: 24 MMOL/L
COLOR: YELLOW
CREAT SERPL-MCNC: 0.85 MG/DL
EOSINOPHILS ABSOLUTE: 100 /ΜL
EOSINOPHILS RELATIVE PERCENT: 1.4 %
GFR CALCULATED: >60
GLUCOSE BLD-MCNC: 133 MG/DL
GLUCOSE URINE: NEGATIVE
HCT VFR BLD CALC: 36.2 % (ref 41–53)
HEMOGLOBIN: 12.2 G/DL (ref 13.5–17.5)
INR BLD: 1.13
KETONES, URINE: NEGATIVE
LEUKOCYTE ESTERASE, URINE: NEGATIVE
LYMPHOCYTES ABSOLUTE: 1000 /ΜL
LYMPHOCYTES RELATIVE PERCENT: 17 %
MAGNESIUM: 1.7 MG/DL
MCH RBC QN AUTO: 32.4 PG
MCHC RBC AUTO-ENTMCNC: 33.7 G/DL
MCV RBC AUTO: 96.1 FL
MONOCYTES ABSOLUTE: 600 /ΜL
MONOCYTES RELATIVE PERCENT: 9.5 %
NEUTROPHILS ABSOLUTE: 4300 /ΜL
NEUTROPHILS RELATIVE PERCENT: 71.3 %
NITRITE, URINE: NEGATIVE
PDW BLD-RTO: 14.1 %
PH UA: 5 (ref 4.5–8)
PLATELET # BLD: 248 K/ΜL
PMV BLD AUTO: ABNORMAL FL
POTASSIUM SERPL-SCNC: 3.6 MMOL/L
PROTEIN UA: NORMAL
PROTIME: 13 SECONDS
RBC # BLD: 3.77 10^6/ΜL
SODIUM BLD-SCNC: 137 MMOL/L
SPECIFIC GRAVITY UA: 1.02 (ref 1–1.03)
UROBILINOGEN, URINE: NORMAL
WBC # BLD: 6.1 10^3/ML

## 2021-05-11 NOTE — PROGRESS NOTES
mouth Daily with supper 2 tab      predniSONE (DELTASONE) 10 MG tablet        No current facility-administered medications for this visit. Past Medical History  David Mcgarry  has a past medical history of Cerebral artery occlusion with cerebral infarction ([facility] [address] DM (diabetes mellitus) ([facility] [address] Hyperlipidemia, Hypertension, and Stroke ([facility] [address] Past Surgical History  The patient  has a past surgical history that includes Knee Arthroplasty (Right); cardiovascular stress test; eye surgery; Cardiac catheterization (06/19/2020); and Cystoscopy (N/A, 8/4/2020). Family History  This patient's family history includes Heart Attack in his father; Heart Disease in his father; High Blood Pressure in his sister. Social History  David Mcgarry  reports that he quit smoking about 49 years ago. His smoking use included pipe and cigarettes. He has a 6.50 pack-year smoking history. He has never used smokeless tobacco. He reports current alcohol use. He reports that he does not use drugs. Subjective:     Review of Systems  No problems with ears, nose or throat. No problems with eyes. No chest pain, shortness of breath, abdominal pain, extremity pain or weakness, and no neurological deficits. No rashes.  symptoms per HPI. The remainder of the review of symptoms is negative. Objective:     PE:   Vitals:    05/12/21 1347   BP: 122/78   Weight: 198 lb (89.8 kg)   Height: 5' 9\" (1.753 m)       Constitutional: Alert and oriented times 3, no acute distress and cooperative to examination with appropriate mood and affect. HENT:   Head:        Normocephalic and atraumatic. Mouth/Throat:         Mucous membranes are normal.   Eyes:         EOM are normal. No scleral icterus. PERRLA. Neck:        Supple, symmetrical, trachea midline  Cardiovascular:        Normal rate, regular rhythm, S1 S2 heart sounds. No murmurs, rub, or gallops. Pulmonary/Chest:  Normal respiratory rate and rhthym. No use of accessory muscles. Abdominal:         Soft. No tenderness. Bowel sounds present. Musculoskeletal:         Normal range of motion. No edema or tenderness of lower extremities. Extremities: No cyanosis, clubbing, or edema present. Neurological:        Alert and oriented. Psychiatric:        Normal mood and affect. Labs   Urine dip demonstrates   No results found for this visit on 05/12/21. Patients recent PSA values are as follows  Lab Results   Component Value Date    PSA 0.88 05/19/2020        Recent BUN/Creatinine:  Lab Results   Component Value Date    BUN 46 07/27/2020    CREATININE 1.4 07/27/2020         Assessment & Plan:     BPH with retention  Hematuria  S/P Greenlight PVP in August    Pt had recent ER admission for ear ache. Noted to not be able to void, so jorge catheter placed, unknown how much was returned. Intermittent hematuria per patient and wife. Urine concentrated yellow in the bag. When fill and spill performed, jorge was removed and pt voided approximately 150 ml bright red blood with clot. Pt denies any pain, not on any anticoagulants. Will push fluids and wife will call tomorrow morning with update. If pt unable to void tonight, he and his wife were instructed to go to ER.     FU in 1 week with PVR    FINN Welch  Urology

## 2021-05-12 ENCOUNTER — OFFICE VISIT (OUTPATIENT)
Dept: UROLOGY | Age: 86
End: 2021-05-12
Payer: MEDICARE

## 2021-05-12 ENCOUNTER — TELEPHONE (OUTPATIENT)
Dept: UROLOGY | Age: 86
End: 2021-05-12

## 2021-05-12 VITALS
WEIGHT: 198 LBS | HEIGHT: 69 IN | DIASTOLIC BLOOD PRESSURE: 78 MMHG | SYSTOLIC BLOOD PRESSURE: 122 MMHG | BODY MASS INDEX: 29.33 KG/M2

## 2021-05-12 DIAGNOSIS — N13.8 BPH WITH OBSTRUCTION/LOWER URINARY TRACT SYMPTOMS: Primary | ICD-10-CM

## 2021-05-12 DIAGNOSIS — R31.0 GROSS HEMATURIA: ICD-10-CM

## 2021-05-12 DIAGNOSIS — N40.1 BPH WITH OBSTRUCTION/LOWER URINARY TRACT SYMPTOMS: Primary | ICD-10-CM

## 2021-05-12 PROCEDURE — 99214 OFFICE O/P EST MOD 30 MIN: CPT | Performed by: NURSE PRACTITIONER

## 2021-05-12 RX ORDER — PREDNISONE 10 MG/1
TABLET ORAL
COMMUNITY
Start: 2021-05-11 | End: 2021-09-20

## 2021-05-12 RX ORDER — FAMCICLOVIR 500 MG/1
TABLET, FILM COATED ORAL
COMMUNITY
Start: 2021-05-11 | End: 2021-09-20

## 2021-05-12 NOTE — PROGRESS NOTES
Patient has given me verbal consent to perform fill and spill procedure yes    With catheter in place 175 cc water instilled into bladder. Patient had a spasm as I took out the catheter. Patient urinated a significant amount of dark red blood/ and large clotting. Patients catheter bag was hanging down below the knee when I removed catheter. Patient and his wife advised of the importance to keep the catheter bag above the knee to prevent blood. 10 cc of water deflated from balloon. 16 Fr jorge removed without difficulty. Pt then voided 150 cc. Patient is to call tomorrow morning with a report of how the blood is doing. Patient encouraged several times to drink plenty of water to clear up his urine. Antonina Shaw reiterated this to patient as well.

## 2021-05-12 NOTE — TELEPHONE ENCOUNTER
Patient's wife called and stated Patient has shingles on his face should he still come for his appt? Please advise. Please see my note on appt desk.  Thanks

## 2021-05-19 ENCOUNTER — NURSE ONLY (OUTPATIENT)
Dept: UROLOGY | Age: 86
End: 2021-05-19
Payer: MEDICARE

## 2021-05-19 DIAGNOSIS — R31.0 GROSS HEMATURIA: Primary | ICD-10-CM

## 2021-05-19 PROCEDURE — 52000 CYSTOURETHROSCOPY: CPT | Performed by: UROLOGY

## 2021-05-19 NOTE — PROGRESS NOTES
Patient here for a lab ma for PVR. . Patients wife states there has been no blood in patients urine, but today patients urine is extremely bloody. Patient comes in with Wet, bloody pants. Attempted to place regular 16 jorge, and 16 coude and was unsuccessful. Dr. Malina Trujillo will scope patient.

## 2021-05-26 ENCOUNTER — NURSE ONLY (OUTPATIENT)
Dept: UROLOGY | Age: 86
End: 2021-05-26

## 2021-05-26 DIAGNOSIS — N40.0 BENIGN PROSTATIC HYPERPLASIA, UNSPECIFIED WHETHER LOWER URINARY TRACT SYMPTOMS PRESENT: ICD-10-CM

## 2021-05-26 PROCEDURE — 99999 PR OFFICE/OUTPT VISIT,PROCEDURE ONLY: CPT | Performed by: NURSE PRACTITIONER

## 2021-05-26 NOTE — PROGRESS NOTES
Patient is here for a catheter removal.  Leg bag has yellow and clear urine. Patient states that he has not had any blood in his urine since his last visit. Patient has given me verbal consent to perform jorge removal yes    10 cc of water deflated from jorge balloon. 16 Fr jorge removed without difficulty. He will drink fluids and call in 4-6 hours if patient has not urinated. F/u with provider Dr. Ileana Macedo in August and 2 weeks for PVR.

## 2021-06-09 ENCOUNTER — NURSE ONLY (OUTPATIENT)
Dept: UROLOGY | Age: 86
End: 2021-06-09
Payer: MEDICARE

## 2021-06-09 DIAGNOSIS — R35.0 URINARY FREQUENCY: ICD-10-CM

## 2021-06-09 DIAGNOSIS — N40.0 BENIGN PROSTATIC HYPERPLASIA, UNSPECIFIED WHETHER LOWER URINARY TRACT SYMPTOMS PRESENT: Primary | ICD-10-CM

## 2021-06-09 DIAGNOSIS — N13.8 BPH WITH OBSTRUCTION/LOWER URINARY TRACT SYMPTOMS: ICD-10-CM

## 2021-06-09 DIAGNOSIS — R33.9 URINARY RETENTION: ICD-10-CM

## 2021-06-09 DIAGNOSIS — N40.1 BPH WITH OBSTRUCTION/LOWER URINARY TRACT SYMPTOMS: ICD-10-CM

## 2021-06-09 LAB
BILIRUBIN URINE: NEGATIVE
BLOOD URINE, POC: ABNORMAL
CHARACTER, URINE: CLEAR
COLOR, URINE: YELLOW
GLUCOSE URINE: NEGATIVE MG/DL
KETONES, URINE: NEGATIVE
LEUKOCYTE CLUMPS, URINE: ABNORMAL
NITRITE, URINE: POSITIVE
PH, URINE: 7 (ref 5–9)
POST VOID RESIDUAL (PVR): 114 ML
PROTEIN, URINE: ABNORMAL MG/DL
SPECIFIC GRAVITY, URINE: 1.02 (ref 1–1.03)
UROBILINOGEN, URINE: 0.2 EU/DL (ref 0–1)

## 2021-06-09 PROCEDURE — 51798 US URINE CAPACITY MEASURE: CPT | Performed by: NURSE PRACTITIONER

## 2021-06-09 PROCEDURE — 81003 URINALYSIS AUTO W/O SCOPE: CPT | Performed by: NURSE PRACTITIONER

## 2021-06-09 RX ORDER — SULFAMETHOXAZOLE AND TRIMETHOPRIM 800; 160 MG/1; MG/1
1 TABLET ORAL 2 TIMES DAILY
Qty: 14 TABLET | Refills: 0 | Status: SHIPPED | OUTPATIENT
Start: 2021-06-09 | End: 2021-06-16

## 2021-06-09 RX ORDER — SULFAMETHOXAZOLE AND TRIMETHOPRIM 800; 160 MG/1; MG/1
1 TABLET ORAL 2 TIMES DAILY
Qty: 14 TABLET | Refills: 0 | Status: SHIPPED | OUTPATIENT
Start: 2021-06-09 | End: 2021-06-09

## 2021-06-09 NOTE — PROGRESS NOTES
Urine with possible infection  pvr acceptable. Start bactrim.  Pts wife to call if no improvement in frequency

## 2021-06-09 NOTE — PROGRESS NOTES
Pt having urinary frequency. Daytime every half hour he is going to restroom. Night time patients states he loses count on how many times he is going to bathroom. He stated maybe 15 times, but pt unsure. UA looks like it may be infected. PVR was 114cc. Per Harmony send for culture and she will call in antibiotic to [facility] Pt wife notified that she should hear form us with culture results by Friday at 2pm. If we dont call them before them they know we have on call provider if they call us and want results. They also know that we may have to switch antibiotic if not culture shows organism is resistant.

## 2021-06-10 LAB
ORGANISM: ABNORMAL
URINE CULTURE, ROUTINE: ABNORMAL

## 2021-06-14 ENCOUNTER — TELEPHONE (OUTPATIENT)
Dept: UROLOGY | Age: 86
End: 2021-06-14

## 2021-06-14 NOTE — TELEPHONE ENCOUNTER
----- Message from FINN Vang CNP sent at 6/14/2021  6:45 AM EDT -----  Positive urine cx.  Continue bactrim

## 2021-06-14 NOTE — TELEPHONE ENCOUNTER
Patient called and stated he is now urinating every half hour during day and every hour at night which is an improvement stated he believes that the antibiotic is working great and is happy with the results. Wanted to let you know. He also wanted to make sure he was taking the correct antibiotic. Please advise.  Thanks

## 2021-06-20 NOTE — PROGRESS NOTES
Cystoscopy Operative Note    Patient:  Yin Carrington  MRN: [id number]  YOB: 1935  Surgeon: Ariana Busch MD  Anesthesia: Urethral 2% Xylocaine   Indications: difficult jorge  Position: Supine    Findings:   The patient was prepped and draped in the usual sterile fashion. The flexible cystoscope was advanced through the urethra and into the bladder. The bladder was thoroughly inspected and the following was noted:    Residual Urine: Minimal   Urethra: urethral damage/blood clot from urethral jorge manipulation  Prostate: open prostatic fossa from previous greenlight  Bladder: No tumors or CIS noted. No bladder diverticulum. Mild  trabeculation noted. Ureters: Clear efflux from both ureters. Orifices with normal configuration and location. The cystoscope was removed. The patient tolerated the procedure well.   Keep catheter and follow up for void trial

## 2021-08-10 ENCOUNTER — OFFICE VISIT (OUTPATIENT)
Dept: UROLOGY | Age: 86
End: 2021-08-10
Payer: MEDICARE

## 2021-08-10 VITALS — BODY MASS INDEX: 29.41 KG/M2 | WEIGHT: 198.6 LBS | HEIGHT: 69 IN | RESPIRATION RATE: 16 BRPM

## 2021-08-10 DIAGNOSIS — R31.0 GROSS HEMATURIA: ICD-10-CM

## 2021-08-10 DIAGNOSIS — N13.8 BPH WITH OBSTRUCTION/LOWER URINARY TRACT SYMPTOMS: Primary | ICD-10-CM

## 2021-08-10 DIAGNOSIS — N32.81 OAB (OVERACTIVE BLADDER): ICD-10-CM

## 2021-08-10 DIAGNOSIS — N40.1 BPH WITH OBSTRUCTION/LOWER URINARY TRACT SYMPTOMS: Primary | ICD-10-CM

## 2021-08-10 LAB
BILIRUBIN URINE: NEGATIVE
BLOOD URINE, POC: NEGATIVE
CHARACTER, URINE: CLEAR
COLOR, URINE: YELLOW
GLUCOSE URINE: NEGATIVE MG/DL
KETONES, URINE: NEGATIVE
LEUKOCYTE CLUMPS, URINE: NEGATIVE
NITRITE, URINE: NEGATIVE
PH, URINE: 5 (ref 5–9)
POST VOID RESIDUAL (PVR): 30 ML
PROTEIN, URINE: NEGATIVE MG/DL
SPECIFIC GRAVITY, URINE: 1.02 (ref 1–1.03)
UROBILINOGEN, URINE: 1 EU/DL (ref 0–1)

## 2021-08-10 PROCEDURE — 99214 OFFICE O/P EST MOD 30 MIN: CPT | Performed by: UROLOGY

## 2021-08-10 PROCEDURE — 81003 URINALYSIS AUTO W/O SCOPE: CPT | Performed by: UROLOGY

## 2021-08-10 PROCEDURE — 51798 US URINE CAPACITY MEASURE: CPT | Performed by: UROLOGY

## 2021-08-10 NOTE — PROGRESS NOTES
PATRICK Mcdermott MD        [facility] [address] [facility] [address] [address]  Dept: [phone]  Dept Fax: [phone]: [address] [address] Urology Office Note      Patient:  Angela Earl  YOB: 1935    The patient is a 81 y.o. male who presents today for evaluation of the following problems:   Chief Complaint   Patient presents with    Benign Prostatic Hypertrophy    6 Month Follow-Up        HISTORY OF PRESENT ILLNESS:     Gross hematuria- resolved    BPH- open prostatic urethra on last cystoscopy earlier this year    OAB- voiding every 30 min. Not at treatment goal. Still wearing undergarments.         Requested/reviewed records from [facility] Carrillo DO office and/or outside [de-identified]    (Patient's old records have been requested, reviewed and pertinent findings summarized in today's note.)    Procedures Today: N/A    Last several PSA's:  Lab Results   Component Value Date    PSA 0.88 05/19/2020       Last total testosterone:  No results found for: TESTOSTERONE    Urinalysis today:  Results for POC orders placed in visit on 08/10/21   POCT Urinalysis No Micro (Auto)   Result Value Ref Range    Glucose, Ur Negative NEGATIVE mg/dl    Bilirubin Urine Negative     Ketones, Urine Negative NEGATIVE    Specific Gravity, Urine 1.025 1.002 - 1.030    Blood, UA POC Negative NEGATIVE    pH, Urine 5.00 5.0 - 9.0    Protein, Urine Negative NEGATIVE mg/dl    Urobilinogen, Urine 1.00 0.0 - 1.0 eu/dl    Nitrite, Urine Negative NEGATIVE    Leukocyte Clumps, Urine Negative NEGATIVE    Color, Urine Yellow YELLOW-STRAW    Character, Urine Clear CLR-SL.CLOUD   poct post void residual   Result Value Ref Range    post void residual 30 ml       Last BUN and creatinine:  Lab Results   Component Value Date    BUN 16 05/10/2021     Lab Results   Component Value Date    CREATININE 0.85 05/10/2021       Imaging Reviewed during this Office Visit:   Gracie Carney MD independently reviewed the images and verified the radiology reports from:    No results found.     PAST MEDICAL, FAMILY AND SOCIAL HISTORY:  Past Medical History:   Diagnosis Date    Cerebral artery occlusion with cerebral infarction ([facility] [address]     weakness right hand    DM (diabetes mellitus) ([facility] [address]     Hyperlipidemia     Hypertension     [facility]roke [facility])      Past Surgical History:   Procedure Laterality Date    CARDIAC CATHETERIZATION  06/19/2020    CARDIOVASCULAR STRESS TEST      CYSTOSCOPY N/A 8/4/2020    CYSTOSCOPY, GREENLIGHT PHOTOVAPORIZATION OF PROSTATE performed by Diamante Ramirez MD at [facility]      Family History   Problem Relation Age of Onset    Heart Disease Father     Heart Attack Father     High Blood Pressure Sister     Cancer Neg Hx     Diabetes Neg Hx     Stroke Neg Hx      Outpatient Medications Marked as Taking for the 8/10/21 encounter (Office Visit) with Diamante Ramirez MD   Medication Sig Dispense Refill    predniSONE (DELTASONE) 10 MG tablet       famciclovir (FAMVIR) 500 MG tablet       Metoprolol Succinate (TOPROL XL PO) Take 100 mg by mouth 2 times daily       QUEtiapine (SEROQUEL) 25 MG tablet Take 25 mg by mouth nightly      donepezil (ARICEPT ODT) 10 MG disintegrating tablet Take 10 mg by mouth every morning      lisinopril (PRINIVIL;ZESTRIL) 20 MG tablet Take 20 mg by mouth 2 times daily       glimepiride (AMARYL) 2 MG tablet Take 1 tablet by mouth daily (with breakfast) 30 tablet 3    Cholecalciferol (VITAMIN D3) 50 MCG (2000 UT) CAPS Take 2,000 Units by mouth daily       pioglitazone (ACTOS) 30 MG tablet Take 30 mg by mouth daily      Cyanocobalamin (VITAMIN B-12 IJ) Inject as directed every 30 days      simvastatin (ZOCOR) 40 MG tablet Take 40 mg by mouth nightly      metFORMIN (GLUCOPHAGE-XR) 750 MG extended release tablet Take 1,500 mg by mouth Daily with supper 2 tab         Patient has no known allergies. Social History     Tobacco Use   Smoking Status Former Smoker    Packs/day: 0.50    Years: 13.00    Pack years: 6.50    Types: Pipe, Cigarettes    Quit date: Fitz [place] Years since quittin.6   Smokeless Tobacco Never Used      (If patient a smoker, smoking cessation counseling offered)   Social History     Substance and Sexual Activity   Alcohol Use Yes    Comment: rare       REVIEW OF SYSTEMS:  Constitutional: negative  Eyes: negative  Respiratory: negative  Cardiovascular: negative  Gastrointestinal: negative  Genitourinary: see HPI  Musculoskeletal: negative  Skin: negative   Neurological: negative  Hematological/Lymphatic: negative  Psychological: negative      Physical Exam:    This a 81 y.o. male  Vitals:    08/10/21 1303   Resp: 16     Body mass index is 29.33 kg/m². Constitutional: Patient in no acute distress;         Assessment and Plan        1. BPH with obstruction/lower urinary tract symptoms    2. OAB (overactive bladder)    3. Gross hematuria               Plan:      bph- pvr's moderate  oab- pt very bothered. Due to mod elevated pvrs, do not recommend botox. Discussed interstim  Gross hematuria- resolved    Stage I sns      Prescriptions Ordered:  No orders of the defined types were placed in this encounter.      Orders Placed:  Orders Placed This Encounter   Procedures    POCT Urinalysis No Micro (Auto)    poct post void residual     Bladder scan            PATRICK Mcclure MD

## 2021-08-11 ENCOUNTER — TELEPHONE (OUTPATIENT)
Dept: UROLOGY | Age: 86
End: 2021-08-11

## 2021-08-11 DIAGNOSIS — R35.0 URINARY FREQUENCY: ICD-10-CM

## 2021-08-11 DIAGNOSIS — N32.81 OAB (OVERACTIVE BLADDER): Primary | ICD-10-CM

## 2021-08-11 DIAGNOSIS — R31.0 GROSS HEMATURIA: ICD-10-CM

## 2021-08-11 DIAGNOSIS — Z01.818 PRE-OP TESTING: ICD-10-CM

## 2021-08-11 NOTE — TELEPHONE ENCOUNTER
Patient scheduled for surgery with Dr Jorge Rizzo for Stage 1 on 9/24/21 and Stage 2 on 10/5/21. Surgery consents on arrival. Dr Steve Islas to clear. Patient to do pre op urine culture,fand fasting labs on 9/10/21. Surgery instructions mailed to the patient.        Loreta Orozco notified

## 2021-08-11 NOTE — TELEPHONE ENCOUNTER
DO NOT TAKE ASPIRIN, PLAVIX, FISH OIL, COUMADIN, IBUPROFEN, MOTRIN-LIKE DRUGS AND ANY MULTIVITAMINS OR OVER THE COUNTER SUPPLEMENTS 5 DAYS PRIOR TO SURGERY. Onmarva Mendoza 1935 Diagnosis:    Surgical Physician: Dr. Dannette Hodgkins have been scheduled for the procedure marked below:      Surgery: Stage 1 Interstim         Date: 9/24/21     Anesthesia: MAC     Place of Service: [address] [place] [address] Second Floor Same Day Surgery         Arrive to same day surgery by:  10:00 am  (Surgery time is subject to change)      INSTRUCTIONS AS MARKED BELOW:    1.  DO NOT eat or drink anything after midnight before surgery. 2.  We prefer you shower or bathe with an antibacterial soap (Dial) the morning of surgery. 3.  Plan to spend the overnight at the hospital the day of surgery  4. Please bring a current medication list, photo ID and insurance card(s) with you  5. Okay to take Tylenol  6. If you take Glucophage, Metformin or Janumet, hold 48-hours prior to surgery. Hold the Glimepiride and the Actos the morning of surgery. 7.  Take blood pressure or heart medication as directed, if taken in the morning take with a small sip of water  8. The office will call you in 1-2 days after your procedure to schedule a follow up. DATE SENSITIVE TESTING    Do the pre op urine culture and fasting labs on 9/10/21. Orders included.         Date: 8/11/2021

## 2021-08-11 NOTE — TELEPHONE ENCOUNTER
Send to Dr Aspen Young when he returns    Pre op Risk Assessment    Procedure Stage 1 Interstim placement   Physician Quincy Person  Date of surgery/procedure 9/24/21    Last OV 4/14/21  Last Stress 6/15/20  Last Echo 6/15/20  Last Cath 6/19/20  Last Stent None in Epic  Is patient on blood thinners None    Hold Meds/how many days

## 2021-08-11 NOTE — TELEPHONE ENCOUNTER
SURGERY [address] [facility] [address] [facility], [address]      Phone *[phone] *[phone]   Surgical Scheduling Direct Line Phone *[phone] Fax *[phone]      Hardy Sep 1935 male    [address]  [place] [address]   Marital Status:          Home Phone: [phone]      Cell Phone:    Telephone Information:   Mobile [phone]          Surgeon: Dr. Nathan Ng  Surgery Date: 9/24/21   Time: 12:00 Noon    Procedure: Stage 1 Interstim                    Walter Due notified    Diagnosis: Overactive Bladder    [facility]    Special Inst/Equip: Regular Room    CPT Codes:    42646  Latex Allergy:  No     Cardiac Device:  No     Anesthesia:  MAC          Admission Type:  Same Day    / Armando Pride Prior to Day of Surgery: No    Case Location:  Main OR           Preadmission Testing:Phone Call              PAT Date and Time:______________________________________________________    PAT Confirmation #: ______________________________________________________    Post Op Visit: ___________________________________________________________    Need Preop Cardiac Clearance: Yes    Does Patient have Cardiologist/physician?      Dr Lawyer Wilkinson Confirmation #: __________________________________________________    Chrystine Maudlin: ________________________   Date: __________________________     Insurance Company Name: [employer]

## 2021-08-11 NOTE — TELEPHONE ENCOUNTER
DO NOT TAKE ASPIRIN, PLAVIX, FISH OIL, COUMADIN, IBUPROFEN, MOTRIN-LIKE DRUGS AND ANY MULTIVITAMINS OR OVER THE COUNTER SUPPLEMENTS 5 DAYS PRIOR TO SURGERY.                John Gupta          1935      Diagnosis:     Surgical Physician: Dr. Bailey Mohr have been scheduled for the procedure marked below:                  Surgery: Stage 2 Interstim                                          Date: 10/5/21     Anesthesia: MAC             Place of Service: [facility] Second Floor Same Day Surgery                                                                            Arrive to same day surgery by:  10:00 am  (Surgery time is subject to change)                             INSTRUCTIONS AS MARKED BELOW:     1.  DO NOT eat or drink anything after midnight before surgery. 2.  We prefer you shower or bathe with an antibacterial soap (Dial) the morning of surgery. 3.  Plan to spend the overnight at the hospital the day of surgery  4. Please bring a current medication list, photo ID and insurance card(s) with you  5. Okay to take Tylenol  6. If you take Glucophage, Metformin or Janumet, hold 48-hours prior to surgery. Hold the Glimepiride and the Actos the morning of surgery. 7.  Take blood pressure or heart medication as directed, if taken in the morning take with a small sip of water  8.  The office will call you in 1-2 days after your procedure to schedule a follow up

## 2021-08-11 NOTE — TELEPHONE ENCOUNTER
SURGERY [address] [facility] [address] [place], [address]                            Phone *[phone] *[phone]              Surgical Scheduling Direct Line Phone *[phone] Fax *[phone]        Radha Due          1935      male     [address]  [facility] [address]                Marital Status:                                                      Home Phone: [phone]      Cell Phone:        Telephone Information:   Mobile [phone]                                            Surgeon: Dr. Sujata Heller     Surgery Date: 10/5/21                       Time: 12:00 Noon     Procedure: Stage 2 Interstim                    Alessia Lines notified     Diagnosis: Overactive Bladder     Important Medical History: In Epic     Special Inst/Equip: Regular Room     CPT Codes:    63226  Latex Allergy:  No     Cardiac Device:  No      Anesthesia:    MAC                     Admission Type:  Same Day    / [place] Clipper Prior to Day of Surgery: No     Case Location:  Main OR                Preadmission Testing:Phone Call                           PAT Date and Time:______________________________________________________     PAT Confirmation #: ______________________________________________________     Post Op Visit: ___________________________________________________________     Need Preop Cardiac Clearance: Yes     Does Patient have Cardiologist/physician?      Dr Claudene Chasten     Surgery Confirmation #: __________________________________________________     : ________________________   Date: __________________________      Insurance Company Name: [address]

## 2021-08-11 NOTE — TELEPHONE ENCOUNTER
Patient scheduled for Stage 1 Interstim placement with Dr Edwards Patiradha on 9/24/21.  We are asking for clearance

## 2021-09-10 ENCOUNTER — HOSPITAL ENCOUNTER (OUTPATIENT)
Age: 86
Discharge: HOME | End: 2021-09-10
Payer: MEDICARE

## 2021-09-10 DIAGNOSIS — R35.0 URINARY FREQUENCY: ICD-10-CM

## 2021-09-10 DIAGNOSIS — N32.81 OAB (OVERACTIVE BLADDER): ICD-10-CM

## 2021-09-10 DIAGNOSIS — Z01.818 PRE-OP TESTING: ICD-10-CM

## 2021-09-10 DIAGNOSIS — R31.0 GROSS HEMATURIA: ICD-10-CM

## 2021-09-10 LAB
ANION GAP SERPL CALCULATED.3IONS-SCNC: 14 MEQ/L (ref 8–16)
BASOPHILS # BLD: 0.5 %
BASOPHILS ABSOLUTE: 0.1 THOU/MM3 (ref 0–0.1)
BUN BLDV-MCNC: 13 MG/DL (ref 7–22)
CALCIUM SERPL-MCNC: 9.6 MG/DL (ref 8.5–10.5)
CHLORIDE BLD-SCNC: 102 MEQ/L (ref 98–111)
CO2: 24 MEQ/L (ref 23–33)
CREAT SERPL-MCNC: 0.7 MG/DL (ref 0.4–1.2)
EOSINOPHIL # BLD: 3.2 %
EOSINOPHILS ABSOLUTE: 0.3 THOU/MM3 (ref 0–0.4)
ERYTHROCYTE [DISTWIDTH] IN BLOOD BY AUTOMATED COUNT: 13.3 % (ref 11.5–14.5)
ERYTHROCYTE [DISTWIDTH] IN BLOOD BY AUTOMATED COUNT: 50.1 FL (ref 35–45)
GFR SERPL CREATININE-BSD FRML MDRD: > 90 ML/MIN/1.73M2
GLUCOSE BLD-MCNC: 152 MG/DL (ref 70–108)
HCT VFR BLD CALC: 39 % (ref 42–52)
HEMOGLOBIN: 12.5 GM/DL (ref 14–18)
IMMATURE GRANS (ABS): 0.03 THOU/MM3 (ref 0–0.07)
IMMATURE GRANULOCYTES: 0.3 %
LYMPHOCYTES # BLD: 21.3 %
LYMPHOCYTES ABSOLUTE: 2.2 THOU/MM3 (ref 1–4.8)
MCH RBC QN AUTO: 32.8 PG (ref 26–33)
MCHC RBC AUTO-ENTMCNC: 32.1 GM/DL (ref 32.2–35.5)
MCV RBC AUTO: 102.4 FL (ref 80–94)
MONOCYTES # BLD: 6.5 %
MONOCYTES ABSOLUTE: 0.7 THOU/MM3 (ref 0.4–1.3)
NUCLEATED RED BLOOD CELLS: 0 /100 WBC
PLATELET # BLD: 363 THOU/MM3 (ref 130–400)
PMV BLD AUTO: 10.1 FL (ref 9.4–12.4)
POTASSIUM SERPL-SCNC: 4.6 MEQ/L (ref 3.5–5.2)
RBC # BLD: 3.81 MILL/MM3 (ref 4.7–6.1)
SEG NEUTROPHILS: 68.2 %
SEGMENTED NEUTROPHILS ABSOLUTE COUNT: 6.9 THOU/MM3 (ref 1.8–7.7)
SODIUM BLD-SCNC: 140 MEQ/L (ref 135–145)
WBC # BLD: 10.1 THOU/MM3 (ref 4.8–10.8)

## 2021-09-10 PROCEDURE — 80048 BASIC METABOLIC PNL TOTAL CA: CPT

## 2021-09-10 PROCEDURE — 87186 SC STD MICRODIL/AGAR DIL: CPT

## 2021-09-10 PROCEDURE — 85025 COMPLETE CBC W/AUTO DIFF WBC: CPT

## 2021-09-10 PROCEDURE — 36415 COLL VENOUS BLD VENIPUNCTURE: CPT

## 2021-09-10 PROCEDURE — 87077 CULTURE AEROBIC IDENTIFY: CPT

## 2021-09-10 PROCEDURE — 87086 URINE CULTURE/COLONY COUNT: CPT

## 2021-09-13 LAB
ORGANISM: ABNORMAL
URINE CULTURE, ROUTINE: ABNORMAL

## 2021-09-14 ENCOUNTER — TELEPHONE (OUTPATIENT)
Dept: UROLOGY | Age: 86
End: 2021-09-14

## 2021-09-14 RX ORDER — CEPHALEXIN 500 MG/1
500 CAPSULE ORAL 2 TIMES DAILY
Qty: 10 CAPSULE | Refills: 0 | Status: SHIPPED | OUTPATIENT
Start: 2021-09-14 | End: 2021-09-19

## 2021-09-20 NOTE — PROGRESS NOTES
In preparation for their surgical procedure above patient was screened for Obstructive Sleep Apnea (YOLY) using the STOP-Bang Questionnaire by the Pre-Admission Testing department. This is a pre-surgical screening tool for patient safety and serves as a recommendation, this WILL NOT cause cancellation of surgery. STOP-Bang Questionnaire  * Do you currently see a pulmonologist?  no   If yes STOP, do not complete. Patient follows with  .    1. Do you snore loudly (able to be heard in the next room)? No    2. Do you often feel tired or sleepy during the daytime? No       3. Has anyone ever told you that you stop breathing during your sleep? No    4. Do you have or are you being treated for high blood pressure? Yes      5. BMI more than 35? BMI (Calculated): 29.2        No    6. Age over 48 years? 81 y.o. Yes    7. Neck Circumference greater than 17 inches for male or 16 inches for female? Measured           (visits only)            Not Applicable    8. Gender Male? Yes      TOTAL SCORE: 3    YOLY - Low Risk : Yes to 0 - 2 questions  YOLY - Intermediate Risk : Yes to 3 - 4 questions  YOLY - High Risk : Yes to 5 - 8 questions    Adapted from:   STOP Questionnaire: A Tool to Screen Patients for Obstructive Sleep Apnea   ATILOI Baugh.C.P.C., Julian Dooley M.B.B.S., Santosh Zheng M.D., Essnece Vazquez. Kang Casarez, Ph.D., NASIM Thomas.B.B.S., Casie Garland M.Sc., Nancy Franks M.D., Markpreethi Gamble. AMARJIT Tarango.P.C.    Anesthesiology 2008; 618:189-82 Copyright 2008, the [address] of [employer]est[employer], [place] [address].   ----------------------------------------------------------------------------------------------------------------

## 2021-09-20 NOTE — PROGRESS NOTES
Following instructions given to patient, who states understanding:     NPO after midnight  [employer] and 's license  Wear comfortable clean clothing  Do not bring jewelry   Shower night before and morning of surgery with a liquid antibacterial soap  Bring medications in original bottles  Follow all instructions given by your physician   needed at discharge  Call [phone] for any questions  Report to SDS on 2nd floor  If you would become ill prior to surgery, please call the surgeon  Please bring and wear mask

## 2021-09-24 ENCOUNTER — APPOINTMENT (OUTPATIENT)
Dept: GENERAL RADIOLOGY | Age: 86
End: 2021-09-24
Attending: UROLOGY
Payer: MEDICARE

## 2021-09-24 ENCOUNTER — HOSPITAL ENCOUNTER (OUTPATIENT)
Age: 86
Setting detail: OUTPATIENT SURGERY
Discharge: HOME | End: 2021-09-24
Attending: UROLOGY | Admitting: UROLOGY
Payer: MEDICARE

## 2021-09-24 ENCOUNTER — ANESTHESIA EVENT (OUTPATIENT)
Dept: OPERATING ROOM | Age: 86
End: 2021-09-24
Payer: MEDICARE

## 2021-09-24 ENCOUNTER — ANESTHESIA (OUTPATIENT)
Dept: OPERATING ROOM | Age: 86
End: 2021-09-24
Payer: MEDICARE

## 2021-09-24 VITALS
HEIGHT: 69 IN | SYSTOLIC BLOOD PRESSURE: 143 MMHG | WEIGHT: 195.4 LBS | DIASTOLIC BLOOD PRESSURE: 77 MMHG | OXYGEN SATURATION: 94 % | HEART RATE: 72 BPM | BODY MASS INDEX: 28.94 KG/M2 | TEMPERATURE: 97.5 F | RESPIRATION RATE: 16 BRPM

## 2021-09-24 VITALS
RESPIRATION RATE: 19 BRPM | DIASTOLIC BLOOD PRESSURE: 56 MMHG | SYSTOLIC BLOOD PRESSURE: 89 MMHG | OXYGEN SATURATION: 98 % | TEMPERATURE: 97 F

## 2021-09-24 DIAGNOSIS — G89.18 POSTOPERATIVE PAIN: Primary | ICD-10-CM

## 2021-09-24 LAB — GLUCOSE BLD-MCNC: 143 MG/DL (ref 70–108)

## 2021-09-24 PROCEDURE — 2720000010 HC SURG SUPPLY STERILE: Performed by: UROLOGY

## 2021-09-24 PROCEDURE — 3700000000 HC ANESTHESIA ATTENDED CARE: Performed by: UROLOGY

## 2021-09-24 PROCEDURE — 7100000011 HC PHASE II RECOVERY - ADDTL 15 MIN: Performed by: UROLOGY

## 2021-09-24 PROCEDURE — 6360000002 HC RX W HCPCS: Performed by: UROLOGY

## 2021-09-24 PROCEDURE — 3600000002 HC SURGERY LEVEL 2 BASE: Performed by: UROLOGY

## 2021-09-24 PROCEDURE — 7100000010 HC PHASE II RECOVERY - FIRST 15 MIN: Performed by: UROLOGY

## 2021-09-24 PROCEDURE — 2709999900 HC NON-CHARGEABLE SUPPLY: Performed by: UROLOGY

## 2021-09-24 PROCEDURE — 6360000002 HC RX W HCPCS: Performed by: NURSE ANESTHETIST, CERTIFIED REGISTERED

## 2021-09-24 PROCEDURE — 3600000012 HC SURGERY LEVEL 2 ADDTL 15MIN: Performed by: UROLOGY

## 2021-09-24 PROCEDURE — C1778 LEAD, NEUROSTIMULATOR: HCPCS | Performed by: UROLOGY

## 2021-09-24 PROCEDURE — 82948 REAGENT STRIP/BLOOD GLUCOSE: CPT

## 2021-09-24 PROCEDURE — 3209999900 FLUORO FOR SURGICAL PROCEDURES

## 2021-09-24 PROCEDURE — 2580000003 HC RX 258: Performed by: UROLOGY

## 2021-09-24 PROCEDURE — 3700000001 HC ADD 15 MINUTES (ANESTHESIA): Performed by: UROLOGY

## 2021-09-24 PROCEDURE — 72220 X-RAY EXAM SACRUM TAILBONE: CPT

## 2021-09-24 PROCEDURE — 2500000003 HC RX 250 WO HCPCS: Performed by: NURSE ANESTHETIST, CERTIFIED REGISTERED

## 2021-09-24 PROCEDURE — 2500000003 HC RX 250 WO HCPCS: Performed by: UROLOGY

## 2021-09-24 DEVICE — KIT LEAD SURE SCAN INTERSTIM MRI: Type: IMPLANTABLE DEVICE | Status: FUNCTIONAL

## 2021-09-24 RX ORDER — LIDOCAINE HCL/PF 100 MG/5ML
SYRINGE (ML) INJECTION PRN
Status: DISCONTINUED | OUTPATIENT
Start: 2021-09-24 | End: 2021-09-24 | Stop reason: SDUPTHER

## 2021-09-24 RX ORDER — HYDROCODONE BITARTRATE AND ACETAMINOPHEN 5; 325 MG/1; MG/1
1 TABLET ORAL EVERY 4 HOURS PRN
Qty: 18 TABLET | Refills: 0 | Status: SHIPPED | OUTPATIENT
Start: 2021-09-24 | End: 2021-09-27

## 2021-09-24 RX ORDER — LIDOCAINE HYDROCHLORIDE 10 MG/ML
INJECTION, SOLUTION EPIDURAL; INFILTRATION; INTRACAUDAL; PERINEURAL PRN
Status: DISCONTINUED | OUTPATIENT
Start: 2021-09-24 | End: 2021-09-24 | Stop reason: ALTCHOICE

## 2021-09-24 RX ORDER — CEFAZOLIN SODIUM 2 G/100ML
2000 INJECTION, SOLUTION INTRAVENOUS
Status: COMPLETED | OUTPATIENT
Start: 2021-09-24 | End: 2021-09-24

## 2021-09-24 RX ORDER — SODIUM CHLORIDE 9 MG/ML
INJECTION, SOLUTION INTRAVENOUS CONTINUOUS
Status: DISCONTINUED | OUTPATIENT
Start: 2021-09-24 | End: 2021-09-24 | Stop reason: HOSPADM

## 2021-09-24 RX ORDER — PROPOFOL 10 MG/ML
INJECTION, EMULSION INTRAVENOUS PRN
Status: DISCONTINUED | OUTPATIENT
Start: 2021-09-24 | End: 2021-09-24 | Stop reason: SDUPTHER

## 2021-09-24 RX ORDER — FENTANYL CITRATE 50 UG/ML
INJECTION, SOLUTION INTRAMUSCULAR; INTRAVENOUS PRN
Status: DISCONTINUED | OUTPATIENT
Start: 2021-09-24 | End: 2021-09-24 | Stop reason: SDUPTHER

## 2021-09-24 RX ORDER — CEPHALEXIN 500 MG/1
500 CAPSULE ORAL 3 TIMES DAILY
Qty: 15 CAPSULE | Refills: 0 | Status: SHIPPED | OUTPATIENT
Start: 2021-09-24 | End: 2021-09-29

## 2021-09-24 RX ORDER — ONDANSETRON 2 MG/ML
INJECTION INTRAMUSCULAR; INTRAVENOUS PRN
Status: DISCONTINUED | OUTPATIENT
Start: 2021-09-24 | End: 2021-09-24 | Stop reason: SDUPTHER

## 2021-09-24 RX ADMIN — FENTANYL CITRATE 25 MCG: 50 INJECTION, SOLUTION INTRAMUSCULAR; INTRAVENOUS at 14:25

## 2021-09-24 RX ADMIN — PROPOFOL 30 MG: 10 INJECTION, EMULSION INTRAVENOUS at 14:04

## 2021-09-24 RX ADMIN — PROPOFOL 25 MG: 10 INJECTION, EMULSION INTRAVENOUS at 14:19

## 2021-09-24 RX ADMIN — Medication 70 MG: at 14:04

## 2021-09-24 RX ADMIN — FENTANYL CITRATE 25 MCG: 50 INJECTION, SOLUTION INTRAMUSCULAR; INTRAVENOUS at 14:04

## 2021-09-24 RX ADMIN — FENTANYL CITRATE 25 MCG: 50 INJECTION, SOLUTION INTRAMUSCULAR; INTRAVENOUS at 14:18

## 2021-09-24 RX ADMIN — PROPOFOL 25 MG: 10 INJECTION, EMULSION INTRAVENOUS at 14:11

## 2021-09-24 RX ADMIN — SODIUM CHLORIDE: 9 INJECTION, SOLUTION INTRAVENOUS at 11:42

## 2021-09-24 RX ADMIN — PROPOFOL 25 MG: 10 INJECTION, EMULSION INTRAVENOUS at 14:48

## 2021-09-24 RX ADMIN — PROPOFOL 25 MG: 10 INJECTION, EMULSION INTRAVENOUS at 14:40

## 2021-09-24 RX ADMIN — CEFAZOLIN SODIUM 2000 MG: 2 INJECTION, SOLUTION INTRAVENOUS at 14:04

## 2021-09-24 RX ADMIN — PROPOFOL 25 MG: 10 INJECTION, EMULSION INTRAVENOUS at 14:27

## 2021-09-24 RX ADMIN — PROPOFOL 20 MG: 10 INJECTION, EMULSION INTRAVENOUS at 14:07

## 2021-09-24 RX ADMIN — PROPOFOL 25 MG: 10 INJECTION, EMULSION INTRAVENOUS at 14:23

## 2021-09-24 RX ADMIN — PROPOFOL 25 MG: 10 INJECTION, EMULSION INTRAVENOUS at 14:15

## 2021-09-24 RX ADMIN — PROPOFOL 25 MG: 10 INJECTION, EMULSION INTRAVENOUS at 14:32

## 2021-09-24 RX ADMIN — FENTANYL CITRATE 25 MCG: 50 INJECTION, SOLUTION INTRAMUSCULAR; INTRAVENOUS at 14:10

## 2021-09-24 RX ADMIN — PROPOFOL 25 MG: 10 INJECTION, EMULSION INTRAVENOUS at 14:36

## 2021-09-24 RX ADMIN — PROPOFOL 25 MG: 10 INJECTION, EMULSION INTRAVENOUS at 14:44

## 2021-09-24 RX ADMIN — ONDANSETRON HYDROCHLORIDE 4 MG: 4 INJECTION, SOLUTION INTRAMUSCULAR; INTRAVENOUS at 14:04

## 2021-09-24 ASSESSMENT — PULMONARY FUNCTION TESTS
PIF_VALUE: 0
PIF_VALUE: 1
PIF_VALUE: 0
PIF_VALUE: 1
PIF_VALUE: 0
PIF_VALUE: 1
PIF_VALUE: 0
PIF_VALUE: 1
PIF_VALUE: 0

## 2021-09-24 ASSESSMENT — PAIN - FUNCTIONAL ASSESSMENT: PAIN_FUNCTIONAL_ASSESSMENT: 0-10

## 2021-09-24 ASSESSMENT — PAIN SCALES - GENERAL
PAINLEVEL_OUTOF10: 0

## 2021-09-24 NOTE — H&P
nightly   Yes Historical Provider, MD   metFORMIN (GLUCOPHAGE-XR) 750 MG extended release tablet Take 1,500 mg by mouth Daily with supper 2 tab   Yes Historical Provider, MD       Allergies:  Patient has no known allergies. Social History:    Social History     Socioeconomic History    Marital status:      Spouse name: Not on file    Number of children: 3    Years of education: Not on file    Highest education level: Not on file   Occupational History    Not on file   Tobacco Use    Smoking status: Former Smoker     Packs/day: 0.50     Years: 13.00     Pack years: 6.50     Types: Pipe, Cigarettes     Quit date:      Years since quittin.7    Smokeless tobacco: Never Used   Vaping Use    Vaping Use: Never assessed   Substance and Sexual Activity    Alcohol use: Not Currently    Drug use: Never    Sexual activity: Not on file   Other Topics Concern    Not on file   Social History Narrative    Not on file     Social Determinants of Health     Financial Resource Strain:     Difficulty of Paying Living Expenses:    Food Insecurity:     Worried About [address] in the Last Year:     [address] [employer] in the Last Year:    Transportation Needs:     Lack of Transportation (Medical):      Lack of Transportation (Non-Medical):    Physical Activity:     Days of Exercise per Week:     Minutes of Exercise per Session:    Stress:     Feeling of Stress :    Social Connections:     Frequency of Communication with Friends and Family:     Frequency of Social Gatherings with Friends and Family:     Attends Adventism Services:     Active Member of Clubs or Organizations:     Attends Club or Organization Meetings:     Marital Status:    Intimate Partner Violence:     Fear of Current or Ex-Partner:     Emotionally Abused:     Physically Abused:     Sexually Abused:        Family History:    Family History   Problem Relation Age of Onset    Heart Disease Father     Heart Attack Father     High Blood Pressure Sister     Cancer Neg Hx     Diabetes Neg Hx     Stroke Neg Hx        REVIEW OF SYSTEMS:  Constitutional: negative  Eyes: negative  Respiratory: negative  Cardiovascular: negative  Gastrointestinal: negative  Genitourinary: no acute issues  Musculoskeletal: negative  Skin: negative   Neurological: negative  Hematological/Lymphatic: negative  Psychological: negative    Physical Exam:      No data found. Constitutional: Patient in no acute distress; Neuro: alert and oriented to person place and time. Psych: Mood and affect normal.  Skin: Normal  Lungs: Respiratory effort normal, CTA  Cardiovascular:  Normal peripheral pulses; no murmur. Normal rhythm  Abdomen: Soft, non-tender, non-distended with no CVA, flank pain, hepatosplenomegaly or hernia. Kidneys normal.  Bladder non-tender and not distended. LABS:   No results for input(s): WBC, HGB, HCT, MCV, PLT in the last 72 hours. No results for input(s): NA, K, CL, CO2, PHOS, BUN, CREATININE in the last 72 hours. Invalid input(s): CA  Lab Results   Component Value Date    PSA 0.88 05/19/2020         Urinalysis: No results for input(s): COLORU, PHUR, LABCAST, WBCUA, RBCUA, MUCUS, TRICHOMONAS, YEAST, BACTERIA, CLARITYU, SPECGRAV, LEUKOCYTESUR, UROBILINOGEN, Walt Panda in the last 72 hours.     Invalid input(s): NITRATE, GLUCOSEUKETONESUAMORPHOUS     -----------------------------------------------------------------      Assessment and Plan     Impression:    Patient Active Problem List   Diagnosis    Abnormal stress test    Delirious    Leukocytosis    BPH (benign prostatic hyperplasia)    Urinary retention due to benign prostatic hyperplasia    E. coli UTI    Type 2 diabetes mellitus with other specified complication (HCC)    History of CVA (cerebrovascular accident)    COVID-19 ruled out by laboratory testing       Plan:     Consent obtained; stage I in OR today    Fatimah Stephens MD  6:03 AM 9/24/2021

## 2021-09-24 NOTE — ANESTHESIA PRE PROCEDURE
Department of Anesthesiology  Preprocedure Note       Name:  Hardy Velazquez   Age:  81 y.o.  :  1935                                          MRN:  [id number]         Date:  2021      Surgeon: Jesus Colón):  Madeleine Romo MD    Procedure: Procedure(s):  STAGE I INTERSTIM    Medications prior to admission:   Prior to Admission medications    Medication Sig Start Date End Date Taking?  Authorizing Provider   Metoprolol Succinate (TOPROL XL PO) Take 100 mg by mouth 2 times daily Pt takes as needed for bp if greater than 135   Yes Historical Provider, MD   QUEtiapine (SEROQUEL) 25 MG tablet Take 25 mg by mouth nightly   Yes Historical Provider, MD   donepezil (ARICEPT ODT) 10 MG disintegrating tablet Take 10 mg by mouth every morning   Yes Historical Provider, MD   lisinopril (PRINIVIL;ZESTRIL) 20 MG tablet Take 20 mg by mouth 2 times daily    Yes Historical Provider, MD   glimepiride (AMARYL) 2 MG tablet Take 1 tablet by mouth daily (with breakfast) 20  Yes Javi Pham DO   pioglitazone (ACTOS) 30 MG tablet Take 30 mg by mouth daily   Yes Historical Provider, MD   simvastatin (ZOCOR) 40 MG tablet Take 40 mg by mouth nightly   Yes Historical Provider, MD   metFORMIN (GLUCOPHAGE-XR) 750 MG extended release tablet Take 1,500 mg by mouth Daily with supper 2 tab   Yes Historical Provider, MD       Current medications:    Current Facility-Administered Medications   Medication Dose Route Frequency Provider Last Rate Last Admin    0.9 % sodium chloride infusion   IntraVENous Continuous Madeleine Romo  mL/hr at 21 1142 New Bag at 21 1142    ceFAZolin (ANCEF) 2000 mg in dextrose 4 % 100 mL IVPB (premix)  2,000 mg IntraVENous 30 Min Pre-Op Madeleine Romo MD           Allergies:  No Known Allergies    Problem List:    Patient Active Problem List   Diagnosis Code    Abnormal stress test R94.39    Delirious R41.0    Leukocytosis D72.829    BPH (benign prostatic hyperplasia) N40.0    Urinary retention due to benign prostatic hyperplasia N40.1, R33.8    E. coli UTI N39.0, B96.20    Type 2 diabetes mellitus with other specified complication (HCC) Q22.03    History of CVA (cerebrovascular accident) Z80.78    COVID-19 ruled out by laboratory testing Z20.822       Past Medical History:        Diagnosis Date    Cerebral artery occlusion with cerebral infarction ([facility]     weakness right hand    DM (diabetes mellitus) ([facility]     Hyperlipidemia     Hypertension     Stroke ([facility]     Urinary incontinence     Urinary retention        Past Surgical History:        Procedure Laterality Date    CARDIAC CATHETERIZATION  2020    CARDIOVASCULAR STRESS TEST      CYSTOSCOPY N/A 2020    CYSTOSCOPY, GREENLIGHT PHOTOVAPORIZATION OF PROSTATE performed by Madeleine Romo MD at [address] [place] [address]      total right knee    KNEE ARTHROPLASTY Right        Social History:    Social History     Tobacco Use    Smoking status: Former Smoker     Packs/day: 0.50     Years: 13.00     Pack years: 6.50     Types: Pipe, Cigarettes     Quit date:      Years since quittin.7    Smokeless tobacco: Never Used   Substance Use Topics    Alcohol use: Not Currently                                Counseling given: Not Answered      Vital Signs (Current):   Vitals:    21 1548 21 1129   BP:  (!) 161/85   Pulse:  76   Resp:  20   Temp:  96.5 °F (35.8 °C)   TempSrc:  Tympanic   SpO2:  94%   Weight: 200 lb (90.7 kg) 195 lb 6.4 oz (88.6 kg)   Height: 5' 9.5\" (1.765 m) 5' 9\" (1.753 m)                                              BP Readings from Last 3 Encounters:   21 (!) 161/85   21 122/78   21 132/80       NPO Status: Time of last liquid consumption: 0800 (sip of water with meds)                        Time of last solid consumption: 1800                        Date of last liquid consumption: 21                        Date of last solid food consumption: 21    BMI: Wt Readings from Last 3 Encounters:   09/24/21 195 lb 6.4 oz (88.6 kg)   08/10/21 198 lb 9.6 oz (90.1 kg)   05/12/21 198 lb (89.8 kg)     Body mass index is 28.86 kg/m². CBC:   Lab Results   Component Value Date    WBC 10.1 09/10/2021    RBC 3.81 09/10/2021    HGB 12.5 09/10/2021    HCT 39.0 09/10/2021    .4 09/10/2021    RDW 14.1 05/10/2021     09/10/2021       CMP:   Lab Results   Component Value Date     09/10/2021    K 4.6 09/10/2021    K 4.1 06/19/2020     09/10/2021    CO2 24 09/10/2021    BUN 13 09/10/2021    CREATININE 0.7 09/10/2021    LABGLOM >90 09/10/2021    GLUCOSE 152 09/10/2021    PROT 7.2 07/27/2020    PROT 7.4 05/19/2020    CALCIUM 9.6 09/10/2021    BILITOT 0.4 07/27/2020    ALKPHOS 51 07/27/2020    AST 23 07/27/2020    ALT 25 07/27/2020       POC Tests:   Recent Labs     09/24/21  1148   POCGLU 143*       Coags:   Lab Results   Component Value Date    PROTIME 13.0 05/10/2021    INR 1.13 05/10/2021    APTT 31.4 06/19/2020       HCG (If Applicable): No results found for: PREGTESTUR, PREGSERUM, HCG, HCGQUANT     ABGs: No results found for: PHART, PO2ART, PQQ0SMW, YCL0TIR, BEART, V9WRDBJS     Type & Screen (If Applicable):  Lab Results   Component Value Date    LABRH NEG 06/19/2020       Drug/Infectious Status (If Applicable):  No results found for: HIV, HEPCAB    COVID-19 Screening (If Applicable):   Lab Results   Component Value Date    COVID19 Not Detected 07/29/2020           Anesthesia Evaluation    Airway: Mallampati: II  TM distance: >3 FB     Mouth opening: > = 3 FB Dental:          Pulmonary: breath sounds clear to auscultation                             Cardiovascular:    (+) hypertension:,         Rhythm: regular                      Neuro/Psych:   (+) CVA:,             GI/Hepatic/Renal:             Endo/Other:    (+) Diabetes, . Abdominal:             Vascular:           Other Findings:             Anesthesia Plan      MAC     ASA 3 (Possible GA)  Induction: intravenous. MIPS: Postoperative opioids intended and Prophylactic antiemetics administered. Anesthetic plan and risks discussed with patient and spouse. Plan discussed with CRNA.                   Hawk Juarez MD   9/24/2021

## 2021-09-24 NOTE — PROGRESS NOTES
Home going instructions given to pt and wife. Both verbalize understanding. Awaiting daughter to pick them up for discharge.

## 2021-09-24 NOTE — PROGRESS NOTES
Pt admitted to [facility] room 10 and oriented to unit. SCD sleeves applied. Nares swabbed. Pt verbalized permission for first name, last initial and physicians name on white board. SDS board and discharge criteria explained, pt and family verbalized understanding. Pt denies thoughts of harming self or others. Call light in reach. Family at the bedside.

## 2021-09-24 NOTE — PROGRESS NOTES
Pt  asking where he is . Pt reoriented to place and surgery that was done. Pt was able to repeat and acknowledge this infgo. Pt wife states that he has some confusion normally.

## 2021-09-24 NOTE — PROGRESS NOTES
Dr. Hudson Lott called re: pt stating left eye hurting and reddened. Dr. Hudson Lott came to see pt and instructed on using Visine eye drops. Wife and pt instructed on this and written in home going instructions.

## 2021-09-24 NOTE — ANESTHESIA POSTPROCEDURE EVALUATION
Department of Anesthesiology  Postprocedure Note    Patient: Meryle Daft  MRN: [id number]  YOB: 1935  Date of evaluation: 9/24/2021  Time:  3:07 PM     Procedure Summary     Date: 09/24/21 Room / Location: [facility]    Anesthesia Start: 1355 Anesthesia Stop: 4367    Procedure: STAGE I INTERSTIM (N/A Back) Diagnosis: (OVERACTIVE BLADDER)    Surgeons: Lisa Cage MD Responsible Provider: Johnna Alford MD    Anesthesia Type: MAC ASA Status: 3          Anesthesia Type: MAC    Mateus Phase I: Mateus Score: 10    Mateus Phase II:      Last vitals: Reviewed and per EMR flowsheets.        Anesthesia Post Evaluation    Patient location during evaluation: bedside  Patient participation: complete - patient participated  Level of consciousness: awake and alert  Pain score: 0  Airway patency: patent  Nausea & Vomiting: no nausea and no vomiting  Complications: no  Cardiovascular status: hemodynamically stable  Respiratory status: spontaneous ventilation and acceptable  Hydration status: stable

## 2021-09-27 NOTE — OP NOTE
Patient:  Angela Earl  MRN: [id number]  YOB: 1935    FACILITY: [facility]    DATE: 9/24/2021    SURGEON: Shefali Ortiz MD     ASSISTANT: none    PREOPERATIVE DIAGNOSIS: OVERACTIVE BLADDER    POSTOPERATIVE DIAGNOSIS: oab    PROCEDURE PERFORMED:     1. Interstim Sacral Neuromodulation System Placement, Stage 1    ANESTHESIA: Monitor Anesthesia Care    ESTIMATED BLOOD LOSS: 3 ml    COMPLICATIONS: None immediate    DRAINS: none    SPECIMENS: none    INDICATIONS FOR PROCEDURE:  The patient is a 81 y.o. male who presents with urinary frequency, urgency and urge incontinence which has been refractory to anticholinergic medications and has been very bothersome. He was given the option of third line management, which was either a Botox injection or neuromodulation. The risks and benefits of treatment as well as all available alternatives and complications were discussed and he wished to undergo interstim placement. Narrative of the Procedure: The patient was properly identified and placed in prone position. Pillows were placed under lower abdomen to flatten sacrum and under shins to allow the toes to dangle freely. A ground pad was placed on the bottom of the patients foot and the long test stimulation cable was connected to the ground pad and the external test stimulator. The patient was prepped and draped in usual sterile fashion. A timeout occurred in which two patient identifiers were used. The sciatic notches and sacral midline were identified using fluoroscopy. Local injection was administered around the anticipated foramen needle entry point which was 2cm lateral to the sacral midline and 2cm cephalad of sciatic notch level. A foramen needle was introduced at an approximate 60 degree angle, feeling for the foraminal margins until S3 was identified.  Proper needle position was confirmed by stimulating the needle and observing a bellowing response, and also plantar flexion of the big toe. In addition, fluoroscopic images demonstrated appropriate placement. The foramen needle stylet was removed and a directional guide was placed through the needle. The foramen needle was removed by sliding it over the directional guide. A small incision was made next to the directional guide through the skin with a 15-blade knife. The lead introducer with dilator was placed over the directional guide. Utilizing fluoroscopic guidance the lead introducer was advanced until the radiopaque syed was half-way through the foramen. The dilator was removed along with the directional guide. Using fluoroscopy, the tined lead with the angled stylet was placed through the introducer until electrodes two and three straddled the anterior edge of the sacrum. All four electrodes were tested, observing maxx and plantar flexion of the great toe. All 4 electrodes demonstrated excellent responses. After satisfactory lead positioning was confirmed, the introducer was retracted over the lead under continuous fluoroscopy, deploying the tines into the presacral tissues. Re-testing after removal of the introducer re-confirmed the aforementioned responses. The potential internal neurostimulator pocket site was identified on the patient's right side below the iliac crest and lateral to the sacrum. Local was administered and an incision was made into the subcutaneous tissue creating a connection site. Blunt dissection was used to create a small pocket with hemostasis achieved. A tunneling tool with sheath was placed from the lead exit site subcutaneously to the small incised pocket site. The tunneling tool was removed and the lead was fed through the sheath, exiting at the pocket site. The sheath was removed. The lead was cleaned and dried. A protective boot was placed over the lead; the lead was inserted into the temporary percutaneous extension with visual confirmation of blue tip advancement.  The four sets-crews were tightened with the torque wrench until audible clicks were heard. The boot was slid over the connection and 2-0 prolene ties were used to secure the boot in place. Using the tunneling tool and sheath, a subcutaneous tunnel was created from the pocket site to the contralateral buttock. The percutaneous extension was placed through the sheath, the sheath removed, leaving the extension exiting the site. The connection components were placed into the incised pocket. The incisions and pocket were irrigated and closed. The dermis was re-approximated with 2-0 Vicryl. The skin was closed with a 4-0 Monocryl in a subcuticular manner. The percutaneous extension was attached to the external gray twist-lock cable. The patient was awakened and transferred to the PACU in good and stable condition. In the PACU, using the external test stimulator, the patient was programmed to the electrode of optimum sensation and provided utilization instructions prior to discharge. Follow-Up: Patient will complete a voiding diary during the testing period. Should they have an appropriate response, the patient will present for a stage 2 procedure in the coming weeks. In addition, the patient will be discharged on 5 days of Keflex.        Ron Meeks MD

## 2021-09-30 ENCOUNTER — OFFICE VISIT (OUTPATIENT)
Dept: UROLOGY | Age: 86
End: 2021-09-30
Payer: MEDICARE

## 2021-09-30 VITALS
SYSTOLIC BLOOD PRESSURE: 148 MMHG | BODY MASS INDEX: 29.18 KG/M2 | HEART RATE: 72 BPM | WEIGHT: 197 LBS | DIASTOLIC BLOOD PRESSURE: 78 MMHG | HEIGHT: 69 IN

## 2021-09-30 DIAGNOSIS — N32.81 OAB (OVERACTIVE BLADDER): Primary | ICD-10-CM

## 2021-09-30 DIAGNOSIS — R35.0 URINARY FREQUENCY: ICD-10-CM

## 2021-09-30 LAB
BILIRUBIN URINE: NEGATIVE
BLOOD URINE, POC: NEGATIVE
CHARACTER, URINE: CLEAR
COLOR, URINE: YELLOW
GLUCOSE URINE: NEGATIVE MG/DL
KETONES, URINE: NEGATIVE
LEUKOCYTE CLUMPS, URINE: NEGATIVE
NITRITE, URINE: NEGATIVE
PH, URINE: 5.5 (ref 5–9)
POST VOID RESIDUAL (PVR): 104 ML
PROTEIN, URINE: 30 MG/DL
SPECIFIC GRAVITY, URINE: >= 1.03 (ref 1–1.03)
UROBILINOGEN, URINE: 0.2 EU/DL (ref 0–1)

## 2021-09-30 PROCEDURE — 81003 URINALYSIS AUTO W/O SCOPE: CPT | Performed by: NURSE PRACTITIONER

## 2021-09-30 PROCEDURE — 51798 US URINE CAPACITY MEASURE: CPT | Performed by: NURSE PRACTITIONER

## 2021-09-30 PROCEDURE — 99024 POSTOP FOLLOW-UP VISIT: CPT | Performed by: NURSE PRACTITIONER

## 2021-09-30 NOTE — PROGRESS NOTES
[address] [facility] [phone]  Dept: [phone]  Loc: [phone]  Visit Date: 9/30/2021      HPI:     Jami Manjarrez is a 81 y. o. with past medical history as listed below who presents today in follow-up for OAB. Underwent interstim sacral neuromodulation system placement, Andrey 1 by Dr Bernhard Oppenheim on 9-. Doing well. 50 % improvement. Wife hasn't had to wash pajamas all week. Frequency every hour, instead of every 30 min. Nocturia is improved. Pt overall pleased. Milli Baron comes in today with his wife. Hx is obtained from the patient and medical record. Current Outpatient Medications   Medication Sig Dispense Refill    Metoprolol Succinate (TOPROL XL PO) Take 100 mg by mouth 2 times daily Pt takes as needed for bp if greater than 135      QUEtiapine (SEROQUEL) 25 MG tablet Take 25 mg by mouth nightly      donepezil (ARICEPT ODT) 10 MG disintegrating tablet Take 10 mg by mouth every morning      lisinopril (PRINIVIL;ZESTRIL) 20 MG tablet Take 20 mg by mouth 2 times daily       glimepiride (AMARYL) 2 MG tablet Take 1 tablet by mouth daily (with breakfast) 30 tablet 3    pioglitazone (ACTOS) 30 MG tablet Take 30 mg by mouth daily      simvastatin (ZOCOR) 40 MG tablet Take 40 mg by mouth nightly      metFORMIN (GLUCOPHAGE-XR) 750 MG extended release tablet Take 1,500 mg by mouth Daily with supper 2 tab    Instructed to hold for surgery       No current facility-administered medications for this visit. Past Medical History  Milli Baron  has a past medical history of Cerebral artery occlusion with cerebral infarction ([facility] [address] DM (diabetes mellitus) ([facility] [address] Hyperlipidemia, Hypertension, Stroke [facility]), Urinary incontinence, and Urinary retention.     Past Surgical History  The patient  has a past surgical history that includes Knee Arthroplasty (Right); cardiovascular stress test; eye surgery; Cardiac catheterization (06/19/2020); Cystoscopy (N/A, 8/4/2020); joint replacement; and Stimulator Surgery (N/A, 9/24/2021). Family History  This patient's family history includes Heart Attack in his father; Heart Disease in his father; High Blood Pressure in his sister. Social History  Juliana Macdonald  reports that he quit smoking about 49 years ago. His smoking use included pipe and cigarettes. He has a 6.50 pack-year smoking history. He has never used smokeless tobacco. He reports previous alcohol use. He reports that he does not use drugs. Subjective:     Review of Systems  No problems with ears, nose or throat. No problems with eyes. No chest pain, shortness of breath, abdominal pain, extremity pain or weakness, and no neurological deficits. No rashes.  symptoms per HPI. The remainder of the review of symptoms is negative. Objective:     PE:   Vitals:    09/30/21 1446   BP: (!) 148/78   Pulse: 72   Weight: 197 lb (89.4 kg)   Height: 5' 9\" (1.753 m)       Constitutional: Alert and oriented times 3, no acute distress and cooperative to examination with appropriate mood and affect. HENT:   Head:        Normocephalic and atraumatic. Mouth/Throat:         Mucous membranes are normal.   Eyes:         EOM are normal. No scleral icterus. PERRLA. Neck:        Supple, symmetrical, trachea midline    Pulmonary/Chest:    Normal respiratory rate and rhthym. No use of accessory muscles. Abdominal:         Soft. No tenderness. Bowel sounds present. Back : dressing dry and intact. Incision approximated. Musculoskeletal:         Normal range of motion. No edema or tenderness of lower extremities. Extremities: No cyanosis, clubbing, or edema present. Neurological:        Alert and oriented. No cranial nerve deficit. [place] [facility]:        Normal mood and affect.       Labs   Urine dip demonstrates   Results for POC orders placed in visit on 09/30/21   POCT Urinalysis No Micro (Auto)   Result Value Ref Range Glucose, Ur Negative NEGATIVE mg/dl    Bilirubin Urine Negative     Ketones, Urine Negative NEGATIVE    Specific Gravity, Urine >= 1.030 1.002 - 1.030    Blood, UA POC Negative NEGATIVE    pH, Urine 5.50 5.0 - 9.0    Protein, Urine 30 (A) NEGATIVE mg/dl    Urobilinogen, Urine 0.20 0.0 - 1.0 eu/dl    Nitrite, Urine Negative NEGATIVE    Leukocyte Clumps, Urine Negative NEGATIVE    Color, Urine Yellow YELLOW-STRAW    Character, Urine Clear CLR-SL.CLOUD   poct post void residual   Result Value Ref Range    post void residual 104 ml       Patients recent PSA values are as follows  Lab Results   Component Value Date    PSA 0.88 05/19/2020        Recent BUN/Creatinine:  Lab Results   Component Value Date    BUN 13 09/10/2021    CREATININE 0.7 09/10/2021         Assessment & Plan:     Lizz Gonsales was seen today for post-op check. Diagnoses and all orders for this visit:    OAB (overactive bladder)    Urinary frequency  -     poct post void residual  -     POCT Urinalysis No Micro (Auto)    10/5 for 2nd stage Interstim placement. No follow-ups on file.     Maryanne Botello, APRN - CNP, APRN  Urology

## 2021-10-05 ENCOUNTER — ANESTHESIA EVENT (OUTPATIENT)
Dept: OPERATING ROOM | Age: 86
End: 2021-10-05
Payer: MEDICARE

## 2021-10-05 ENCOUNTER — ANESTHESIA (OUTPATIENT)
Dept: OPERATING ROOM | Age: 86
End: 2021-10-05
Payer: MEDICARE

## 2021-10-05 ENCOUNTER — HOSPITAL ENCOUNTER (OUTPATIENT)
Age: 86
Setting detail: OUTPATIENT SURGERY
Discharge: HOME | End: 2021-10-05
Attending: UROLOGY | Admitting: UROLOGY
Payer: MEDICARE

## 2021-10-05 VITALS
RESPIRATION RATE: 16 BRPM | DIASTOLIC BLOOD PRESSURE: 85 MMHG | HEIGHT: 69 IN | HEART RATE: 71 BPM | SYSTOLIC BLOOD PRESSURE: 170 MMHG | OXYGEN SATURATION: 95 % | BODY MASS INDEX: 28.71 KG/M2 | WEIGHT: 193.8 LBS | TEMPERATURE: 98.2 F

## 2021-10-05 VITALS — DIASTOLIC BLOOD PRESSURE: 73 MMHG | SYSTOLIC BLOOD PRESSURE: 117 MMHG | OXYGEN SATURATION: 98 %

## 2021-10-05 DIAGNOSIS — G89.18 POSTOPERATIVE PAIN: Primary | ICD-10-CM

## 2021-10-05 LAB — GLUCOSE BLD-MCNC: 153 MG/DL (ref 70–108)

## 2021-10-05 PROCEDURE — 2580000003 HC RX 258

## 2021-10-05 PROCEDURE — 6360000002 HC RX W HCPCS

## 2021-10-05 PROCEDURE — 3700000001 HC ADD 15 MINUTES (ANESTHESIA): Performed by: UROLOGY

## 2021-10-05 PROCEDURE — 7100000010 HC PHASE II RECOVERY - FIRST 15 MIN: Performed by: UROLOGY

## 2021-10-05 PROCEDURE — 6360000002 HC RX W HCPCS: Performed by: NURSE ANESTHETIST, CERTIFIED REGISTERED

## 2021-10-05 PROCEDURE — 82948 REAGENT STRIP/BLOOD GLUCOSE: CPT

## 2021-10-05 PROCEDURE — 2709999900 HC NON-CHARGEABLE SUPPLY: Performed by: UROLOGY

## 2021-10-05 PROCEDURE — C1767 GENERATOR, NEURO NON-RECHARG: HCPCS | Performed by: UROLOGY

## 2021-10-05 PROCEDURE — 3700000000 HC ANESTHESIA ATTENDED CARE: Performed by: UROLOGY

## 2021-10-05 PROCEDURE — 3600000002 HC SURGERY LEVEL 2 BASE: Performed by: UROLOGY

## 2021-10-05 PROCEDURE — 7100000011 HC PHASE II RECOVERY - ADDTL 15 MIN: Performed by: UROLOGY

## 2021-10-05 PROCEDURE — 2720000010 HC SURG SUPPLY STERILE: Performed by: UROLOGY

## 2021-10-05 PROCEDURE — 2500000003 HC RX 250 WO HCPCS: Performed by: UROLOGY

## 2021-10-05 PROCEDURE — 3600000012 HC SURGERY LEVEL 2 ADDTL 15MIN: Performed by: UROLOGY

## 2021-10-05 PROCEDURE — 2500000003 HC RX 250 WO HCPCS: Performed by: NURSE ANESTHETIST, CERTIFIED REGISTERED

## 2021-10-05 DEVICE — Z DUP USE 2628873 GENERATOR NEUROSTIMULATOR H1.7XL2IN THK3IN TORQ WRNCH PROD: Type: IMPLANTABLE DEVICE | Status: FUNCTIONAL

## 2021-10-05 RX ORDER — CEPHALEXIN 500 MG/1
500 CAPSULE ORAL 3 TIMES DAILY
Qty: 15 CAPSULE | Refills: 0 | Status: SHIPPED | OUTPATIENT
Start: 2021-10-05 | End: 2021-10-10

## 2021-10-05 RX ORDER — CEFAZOLIN SODIUM 2 G/100ML
2000 INJECTION, SOLUTION INTRAVENOUS
Status: COMPLETED | OUTPATIENT
Start: 2021-10-05 | End: 2021-10-05

## 2021-10-05 RX ORDER — PROPOFOL 10 MG/ML
INJECTION, EMULSION INTRAVENOUS PRN
Status: DISCONTINUED | OUTPATIENT
Start: 2021-10-05 | End: 2021-10-05 | Stop reason: SDUPTHER

## 2021-10-05 RX ORDER — HYDROCODONE BITARTRATE AND ACETAMINOPHEN 5; 325 MG/1; MG/1
1 TABLET ORAL EVERY 6 HOURS PRN
Qty: 18 TABLET | Refills: 0 | Status: SHIPPED | OUTPATIENT
Start: 2021-10-05 | End: 2021-10-10

## 2021-10-05 RX ORDER — LIDOCAINE HYDROCHLORIDE 10 MG/ML
INJECTION, SOLUTION EPIDURAL; INFILTRATION; INTRACAUDAL; PERINEURAL PRN
Status: DISCONTINUED | OUTPATIENT
Start: 2021-10-05 | End: 2021-10-05 | Stop reason: ALTCHOICE

## 2021-10-05 RX ORDER — LIDOCAINE HCL/PF 100 MG/5ML
SYRINGE (ML) INJECTION PRN
Status: DISCONTINUED | OUTPATIENT
Start: 2021-10-05 | End: 2021-10-05 | Stop reason: SDUPTHER

## 2021-10-05 RX ORDER — SODIUM CHLORIDE 9 MG/ML
INJECTION, SOLUTION INTRAVENOUS CONTINUOUS
Status: DISCONTINUED | OUTPATIENT
Start: 2021-10-05 | End: 2021-10-05 | Stop reason: HOSPADM

## 2021-10-05 RX ADMIN — PROPOFOL 150 MG: 10 INJECTION, EMULSION INTRAVENOUS at 12:00

## 2021-10-05 RX ADMIN — Medication 80 MG: at 12:00

## 2021-10-05 RX ADMIN — SODIUM CHLORIDE: 9 INJECTION, SOLUTION INTRAVENOUS at 10:32

## 2021-10-05 RX ADMIN — CEFAZOLIN SODIUM 2000 MG: 2 INJECTION, SOLUTION INTRAVENOUS at 12:00

## 2021-10-05 ASSESSMENT — PAIN SCALES - GENERAL
PAINLEVEL_OUTOF10: 0
PAINLEVEL_OUTOF10: 2

## 2021-10-05 ASSESSMENT — PULMONARY FUNCTION TESTS
PIF_VALUE: 0
PIF_VALUE: 1
PIF_VALUE: 0
PIF_VALUE: 0
PIF_VALUE: 1
PIF_VALUE: 0
PIF_VALUE: 1
PIF_VALUE: 1
PIF_VALUE: 0
PIF_VALUE: 0
PIF_VALUE: 1
PIF_VALUE: 2
PIF_VALUE: 1
PIF_VALUE: 1
PIF_VALUE: 0
PIF_VALUE: 0
PIF_VALUE: 1
PIF_VALUE: 1
PIF_VALUE: 0
PIF_VALUE: 1
PIF_VALUE: 1
PIF_VALUE: 0
PIF_VALUE: 0
PIF_VALUE: 1

## 2021-10-05 ASSESSMENT — PAIN - FUNCTIONAL ASSESSMENT: PAIN_FUNCTIONAL_ASSESSMENT: 0-10

## 2021-10-05 NOTE — ANESTHESIA POSTPROCEDURE EVALUATION
Department of Anesthesiology  Postprocedure Note    Patient: Cruz Cuevas  MRN: [id number]  YOB: 1935  Date of evaluation: 10/5/2021  Time:  12:24 PM     Procedure Summary     Date: 10/05/21 Room / Location: [address] [place]    Anesthesia Start: 6389 Anesthesia Stop: 7715    Procedure: STAGE 2 INTERSTIM (N/A Back) Diagnosis: (OVERACTIVE BLADDER)    Surgeons: Hai Gandhi MD Responsible Provider: Ascencion Nesbitt MD    Anesthesia Type: MAC ASA Status: 4          Anesthesia Type: MAC    Mateus Phase I: Mateus Score: 10    Mateus Phase II:      Last vitals: Reviewed and per EMR flowsheets.        Anesthesia Post Evaluation    Patient location during evaluation: bedside  Patient participation: complete - patient participated  Level of consciousness: awake and alert  Pain score: 0  Airway patency: patent  Nausea & Vomiting: no vomiting  Complications: no  Cardiovascular status: hemodynamically stable and blood pressure returned to baseline  Respiratory status: spontaneous ventilation, nonlabored ventilation, acceptable and room air  Hydration status: stable

## 2021-10-05 NOTE — OP NOTE
Patient:  Flower Dobbs  MRN: [id number]  YOB: 1935    FACILITY[place] [facility]    DATE: 10/5/2021    SURGEON: Kimberly Mahmood MD     ASSISTANT: none    PREOPERATIVE DIAGNOSIS: OVERACTIVE BLADDER    POSTOPERATIVE DIAGNOSIS: oab     PROCEDURE PERFORMED:     1. Interstim Sacral Neuromodulation System Placement, Stage 2    ANESTHESIA: Monitor Anesthesia Care    ESTIMATED BLOOD LOSS: 0 ml    COMPLICATIONS: None immediate    DRAINS: none    SPECIMENS: none    INDICATIONS FOR PROCEDURE:  The patient is a 81 y.o. male who presents with urinary frequency, urgency, and urge incontinence which was refractory to anticholinergic medications and behavioral modifications. Two weeks ago he underwent InterStim stage 1 placement and over the last 2 weeks has experienced greater than 50% improvement in symptoms. he presents today for 2nd stage placement of permanent stimulator. The risks and benefits of the procedure as well as possible alternatives and complications were discussed and he consented. Narrative of the Procedure:    After informed consent was obtained, the patient was brought to the operating room and placed on the operating table in the prone position. Anesthesia was induced and antibiotics were given. The patient was prepped and draped in a sterile manner. To prevent contamination, the wire extension was left hanging off the patient on the side contralateral to the pocket. The wire overlying the surgical site was thoroughly cleaned. A timeout occurred in which two patient identifiers were used. The scar overlying the site of previous connection was located and anesthetized. Using a 15-blade scalpel a skin incision was made over the scar. Blunt dissection was used to develop a pocket. The connection was identified and delivered through the incision. The wire extension was transected at the level of the patient's skin and removed from under the drape.  The prolene sutures surrounding the boot were incised and removed. The boot was retracted and the quick-connection wrench was used to loosen the screws. The boot and wire extension were removed intact and discarded. The lead was cleaned and the battery was attached using the quick-connection wrench. Care was taken to ensure the blue electrode tip was seen through the distal window on the battery. The battery was placed into the wound. The device was then interrogated. All resistances were within normal limits. The dermis was re-approximated with 2-0 Vicryl. The skin was closed with a 4-0 Monocryl in a subcuticular manner. The patient was then awakened and transferred to the PACU in good and stable condition. Follow-Up: The patient will be discharged on 5 days of Keflex. Hiral Cuevas MD  Electronically signed on 10/5/2021 at 5:26 PM     DISPOSITION:  The patient was discharged home in stable condition with instructions on  the functionality of his device. he was instructed to follow up in clinic in 2-3 weeks with np to evaluate for continued symptom control. The InterStim rep was present and available for questions.

## 2021-10-05 NOTE — H&P
Jeyson Rooney MD  History and Physical    Patient:  Florentin Banerjee  MRN: [id number]  YOB: 1935    HISTORY OF PRESENT ILLNESS:     The patient is a 81 y.o. male who presents with oab. Here for procedure. Patient's old records, notes and chart reviewed and summarized above. Jeyson Rooney MD independently reviewed the images and verified the radiology reports from:    No results found. Past Medical History:    Past Medical History:   Diagnosis Date    Cerebral artery occlusion with cerebral infarction ([facility] [address]     weakness right hand    DM (diabetes mellitus) ([facility] [address]     Hyperlipidemia     Hypertension     Stroke ([facility] [address]     Urinary incontinence     Urinary retention        Past Surgical History:    Past Surgical History:   Procedure Laterality Date    CARDIAC CATHETERIZATION  06/19/2020    CARDIOVASCULAR STRESS TEST      CYSTOSCOPY N/A 8/4/2020    CYSTOSCOPY, GREENLIGHT PHOTOVAPORIZATION OF PROSTATE performed by Urmila Walter MD at [address] [place] [address]      total right knee    KNEE ARTHROPLASTY Right     STIMULATOR SURGERY N/A 9/24/2021    STAGE [facility]STIM [facility]       Medications Prior to Admission:    Prior to Admission medications    Medication Sig Start Date End Date Taking?  Authorizing Provider   Metoprolol Succinate (TOPROL XL PO) Take 100 mg by mouth 2 times daily Pt takes as needed for bp if greater than 135    Historical Provider, MD   QUEtiapine (SEROQUEL) 25 MG tablet Take 25 mg by mouth nightly    Historical Provider, MD   donepezil (ARICEPT ODT) 10 MG disintegrating tablet Take 10 mg by mouth every morning    Historical Provider, MD   lisinopril (PRINIVIL;ZESTRIL) 20 MG tablet Take 20 mg by mouth 2 times daily     Historical Provider, MD   glimepiride (AMARYL) 2 MG tablet Take 1 tablet by mouth daily (with breakfast) 7/24/20   Geno Pham DO   pioglitazone (ACTOS) 30 MG tablet Take 30 mg by mouth daily Historical Provider, MD   simvastatin (ZOCOR) 40 MG tablet Take 40 mg by mouth nightly    Historical Provider, MD   metFORMIN (GLUCOPHAGE-XR) 750 MG extended release tablet Take 1,500 mg by mouth Daily with supper 2 tab    Instructed to hold for surgery    Historical Provider, MD       Allergies:  Patient has no known allergies. Social History:    Social History     Socioeconomic History    Marital status:      Spouse name: Not on file    Number of children: 3    Years of education: Not on file    Highest education level: Not on file   Occupational History    Not on file   Tobacco Use    Smoking status: Former Smoker     Packs/day: 0.50     Years: 13.00     Pack years: 6.50     Types: Pipe, Cigarettes     Quit date:      Years since quittin.7    Smokeless tobacco: Never Used   Vaping Use    Vaping Use: Never assessed   Substance and Sexual Activity    Alcohol use: Not Currently    Drug use: Never    Sexual activity: Not on file   Other Topics Concern    Not on file   Social History Narrative    Not on file     Social Determinants of Health     Financial Resource Strain:     Difficulty of Paying Living Expenses:    Food Insecurity:     Worried About [address] in the Last Year:     [address] [employer] in the Last Year:    Transportation Needs:     Lack of Transportation (Medical):      Lack of Transportation (Non-Medical):    Physical Activity:     Days of Exercise per Week:     Minutes of Exercise per Session:    Stress:     Feeling of Stress :    Social Connections:     Frequency of Communication with Friends and Family:     Frequency of Social Gatherings with Friends and Family:     Attends Yarsani Services:     Active Member of Clubs or Organizations:     Attends Club or Organization Meetings:     Marital Status:    Intimate Partner Violence:     Fear of Current or Ex-Partner:     Emotionally Abused:     Physically Abused:     Sexually Abused:        Family History:    Family History   Problem Relation Age of Onset    Heart Disease Father     Heart Attack Father     High Blood Pressure Sister     Cancer Neg Hx     Diabetes Neg Hx     Stroke Neg Hx        REVIEW OF SYSTEMS:  Constitutional: negative  Eyes: negative  Respiratory: negative  Cardiovascular: negative  Gastrointestinal: negative  Genitourinary: no acute issues  Musculoskeletal: negative  Skin: negative   Neurological: negative  Hematological/Lymphatic: negative  Psychological: negative    Physical Exam:      No data found. Constitutional: Patient in no acute distress; Neuro: alert and oriented to person place and time. Psych: Mood and affect normal.  Skin: Normal  Lungs: Respiratory effort normal, CTA  Cardiovascular:  Normal peripheral pulses; no murmur. Normal rhythm  Abdomen: Soft, non-tender, non-distended with no CVA, flank pain, hepatosplenomegaly or hernia. Kidneys normal.  Bladder non-tender and not distended. LABS:   No results for input(s): WBC, HGB, HCT, MCV, PLT in the last 72 hours. No results for input(s): NA, K, CL, CO2, PHOS, BUN, CREATININE in the last 72 hours. Invalid input(s): CA  Lab Results   Component Value Date    PSA 0.88 05/19/2020         Urinalysis: No results for input(s): COLORU, PHUR, LABCAST, WBCUA, RBCUA, MUCUS, TRICHOMONAS, YEAST, BACTERIA, CLARITYU, SPECGRAV, LEUKOCYTESUR, UROBILINOGEN, Genene Jose Armando in the last 72 hours.     Invalid input(s): NITRATE, GLUCOSEUKETONESUAMORPHOUS     -----------------------------------------------------------------      Assessment and Plan     Impression:    Patient Active Problem List   Diagnosis    Abnormal stress test    Delirious    Leukocytosis    BPH (benign prostatic hyperplasia)    Urinary retention due to benign prostatic hyperplasia    E. coli UTI    Type 2 diabetes mellitus with other specified complication [facility]    History of CVA (cerebrovascular accident)    COVID-19 ruled out by laboratory testing       Plan:     Consent obtained; sns stage II in OR today    Huong Martin MD  8:55 PM 10/4/2021

## 2021-10-05 NOTE — ANESTHESIA PRE PROCEDURE
Department of Anesthesiology  Preprocedure Note       Name:  Sherine Gaston   Age:  81 y.o.  :  1935                                          MRN:  [id number]         Date:  10/5/2021      Surgeon: Mohsen Knight):  Ursula Leyva MD    Procedure: Procedure(s):  STAGE 2 INTERSTIM    Medications prior to admission:   Prior to Admission medications    Medication Sig Start Date End Date Taking?  Authorizing Provider   Metoprolol Succinate (TOPROL XL PO) Take 100 mg by mouth 2 times daily Pt takes as needed for bp if greater than 135   Yes Historical Provider, MD   QUEtiapine (SEROQUEL) 25 MG tablet Take 25 mg by mouth nightly   Yes Historical Provider, MD   donepezil (ARICEPT ODT) 10 MG disintegrating tablet Take 10 mg by mouth every morning   Yes Historical Provider, MD   lisinopril (PRINIVIL;ZESTRIL) 20 MG tablet Take 20 mg by mouth 2 times daily    Yes Historical Provider, MD   glimepiride (AMARYL) 2 MG tablet Take 1 tablet by mouth daily (with breakfast) 20  Yes Palmira Pham DO   pioglitazone (ACTOS) 30 MG tablet Take 30 mg by mouth daily   Yes Historical Provider, MD   simvastatin (ZOCOR) 40 MG tablet Take 40 mg by mouth nightly   Yes Historical Provider, MD   metFORMIN (GLUCOPHAGE-XR) 750 MG extended release tablet Take 1,500 mg by mouth Daily with supper 2 tab    Instructed to hold for surgery   Yes Historical Provider, MD       Current medications:    Current Facility-Administered Medications   Medication Dose Route Frequency Provider Last Rate Last Admin    0.9 % sodium chloride infusion   IntraVENous Continuous Kat Nino CMA (AAMA) 100 mL/hr at 10/05/21 1032 New Bag at 10/05/21 1032    ceFAZolin (ANCEF) 2000 mg in dextrose 4 % 100 mL IVPB (premix)  2,000 mg IntraVENous 30 Min Pre-Op Kat Nino CMA (AAMA)           Allergies:  No Known Allergies    Problem List:    Patient Active Problem List   Diagnosis Code    Abnormal stress test R94.39    Delirious R41.0    Leukocytosis D72.829    BPH (benign prostatic hyperplasia) N40.0    Urinary retention due to benign prostatic hyperplasia N40.1, R33.8    E. coli UTI N39.0, B96.20    Type 2 diabetes mellitus with other specified complication (HCC) N88.66    History of CVA (cerebrovascular accident) Z80.78    COVID-19 ruled out by laboratory testing Z20.822       Past Medical History:        Diagnosis Date    Arthritis     Cerebral artery occlusion with [facility] infarction ([facility]     weakness right hand    [facility]     Hyperlipidemia     Hypertension     Stroke ([facility]     Urinary incontinence     Urinary retention        Past Surgical History:        Procedure Laterality Date    CARDIAC CATHETERIZATION  2020    CARDIOVASCULAR STRESS TEST      CYSTOSCOPY N/A 2020    CYSTOSCOPY, GREENLIGHT PHOTOVAPORIZATION OF PROSTATE performed by Karan Kawasaki, MD at [address] [place] [address]      total right knee    KNEE ARTHROPLASTY Right     STIMULATOR SURGERY N/A 2021    STAGE I INTERSTIM performed by Karan Kawasaki, MD at [place] [address] History:    Social History     Tobacco Use    Smoking status: Former Smoker     Packs/day: 0.50     Years: 13.00     Pack years: 6.50     Types: Pipe, Cigarettes     Quit date:      Years since quittin.7    Smokeless tobacco: Never Used   Substance Use Topics    Alcohol use: Not Currently                                Counseling given: Not Answered      Vital Signs (Current):   Vitals:    10/05/21 1008   BP: (!) 181/94   Pulse: 78   Resp: 16   Temp: 97.5 °F (36.4 °C)   TempSrc: Temporal   SpO2: 94%   Weight: 193 lb 12.8 oz (87.9 kg)   Height: 5' 9\" (1.753 m)                                              BP Readings from Last 3 Encounters:   10/05/21 (!) 181/94   21 (!) 148/78   21 (!) 143/77       NPO Status: Time of last liquid consumption: 0800 (sips water with meds)                        Time of last solid consumption: 1800                        Date of last liquid consumption: 10/05/21                        Date of last solid food consumption: 10/04/21    BMI:   Wt Readings from Last 3 Encounters:   10/05/21 193 lb 12.8 oz (87.9 kg)   09/30/21 197 lb (89.4 kg)   09/24/21 195 lb 6.4 oz (88.6 kg)     Body mass index is 28.62 kg/m². CBC:   Lab Results   Component Value Date    WBC 10.1 09/10/2021    RBC 3.81 09/10/2021    HGB 12.5 09/10/2021    HCT 39.0 09/10/2021    .4 09/10/2021    RDW 14.1 05/10/2021     09/10/2021       CMP:   Lab Results   Component Value Date     09/10/2021    K 4.6 09/10/2021    K 4.1 06/19/2020     09/10/2021    CO2 24 09/10/2021    BUN 13 09/10/2021    CREATININE 0.7 09/10/2021    LABGLOM >90 09/10/2021    GLUCOSE 152 09/10/2021    PROT 7.2 07/27/2020    PROT 7.4 05/19/2020    CALCIUM 9.6 09/10/2021    BILITOT 0.4 07/27/2020    ALKPHOS 51 07/27/2020    AST 23 07/27/2020    ALT 25 07/27/2020       POC Tests:   Recent Labs     10/05/21  1032   POCGLU 153*       Coags:   Lab Results   Component Value Date    PROTIME 13.0 05/10/2021    INR 1.13 05/10/2021    APTT 31.4 06/19/2020       HCG (If Applicable): No results found for: PREGTESTUR, PREGSERUM, HCG, HCGQUANT     ABGs: No results found for: PHART, PO2ART, UCH8UWA, TEN7PED, BEART, S6NVFCED     Type & Screen (If Applicable):  Lab Results   Component Value Date    LABRH NEG 06/19/2020       Drug/Infectious Status (If Applicable):  No results found for: HIV, HEPCAB    COVID-19 Screening (If Applicable):   Lab Results   Component Value Date    COVID19 Not Detected 07/29/2020           Anesthesia Evaluation    Airway: Mallampati: II        Dental:          Pulmonary:                              Cardiovascular:    (+) hypertension:,                   Neuro/Psych:   (+) CVA:,             GI/Hepatic/Renal:             Endo/Other:    (+) Diabetes, . Abdominal:             Vascular:           Other Findings:

## 2021-11-01 ENCOUNTER — OFFICE VISIT (OUTPATIENT)
Dept: UROLOGY | Age: 86
End: 2021-11-01
Payer: MEDICARE

## 2021-11-01 VITALS
DIASTOLIC BLOOD PRESSURE: 80 MMHG | HEIGHT: 70 IN | WEIGHT: 195 LBS | SYSTOLIC BLOOD PRESSURE: 118 MMHG | BODY MASS INDEX: 27.92 KG/M2

## 2021-11-01 DIAGNOSIS — N32.81 OAB (OVERACTIVE BLADDER): ICD-10-CM

## 2021-11-01 DIAGNOSIS — R35.0 URINARY FREQUENCY: Primary | ICD-10-CM

## 2021-11-01 LAB
BILIRUBIN URINE: NEGATIVE
BLOOD URINE, POC: NEGATIVE
CHARACTER, URINE: CLEAR
COLOR, URINE: YELLOW
GLUCOSE URINE: NEGATIVE MG/DL
KETONES, URINE: ABNORMAL
LEUKOCYTE CLUMPS, URINE: NEGATIVE
NITRITE, URINE: NEGATIVE
PH, URINE: 5.5 (ref 5–9)
POST VOID RESIDUAL (PVR): 134 ML
PROTEIN, URINE: 30 MG/DL
SPECIFIC GRAVITY, URINE: 1.02 (ref 1–1.03)
UROBILINOGEN, URINE: 1 EU/DL (ref 0–1)

## 2021-11-01 PROCEDURE — 81003 URINALYSIS AUTO W/O SCOPE: CPT | Performed by: NURSE PRACTITIONER

## 2021-11-01 PROCEDURE — 99024 POSTOP FOLLOW-UP VISIT: CPT | Performed by: NURSE PRACTITIONER

## 2021-11-01 PROCEDURE — 51798 US URINE CAPACITY MEASURE: CPT | Performed by: NURSE PRACTITIONER

## 2021-11-01 NOTE — PROGRESS NOTES
[id number] [facility].  [address]  [facility] [address]  Dept: [phone]  Loc: [phone]  Visit Date: 11/1/2021      HPI:     Juaquin Arroyo is a 81 y.o. with past medical history as listed below who presents today in follow-up for OAB. Underwent interstim sacral neuromodulation system placement, Andrey 1 by Dr Maryann Noyola on 9-. And Stage 2 on 10/5/2021. Here for follow up. Pts wife reports patient had accidents last week. Pt thinks symptoms have improved. Getting up 2 times a night to void. No urgency or frequency. No dysuria. Had Green light PVP in August 2020 by Dr. Maryann Noyola, last seen 2/2020.   Anna Marie Millan comes in today with his wife. Hx is obtained from the patient and medical record. Current Outpatient Medications   Medication Sig Dispense Refill    Metoprolol Succinate (TOPROL XL PO) Take 100 mg by mouth 2 times daily Pt takes as needed for bp if greater than 135      QUEtiapine (SEROQUEL) 25 MG tablet Take 25 mg by mouth nightly      donepezil (ARICEPT ODT) 10 MG disintegrating tablet Take 10 mg by mouth every morning      lisinopril (PRINIVIL;ZESTRIL) 20 MG tablet Take 20 mg by mouth 2 times daily       glimepiride (AMARYL) 2 MG tablet Take 1 tablet by mouth daily (with breakfast) 30 tablet 3    pioglitazone (ACTOS) 30 MG tablet Take 30 mg by mouth daily      simvastatin (ZOCOR) 40 MG tablet Take 40 mg by mouth nightly      metFORMIN (GLUCOPHAGE-XR) 750 MG extended release tablet Take 1,500 mg by mouth Daily with supper 2 tab    Instructed to hold for surgery       No current facility-administered medications for this visit. Past Medical History  [facility]  has a past medical history of Arthritis, Cerebral artery occlusion with cerebral infarction ([facility] DM (diabetes mellitus) ([facility] Hyperlipidemia, Hypertension, Stroke [facility]), Urinary incontinence, and Urinary retention.     Past Surgical History  The patient  has a past surgical history that includes Knee Arthroplasty (Right); cardiovascular stress test; eye surgery; Cardiac catheterization (06/19/2020); Cystoscopy (N/A, 8/4/2020); joint replacement; Stimulator Surgery (N/A, 9/24/2021); Tonsillectomy; and Stimulator Surgery (N/A, 10/5/2021). Family History  This patient's family history includes Heart Attack in his father; Heart Disease in his father; High Blood Pressure in his sister. Social History  Sony Trinidad  reports that he quit smoking about 49 years ago. His smoking use included pipe and cigarettes. He has a 6.50 pack-year smoking history. He has never used smokeless tobacco. He reports previous alcohol use. He reports that he does not use drugs. Subjective:     Review of Systems  No problems with ears, nose or throat. No problems with eyes. No chest pain, shortness of breath, abdominal pain, extremity pain or weakness, and no neurological deficits. No rashes.  symptoms per HPI. The remainder of the review of symptoms is negative. Objective:     PE:   Vitals:    11/01/21 1257   BP: 118/80   Weight: 195 lb (88.5 kg)   Height: 5' 9.5\" (1.765 m)       Constitutional: Alert and oriented times 3, no acute distress and cooperative to examination with appropriate mood and affect. HENT:   Head:        Normocephalic and atraumatic. Mouth/Throat:         Mucous membranes are normal.   Eyes:         EOM are normal. No scleral icterus. PERRLA. Neck:        Supple, symmetrical, trachea midline    Pulmonary/Chest:    Normal respiratory rate and rhthym. No use of accessory muscles. Abdominal:         Soft. No tenderness. Bowel sounds present. Incision is approximated no drainage. Musculoskeletal:         Normal range of motion. No edema or tenderness of lower extremities. Extremities: No cyanosis, clubbing, or edema present. Neurological:        Alert and oriented. Psychiatric:        Normal mood and affect.       Labs   Urine dip demonstrates   Results for POC orders placed in visit on 11/01/21   POCT Urinalysis No Micro (Auto)   Result Value Ref Range    Glucose, Ur Negative NEGATIVE mg/dl    Bilirubin Urine Negative     Ketones, Urine Trace (A) NEGATIVE    Specific Gravity, Urine 1.025 1.002 - 1.030    Blood, UA POC Negative NEGATIVE    pH, Urine 5.50 5.0 - 9.0    Protein, Urine 30 (A) NEGATIVE mg/dl    Urobilinogen, Urine 1.00 0.0 - 1.0 eu/dl    Nitrite, Urine Negative NEGATIVE    Leukocyte Clumps, Urine Negative NEGATIVE    Color, Urine Yellow YELLOW-STRAW    Character, Urine Clear CLR-SL.CLOUD   poct post void residual   Result Value Ref Range    post void residual 134 ml       Patients recent PSA values are as follows  Lab Results   Component Value Date    PSA 0.88 05/19/2020        Recent BUN/Creatinine:  Lab Results   Component Value Date    BUN 13 09/10/2021    CREATININE 0.7 09/10/2021         Assessment & Plan:   OAB  BPH    Pt brought device with him. Was on airplane mode. Controller was restarted. Pt on program 2, amplitude at 1.8. Pt or wife to call next week if symptoms don't improve. [facility] FU 1 month.     Josy Fleming, APRN - CNP, APRN  Urology

## 2021-11-18 RX ORDER — METOPROLOL SUCCINATE 100 MG/1
100 TABLET, EXTENDED RELEASE ORAL 2 TIMES DAILY
Qty: 60 TABLET | Refills: 5 | Status: SHIPPED | OUTPATIENT
Start: 2021-11-18 | End: 2022-03-18

## 2021-11-18 NOTE — TELEPHONE ENCOUNTER
Maite Jimenez called requesting a refill on the following medications:  Requested Prescriptions     Pending Prescriptions Disp Refills    metoprolol succinate (TOPROL XL) 100 MG extended release tablet 30 tablet 3     Sig: Take 1 tablet by mouth 2 times daily Pt takes as needed for bp if greater than 135     Pharmacy [email or web address]    Date of last visit: 04/14/2021  Date of next visit (if applicable): 8/19/1350

## 2021-12-01 ENCOUNTER — OFFICE VISIT (OUTPATIENT)
Dept: UROLOGY | Age: 86
End: 2021-12-01
Payer: MEDICARE

## 2021-12-01 VITALS
HEIGHT: 70 IN | HEART RATE: 84 BPM | DIASTOLIC BLOOD PRESSURE: 80 MMHG | SYSTOLIC BLOOD PRESSURE: 140 MMHG | WEIGHT: 190 LBS | BODY MASS INDEX: 27.2 KG/M2

## 2021-12-01 DIAGNOSIS — R35.0 URINARY FREQUENCY: ICD-10-CM

## 2021-12-01 DIAGNOSIS — N32.81 OAB (OVERACTIVE BLADDER): Primary | ICD-10-CM

## 2021-12-01 LAB
BILIRUBIN URINE: NEGATIVE
BLOOD URINE, POC: NEGATIVE
CHARACTER, URINE: CLEAR
COLOR, URINE: YELLOW
GLUCOSE URINE: NEGATIVE MG/DL
KETONES, URINE: NEGATIVE
LEUKOCYTE CLUMPS, URINE: NEGATIVE
NITRITE, URINE: NEGATIVE
PH, URINE: 5 (ref 5–9)
POST VOID RESIDUAL (PVR): 166 ML
PROTEIN, URINE: ABNORMAL MG/DL
SPECIFIC GRAVITY, URINE: >= 1.03 (ref 1–1.03)
UROBILINOGEN, URINE: 0.2 EU/DL (ref 0–1)

## 2021-12-01 PROCEDURE — 51798 US URINE CAPACITY MEASURE: CPT | Performed by: NURSE PRACTITIONER

## 2021-12-01 PROCEDURE — 81003 URINALYSIS AUTO W/O SCOPE: CPT | Performed by: NURSE PRACTITIONER

## 2021-12-01 PROCEDURE — 99024 POSTOP FOLLOW-UP VISIT: CPT | Performed by: NURSE PRACTITIONER

## 2021-12-01 NOTE — PROGRESS NOTES
[address] [facility].  [address]  [facility] [address]  Dept: [phone]  Loc: [phone]  Visit Date: 12/1/2021      HPI:     Nina Kc is a 81 y.o. with past medical history as listed below who presents today in follow-up for OAB. Underwent interstim sacral neuromodulation system placement, Andrey 1 by Dr Flori Pradhan on 9-. And Stage 2 on 10/5/2021. Here for follow up. Pts wife reports patient had accidents a few weeks ago. Interstim adjusted at last visit. Pt thinks symptoms have improved. Getting up 1- 2 times a night to void. Can hold bladder longer now. No dysuria. Had Green light PVP in August 2020 by Dr. Flori Pradhan, last seen 2/2020.   Chante Lowery comes in today with his wife. Hx is obtained from the patient and medical record. Current Outpatient Medications   Medication Sig Dispense Refill    metoprolol succinate (TOPROL XL) 100 MG extended release tablet Take 1 tablet by mouth 2 times daily Pt takes as needed for bp if greater than 135 60 tablet 5    QUEtiapine (SEROQUEL) 25 MG tablet Take 25 mg by mouth nightly      donepezil (ARICEPT ODT) 10 MG disintegrating tablet Take 10 mg by mouth every morning      lisinopril (PRINIVIL;ZESTRIL) 20 MG tablet Take 20 mg by mouth 2 times daily       glimepiride (AMARYL) 2 MG tablet Take 1 tablet by mouth daily (with breakfast) 30 tablet 3    pioglitazone (ACTOS) 30 MG tablet Take 30 mg by mouth daily      simvastatin (ZOCOR) 40 MG tablet Take 40 mg by mouth nightly      metFORMIN (GLUCOPHAGE-XR) 750 MG extended release tablet Take 1,500 mg by mouth Daily with supper 2 tab    Instructed to hold for surgery       No current facility-administered medications for this visit.        Past Medical History  Natalie Gleason  has a past medical history of Arthritis, Cerebral artery occlusion with cerebral infarction ([address] DM (diabetes mellitus) ([address] Hyperlipidemia, Hypertension, Stroke ([address] and oriented. Psychiatric:        Normal mood and affect. Labs   Urine dip demonstrates   Results for POC orders placed in visit on 12/01/21   POCT Urinalysis No Micro (Auto)   Result Value Ref Range    Glucose, Ur Negative NEGATIVE mg/dl    Bilirubin Urine Negative     Ketones, Urine Negative NEGATIVE    Specific Gravity, Urine >= 1.030 1.002 - 1.030    Blood, UA POC Negative NEGATIVE    pH, Urine 5.00 5.0 - 9.0    Protein, Urine Trace (A) NEGATIVE mg/dl    Urobilinogen, Urine 0.20 0.0 - 1.0 eu/dl    Nitrite, Urine Negative NEGATIVE    Leukocyte Clumps, Urine Negative NEGATIVE    Color, Urine Yellow YELLOW-STRAW    Character, Urine Clear CLR-SL.CLOUD   poct post void residual   Result Value Ref Range    post void residual 166 ml       Patients recent PSA values are as follows  Lab Results   Component Value Date    PSA 0.88 05/19/2020        Recent BUN/Creatinine:  Lab Results   Component Value Date    BUN 13 09/10/2021    CREATININE 0.7 09/10/2021         Assessment & Plan:   OAB  BPH    Pt brought device with him. Pt on program 2, amplitude at 3.0    FU with Interstim rep  FU with Dr Carine Lin in 4 months.  PVR    Julia Gale, APRN - CNP, APRN  Urology

## 2022-01-19 ENCOUNTER — NURSE ONLY (OUTPATIENT)
Dept: UROLOGY | Age: 87
End: 2022-01-19

## 2022-01-19 DIAGNOSIS — R35.0 URINARY FREQUENCY: Primary | ICD-10-CM

## 2022-01-19 NOTE — PROGRESS NOTES
Per Apple Guzman, pt symptoms management well controlled for urinary and bowel symptoms. 0 tininged programming from prog 2 @ 2.0v to prog 3 @1.6v. pt has appropriate sensory response. Re-educated ot on program equipment, ect. Follow up with [employer] as needed.

## 2022-04-12 ENCOUNTER — OFFICE VISIT (OUTPATIENT)
Dept: UROLOGY | Age: 87
End: 2022-04-12
Payer: MEDICARE

## 2022-04-12 VITALS
HEIGHT: 70 IN | SYSTOLIC BLOOD PRESSURE: 132 MMHG | DIASTOLIC BLOOD PRESSURE: 70 MMHG | WEIGHT: 191 LBS | BODY MASS INDEX: 27.35 KG/M2

## 2022-04-12 DIAGNOSIS — N40.1 BPH WITH OBSTRUCTION/LOWER URINARY TRACT SYMPTOMS: Primary | ICD-10-CM

## 2022-04-12 DIAGNOSIS — N32.81 OAB (OVERACTIVE BLADDER): ICD-10-CM

## 2022-04-12 DIAGNOSIS — N13.8 BPH WITH OBSTRUCTION/LOWER URINARY TRACT SYMPTOMS: Primary | ICD-10-CM

## 2022-04-12 LAB
BILIRUBIN URINE: NEGATIVE
BLOOD URINE, POC: NEGATIVE
CHARACTER, URINE: CLEAR
COLOR, URINE: YELLOW
GLUCOSE URINE: NEGATIVE MG/DL
KETONES, URINE: NEGATIVE
LEUKOCYTE CLUMPS, URINE: NEGATIVE
NITRITE, URINE: NEGATIVE
PH, URINE: 5.5 (ref 5–9)
POST VOID RESIDUAL (PVR): 212 ML
PROTEIN, URINE: NEGATIVE MG/DL
SPECIFIC GRAVITY, URINE: 1.02 (ref 1–1.03)
UROBILINOGEN, URINE: 0.2 EU/DL (ref 0–1)

## 2022-04-12 PROCEDURE — 99214 OFFICE O/P EST MOD 30 MIN: CPT | Performed by: UROLOGY

## 2022-04-12 PROCEDURE — 51798 US URINE CAPACITY MEASURE: CPT | Performed by: UROLOGY

## 2022-04-12 PROCEDURE — 81003 URINALYSIS AUTO W/O SCOPE: CPT | Performed by: UROLOGY

## 2022-04-12 NOTE — PROGRESS NOTES
[facility] [address]  Dept: [phone]  Dept Fax: [phone]  Loc: [address] [facility] Urology Office Note -     Patient:  Oneil Mendoza  YOB: 1935    The patient is a 81 y.o. male who presents today for evaluation of the following problems:   Chief Complaint   Patient presents with    Follow-up    Benign Prostatic Hypertrophy        History of Present Illness:    BPH  S/p greenlight  Elevated residual  Cystoscopy after greenlight showed open prostatic fossa      OAB  denovo after PVP sx  S/p interstim  Still with significant UUI    No UTI's or hematuria recently    Summary of Previous Records:  Underwent interstim sacral neuromodulation system placement, Andrey 1 by Dr Violeta Berrios on 9-. And Stage 2 on 10/5/2021. Here for follow up. Pts wife reports patient had accidents a few weeks ago. Interstim adjusted at last visit. Pt thinks symptoms have improved. Getting up 1- 2 times a night to void. Can hold bladder longer now. No dysuria.     Had Green light PVP in August 2020 by Dr. Violeta Berrios        Requested/reviewed records from [address] [place] [address],[address], DO office and/or outside [de-identified]    (Patient's old records have been requested, reviewed and pertinent findings summarized in today's note.)    Procedures Today: N/A    Last several PSA's:  Lab Results   Component Value Date    PSA 0.88 05/19/2020       Last total testosterone:  No results found for: TESTOSTERONE    Urinalysis today:  Results for POC orders placed in visit on 04/12/22   POCT Urinalysis No Micro (Auto)   Result Value Ref Range    Glucose, Ur Negative NEGATIVE mg/dl    Bilirubin Urine Negative     Ketones, Urine Negative NEGATIVE    Specific Gravity, Urine 1.025 1.002 - 1.030    Blood, UA POC Negative NEGATIVE    pH, Urine 5.50 5.0 - 9.0    Protein, Urine Negative NEGATIVE mg/dl    Urobilinogen, Urine 0.20 0.0 - 1.0 eu/dl    Nitrite, Urine Negative NEGATIVE    Leukocyte Clumps, Urine Negative NEGATIVE    Color, Urine Yellow YELLOW-STRAW    Character, Urine Clear CLR-SL.CLOUD   poct post void residual   Result Value Ref Range    post void residual 212 ml       Last BUN and creatinine:  Lab Results   Component Value Date    BUN 13 09/10/2021     Lab Results   Component Value Date    CREATININE 0.7 09/10/2021       Imaging Reviewed during this Office Visit:   Janell Stanton MD independently reviewed the images and verified the radiology reports from:    No results found.     PAST MEDICAL, FAMILY AND SOCIAL HISTORY:  Past Medical History:   Diagnosis Date    Arthritis     Cerebral artery occlusion with [facility] infarction ([facility]     weakness right hand    DM (diabetes mellitus) ([facility]     Hyperlipidemia     Hypertension     Stroke ([facility]     Urinary incontinence     Urinary retention      Past Surgical History:   Procedure Laterality Date    CARDIAC CATHETERIZATION  06/19/2020    CARDIOVASCULAR STRESS TEST      CYSTOSCOPY N/A 8/4/2020    CYSTOSCOPY, GREENLIGHT PHOTOVAPORIZATION OF PROSTATE performed by Pavel Vogel MD at [facility] [address]      total right knee    KNEE ARTHROPLASTY Right     STIMULATOR SURGERY N/A 9/24/2021    STAGE I INTERSTIM performed by Pavel Vogel MD at [address] [facility] [address] [address] N/A 10/5/2021    STAGE 2 INTERSTIM performed by Pavel [facility]ogel MD at [facility]       Family History   Problem Relation Age of Onset    Heart Disease Father     Heart Attack Father     High Blood Pressure Sister     Cancer Neg Hx     Diabetes Neg Hx     Stroke Neg Hx      Outpatient Medications Marked as Taking for the 4/12/22 encounter (Office Visit) with Pavel Vogel MD   Medication Sig Dispense Refill    QUEtiapine (SEROQUEL) 25 MG tablet Take 25 mg by mouth nightly      donepezil (ARICEPT ODT) 10 MG disintegrating tablet Take 10 mg by mouth every morning      lisinopril (PRINIVIL;ZESTRIL) 20 MG tablet Take 20 mg by mouth 2 times daily       glimepiride (AMARYL) 2 MG tablet Take 1 tablet by mouth daily (with breakfast) 30 tablet 3    pioglitazone (ACTOS) 30 MG tablet Take 30 mg by mouth daily      simvastatin (ZOCOR) 40 MG tablet Take 40 mg by mouth nightly      metFORMIN (GLUCOPHAGE-XR) 750 MG extended release tablet Take 1,500 mg by mouth Daily with supper 2 tab    Instructed to hold for surgery         Patient has no known allergies. Social History     Tobacco Use   Smoking Status Former Smoker    Packs/day: 0.50    Years: 13.00    Pack years: 6.50    Types: Pipe, Cigarettes    Quit date: Eliseo Harris Years since quittin.3   Smokeless Tobacco Never Used      (If patient a smoker, smoking cessation counseling offered)   Social History     Substance and Sexual Activity   Alcohol Use Not Currently       REVIEW OF SYSTEMS:  Constitutional: negative  Eyes: negative  Respiratory: negative  Cardiovascular: negative  Gastrointestinal: negative  Genitourinary: see HPI  Musculoskeletal: negative  Skin: negative   Neurological: negative  Hematological/Lymphatic: negative  Psychological: negative        Physical Exam:    This a 81 y.o. male  Vitals:    22 1315   BP: 132/70     Body mass index is 27.8 kg/m². Constitutional: Patient in no acute distress;         Assessment and Plan        1. BPH with obstruction/lower urinary tract symptoms    2. OAB (overactive bladder)               Plan:      BPH- s/p PVP. Still with elevated residuals. OAB with UUI- s/p interstim. Still with incontinence. It's possible its overflow incontinence. I do not recommend botox. Not at treatment goal  Offered botox (retention risk) or passing catheter bid  Reprogramming with Ailin Quintero  Cysto three months     Prescriptions Ordered:  No orders of the defined types were placed in this encounter.      Orders Placed:  Orders Placed This Encounter   Procedures    POCT Urinalysis No Micro (Auto)    poct post void residual     Bladder scan            PATRICK Villafana MD

## 2022-04-14 ENCOUNTER — OFFICE VISIT (OUTPATIENT)
Dept: CARDIOLOGY CLINIC | Age: 87
End: 2022-04-14
Payer: MEDICARE

## 2022-04-14 VITALS
BODY MASS INDEX: 27.49 KG/M2 | DIASTOLIC BLOOD PRESSURE: 78 MMHG | HEART RATE: 111 BPM | SYSTOLIC BLOOD PRESSURE: 140 MMHG | HEIGHT: 70 IN | WEIGHT: 192 LBS

## 2022-04-14 DIAGNOSIS — E78.01 FAMILIAL HYPERCHOLESTEROLEMIA: ICD-10-CM

## 2022-04-14 DIAGNOSIS — I10 PRIMARY HYPERTENSION: ICD-10-CM

## 2022-04-14 DIAGNOSIS — I25.10 CORONARY ARTERY DISEASE INVOLVING NATIVE CORONARY ARTERY OF NATIVE HEART WITHOUT ANGINA PECTORIS: Primary | ICD-10-CM

## 2022-04-14 PROCEDURE — 99213 OFFICE O/P EST LOW 20 MIN: CPT | Performed by: NUCLEAR MEDICINE

## 2022-04-14 PROCEDURE — 93000 ELECTROCARDIOGRAM COMPLETE: CPT | Performed by: NUCLEAR MEDICINE

## 2022-04-14 RX ORDER — ACETAMINOPHEN 160 MG
TABLET,DISINTEGRATING ORAL
COMMUNITY

## 2022-04-14 NOTE — PROGRESS NOTES
[address] [facility] [address]  [address] .  [address]  [facility] [address]  Dept: [phone]  Dept Fax: [phone]  Loc: [phone]    Visit Date: 2022    Angela Earl is a 81 y.o. male who presents todayfor:  Chief Complaint   Patient presents with    Check-Up     EKG done today    Coronary Artery Disease    Hypertension    Hyperlipidemia     Know moderate CAD  Cath before  No chest pain   Some severe limitation   uses a walker  Bp is stable   BS is stable   No dizziness  No syncope      HPI:  HPI  Past Medical History:   Diagnosis Date    Arthritis     Cerebral artery occlusion with cerebral infarction ([facility] [address]     weakness right hand    DM (diabetes mellitus) ([facility] [facility]     Hyperlipidemia     Hypertension     Stroke ([facility] [address]     Urinary incontinence     Urinary retention       Past Surgical History:   Procedure Laterality Date    CARDIAC CATHETERIZATION  2020    CARDIOVASCULAR STRESS TEST      CYSTOSCOPY N/A 2020    CYSTOSCOPY, GREENLIGHT PHOTOVAPORIZATION OF PROSTATE performed by Vena Duane, MD at [facility] [address]      total right knee    KNEE ARTHROPLASTY Right     STIMULATOR SURGERY N/A 2021    STAGE I INTERSTIM performed by Vena Duane, MD at [address] N/A 10/5/2021    STAGE 2 INTERSTIM performed by Vena Duane, MD at [facility]       Family History   Problem Relation Age of Onset    Heart Disease Father     Heart Attack Father     High Blood Pressure Sister     Cancer Neg Hx     Diabetes Neg Hx     Stroke Neg Hx      Social History     Tobacco Use    Smoking status: Former Smoker     Packs/day: 0.50     Years: 13.00     Pack years: 6.50     Types: Pipe, Cigarettes     Quit date:      Years since quittin.3    Smokeless tobacco: Never Used   Substance Use Topics    Alcohol use: Not Currently      Current Outpatient Medications   Medication Sig Dispense Refill    Cyanocobalamin (VITAMIN B-12 IJ) Inject as directed      Cholecalciferol (VITAMIN D3) 50 MCG (2000 UT) CAPS Take by mouth      QUEtiapine (SEROQUEL) 25 MG tablet Take 25 mg by mouth nightly      donepezil (ARICEPT ODT) 10 MG disintegrating tablet Take 10 mg by mouth every morning      lisinopril (PRINIVIL;ZESTRIL) 20 MG tablet Take 20 mg by mouth 2 times daily       glimepiride (AMARYL) 2 MG tablet Take 1 tablet by mouth daily (with breakfast) 30 tablet 3    pioglitazone (ACTOS) 30 MG tablet Take 30 mg by mouth daily      simvastatin (ZOCOR) 40 MG tablet Take 40 mg by mouth nightly      metFORMIN (GLUCOPHAGE-XR) 750 MG extended release tablet Take 1,500 mg by mouth Daily with supper 2 tab    Instructed to hold for surgery      metoprolol succinate (TOPROL XL) 100 MG extended release tablet Take 1 tablet by mouth 2 times daily Pt takes as needed for bp if greater than 135 60 tablet 5     No current facility-administered medications for this visit.      No Known Allergies  Health Maintenance   Topic Date Due    COVID-19 Vaccine (1) Never done    Depression Screen  Never done    DTaP/Tdap/Td vaccine (1 - Tdap) Never done    Annual Wellness Visit (AWV)  Never done    Lipid screen  06/19/2021    Shingles Vaccine (2 of 2) 05/10/2022    Flu vaccine (Season Ended) 09/01/2022    Potassium monitoring  09/10/2022    Creatinine monitoring  09/10/2022    Pneumococcal 65+ years Vaccine  Completed    Hepatitis A vaccine  Aged Out    Hib vaccine  Aged Out    Meningococcal (ACWY) vaccine  Aged Out       Subjective:  Review of Systems  General:   No fever, no chills, No fatigue or weight loss  Pulmonary:    No dyspnea, no wheezing  Cardiac:    Denies recent chest pain,   GI:     No nausea or vomiting, no abdominal pain  Neuro:    No dizziness or light headedness,   Musculoskeletal:  No recent active issues  Extremities:   No edema, no obvious claudication       Objective:  Physical Exam  BP (!) 160/70 Pulse 111   Ht 5' 9.5\" (1.765 m)   Wt 192 lb (87.1 kg)   BMI 27.95 kg/m²   General:   Well developed, well nourished  Lungs:   Clear to auscultation  Heart:    Normal S1 S2, Slight murmur. no rubs, no gallops  Abdomen:   Soft, non tender, no organomegalies, positive bowel sounds  Extremities:   No edema, no cyanosis, good peripheral pulses  Neurological:   Awake, alert, oriented. No obvious focal deficits  Musculoskelatal:  No obvious deformities    Assessment:      Diagnosis Orders   1. Coronary artery disease involving native coronary artery of native heart without angina pectoris  EKG 12 Lead   2. Primary hypertension     3. Familial hypercholesterolemia     as above  Cardiac fair for now  ECG in office was done today. I reviewed the ECG. No acute findings      Plan:  No follow-ups on file. As above  Continue risk factor modification and medical management  Thank you for allowing me to participate in the care of your patient. Please don't hesitate to contact me regarding any further issues related to the patient care    Orders Placed:  Orders Placed This Encounter   Procedures    EKG 12 Lead     Order Specific Question:   Reason for Exam?     Answer: Other       Medications Prescribed:  No orders of the defined types were placed in this encounter. Discussed use, benefit, and side effects of prescribed medications. All patient questions answered. Pt voicedunderstanding. Instructed to continue current medications, diet and exercise. Continue risk factor modification and medical management. Patient agreed with treatment plan. Follow up as directed.     Electronically signedby Ev Arguello MD on 4/14/2022 at 12:55 PM

## 2022-06-22 ENCOUNTER — NURSE ONLY (OUTPATIENT)
Dept: UROLOGY | Age: 87
End: 2022-06-22

## 2022-06-22 DIAGNOSIS — N32.81 OAB (OVERACTIVE BLADDER): Primary | ICD-10-CM

## 2022-06-22 PROCEDURE — 99999 PR OFFICE/OUTPT VISIT,PROCEDURE ONLY: CPT | Performed by: NURSE PRACTITIONER

## 2022-06-22 NOTE — PROGRESS NOTES
Per janes interstim rep, pt still having some leakage hen he goes from sitting to standing. initials program was p3 @ 1.6v. programming optimized to p6 ! 1.8v with positive sensory in rectal area. F/u as needed.

## 2022-07-14 ENCOUNTER — PROCEDURE VISIT (OUTPATIENT)
Dept: UROLOGY | Age: 87
End: 2022-07-14
Payer: MEDICARE

## 2022-07-14 VITALS
DIASTOLIC BLOOD PRESSURE: 70 MMHG | BODY MASS INDEX: 26.77 KG/M2 | WEIGHT: 187 LBS | SYSTOLIC BLOOD PRESSURE: 134 MMHG | HEIGHT: 70 IN

## 2022-07-14 DIAGNOSIS — N13.8 BPH WITH OBSTRUCTION/LOWER URINARY TRACT SYMPTOMS: Primary | ICD-10-CM

## 2022-07-14 DIAGNOSIS — N32.81 OAB (OVERACTIVE BLADDER): ICD-10-CM

## 2022-07-14 DIAGNOSIS — N40.1 BPH WITH OBSTRUCTION/LOWER URINARY TRACT SYMPTOMS: Primary | ICD-10-CM

## 2022-07-14 PROCEDURE — 52000 CYSTOURETHROSCOPY: CPT | Performed by: UROLOGY

## 2022-11-21 LAB
A/G RATIO: NORMAL
ALBUMIN SERPL-MCNC: 4.7 G/DL
ALP BLD-CCNC: 55 U/L
ALT SERPL-CCNC: 25 U/L
AST SERPL-CCNC: 25 U/L
AVERAGE GLUCOSE: 148
BASOPHILS ABSOLUTE: 0.07 /ΜL
BASOPHILS RELATIVE PERCENT: 0.7 %
BILIRUB SERPL-MCNC: 0.5 MG/DL (ref 0.1–1.4)
BILIRUBIN DIRECT: NORMAL
BILIRUBIN, INDIRECT: NORMAL
BUN BLDV-MCNC: 18 MG/DL
CALCIUM SERPL-MCNC: 9.7 MG/DL
CHLORIDE BLD-SCNC: 102 MMOL/L
CHOLESTEROL, TOTAL: 142 MG/DL
CHOLESTEROL/HDL RATIO: 3.7
CO2: 27 MMOL/L
CREAT SERPL-MCNC: 0.78 MG/DL
EOSINOPHILS ABSOLUTE: 0.42 /ΜL
EOSINOPHILS RELATIVE PERCENT: 4.2 %
GFR SERPL CREATININE-BSD FRML MDRD: 87 ML/MIN/{1.73_M2}
GLOBULIN: NORMAL
GLUCOSE BLD-MCNC: 106 MG/DL
HBA1C MFR BLD: 6.8 %
HCT VFR BLD CALC: 37.3 % (ref 41–53)
HDLC SERPL-MCNC: 38 MG/DL (ref 35–70)
HEMOGLOBIN: 12.4 G/DL (ref 13.5–17.5)
LDL CHOLESTEROL CALCULATED: 79 MG/DL (ref 0–160)
LYMPHOCYTES ABSOLUTE: 2.38 /ΜL
LYMPHOCYTES RELATIVE PERCENT: 23.8 %
MCH RBC QN AUTO: 31.3 PG
MCHC RBC AUTO-ENTMCNC: 33.2 G/DL
MCV RBC AUTO: 94.2 FL
MONOCYTES ABSOLUTE: 0.68 /ΜL
MONOCYTES RELATIVE PERCENT: 6.8 %
NEUTROPHILS ABSOLUTE: 6.41 /ΜL
NEUTROPHILS RELATIVE PERCENT: 64.2 %
NONHDLC SERPL-MCNC: NORMAL MG/DL
PDW BLD-RTO: 13.1 %
PLATELET # BLD: 438 K/ΜL
PMV BLD AUTO: 10.4 FL
POTASSIUM SERPL-SCNC: 4.6 MMOL/L
PROTEIN TOTAL: 7.1 G/DL
RBC # BLD: 3.96 10^6/ΜL
SODIUM BLD-SCNC: 142 MMOL/L
TRIGL SERPL-MCNC: 126 MG/DL
VLDLC SERPL CALC-MCNC: 25 MG/DL
WBC # BLD: 10 10^3/ML

## 2023-01-17 ENCOUNTER — OFFICE VISIT (OUTPATIENT)
Dept: UROLOGY | Age: 88
End: 2023-01-17
Payer: MEDICARE

## 2023-01-17 VITALS — HEIGHT: 70 IN | RESPIRATION RATE: 20 BRPM | WEIGHT: 182 LBS | BODY MASS INDEX: 26.05 KG/M2

## 2023-01-17 DIAGNOSIS — N13.8 BPH WITH OBSTRUCTION/LOWER URINARY TRACT SYMPTOMS: Primary | ICD-10-CM

## 2023-01-17 DIAGNOSIS — N32.81 OAB (OVERACTIVE BLADDER): ICD-10-CM

## 2023-01-17 DIAGNOSIS — N40.1 BPH WITH OBSTRUCTION/LOWER URINARY TRACT SYMPTOMS: Primary | ICD-10-CM

## 2023-01-17 LAB — POST VOID RESIDUAL (PVR): 197 ML

## 2023-01-17 PROCEDURE — 51798 US URINE CAPACITY MEASURE: CPT | Performed by: UROLOGY

## 2023-01-17 PROCEDURE — 1123F ACP DISCUSS/DSCN MKR DOCD: CPT | Performed by: UROLOGY

## 2023-01-17 PROCEDURE — 99214 OFFICE O/P EST MOD 30 MIN: CPT | Performed by: UROLOGY

## 2023-01-17 NOTE — PROGRESS NOTES
[facility] [id number]  Dept: [phone]  Dept Fax: [phone] : [address] [facility] Urology Office Note -     Patient:  Tabitha Buitrago  YOB: 1935    The patient is a 81 y.o. male who presents today for evaluation of the following problems:   Chief Complaint   Patient presents with    Benign Prostatic Hypertrophy        History of Present Illness:    BPH  hx greenlight  Elevated residual  Cystoscopy after greenlight showed open prostatic fossa with some apical residual tissue      OAB  Failed meds  denovo after PVP sx  hx interstim  Still with significant UUI  Pvr 200 cc    No UTI's or hematuria recently    Summary of Previous Records:  Underwent interstim sacral neuromodulation system placement, Andrey 1 by Dr Dee Bo on 9-. And Stage 2 on 10/5/2021. Had Green light PVP in August 2020 by Dr. Dee Bo        Requested/reviewed records from [address] [place] [address],[address], DO office and/or outside [de-identified]    (Patient's old records have been requested, reviewed and pertinent findings summarized in today's note.)    Procedures Today: N/A    Last several PSA's:  Lab Results   Component Value Date    PSA 0.88 05/19/2020       Last total testosterone:  No results found for: TESTOSTERONE    Urinalysis today:  Results for POC orders placed in visit on 01/17/23   poct post void residual   Result Value Ref Range    post void residual 197 ml       Last BUN and creatinine:  Lab Results   Component Value Date    BUN 13 09/10/2021     Lab Results   Component Value Date    CREATININE 0.7 09/10/2021       Imaging Reviewed during this Office Visit:   Marycruz Denton MD independently reviewed the images and verified the radiology reports from:    No results found.     PAST MEDICAL, FAMILY AND SOCIAL HISTORY:  Past Medical History:   Diagnosis Date    Arthritis     Cerebral artery occlusion with cerebral infarction (HCC)     weakness right hand    DM (diabetes mellitus) ([address]     Hyperlipidemia     Hypertension     Stroke [facility])     Urinary incontinence     Urinary retention      Past Surgical History:   Procedure Laterality Date    CARDIAC CATHETERIZATION  06/19/2020    CARDIOVASCULAR STRESS TEST      CYSTOSCOPY N/A 8/4/2020    CYSTOSCOPY, GREENLIGHT PHOTOVAPORIZATION OF PROSTATE performed by Zoe Langley MD at [facility]      total right knee    KNEE ARTHROPLASTY Right     STIMULATOR SURGERY N/A 9/24/2021    STAGE I INTERSTIM performed by Zoe Langley MD at [facility] [address] N/A 10/5/2021    STAGE 2 INTERSTIM performed by Zoe Langley MD at [facility]       Family History   Problem Relation Age of Onset    Heart Disease Father     Heart Attack Father     High Blood Pressure Sister     Cancer Neg Hx     Diabetes Neg Hx     Stroke Neg Hx      Outpatient Medications Marked as Taking for the 1/17/23 encounter (Office Visit) with Zoe Langley MD   Medication Sig Dispense Refill    Cyanocobalamin (VITAMIN B-12 IJ) Inject as directed      Cholecalciferol (VITAMIN D3) 50 MCG (2000 UT) CAPS Take by mouth      metoprolol succinate (TOPROL XL) 100 MG extended release tablet Take 1 tablet by mouth 2 times daily Pt takes as needed for bp if greater than 135 60 tablet 5    QUEtiapine (SEROQUEL) 25 MG tablet Take 25 mg by mouth nightly      lisinopril (PRINIVIL;ZESTRIL) 20 MG tablet Take 20 mg by mouth 2 times daily       glimepiride (AMARYL) 2 MG tablet Take 1 tablet by mouth daily (with breakfast) 30 tablet 3    pioglitazone (ACTOS) 30 MG tablet Take 30 mg by mouth daily      simvastatin (ZOCOR) 40 MG tablet Take 40 mg by mouth nightly      metFORMIN (GLUCOPHAGE-XR) 750 MG extended release tablet Take 1,500 mg by mouth Daily with supper 2 tab    Instructed to hold for surgery         Patient has no known allergies.   Social History     Tobacco Use   Smoking Status Former    Packs/day: 0.50    Years: 13.00    Pack years: 6.50    Types: Pipe, Cigarettes    Quit date:     Years since quittin.0   Smokeless Tobacco Never      (If patient a smoker, smoking cessation counseling offered)   Social History     Substance and Sexual Activity   Alcohol Use Not Currently       REVIEW OF SYSTEMS:  Constitutional: negative  Eyes: negative  Respiratory: negative  Cardiovascular: negative  Gastrointestinal: negative  Genitourinary: see HPI  Musculoskeletal: negative  Skin: negative   Neurological: negative  Hematological/Lymphatic: negative  Psychological: negative        Physical Exam:    This a 81 y.o. male  Vitals:    23 1312   Resp: 20     Body mass index is 26.49 kg/m². Constitutional: Patient in no acute distress;         Assessment and Plan        1. BPH with obstruction/lower urinary tract symptoms    2. OAB (overactive bladder)               Plan:      BPH- s/p PVP. Still with elevated residuals. Offered second PVP to remove apical tissue but increased incont. Risk    OAB with UUI- s/p interstim. Still with incontinence. It's possible its overflow incontinence. Interstim has been reprogrammed. Discussed cic vs botox. Worried about retention risk w botox in past but pt is maximally frustrated. Discussed w wife and patient    Botox 100 u       Prescriptions Ordered:  No orders of the defined types were placed in this encounter.      Orders Placed:  Orders Placed This Encounter   Procedures    poct post void residual     Bladder scan            PATRICK Saldivar MD

## 2023-01-20 ENCOUNTER — TELEPHONE (OUTPATIENT)
Dept: UROLOGY | Age: 88
End: 2023-01-20

## 2023-01-20 DIAGNOSIS — Z01.818 PRE-OP TESTING: ICD-10-CM

## 2023-01-20 DIAGNOSIS — N40.1 BPH WITH OBSTRUCTION/LOWER URINARY TRACT SYMPTOMS: Primary | ICD-10-CM

## 2023-01-20 DIAGNOSIS — N32.81 OAB (OVERACTIVE BLADDER): ICD-10-CM

## 2023-01-20 DIAGNOSIS — N13.8 BPH WITH OBSTRUCTION/LOWER URINARY TRACT SYMPTOMS: Primary | ICD-10-CM

## 2023-01-20 NOTE — TELEPHONE ENCOUNTER
DO NOT TAKE  FISH OIL, IBUPROFEN, MOTRIN-LIKE DRUGS AND ANY MULTIVITAMINS OR OVER THE COUNTER SUPPLEMENTS 14 DAYS PRIOR TO SURGERY. HOLD ASPIRIN FOR 5 DAYS PRIOR TO SURGERY. HOLD ACTOS AND GLIMEPIRIDE THE MORNING OF SURGERY. MUST HAVE AN ADULT OVER THE AGE OF 18 WITH YOU AT THE TIME OF THE PROCEDURE AND WITH YOU AT HOME AFTER THE PROCEDURE FOR 24 HOURS       Abundio Duranins 1935 Diagnosis:     Surgical Physician: Dr. Saumya Castillo have been scheduled for the procedure marked below:      Surgery: Cystoscopy, Bladder Botox 100 units         Date: 2/15/2023     Anesthesia:  MAC      Place of Service: [facility] Second Floor Same Day Surgery         Arrive to same day surgery by:  11:00am  (Surgery time is subject to change)      INSTRUCTIONS AS MARKED BELOW:    1.  DO NOT eat or drink anything after midnight before surgery. 2.  We prefer you shower or bathe with an antibacterial soap (Dial) the morning of surgery. 3  Please bring a current medication list, photo ID and insurance card(s) with you  4. Okay to take Tylenol  5. If you take Glucophage, Metformin or Janumet, hold 48-hours prior to surgery  6. Take blood pressure or heart medication as directed, if taken in the morning take with a small sip of water  7. The office will call you in 1-2 days after your procedure to schedule a follow up. DATE SENSITIVE TESTING-DO ON THE DATE LISTED*WALK IN *([facility]) NO APPOINTMENT    DO URINE CULTURE AND FASTING LABS ON 2/1/2023. ORDERS INCLUDED.         Date: 1/20/2023

## 2023-01-20 NOTE — TELEPHONE ENCOUNTER
SURGERY [address] [place] [address] [address], [address]      Phone *[phone] *[phone]   Surgical Scheduling Direct Line Phone *[phone] Fax *[phone]      Halina Lyman 1935 male    [address] [place] [address] [facility] [address]   Marital Status:          Home Phone: [phone]      Cell Phone:    Telephone Information:   Mobile [phone]          Surgeon: Dr. Vanessa Lobato Surgery Date: 2/15/2023   Time: 1:00pm    Procedure: Cystoscopy, Bladder Botox 100 units    Diagnosis: OAB, BPH     Important Medical History:  In Epic    Special Inst/Equip:     CPT Codes:    25833,   Latex Allergy: No     Cardiac Device:  No    Anesthesia:  MAC          Admission Type:  Same Day                        Admit Prior to Day of Surgery: No    Case Location:  Main OR            Preadmission Testing:  Phone Call          PAT Date and Time:______________________________________________________    PAT Confirmation #: ______________________________________________________    Post Op Visit: ___________________________________________________________    Need Preop Cardiac Clearance: Yes    Does Patient have Cardiologist/physician?      Dr. Christianne Alexis    Surgery Confirmation #: __________________________________________________    Ángel Olive: ________________________   Date: __________________________     Insurance Company Name: [employer]

## 2023-01-20 NOTE — TELEPHONE ENCOUNTER
Patient scheduled with Dr. Talita Watters on 2/15/2023. Surgery consent to be done upon arrival.  Dr. Nicole Hdez to clear. Patient to do urine culture and fasting labs on 2/1/23; orders mailed to patient. Surgery instructions mailed to patient. Patient will have an adult over the age of 25 with them at discharge and 24 hours after procedure.

## 2023-01-20 NOTE — TELEPHONE ENCOUNTER
Patient is scheduled for Cystoscopy, Bladder botox (under MAC) with Dr. Adrian Mitchell on 2/15/2023. We are requesting clearance from Dr. Bo Sanchez.   Thank you

## 2023-02-07 ENCOUNTER — PREP FOR PROCEDURE (OUTPATIENT)
Dept: UROLOGY | Age: 88
End: 2023-02-07

## 2023-02-07 NOTE — PROGRESS NOTES
PAT Call Date: ATTEMPTED 2/7   Surgery Date: 2/15    Surgeon: Amanda Che   Surgery: cystow/botox    Is patient from a nursing home? No   Any Isolation Precautions? No   Any Pacemaker or ICD? No If YES, has it been checked recently and where? Has the rep been notified? No     On Snapboard?  No  10/5/2021 BLADDER STIMULATOR   Hard Copy on Chart  In EPIC Pending/Notes   Consent -   Within 30 days; signed, dated & timed by patient and physician     [] On Arrival     [] Blood    Additional Consent Needs:     H&P - Within 30 days    [] Physician To Do     [] H&P Update - If H&P is older then 24 hours    Clearance -  Medical, Cardiac, Pulmonary, etc.    Chuy Garcia (2/9)   Orders - Signed and Dated    Copy Sent to Pharm []    [] Physician To Do    Labs - Within 3 months   11/21 2/1  [x] CBC -OK   [x] BMP-OK   [] GFR   [] INR    [] PTT    [x] Urine (-)   [] Liver Enzymes    [] Kidney Function    [] MRSA Nasal   [] MSSA      Others:    Radiology Studies-   Within 1 year  N/a  [] Chest X-Ray   [] MRI    [] CT    EKG -   Within 1 year, unless hx of HTN  4/14 ABNORMAL   Cardiac Workup -   Stress Test, Echo, Cath within 18 months  6/19/20  6/15/20  6/15/20  10/2/20  [x] Cath                                [x] Stress Test                      [x] Echo 60%   [x] Holter Monitor    [] RAS

## 2023-02-08 RX ORDER — SODIUM CHLORIDE 9 MG/ML
INJECTION, SOLUTION INTRAVENOUS PRN
Status: CANCELLED | OUTPATIENT
Start: 2023-02-08

## 2023-02-08 RX ORDER — SODIUM CHLORIDE 0.9 % (FLUSH) 0.9 %
5-40 SYRINGE (ML) INJECTION PRN
Status: CANCELLED | OUTPATIENT
Start: 2023-02-08

## 2023-02-08 RX ORDER — SODIUM CHLORIDE 0.9 % (FLUSH) 0.9 %
5-40 SYRINGE (ML) INJECTION EVERY 12 HOURS SCHEDULED
Status: CANCELLED | OUTPATIENT
Start: 2023-02-08

## 2023-02-09 ENCOUNTER — OFFICE VISIT (OUTPATIENT)
Dept: CARDIOLOGY CLINIC | Age: 88
End: 2023-02-09
Payer: MEDICARE

## 2023-02-09 VITALS
SYSTOLIC BLOOD PRESSURE: 130 MMHG | HEART RATE: 111 BPM | WEIGHT: 192 LBS | BODY MASS INDEX: 27.49 KG/M2 | HEIGHT: 70 IN | DIASTOLIC BLOOD PRESSURE: 70 MMHG

## 2023-02-09 DIAGNOSIS — Z01.818 PREOP TESTING: Primary | ICD-10-CM

## 2023-02-09 DIAGNOSIS — I25.10 CORONARY ARTERY DISEASE INVOLVING NATIVE CORONARY ARTERY OF NATIVE HEART WITHOUT ANGINA PECTORIS: ICD-10-CM

## 2023-02-09 DIAGNOSIS — I10 PRIMARY HYPERTENSION: ICD-10-CM

## 2023-02-09 DIAGNOSIS — E78.01 FAMILIAL HYPERCHOLESTEROLEMIA: ICD-10-CM

## 2023-02-09 PROCEDURE — 99213 OFFICE O/P EST LOW 20 MIN: CPT | Performed by: NUCLEAR MEDICINE

## 2023-02-09 PROCEDURE — 1123F ACP DISCUSS/DSCN MKR DOCD: CPT | Performed by: NUCLEAR MEDICINE

## 2023-02-09 PROCEDURE — 93000 ELECTROCARDIOGRAM COMPLETE: CPT | Performed by: NUCLEAR MEDICINE

## 2023-02-09 NOTE — PROGRESS NOTES
[address] [facility]  [address]  [address]  [facility] [address]  Dept: [phone]  Dept Fax: [phone]  Loc: [phone]    Visit Date: 2023    Marty Valdez is a 81 y.o. male who presents todayfor:  Chief Complaint   Patient presents with    Cardiac Clearance    Hypertension    Hyperlipidemia    Coronary Artery Disease     Here for pre op for cystoscopy   Know moderate CAD  Cath before  No chest pain lately  No changes in breathing  Some dizziness  No syncope  On BP meds  Under control   No syncope  On statins for hyperlipidemia      HPI:  HPI  Past Medical History:   Diagnosis Date    Arthritis     Cerebral artery occlusion with cerebral infarction ([facility] [address]     weakness right hand    DM (diabetes mellitus) ([facility] [address]     Hyperlipidemia     Hypertension     Stroke [facility])     Urinary incontinence     Urinary retention       Past Surgical History:   Procedure Laterality Date    CARDIAC CATHETERIZATION  2020    CARDIOVASCULAR STRESS TEST      CYSTOSCOPY N/A 2020    CYSTOSCOPY, GREENLIGHT PHOTOVAPORIZATION OF PROSTATE performed by Aníbal Espinoza MD at [address] [place] [address]      total right knee    KNEE ARTHROPLASTY Right     STIMULATOR SURGERY N/A 2021    STAGE I INTERSTIM performed by Aníbal Espinoza MD at [place] [address] N/A 10/5/2021    STAGE 2 INTERSTIM performed by Aníbal Espinoza MD at [address] [place], [place] [address]       Family History   Problem Relation Age of Onset    Heart Disease Father     Heart Attack Father     High Blood Pressure Sister     Cancer Neg Hx     Diabetes Neg Hx     Stroke Neg Hx      Social History     Tobacco Use    Smoking status: Former     Packs/day: 0.50     Years: 13.00     Pack years: 6.50     Types: Pipe, Cigarettes     Quit date:      Years since quittin.1    Smokeless tobacco: Never   Substance Use Topics    Alcohol use: Not Currently      Current Outpatient Medications   Medication Sig Dispense Refill    Cyanocobalamin (VITAMIN B-12 IJ) Inject as directed      Cholecalciferol (VITAMIN D3) 50 MCG (2000 UT) CAPS Take by mouth      metoprolol succinate (TOPROL XL) 100 MG extended release tablet Take 1 tablet by mouth 2 times daily Pt takes as needed for bp if greater than 135 60 tablet 5    QUEtiapine (SEROQUEL) 25 MG tablet Take 25 mg by mouth nightly      donepezil (ARICEPT ODT) 10 MG disintegrating tablet Take 10 mg by mouth every morning      lisinopril (PRINIVIL;ZESTRIL) 20 MG tablet Take 20 mg by mouth 2 times daily       glimepiride (AMARYL) 2 MG tablet Take 1 tablet by mouth daily (with breakfast) 30 tablet 3    pioglitazone (ACTOS) 30 MG tablet Take 30 mg by mouth daily      simvastatin (ZOCOR) 40 MG tablet Take 40 mg by mouth nightly      metFORMIN (GLUCOPHAGE-XR) 750 MG extended release tablet Take 1,500 mg by mouth Daily with supper 2 tab    Instructed to hold for surgery       No current facility-administered medications for this visit.      No Known Allergies  Health Maintenance   Topic Date Due    Depression Screen  Never done    Annual Wellness Visit (AWV)  Never done    Shingles vaccine (2 of 2) 05/10/2022    COVID-19 Vaccine (2 - Pfizer series) 11/16/2022    Lipids  11/21/2023    DTaP/Tdap/Td vaccine (2 - Td or Tdap) 11/12/2032    Flu vaccine  Completed    Pneumococcal 65+ years Vaccine  Completed    Hepatitis A vaccine  Aged Out    Hib vaccine  Aged Out    Meningococcal (ACWY) vaccine  Aged Out       Subjective:  Review of Systems  General:   No fever, no chills, No fatigue or weight loss  Pulmonary:    No dyspnea, no wheezing  Cardiac:    Denies recent chest pain,   GI:     No nausea or vomiting, no abdominal pain  Neuro:     No dizziness or light headedness,   Musculoskeletal:  No recent active issues  Extremities:   No edema, no obvious claudication     Objective:  Physical Exam  /70   Pulse (!) 111   Ht 5' 9.5\" (1.765 m)   Wt 192 lb (87.1 kg)   BMI 27.95 kg/m² General:   Well developed, well nourished  Lungs:   Clear to auscultation  Heart:    Normal S1 S2, Slight murmur. no rubs, no gallops  Abdomen:   Soft, non tender, no organomegalies, positive bowel sounds  Extremities:   No edema, no cyanosis, good peripheral pulses  Neurological:   Awake, alert, oriented. No obvious focal deficits  Musculoskelatal:  No obvious deformities    Assessment:      Diagnosis Orders   1. Preop testing  EKG 12 lead      2. Coronary artery disease involving native coronary artery of native heart without angina pectoris        3. Primary hypertension        4. Familial hypercholesterolemia        As above  Patient is stable cardiac wise  ECG in office was done today. I reviewed the ECG. No acute findings      Plan:  No follow-ups on file. As above   Clear for surgery   Continue risk factor modification and medical management  Thank you for allowing me to participate in the care of your patient. Please don't hesitate to contact me regarding any further issues related to the patient care    Orders Placed:  Orders Placed This Encounter   Procedures    EKG 12 lead     Order Specific Question:   Reason for Exam?     Answer: Other       Medications Prescribed:  No orders of the defined types were placed in this encounter. Discussed use, benefit, and side effects of prescribed medications. All patient questions answered. Pt voicedunderstanding. Instructed to continue current medications, diet and exercise. Continue risk factor modification and medical management. Patient agreed with treatment plan. Follow up as directed.     Electronically signedby Kalyn Zamora MD on 2/9/2023 at 3:39 PM

## 2023-02-15 ENCOUNTER — ANESTHESIA (OUTPATIENT)
Dept: OPERATING ROOM | Age: 88
End: 2023-02-15
Payer: MEDICARE

## 2023-02-15 ENCOUNTER — ANESTHESIA EVENT (OUTPATIENT)
Dept: OPERATING ROOM | Age: 88
End: 2023-02-15
Payer: MEDICARE

## 2023-02-15 ENCOUNTER — HOSPITAL ENCOUNTER (OUTPATIENT)
Age: 88
Setting detail: OUTPATIENT SURGERY
Discharge: HOME | End: 2023-02-15
Attending: UROLOGY | Admitting: UROLOGY
Payer: MEDICARE

## 2023-02-15 VITALS
TEMPERATURE: 97.4 F | SYSTOLIC BLOOD PRESSURE: 180 MMHG | OXYGEN SATURATION: 98 % | BODY MASS INDEX: 27.99 KG/M2 | HEIGHT: 69 IN | RESPIRATION RATE: 20 BRPM | HEART RATE: 93 BPM | WEIGHT: 189 LBS | DIASTOLIC BLOOD PRESSURE: 83 MMHG

## 2023-02-15 PROCEDURE — 6360000002 HC RX W HCPCS: Performed by: UROLOGY

## 2023-02-15 PROCEDURE — 7100000011 HC PHASE II RECOVERY - ADDTL 15 MIN: Performed by: UROLOGY

## 2023-02-15 PROCEDURE — 3600000012 HC SURGERY LEVEL 2 ADDTL 15MIN: Performed by: UROLOGY

## 2023-02-15 PROCEDURE — 2580000003 HC RX 258: Performed by: NURSE ANESTHETIST, CERTIFIED REGISTERED

## 2023-02-15 PROCEDURE — 2709999900 HC NON-CHARGEABLE SUPPLY: Performed by: UROLOGY

## 2023-02-15 PROCEDURE — 2580000003 HC RX 258: Performed by: UROLOGY

## 2023-02-15 PROCEDURE — 3700000000 HC ANESTHESIA ATTENDED CARE: Performed by: UROLOGY

## 2023-02-15 PROCEDURE — 7100000010 HC PHASE II RECOVERY - FIRST 15 MIN: Performed by: UROLOGY

## 2023-02-15 PROCEDURE — 3600000002 HC SURGERY LEVEL 2 BASE: Performed by: UROLOGY

## 2023-02-15 PROCEDURE — 6360000002 HC RX W HCPCS: Performed by: NURSE ANESTHETIST, CERTIFIED REGISTERED

## 2023-02-15 PROCEDURE — 3700000001 HC ADD 15 MINUTES (ANESTHESIA): Performed by: UROLOGY

## 2023-02-15 RX ORDER — SODIUM CHLORIDE 0.9 % (FLUSH) 0.9 %
5-40 SYRINGE (ML) INJECTION EVERY 12 HOURS SCHEDULED
Status: DISCONTINUED | OUTPATIENT
Start: 2023-02-15 | End: 2023-02-15 | Stop reason: HOSPADM

## 2023-02-15 RX ORDER — PHENAZOPYRIDINE HYDROCHLORIDE 200 MG/1
200 TABLET, FILM COATED ORAL 3 TIMES DAILY PRN
Qty: 9 TABLET | Refills: 1 | Status: SHIPPED | OUTPATIENT
Start: 2023-02-15 | End: 2023-02-18

## 2023-02-15 RX ORDER — LIDOCAINE HYDROCHLORIDE 20 MG/ML
INJECTION, SOLUTION INTRAVENOUS PRN
Status: DISCONTINUED | OUTPATIENT
Start: 2023-02-15 | End: 2023-02-15 | Stop reason: SDUPTHER

## 2023-02-15 RX ORDER — SODIUM CHLORIDE 9 MG/ML
INJECTION, SOLUTION INTRAVENOUS PRN
Status: DISCONTINUED | OUTPATIENT
Start: 2023-02-15 | End: 2023-02-15 | Stop reason: HOSPADM

## 2023-02-15 RX ORDER — SODIUM CHLORIDE 0.9 % (FLUSH) 0.9 %
5-40 SYRINGE (ML) INJECTION PRN
Status: DISCONTINUED | OUTPATIENT
Start: 2023-02-15 | End: 2023-02-15 | Stop reason: HOSPADM

## 2023-02-15 RX ORDER — SODIUM CHLORIDE 9 MG/ML
INJECTION, SOLUTION INTRAVENOUS CONTINUOUS PRN
Status: DISCONTINUED | OUTPATIENT
Start: 2023-02-15 | End: 2023-02-15 | Stop reason: SDUPTHER

## 2023-02-15 RX ORDER — CEFDINIR 300 MG/1
300 CAPSULE ORAL 2 TIMES DAILY
Qty: 10 CAPSULE | Refills: 0 | Status: SHIPPED | OUTPATIENT
Start: 2023-02-15 | End: 2023-02-20

## 2023-02-15 RX ORDER — PROPOFOL 10 MG/ML
INJECTION, EMULSION INTRAVENOUS PRN
Status: DISCONTINUED | OUTPATIENT
Start: 2023-02-15 | End: 2023-02-15 | Stop reason: SDUPTHER

## 2023-02-15 RX ADMIN — Medication 2000 MG: at 13:31

## 2023-02-15 RX ADMIN — PROPOFOL 30 MG: 10 INJECTION, EMULSION INTRAVENOUS at 13:44

## 2023-02-15 RX ADMIN — PROPOFOL 40 MG: 10 INJECTION, EMULSION INTRAVENOUS at 13:36

## 2023-02-15 RX ADMIN — PROPOFOL 30 MG: 10 INJECTION, EMULSION INTRAVENOUS at 13:38

## 2023-02-15 RX ADMIN — SODIUM CHLORIDE: 9 INJECTION, SOLUTION INTRAVENOUS at 12:53

## 2023-02-15 RX ADMIN — PROPOFOL 30 MG: 10 INJECTION, EMULSION INTRAVENOUS at 13:42

## 2023-02-15 RX ADMIN — SODIUM CHLORIDE: 9 INJECTION, SOLUTION INTRAVENOUS at 13:26

## 2023-02-15 RX ADMIN — PROPOFOL 30 MG: 10 INJECTION, EMULSION INTRAVENOUS at 13:33

## 2023-02-15 RX ADMIN — LIDOCAINE HYDROCHLORIDE 100 MG: 20 INJECTION INTRAVENOUS at 13:30

## 2023-02-15 RX ADMIN — PROPOFOL 30 MG: 10 INJECTION, EMULSION INTRAVENOUS at 13:40

## 2023-02-15 RX ADMIN — PROPOFOL 30 MG: 10 INJECTION, EMULSION INTRAVENOUS at 13:30

## 2023-02-15 ASSESSMENT — PAIN SCALES - GENERAL
PAINLEVEL_OUTOF10: 0

## 2023-02-15 ASSESSMENT — PAIN - FUNCTIONAL ASSESSMENT: PAIN_FUNCTIONAL_ASSESSMENT: 0-10

## 2023-02-15 NOTE — DISCHARGE INSTRUCTIONS
Pt ok to discharge home in good condition  No heavy lifting, >10 lbs for today  Pt should avoid strenuous activity for today  Pt should walk moderately at home  Pt ok to shower   Pt may resume diet as tolerated  Pt should take Rx as directed  No driving while on narcotics  Please call attending physician or hospital  with questions  Call or Present to ED if fever (> 101F), intractable nausea vomiting or pain.   Rx in chart    Pt should follow up with Isaias Anderson MD, in 4 weeks, call to confirm appointment

## 2023-02-15 NOTE — ANESTHESIA POSTPROCEDURE EVALUATION
Department of Anesthesiology  Postprocedure Note    Patient: Nader Lung  MRN: [id number]  YOB: 1935  Date of evaluation: 2/15/2023      Procedure Summary     Date: 02/15/23 Room / Location: [address] [facility] [place]    Anesthesia Start: 1326 Anesthesia Stop: 9929    Procedure: Cystoscopy Bladder Botox Injection 100 units (Bladder) Diagnosis:       OAB (overactive bladder)      Benign prostatic hyperplasia, unspecified whether lower urinary tract symptoms present      (OAB (overactive bladder) [N32.81])      (Benign prostatic hyperplasia, unspecified whether lower urinary tract symptoms present [N40.0])    Surgeons: Pérez Roblero MD Responsible Provider: Marleni Raymundo DO    Anesthesia Type: MAC ASA Status: 3          Anesthesia Type: No value filed.     Mateus Phase I: Mateus Score: 10    Mateus Phase II: Mateus Score: 10      Anesthesia Post Evaluation    Patient location during evaluation: bedside  Patient participation: complete - patient participated  Level of consciousness: awake  Airway patency: patent  Nausea & Vomiting: no nausea  Complications: no  Cardiovascular status: hemodynamically stable  Respiratory status: acceptable  Hydration status: stable

## 2023-02-15 NOTE — PROGRESS NOTES
Pt returned to [facility] room 12. Vitals and assessment as charted. 0.9 infusing, @950ml to count from PACU. Pt has pop and ice cream. Family at the bedside. Pt and family verbalized understanding of discharge criteria and call light use. Call light in reach.

## 2023-02-15 NOTE — ANESTHESIA PRE PROCEDURE
Department of Anesthesiology  Preprocedure Note       Name:  Rafael Yang   Age:  81 y.o.  :  1935                                          MRN:  [id number]         Date:  2/15/2023      Surgeon: Santosh Ariza):  Jordan Kwon MD    Procedure: Procedure(s):  Cystoscopy Bladder Botox Injection 100 units    Medications prior to admission:   Prior to Admission medications    Medication Sig Start Date End Date Taking?  Authorizing Provider   Cyanocobalamin (VITAMIN B-12 IJ) Inject as directed    Historical Provider, MD   Cholecalciferol (VITAMIN D3) 50 MCG (2000) CAPS Take by mouth    Historical Provider, MD   metoprolol succinate (TOPROL XL) 100 MG extended release tablet Take 1 tablet by mouth 2 times daily Pt takes as needed for bp if greater than 135 11/18/21 2/15/23  Roshni Willis MD   QUEtiapine (SEROQUEL) 25 MG tablet Take 25 mg by mouth nightly    Historical Provider, MD   donepezil (ARICEPT ODT) 10 MG disintegrating tablet Take 10 mg by mouth every morning    Historical Provider, MD   lisinopril (PRINIVIL;ZESTRIL) 20 MG tablet Take 20 mg by mouth 2 times daily     Historical Provider, MD   glimepiride (AMARYL) 2 MG tablet Take 1 tablet by mouth daily (with breakfast) 20   Alyse Pham DO   pioglitazone (ACTOS) 30 MG tablet Take 30 mg by mouth daily    Historical Provider, MD   simvastatin (ZOCOR) 40 MG tablet Take 40 mg by mouth nightly    Historical Provider, MD   metFORMIN (GLUCOPHAGE-XR) 750 MG extended release tablet Take 1,500 mg by mouth Daily with supper 2 tab    Instructed to hold for surgery    Historical Provider, MD       Current medications:    Current Facility-Administered Medications   Medication Dose Route Frequency Provider Last Rate Last Admin    sodium chloride flush 0.9 % injection 5-40 mL  5-40 mL IntraVENous 2 times per day Jordan Kwon MD        sodium chloride flush 0.9 % injection 5-40 mL  5-40 mL IntraVENous PRN Jordan Kwon MD        0.9 % sodium chloride infusion IntraVENous PRN Josep Porter  mL/hr at 02/15/23 1253 New Bag at 02/15/23 1253    ceFAZolin (ANCEF) 2000 mg in 0.9% sodium chloride 50 mL IVPB  2,000 mg IntraVENous On Call to [id number] Ong, MD        onabotulinumtoxin A (BOTOX) injection 100 Units  100 Units IntraMUSCular Once Josep Porter MD           Allergies:  No Known Allergies    Problem List:    Patient Active Problem List   Diagnosis Code    Abnormal stress test R94.39    Delirious R41.0    Leukocytosis D72.829    BPH (benign prostatic hyperplasia) N40.0    Urinary retention due to benign prostatic hyperplasia N40.1, R33.8    E. coli UTI N39.0, B96.20    Type 2 diabetes mellitus with other specified complication ([address] B48.72    History of CVA (cerebrovascular accident) Z80.78    COVID-19 ruled out by laboratory testing Z20.822       Past Medical History:        Diagnosis Date    Arthritis     Cerebral artery occlusion with [facility] infarction ([facility]     weakness right hand    DM (diabetes mellitus) ([facility]     Hyperlipidemia     Hypertension     Stroke ([facility]     Urinary incontinence     Urinary retention        Past Surgical History:        Procedure Laterality Date    CARDIAC CATHETERIZATION  2020    CARDIOVASCULAR STRESS TEST      CYSTOSCOPY N/A 2020    CYSTOSCOPY, GREENLIGHT PHOTOVAPORIZATION OF PROSTATE performed by Josep Porter MD at [address] [place] [address]      total right knee    KNEE ARTHROPLASTY Right     STIMULATOR SURGERY N/A 2021    STAGE I INTERSTIM performed by Josep Porter MD at [address] [place] [address] N/A 10/5/2021    STAGE 2 INTERSTIM performed by Josep Porter MD at [facility]         Social History:    Social History     Tobacco Use    Smoking status: Former     Packs/day: 0.50     Years: 13.00     Pack years: 6.50     Types: Pipe, Cigarettes     Quit date: 1972     Years since quittin.1    Smokeless tobacco: Never   Substance Use Topics    Alcohol use: Not Currently                                Counseling given: Not Answered      Vital Signs (Current):   Vitals:    02/15/23 1220 02/15/23 1231   BP: 134/80    Pulse: 93    Resp: 16    Temp: 97.4 °F (36.3 °C)    TempSrc: Temporal    SpO2: 96%    Weight:  189 lb (85.7 kg)   Height:  5' 9\" (1.753 m)                                              BP Readings from Last 3 Encounters:   02/15/23 134/80   02/09/23 130/70   07/14/22 134/70       NPO Status: Time of last liquid consumption: 0700                        Time of last solid consumption: 1800                        Date of last liquid consumption: 02/15/23                        Date of last solid food consumption: 02/14/23    BMI:   Wt Readings from Last 3 Encounters:   02/15/23 189 lb (85.7 kg)   02/09/23 192 lb (87.1 kg)   01/17/23 182 lb (82.6 kg)     Body mass index is 27.91 kg/m². CBC:   Lab Results   Component Value Date/Time    WBC 10.0 11/21/2022 12:00 AM    RBC 3.96 11/21/2022 12:00 AM    HGB 12.4 11/21/2022 12:00 AM    HCT 37.3 11/21/2022 12:00 AM    MCV 94.2 11/21/2022 12:00 AM    RDW 13.1 11/21/2022 12:00 AM     11/21/2022 12:00 AM       CMP:   Lab Results   Component Value Date/Time     11/21/2022 12:00 AM    K 4.6 11/21/2022 12:00 AM    K 4.1 06/19/2020 02:18 PM     11/21/2022 12:00 AM    CO2 27 11/21/2022 12:00 AM    BUN 18 11/21/2022 12:00 AM    CREATININE 0.78 11/21/2022 12:00 AM    LABGLOM 87 11/21/2022 12:00 AM    GLUCOSE 106 11/21/2022 12:00 AM    PROT 7.1 11/21/2022 12:00 AM    CALCIUM 9.7 11/21/2022 12:00 AM    BILITOT 0.5 11/21/2022 12:00 AM    ALKPHOS 55 11/21/2022 12:00 AM    AST 25 11/21/2022 12:00 AM    ALT 25 11/21/2022 12:00 AM       POC Tests: No results for input(s): POCGLU, POCNA, POCK, POCCL, POCBUN, POCHEMO, POCHCT in the last 72 hours.     Coags:   Lab Results   Component Value Date/Time    PROTIME 13.0 05/10/2021 12:00 AM    INR 1.13 05/10/2021 12:00 AM    APTT 31.4 06/19/2020 02:18 PM       HCG (If Applicable): No results found for: PREGTESTUR, PREGSERUM, HCG, HCGQUANT     ABGs: No results found for: PHART, PO2ART, CXF7IJF, HEY6TXK, BEART, R0YVECBI     Type & Screen (If Applicable):  Lab Results   Component Value Date    LABRH NEG 06/19/2020       Drug/Infectious Status (If Applicable):  No results found for: HIV, HEPCAB    COVID-19 Screening (If Applicable):   Lab Results   Component Value Date/Time    COVID19 Not Detected 07/29/2020 03:15 PM           Anesthesia Evaluation  Patient summary reviewed and Nursing notes reviewed no history of anesthetic complications:   Airway: Mallampati: II          Dental:          Pulmonary: breath sounds clear to auscultation                             Cardiovascular:  Exercise tolerance: no interval change,   (+) hypertension:,       ECG reviewed  Rhythm: regular  Rate: normal                    Neuro/Psych:   (+) CVA: residual symptoms,             GI/Hepatic/Renal:             Endo/Other:    (+) Diabeteswell controlled, , .                 Abdominal:       Abdomen: soft. Vascular: Other Findings:           Anesthesia Plan      MAC     ASA 3       Induction: intravenous. MIPS: Postoperative opioids intended and Prophylactic antiemetics administered. Anesthetic plan and risks discussed with patient. Plan discussed with CRNA.                     [address] [place] [address],    2/15/2023

## 2023-02-15 NOTE — PROGRESS NOTES
Pt has met discharge criteria and states he is ready for discharge to home. IV removed, gauze and tape applied. Dressed in own clothes and personal belongings gathered. Discharge instructions  given to pt and family; pt and family verbalized understanding of discharge instructions, prescriptions and follow up appointments. Pt transported to discharge lobby by [facility] staff.

## 2023-02-15 NOTE — H&P
Yadi Ni MD  History and Physical    Patient:  Mukesh Stiles  MRN: [id number]  YOB: 1935    HISTORY OF PRESENT ILLNESS:     The patient is a 81 y.o. male who presents with oab. Here for procedure. Patient's old records, notes and chart reviewed and summarized above. Yadi Ni MD independently reviewed the images and verified the radiology reports from:    No results found. Past Medical History:    Past Medical History:   Diagnosis Date    Arthritis     Cerebral artery occlusion with cerebral infarction ([facility]     weakness right hand    DM (diabetes mellitus) ([facility]     Hyperlipidemia     Hypertension     Stroke [facility])     Urinary incontinence     Urinary retention        Past Surgical History:    Past Surgical History:   Procedure Laterality Date    CARDIAC CATHETERIZATION  06/19/2020    CARDIOVASCULAR STRESS TEST      CYSTOSCOPY N/A 8/4/2020    CYSTOSCOPY, GREENLIGHT PHOTOVAPORIZATION OF PROSTATE performed by Sandie Quevedo MD at [facility]      total right knee    KNEE ARTHROPLASTY Right     STIMULATOR SURGERY N/A 9/24/2021    STAGE I INTERSTIM performed by Sandie Quevedo MD at [facility] 10/5/2021    STAGE 2 INTERSTIM performed by Sandie Quevedo MD at [facility]         Medications Prior to Admission:    Prior to Admission medications    Medication Sig Start Date End Date Taking?  Authorizing Provider   Cyanocobalamin (VITAMIN B-12 IJ) Inject as directed    Historical Provider, MD   Cholecalciferol (VITAMIN D3) 50 MCG (2000 UT) CAPS Take by mouth    Historical Provider, MD   metoprolol succinate (TOPROL XL) 100 MG extended release tablet Take 1 tablet by mouth 2 times daily Pt takes as needed for bp if greater than 135 11/18/21 2/9/23  Joan Templeton MD   QUEtiapine (SEROQUEL) 25 MG tablet Take 25 mg by mouth nightly    Historical Provider, MD   donepezil (ARICEPT ODT) 10 MG disintegrating tablet Take 10 mg by mouth every morning    Historical Provider, MD   lisinopril (PRINIVIL;ZESTRIL) 20 MG tablet Take 20 mg by mouth 2 times daily     Historical Provider, MD   glimepiride (AMARYL) 2 MG tablet Take 1 tablet by mouth daily (with breakfast) 20   Octavia Pham DO   pioglitazone (ACTOS) 30 MG tablet Take 30 mg by mouth daily    Historical Provider, MD   simvastatin (ZOCOR) 40 MG tablet Take 40 mg by mouth nightly    Historical Provider, MD   metFORMIN (GLUCOPHAGE-XR) 750 MG extended release tablet Take 1,500 mg by mouth Daily with supper 2 tab    Instructed to hold for surgery    Historical Provider, MD       Allergies:  Patient has no known allergies.     Social History:    Social History     Socioeconomic History    Marital status:      Spouse name: Not on file    Number of children: 4    Years of education: Not on file    Highest education level: Not on file   Occupational History    Not on file   Tobacco Use    Smoking status: Former     Packs/day: 0.50     Years: 13.00     Pack years: 6.50     Types: Pipe, Cigarettes     Quit date:      Years since quittin.1    Smokeless tobacco: Never   Vaping Use    Vaping Use: Not on file   Substance and Sexual Activity    Alcohol use: Not Currently    Drug use: Never    Sexual activity: Not on file   Other Topics Concern    Not on file   Social History Narrative    Not on file     Social Determinants of Health     Financial Resource Strain: Not on file   Food Insecurity: Not on file   Transportation Needs: Not on file   Physical Activity: Not on file   Stress: Not on file   Social Connections: Not on file   Intimate Partner Violence: Not on file   Housing Stability: Not on file       Family History:    Family History   Problem Relation Age of Onset    Heart Disease Father     Heart Attack Father     High Blood Pressure Sister     Cancer Neg Hx     Diabetes Neg Hx     Stroke Neg Hx        REVIEW OF SYSTEMS:  Constitutional: negative  Eyes: negative  Respiratory: negative  Cardiovascular: negative  Gastrointestinal: negative  Genitourinary: no acute issues  Musculoskeletal: negative  Skin: negative   Neurological: negative  Hematological/Lymphatic: negative  Psychological: negative    Physical Exam:      No data found. Constitutional: Patient in no acute distress; Neuro: alert and oriented to person place and time. Psych: Mood and affect normal.  Skin: Normal  Lungs: Respiratory effort normal, CTA  Cardiovascular:  Normal peripheral pulses; no murmur. Normal rhythm  Abdomen: Soft, non-tender, non-distended with no CVA, flank pain, hepatosplenomegaly or hernia. Kidneys normal.  Bladder non-tender and not distended. LABS:   No results for input(s): WBC, HGB, HCT, MCV, PLT in the last 72 hours. No results for input(s): NA, K, CL, CO2, PHOS, BUN, CREATININE, CA in the last 72 hours. Lab Results   Component Value Date    PSA 0.88 05/19/2020         Urinalysis: No results for input(s): COLORU, PHUR, LABCAST, WBCUA, RBCUA, MUCUS, TRICHOMONAS, YEAST, BACTERIA, CLARITYU, SPECGRAV, LEUKOCYTESUR, UROBILINOGEN, Sravan Carne in the last 72 hours.     Invalid input(s): NITRATE, GLUCOSEUKETONESUAMORPHOUS     -----------------------------------------------------------------      Assessment and Plan     Impression:    Patient Active Problem List   Diagnosis    Abnormal stress test    Delirious    Leukocytosis    BPH (benign prostatic hyperplasia)    Urinary retention due to benign prostatic hyperplasia    E. coli UTI    Type 2 diabetes mellitus with other specified complication (HCC)    History of CVA (cerebrovascular accident)    COVID-19 ruled out by laboratory testing       Plan:     Consent obtained; cysto botox  in OR today    Dino Tran MD  6:47 AM 2/15/2023

## 2023-02-15 NOTE — BRIEF OP NOTE
Brief Postoperative Note      Patient: Ivana Arias  YOB: 1935  MRN: [id number]    Date of Procedure: 2/15/2023    Pre-Op Diagnosis: OAB (overactive bladder) [N32.81]  Benign prostatic hyperplasia, unspecified whether lower urinary tract symptoms present [N40.0]    Post-Op Diagnosis: Same       Procedure(s):  Cystoscopy Bladder Botox Injection 100 units    Surgeon(s):  Joanie Christianson MD    Assistant:  * No surgical staff found *    Anesthesia: Monitor Anesthesia Care    Estimated Blood Loss (mL): Minimal    Complications: None    Specimens:   * No specimens in log *    Implants:  * No implants in log *      Drains:   Urethral Catheter Double-lumen (Active)       Findings: f/u 4 weeks with pvr w lizy    Electronically signed by Tamica Link MD on 2/15/2023 at 2:46 PM

## 2023-02-20 NOTE — OP NOTE
[address] [place] [address]. [place]    DATE: 2/15/2023  Patient:  Paulina Pappas  MRN: [id number]  YOB: 1935    SURGEON: Porsche Molina MD.    ASSISTANT: none    PREOPERATIVE DIAGNOSIS: overactive bladder    POSTOPERATIVE DIAGNOSIS: overactive bladder    PROCEDURE PERFORMED: cystoscopy with botox instillation    ANESTHESIA: Monitor Anesthesia Care    COMPLICATIONS: none    OR BLOOD LOSS:  Minimal    FLUIDS: Cystalloids per Anesthesia    SPECIMENS:  * No specimens in log *  none    DRAINS: none    INDICATIONS FOR PROCEDURE:  The patient is a 81 y.o. male who presents today with OAB (overactive bladder) [N32.81]  Benign prostatic hyperplasia, unspecified whether lower urinary tract symptoms present [N40.0] here for Cystoscopy Bladder Botox Injection 100 units. After risks, benefits and alternatives of the procedure were discussed with the patient, the patient elected to proceed. DETAILS OF PROCEDURE:  After informed consent was obtained in the preoperative area, the patient was taken back to the operating room and transferred to the operating table in supine position. Anesthesia was induced and antibiotics were given. A timeout occurred. Two patient identifiers were used. We entered the urethra with a 22F rigid cystoscope with a 30 degree lens. Once within the bladder, the injection needle was advanced and purged of air. We then completed a total of 20 injections throughout the bladder. A total of 100 units of Botox were injected. The trigone and ureteral orifices were spared from injection to reduce the risk of vesicoureteral reflux. Once completed the bladder was surveyed. There was no evidence of active bleeding from the injection sites. The patient's bladder was the drained. All instruments were removed. The patient was awakened and discharged back to the PACU in good and stable condition.

## 2023-03-13 ENCOUNTER — OFFICE VISIT (OUTPATIENT)
Dept: UROLOGY | Age: 88
End: 2023-03-13
Payer: MEDICARE

## 2023-03-13 VITALS — HEIGHT: 69 IN | WEIGHT: 189.8 LBS | BODY MASS INDEX: 28.11 KG/M2

## 2023-03-13 DIAGNOSIS — R35.0 URINARY FREQUENCY: Primary | ICD-10-CM

## 2023-03-13 DIAGNOSIS — N32.81 OAB (OVERACTIVE BLADDER): ICD-10-CM

## 2023-03-13 DIAGNOSIS — N13.8 BPH WITH OBSTRUCTION/LOWER URINARY TRACT SYMPTOMS: ICD-10-CM

## 2023-03-13 DIAGNOSIS — N40.1 BPH WITH OBSTRUCTION/LOWER URINARY TRACT SYMPTOMS: ICD-10-CM

## 2023-03-13 LAB
BILIRUBIN URINE: NEGATIVE
BLOOD URINE, POC: NEGATIVE
CHARACTER, URINE: CLEAR
COLOR, URINE: YELLOW
GLUCOSE URINE: NEGATIVE MG/DL
KETONES, URINE: NEGATIVE
LEUKOCYTE CLUMPS, URINE: NEGATIVE
NITRITE, URINE: NEGATIVE
PH, URINE: 5.5 (ref 5–9)
POST VOID RESIDUAL (PVR): 220 ML
PROTEIN, URINE: NEGATIVE MG/DL
SPECIFIC GRAVITY, URINE: 1.02 (ref 1–1.03)
UROBILINOGEN, URINE: 1 EU/DL (ref 0–1)

## 2023-03-13 PROCEDURE — 99213 OFFICE O/P EST LOW 20 MIN: CPT

## 2023-03-13 PROCEDURE — 1123F ACP DISCUSS/DSCN MKR DOCD: CPT

## 2023-03-13 PROCEDURE — 51798 US URINE CAPACITY MEASURE: CPT

## 2023-03-13 PROCEDURE — 81003 URINALYSIS AUTO W/O SCOPE: CPT

## 2023-03-13 NOTE — PROGRESS NOTES
[address] [place] [address]  Dept: [phone]  Loc: [phone]  Visit Date: 3/13/2023    THERESA Patten is a 81 y.o. male that presents to the urology clinic for OAB check. Sherwin underwent Cystoscopy Bladder Botox Injection 100 Units by Dr. Yulia Lerma on 2/15/23 for OAB. Has had InterStim. Patient has a history of BPH for which he has undergone Greenlight PVP. Patient with dementia. Presenting with his wife- Olimpia Gamez. Since surgery patient's symptoms have improved modestly. Wife notes less leaking and less frequent trips to the bathroom. Worsened for ~3-4 days post-op but has steadily improved following that acute window. Pleased with results. PVR comparable to previous visits at 220 mL (212 mL at his last visit). PVR: 220 mL. UA: Negative.    Lab Results   Component Value Date/Time    COLORU Yellow 03/13/2023 01:03 PM    COLORU Yellow 05/10/2021 12:00 AM    LABSPEC 1.025 03/13/2023 01:03 PM    LABPH 5.50 03/13/2023 01:03 PM    NITRU Negative 03/13/2023 01:03 PM    GLUCOSEU Negative 03/13/2023 01:03 PM    KETUA Negative 03/13/2023 01:03 PM    UROBILINOGEN 1.00 03/13/2023 01:03 PM    BILIRUBINUR Negative 03/13/2023 01:03 PM            Last BUN and creatinine:  Lab Results   Component Value Date    BUN 18 11/21/2022     Lab Results   Component Value Date    CREATININE 0.78 11/21/2022           PAST MEDICAL, FAMILY AND SOCIAL HISTORY UPDATE:  Past Medical History:   Diagnosis Date    Arthritis     Cerebral artery occlusion with cerebral infarction [facility]     weakness right hand    DM (diabetes mellitus) ([facility]     Hyperlipidemia     Hypertension     Stroke [facility])     Urinary incontinence     Urinary retention      Past Surgical History:   Procedure Laterality Date    CARDIAC CATHETERIZATION  06/19/2020    CARDIOVASCULAR STRESS TEST      CYSTOSCOPY N/A 8/4/2020    CYSTOSCOPY, GREENLIGHT PHOTOVAPORIZATION OF PROSTATE performed by Bismark Brink MD at [facility] N/A 2/15/2023    Cystoscopy Bladder Botox Injection 100 units performed by Bismark Brink MD at [facility]      total right knee    KNEE ARTHROPLASTY Right     STIMULATOR SURGERY N/A 2021    STAGE I INTERSTIM performed by Bismark Brink MD at [facility] N/A 10/5/2021    STAGE 2 INTERSTIM performed by Bismark Brink MD at [address] [place] [address]       Family History   Problem Relation Age of Onset    Heart Disease Father     Heart Attack Father     High Blood Pressure Sister     Cancer Neg Hx     Diabetes Neg Hx     Stroke Neg Hx      No outpatient medications have been marked as taking for the 3/13/23 encounter (Appointment) with Kurt Childers PA-C. Patient has no known allergies. Social History     Tobacco Use   Smoking Status Former    Packs/day: 0.50    Years: 13.00    Pack years: 6.50    Types: Pipe, Cigarettes    Quit date:     Years since quittin.2   Smokeless Tobacco Never       Social History     Substance and Sexual Activity   Alcohol Use Not Currently       REVIEW OF SYSTEMS:  Constitutional: negative  Eyes: negative  Respiratory: negative  Cardiovascular: negative  Gastrointestinal: negative  Musculoskeletal: negative  Genitourinary: negative except for what is in HPI  Skin: negative   Neurological: negative  Hematological/Lymphatic: negative  Psychological: negative    Physical Exam:    There were no vitals filed for this visit. Patient is a 81 y.o. male in no acute distress and alert and oriented to person, place and time. Pulmonary: Non-labored respiration. Cardiovascular: Normal rate, regular rhythm, normal peripheral pulses. Skin: Warm and dry. Psych: Normal mood and affect. Genitourinary: Bladder non-distended and non-tender. Assessment and Plan   OAB  Urinary Incontinence  - Improved following Bladder Botox injections on 2/15.  Still dependent on pads/diapers, but patient and his wife report less frequent urination and less leakage.   - Patient has InterStim. Not on medication as he has failed first line options. - Given improvement in symptoms and baseline PVR, will follow-up with Dr. Maame Narvaez in 6 months. - If symptoms worsen or if there is concern that the patient is not emptying effectively, he can be seen soon. Patient and wife express understanding. BPH  - Greenlight PVP in the past. Dr. Maame Narvaez offered once again at last visit to address residual apical tissue, but patient was not interested. Prostatic fossa open.        Melissa Noonan, PA-C  Urology

## 2023-09-12 ENCOUNTER — OFFICE VISIT (OUTPATIENT)
Dept: UROLOGY | Age: 88
End: 2023-09-12
Payer: MEDICARE

## 2023-09-12 VITALS — HEIGHT: 69 IN | BODY MASS INDEX: 27.99 KG/M2 | RESPIRATION RATE: 18 BRPM | WEIGHT: 189 LBS

## 2023-09-12 DIAGNOSIS — N32.81 OAB (OVERACTIVE BLADDER): Primary | ICD-10-CM

## 2023-09-12 PROCEDURE — 99214 OFFICE O/P EST MOD 30 MIN: CPT

## 2023-09-12 PROCEDURE — 1123F ACP DISCUSS/DSCN MKR DOCD: CPT

## 2023-09-12 ASSESSMENT — ENCOUNTER SYMPTOMS
EYE REDNESS: 0
NAUSEA: 0
EYE DISCHARGE: 0
SHORTNESS OF BREATH: 0
RHINORRHEA: 0
COLOR CHANGE: 0
ABDOMINAL PAIN: 0
VOMITING: 0

## 2023-09-12 NOTE — PATIENT INSTRUCTIONS
Follow-up with [employer] representative  Call for worsening of urinary of symptoms and if you would like to discuss undergoing botox again. Follow-up in 6 months  -Call with questions, comments, or concerns. I recommend going to the ED for further evaluation if you develop fever, chills, nausea, vomiting, chest pain, SOB, or calf pain.

## 2023-09-12 NOTE — PROGRESS NOTES
[address] [facility]  [address]  [facility] [address]  Dept: [phone]  Loc: [phone]    Visit Date: 9/12/2023        HPI:     HPI  Gregory Fleming is a 81 y.o. male that follows in the clinic for OAB and BPH. Sherwin underwent Cystoscopy Bladder Botox Injection 100 Units by Dr. Deniz Chung on 2/15/23 for OAB. Has had InterStim. Patient with dementia. Presenting with his wife- Riky Lindsey. Since surgery patient's symptoms have improved modestly. Wife notes less leaking and less frequent trips to the bathroom. Worsened for ~3-4 days post-op but has steadily improved following that acute window. Pleased with results. PVR comparable to previous visits at 220 mL (212 mL at his last visit). Patient has a history of BPH for which he has undergone Greenlight PVP. General medical history remarkable for frequent falls per patient, every 1-2 months. Current Outpatient Medications   Medication Sig Dispense Refill    Cyanocobalamin (VITAMIN B-12 IJ) Inject as directed      Cholecalciferol (VITAMIN D3) 50 MCG (2000 UT) CAPS Take by mouth      QUEtiapine (SEROQUEL) 25 MG tablet Take 1 tablet by mouth nightly      donepezil (ARICEPT ODT) 10 MG disintegrating tablet Take 1 tablet by mouth every morning      lisinopril (PRINIVIL;ZESTRIL) 20 MG tablet Take 1 tablet by mouth 2 times daily      glimepiride (AMARYL) 2 MG tablet Take 1 tablet by mouth daily (with breakfast) 30 tablet 3    pioglitazone (ACTOS) 30 MG tablet Take 1 tablet by mouth daily      simvastatin (ZOCOR) 40 MG tablet Take 1 tablet by mouth nightly      metFORMIN (GLUCOPHAGE-XR) 750 MG extended release tablet Take 2 tablets by mouth Daily with supper 2 tab    Instructed to hold for surgery       No current facility-administered medications for this visit.        Past Medical History  Kamari Do  has a past medical history of Arthritis, Cerebral artery occlusion with cerebral infarction ([address]), DM

## 2023-10-11 ENCOUNTER — NURSE ONLY (OUTPATIENT)
Dept: UROLOGY | Age: 88
End: 2023-10-11

## 2023-10-11 DIAGNOSIS — R35.0 URINARY FREQUENCY: ICD-10-CM

## 2023-10-11 DIAGNOSIS — N32.81 OAB (OVERACTIVE BLADDER): Primary | ICD-10-CM

## 2023-10-11 PROCEDURE — NBSRV NON-BILLABLE SERVICE: Performed by: NURSE PRACTITIONER

## 2024-02-21 ENCOUNTER — TELEPHONE (OUTPATIENT)
Dept: CARDIOLOGY CLINIC | Age: 89
End: 2024-02-21

## 2024-02-21 NOTE — TELEPHONE ENCOUNTER
[facility] is calling to schedule a hosp f/u for Sherwin in 7-10 days, est of dayron. DC 02-21. Dx=syncope, Elevated d-dimer, 2766, bilateral pleural effusion, DVT R femoral artery. Please call Sherwin to get an appt scheduled.

## 2024-03-07 ENCOUNTER — TELEPHONE (OUTPATIENT)
Dept: CARDIOLOGY CLINIC | Age: 89
End: 2024-03-07

## 2024-03-07 NOTE — TELEPHONE ENCOUNTER
Patient LM on nurse line stating he got out of [facility] this morning and he needs to schedule a follow up with Dr. Ryanne LYNN to call back and get appointment scheduled.

## 2024-03-11 NOTE — TELEPHONE ENCOUNTER
Received voicemail from patient's wife Romy, stating she was calling back about an appointment for patient on 3/15/24. No appt scheduled on appt desk. Attempted to call Romy back at requested number, [phone], no voicemail set up. Called patient's number back, left voicemail requesting call back

## 2024-03-14 ENCOUNTER — OFFICE VISIT (OUTPATIENT)
Dept: UROLOGY | Age: 89
End: 2024-03-14

## 2024-03-14 VITALS — HEIGHT: 69 IN | RESPIRATION RATE: 16 BRPM | WEIGHT: 179 LBS | BODY MASS INDEX: 26.51 KG/M2

## 2024-03-14 DIAGNOSIS — N13.8 BPH WITH OBSTRUCTION/LOWER URINARY TRACT SYMPTOMS: ICD-10-CM

## 2024-03-14 DIAGNOSIS — N40.1 BPH WITH OBSTRUCTION/LOWER URINARY TRACT SYMPTOMS: ICD-10-CM

## 2024-03-14 DIAGNOSIS — R35.0 URINARY FREQUENCY: ICD-10-CM

## 2024-03-14 DIAGNOSIS — N32.81 OAB (OVERACTIVE BLADDER): Primary | ICD-10-CM

## 2024-03-14 LAB
BILIRUBIN URINE: NEGATIVE
BLOOD URINE, POC: NEGATIVE
CHARACTER, URINE: CLEAR
COLOR, URINE: YELLOW
GLUCOSE URINE: NEGATIVE MG/DL
KETONES, URINE: NEGATIVE
LEUKOCYTE CLUMPS, URINE: NEGATIVE
NITRITE, URINE: NEGATIVE
PH, URINE: 5.5 (ref 5–9)
POST VOID RESIDUAL (PVR): 0 ML
PROTEIN, URINE: NEGATIVE MG/DL
SPECIFIC GRAVITY, URINE: >= 1.03 (ref 1–1.03)
UROBILINOGEN, URINE: 0.2 EU/DL (ref 0–1)

## 2024-03-14 RX ORDER — APIXABAN 5 MG/1
5 TABLET, FILM COATED ORAL 2 TIMES DAILY
COMMUNITY

## 2024-03-14 NOTE — PATIENT INSTRUCTIONS
Follow-up with shawn representative  Follow-up in 6 months with myself  Call with questions, comments, or concerns. I recommend going to the ED for further evaluation if you develop fever, chills, nausea, vomiting, chest pain, SOB, or calf pain.

## 2024-03-14 NOTE — PROGRESS NOTES
Component Value Date/Time    BUN 21 02/15/2024 06:10 PM    CREATININE 0.91 02/15/2024 06:10 PM         Assessment:   OAB  Urinary Incontinence   BPH    Plan:     OAB  Urinary Incontinence  - Improved following Bladder Botox injections on 2/15/2023. Still dependent on pads/diapers, but patient and his wife report less frequent urination and less leakage following the procedure. Sx beginning to worsen as botox wears off  - Patient has InterStim. Placed in 10/2021. Having issues with the device. Recommended patient meet with interstim representative to maximize this therapy.   -Not on medication as he has failed first line options. Ditropan may also exacerbate confusion, etc.      BPH  - Greenlight PVP in the past. Dr. Rahman has offered to address residual apical tissue noted on cystoscopy in 7/2022, but patient was not interested. Prostatic fossa open.     4. Frequent Falls  -reports frequent falls, every 1-2 months. Advised to go to the ED for evaluation after a fall, especially if he hits his head or experiences pain.   -Most recent fall in 2/2024. Patient did fracture his hip at this time        Jessica Burrell PA-C  Urology

## 2024-03-27 NOTE — PROGRESS NOTES
[facility] PROFESSIONAL SERVICES  [facility] [address]   [address]   [facility] [address]   Dept: [phone]   Dept Fax: [phone]   Loc: [phone]      Chief Complaint   Patient presents with    Follow-up   Status post fall at home ended up to [facility]    Cardiologist:  Dr. Pearl              Today's visit:   Subjective:      General:   No fever, no chills, No fatigue or weight loss/gain  Pulmonary:    No dyspnea, no wheezing  Cardiac:    Denies recent chest discomfort or palpitations  GI:     No nausea or vomiting, no abdominal pain, no black or tarry stools, no blood in stools  Neuro:    No dizziness or light headedness  Musculoskeletal:  No recent active issues, no new musculoskeletal pain  Extremities:   No swelling or claudication symptoms      Past Medical History:   Diagnosis Date    Alzheimer's dementia (HCC)     Arthritis     Cerebral artery occlusion with cerebral infarction (HCC)     weakness right hand    DM (diabetes mellitus) (HCC)     DVT (deep venous thrombosis) (HCC)     Hyperlipidemia     Hypertension     Pulmonary embolism (HCC)     Stroke (HCC)     Urinary incontinence     Urinary retention        No Known Allergies    Current Outpatient Medications   Medication Sig Dispense Refill    metoprolol succinate (TOPROL XL) 25 MG extended release tablet Take 1 tablet by mouth daily      polyethylene glycol (GLYCOLAX) 17 g packet Take 1 packet by mouth daily as needed for Constipation      aspirin 81 MG EC tablet Take 1 tablet by mouth daily      docusate (COLACE) 50 MG/5ML liquid Take 5 mLs by mouth daily      ELIQUIS 5 MG TABS tablet Take 1 tablet by mouth 2 times daily      Cyanocobalamin (VITAMIN B-12 IJ) Inject as directed      Cholecalciferol (VITAMIN D3) 50 MCG (2000 UT) CAPS Take by mouth      QUEtiapine (SEROQUEL) 25 MG tablet Take 1 tablet by mouth nightly      donepezil (ARICEPT ODT) 10 MG disintegrating tablet Take 1 tablet by mouth every morning

## 2024-03-28 ENCOUNTER — OFFICE VISIT (OUTPATIENT)
Dept: CARDIOLOGY CLINIC | Age: 89
End: 2024-03-28
Payer: MEDICARE

## 2024-03-28 VITALS
WEIGHT: 175 LBS | DIASTOLIC BLOOD PRESSURE: 60 MMHG | HEART RATE: 87 BPM | RESPIRATION RATE: 18 BRPM | HEIGHT: 69 IN | BODY MASS INDEX: 25.92 KG/M2 | SYSTOLIC BLOOD PRESSURE: 98 MMHG

## 2024-03-28 DIAGNOSIS — G30.9 ALZHEIMER'S DEMENTIA, UNSPECIFIED DEMENTIA SEVERITY, UNSPECIFIED TIMING OF DEMENTIA ONSET, UNSPECIFIED WHETHER BEHAVIORAL, PSYCHOTIC, OR MOOD DISTURBANCE OR ANXIETY (HCC): ICD-10-CM

## 2024-03-28 DIAGNOSIS — E78.5 HYPERLIPIDEMIA LDL GOAL <100: ICD-10-CM

## 2024-03-28 DIAGNOSIS — I95.9 HYPOTENSION, UNSPECIFIED HYPOTENSION TYPE: Primary | ICD-10-CM
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
## 2024-03-28 DIAGNOSIS — F02.80 ALZHEIMER'S DEMENTIA, UNSPECIFIED DEMENTIA SEVERITY, UNSPECIFIED TIMING OF DEMENTIA ONSET, UNSPECIFIED WHETHER BEHAVIORAL, PSYCHOTIC, OR MOOD DISTURBANCE OR ANXIETY (HCC): ICD-10-CM

## 2024-03-28 DIAGNOSIS — R41.82 ALTERED MENTAL STATUS, UNSPECIFIED ALTERED MENTAL STATUS TYPE: ICD-10-CM

## 2024-03-28 DIAGNOSIS — I25.10 CORONARY ARTERY DISEASE INVOLVING NATIVE CORONARY ARTERY OF NATIVE HEART WITHOUT ANGINA PECTORIS: ICD-10-CM

## 2024-03-28 PROCEDURE — 1123F ACP DISCUSS/DSCN MKR DOCD: CPT | Performed by: NURSE PRACTITIONER

## 2024-03-28 PROCEDURE — 93000 ELECTROCARDIOGRAM COMPLETE: CPT | Performed by: NURSE PRACTITIONER

## 2024-03-28 PROCEDURE — 99214 OFFICE O/P EST MOD 30 MIN: CPT | Performed by: NURSE PRACTITIONER

## 2024-03-28 RX ORDER — METOPROLOL SUCCINATE 25 MG/1
25 TABLET, EXTENDED RELEASE ORAL DAILY
COMMUNITY

## 2024-03-28 RX ORDER — ASPIRIN 81 MG/1
81 TABLET ORAL DAILY
COMMUNITY

## 2024-03-28 RX ORDER — POLYETHYLENE GLYCOL 3350 17 G/17G
17 POWDER, FOR SOLUTION ORAL DAILY PRN
COMMUNITY

## 2024-04-01 ENCOUNTER — APPOINTMENT (OUTPATIENT)
Dept: GENERAL RADIOLOGY | Age: 89
DRG: 377 | End: 2024-04-01
Payer: MEDICARE

## 2024-04-01 ENCOUNTER — HOSPITAL ENCOUNTER (INPATIENT)
Age: 89
LOS: 8 days | Discharge: HOME HEALTH | DRG: 377 | End: 2024-04-16
Attending: EMERGENCY MEDICINE | Admitting: INTERNAL MEDICINE
Payer: MEDICARE

## 2024-04-01 DIAGNOSIS — Z86.718 HX OF DEEP VENOUS THROMBOSIS: ICD-10-CM

## 2024-04-01 DIAGNOSIS — R07.9 CHEST PAIN, UNSPECIFIED TYPE: Primary | ICD-10-CM

## 2024-04-01 DIAGNOSIS — D64.9 ACUTE ANEMIA: ICD-10-CM

## 2024-04-01 DIAGNOSIS — I10 HYPERTENSION, UNSPECIFIED TYPE: ICD-10-CM

## 2024-04-01 PROBLEM — D62 ACUTE BLOOD LOSS ANEMIA: Status: ACTIVE | Noted: 2024-04-01

## 2024-04-01 LAB
ABO: NORMAL
ALBUMIN SERPL BCG-MCNC: 3.8 G/DL (ref 3.5–5.1)
ALP SERPL-CCNC: 44 U/L (ref 38–126)
ALT SERPL W/O P-5'-P-CCNC: 9 U/L (ref 11–66)
ANION GAP SERPL CALC-SCNC: 14 MEQ/L (ref 8–16)
ANTIBODY SCREEN: NORMAL
APTT PPP: 33.4 SECONDS (ref 22–38)
AST SERPL-CCNC: 11 U/L (ref 5–40)
BASOPHILS ABSOLUTE: 0 THOU/MM3 (ref 0–0.1)
BASOPHILS NFR BLD AUTO: 0.4 %
BILIRUB CONJ SERPL-MCNC: < 0.2 MG/DL (ref 0–0.3)
BILIRUB SERPL-MCNC: < 0.2 MG/DL (ref 0.3–1.2)
BUN SERPL-MCNC: 26 MG/DL (ref 7–22)
CALCIUM SERPL-MCNC: 8.6 MG/DL (ref 8.5–10.5)
CHLORIDE SERPL-SCNC: 106 MEQ/L (ref 98–111)
CO2 SERPL-SCNC: 21 MEQ/L (ref 23–33)
CREAT SERPL-MCNC: 0.7 MG/DL (ref 0.4–1.2)
DEPRECATED RDW RBC AUTO: 53 FL (ref 35–45)
EOSINOPHIL NFR BLD AUTO: 3.1 %
EOSINOPHILS ABSOLUTE: 0.3 THOU/MM3 (ref 0–0.4)
ERYTHROCYTE [DISTWIDTH] IN BLOOD BY AUTOMATED COUNT: 14.6 % (ref 11.5–14.5)
FLUAV RNA RESP QL NAA+PROBE: NOT DETECTED
FLUBV RNA RESP QL NAA+PROBE: NOT DETECTED
GFR SERPL CREATININE-BSD FRML MDRD: 89 ML/MIN/1.73M2
GLUCOSE SERPL-MCNC: 165 MG/DL (ref 70–108)
HCT VFR BLD AUTO: 20.5 % (ref 42–52)
HCT VFR BLD AUTO: 24.7 % (ref 42–52)
HEMOCCULT STL QL: POSITIVE
HGB BLD-MCNC: 6 GM/DL (ref 14–18)
HGB BLD-MCNC: 7.4 GM/DL (ref 14–18)
IMM GRANULOCYTES # BLD AUTO: 0.04 THOU/MM3 (ref 0–0.07)
IMM GRANULOCYTES NFR BLD AUTO: 0.4 %
INR PPP: 1.12 (ref 0.85–1.13)
LIPASE SERPL-CCNC: 26.2 U/L (ref 5.6–51.3)
LYMPHOCYTES ABSOLUTE: 1.9 THOU/MM3 (ref 1–4.8)
LYMPHOCYTES NFR BLD AUTO: 21.6 %
MAGNESIUM SERPL-MCNC: 2.1 MG/DL (ref 1.6–2.4)
MCH RBC QN AUTO: 29.6 PG (ref 26–33)
MCHC RBC AUTO-ENTMCNC: 29.3 GM/DL (ref 32.2–35.5)
MCV RBC AUTO: 101 FL (ref 80–94)
MONOCYTES ABSOLUTE: 0.6 THOU/MM3 (ref 0.4–1.3)
MONOCYTES NFR BLD AUTO: 7.1 %
NEUTROPHILS NFR BLD AUTO: 67.4 %
NRBC BLD AUTO-RTO: 0 /100 WBC
NT-PROBNP SERPL IA-MCNC: 1292 PG/ML (ref 0–449)
OSMOLALITY SERPL CALC.SUM OF ELEC: 289.7 MOSMOL/KG (ref 275–300)
PLATELET # BLD AUTO: 446 THOU/MM3 (ref 130–400)
PMV BLD AUTO: 9.9 FL (ref 9.4–12.4)
POTASSIUM SERPL-SCNC: 4.4 MEQ/L (ref 3.5–5.2)
PROT SERPL-MCNC: 6.3 G/DL (ref 6.1–8)
RBC # BLD AUTO: 2.03 MILL/MM3 (ref 4.7–6.1)
RH FACTOR: NORMAL
SARS-COV-2 RNA RESP QL NAA+PROBE: NOT DETECTED
SCAN OF BLOOD SMEAR: NORMAL
SEGMENTED NEUTROPHILS ABSOLUTE COUNT: 6.1 THOU/MM3 (ref 1.8–7.7)
SODIUM SERPL-SCNC: 141 MEQ/L (ref 135–145)
TROPONIN, HIGH SENSITIVITY: 28 NG/L (ref 0–12)
TROPONIN, HIGH SENSITIVITY: 30 NG/L (ref 0–12)
WBC # BLD AUTO: 9 THOU/MM3 (ref 4.8–10.8)

## 2024-04-01 PROCEDURE — 85014 HEMATOCRIT: CPT

## 2024-04-01 PROCEDURE — 99285 EMERGENCY DEPT VISIT HI MDM: CPT

## 2024-04-01 PROCEDURE — 87636 SARSCOV2 & INF A&B AMP PRB: CPT

## 2024-04-01 PROCEDURE — 85610 PROTHROMBIN TIME: CPT

## 2024-04-01 PROCEDURE — G0378 HOSPITAL OBSERVATION PER HR: HCPCS

## 2024-04-01 PROCEDURE — 96366 THER/PROPH/DIAG IV INF ADDON: CPT

## 2024-04-01 PROCEDURE — 84484 ASSAY OF TROPONIN QUANT: CPT

## 2024-04-01 PROCEDURE — 83880 ASSAY OF NATRIURETIC PEPTIDE: CPT

## 2024-04-01 PROCEDURE — 2580000003 HC RX 258: Performed by: PHYSICIAN ASSISTANT

## 2024-04-01 PROCEDURE — 80048 BASIC METABOLIC PNL TOTAL CA: CPT

## 2024-04-01 PROCEDURE — 86901 BLOOD TYPING SEROLOGIC RH(D): CPT

## 2024-04-01 PROCEDURE — 71045 X-RAY EXAM CHEST 1 VIEW: CPT

## 2024-04-01 PROCEDURE — 85730 THROMBOPLASTIN TIME PARTIAL: CPT

## 2024-04-01 PROCEDURE — 82272 OCCULT BLD FECES 1-3 TESTS: CPT

## 2024-04-01 PROCEDURE — 80076 HEPATIC FUNCTION PANEL: CPT

## 2024-04-01 PROCEDURE — 86923 COMPATIBILITY TEST ELECTRIC: CPT

## 2024-04-01 PROCEDURE — 93005 ELECTROCARDIOGRAM TRACING: CPT | Performed by: EMERGENCY MEDICINE

## 2024-04-01 PROCEDURE — P9016 RBC LEUKOCYTES REDUCED: HCPCS

## 2024-04-01 PROCEDURE — 36415 COLL VENOUS BLD VENIPUNCTURE: CPT

## 2024-04-01 PROCEDURE — 85025 COMPLETE CBC W/AUTO DIFF WBC: CPT

## 2024-04-01 PROCEDURE — 2060000000 HC ICU INTERMEDIATE R&B

## 2024-04-01 PROCEDURE — 86900 BLOOD TYPING SEROLOGIC ABO: CPT

## 2024-04-01 PROCEDURE — 86850 RBC ANTIBODY SCREEN: CPT

## 2024-04-01 PROCEDURE — 85018 HEMOGLOBIN: CPT

## 2024-04-01 PROCEDURE — 6360000002 HC RX W HCPCS: Performed by: PHYSICIAN ASSISTANT

## 2024-04-01 PROCEDURE — 96365 THER/PROPH/DIAG IV INF INIT: CPT

## 2024-04-01 PROCEDURE — 83735 ASSAY OF MAGNESIUM: CPT

## 2024-04-01 PROCEDURE — 6370000000 HC RX 637 (ALT 250 FOR IP): Performed by: PHYSICIAN ASSISTANT

## 2024-04-01 PROCEDURE — 83690 ASSAY OF LIPASE: CPT

## 2024-04-01 PROCEDURE — 99223 1ST HOSP IP/OBS HIGH 75: CPT | Performed by: PHYSICIAN ASSISTANT

## 2024-04-01 RX ORDER — SODIUM CHLORIDE 9 MG/ML
INJECTION, SOLUTION INTRAVENOUS PRN
Status: DISCONTINUED | OUTPATIENT
Start: 2024-04-01 | End: 2024-04-16 | Stop reason: HOSPADM

## 2024-04-01 RX ORDER — DONEPEZIL HYDROCHLORIDE 10 MG/1
10 TABLET, FILM COATED ORAL EVERY MORNING
Status: DISCONTINUED | OUTPATIENT
Start: 2024-04-02 | End: 2024-04-16 | Stop reason: HOSPADM

## 2024-04-01 RX ORDER — POLYETHYLENE GLYCOL 3350 17 G/17G
17 POWDER, FOR SOLUTION ORAL DAILY PRN
Status: DISCONTINUED | OUTPATIENT
Start: 2024-04-01 | End: 2024-04-16 | Stop reason: HOSPADM

## 2024-04-01 RX ORDER — SODIUM CHLORIDE 9 MG/ML
INJECTION, SOLUTION INTRAVENOUS PRN
Status: DISCONTINUED | OUTPATIENT
Start: 2024-04-01 | End: 2024-04-05

## 2024-04-01 RX ORDER — ACETAMINOPHEN 650 MG/1
650 SUPPOSITORY RECTAL EVERY 6 HOURS PRN
Status: DISCONTINUED | OUTPATIENT
Start: 2024-04-01 | End: 2024-04-16 | Stop reason: HOSPADM

## 2024-04-01 RX ORDER — HEPARIN SODIUM 1000 [USP'U]/ML
80 INJECTION, SOLUTION INTRAVENOUS; SUBCUTANEOUS PRN
Status: DISCONTINUED | OUTPATIENT
Start: 2024-04-02 | End: 2024-04-03

## 2024-04-01 RX ORDER — ATORVASTATIN CALCIUM 20 MG/1
20 TABLET, FILM COATED ORAL DAILY
Status: DISCONTINUED | OUTPATIENT
Start: 2024-04-01 | End: 2024-04-16 | Stop reason: HOSPADM

## 2024-04-01 RX ORDER — ONDANSETRON 4 MG/1
4 TABLET, ORALLY DISINTEGRATING ORAL EVERY 8 HOURS PRN
Status: DISCONTINUED | OUTPATIENT
Start: 2024-04-01 | End: 2024-04-16 | Stop reason: HOSPADM

## 2024-04-01 RX ORDER — PIOGLITAZONEHYDROCHLORIDE 30 MG/1
30 TABLET ORAL DAILY
Status: DISCONTINUED | OUTPATIENT
Start: 2024-04-02 | End: 2024-04-14

## 2024-04-01 RX ORDER — ACETAMINOPHEN 325 MG/1
650 TABLET ORAL EVERY 6 HOURS PRN
Status: DISCONTINUED | OUTPATIENT
Start: 2024-04-01 | End: 2024-04-16 | Stop reason: HOSPADM

## 2024-04-01 RX ORDER — SODIUM CHLORIDE 0.9 % (FLUSH) 0.9 %
5-40 SYRINGE (ML) INJECTION EVERY 12 HOURS SCHEDULED
Status: DISCONTINUED | OUTPATIENT
Start: 2024-04-01 | End: 2024-04-16 | Stop reason: HOSPADM

## 2024-04-01 RX ORDER — MAGNESIUM SULFATE IN WATER 40 MG/ML
2000 INJECTION, SOLUTION INTRAVENOUS PRN
Status: DISCONTINUED | OUTPATIENT
Start: 2024-04-01 | End: 2024-04-16 | Stop reason: HOSPADM

## 2024-04-01 RX ORDER — ONDANSETRON 2 MG/ML
4 INJECTION INTRAMUSCULAR; INTRAVENOUS EVERY 6 HOURS PRN
Status: DISCONTINUED | OUTPATIENT
Start: 2024-04-01 | End: 2024-04-16 | Stop reason: HOSPADM

## 2024-04-01 RX ORDER — GLIPIZIDE 5 MG/1
5 TABLET ORAL
Status: DISCONTINUED | OUTPATIENT
Start: 2024-04-02 | End: 2024-04-12

## 2024-04-01 RX ORDER — POTASSIUM CHLORIDE 20 MEQ/1
40 TABLET, EXTENDED RELEASE ORAL PRN
Status: DISCONTINUED | OUTPATIENT
Start: 2024-04-01 | End: 2024-04-16 | Stop reason: HOSPADM

## 2024-04-01 RX ORDER — HEPARIN SODIUM 10000 [USP'U]/100ML
5-30 INJECTION, SOLUTION INTRAVENOUS CONTINUOUS
Status: DISCONTINUED | OUTPATIENT
Start: 2024-04-01 | End: 2024-04-02

## 2024-04-01 RX ORDER — HEPARIN SODIUM 1000 [USP'U]/ML
40 INJECTION, SOLUTION INTRAVENOUS; SUBCUTANEOUS PRN
Status: DISCONTINUED | OUTPATIENT
Start: 2024-04-02 | End: 2024-04-03

## 2024-04-01 RX ORDER — POTASSIUM CHLORIDE 7.45 MG/ML
10 INJECTION INTRAVENOUS PRN
Status: DISCONTINUED | OUTPATIENT
Start: 2024-04-01 | End: 2024-04-16 | Stop reason: HOSPADM

## 2024-04-01 RX ORDER — ASPIRIN 81 MG/1
81 TABLET ORAL DAILY
Status: DISCONTINUED | OUTPATIENT
Start: 2024-04-02 | End: 2024-04-16 | Stop reason: HOSPADM

## 2024-04-01 RX ORDER — SODIUM CHLORIDE 0.9 % (FLUSH) 0.9 %
5-40 SYRINGE (ML) INJECTION PRN
Status: DISCONTINUED | OUTPATIENT
Start: 2024-04-01 | End: 2024-04-16 | Stop reason: HOSPADM

## 2024-04-01 RX ORDER — QUETIAPINE FUMARATE 25 MG/1
25 TABLET, FILM COATED ORAL NIGHTLY
Status: DISCONTINUED | OUTPATIENT
Start: 2024-04-01 | End: 2024-04-16 | Stop reason: HOSPADM

## 2024-04-01 RX ADMIN — HEPARIN SODIUM 18 UNITS/KG/HR: 10000 INJECTION, SOLUTION INTRAVENOUS at 22:17

## 2024-04-01 RX ADMIN — QUETIAPINE FUMARATE 25 MG: 25 TABLET ORAL at 23:53

## 2024-04-01 RX ADMIN — METOPROLOL TARTRATE 25 MG: 25 TABLET, FILM COATED ORAL at 23:53

## 2024-04-01 RX ADMIN — SODIUM CHLORIDE, PRESERVATIVE FREE 10 ML: 5 INJECTION INTRAVENOUS at 23:55

## 2024-04-01 RX ADMIN — ATORVASTATIN CALCIUM 20 MG: 20 TABLET, FILM COATED ORAL at 23:53

## 2024-04-01 ASSESSMENT — PAIN SCALES - GENERAL: PAINLEVEL_OUTOF10: 0

## 2024-04-01 ASSESSMENT — HEART SCORE: ECG: NON-SPECIFC REPOLARIZATION DISTURBANCE/LBTB/PM

## 2024-04-01 ASSESSMENT — PAIN - FUNCTIONAL ASSESSMENT
PAIN_FUNCTIONAL_ASSESSMENT: NONE - DENIES PAIN
PAIN_FUNCTIONAL_ASSESSMENT: NONE - DENIES PAIN

## 2024-04-01 NOTE — ED PROVIDER NOTES
he has fallen multiple times over the last couple weeks.  States he had felt \"slightly dizzy.  Patient does have a history of multiple falls and this is where his hip fracture came from.  He has been worked up for this in the past.  At this point I feel the patient needs to be admitted.  Patient's heart score here today is a 6 despite the fact that he had a coronary artery catheterization in 2020 which showed nonobstructive disease.  I repeated the CBC here just to make sure and it was indeed accurate.  Ordered for transfusion were given.  I then called the hospitalist group and discussed case with them.  They graciously excepted the admission.  Patient is admitted in stable condition pending further evaluation and treatment.      Heart Score    Heart Score for chest pain patients  History: Slightly Suspicious  ECG: Non-Specifc repolarization disturbance/LBTB/PM  Patient Age: > 65 years  *Risk factors for Atherosclerotic disease: Cigarette smoking, Diabetes Mellitus, Hypercholesterolemia, Hypertension  Risk Factors: > 3 Risk factors or history of atherosclerotic disease*  Troponin: > 1 and < 3X normal limit  Heart Score Total: 6    HEART Score recommendations:  Score 0 - 3:   <1.7%  risk of MACE over next 6 wks = Outpatient workup  Score 4 - 6: 12-16% risk of MACE over next 6 wks = Admission for observation  Score 7- 10: 50-65% risk of MACE over next 6 wks = Early invasive strategy    MACE: Major Acute Cardiac Event (All Cause Mortality, AMI or revascularization)  “Chest Pain in the Emergency Room: A Multicenter Validation of the HEART Score”. [place] BE, Prateek AJ, Giovanna TIMOTEO, João TP, van magui Ortega F, João EG, Jennifer SH, van Lyla RM, Aedlso PA. Crit Pathw Cardiol. 2010 Sep; 9(3): 164-169.  \"A prospective validation of the HEART score for chest pain patients at the emergency department.\" Int J Cardiol. 2013 Oct 3;168(3):2153-8. doi: 10.1016/j.ijcard.2013.01.255. Epub 2013 Mar 7.      CRITICAL CARE:    None    CONSULTS:  Hospitalist    PROCEDURES:  None    FINAL IMPRESSION      1. Chest pain, unspecified type    2. Acute anemia          DISPOSITION/PLAN   Admission    PATIENT REFERRED TO:  No follow-up provider specified.    DISCHARGE MEDICATIONS:  New Prescriptions    No medications on file       (Please note that portions of this note were completed with a voice recognition program.  Efforts were made to edit the dictations but occasionally words are mis-transcribed.)    DO Anegl MUHAMMAD John T, DO  04/01/24 7865

## 2024-04-01 NOTE — H&P
Hospitalist - History & Physical      Patient: Sherwin Phelps    Unit/Bed:4A-07/007-A  YOB: 1935  MRN: [id number]   Acct: [id number]   PCP: Diego Silva Jr.,       Assessment and Plan:        Acute blood loss anemia:   Presumed GI source -positive occult and recent anticoagulation start  Transfused in ED for hemoglobin 6.0  H&H every 6 hours  Transfuse if PRBCs less than 7  GI consult  NPO  Pulmonary embolism:   Diagnosed early March 2024  Anticoagulated on apixaban starting 3/7/2024  Currently held -initiate heparin drip while evaluating for acute anemia  NIDDM type II:   Glimepiride 2 mg and pioglitazone 30 mg daily are currently held while NPO  Low-dose sliding scale insulin every 4 hours while NPO  ADA diet when resumed  Hypoglycemic treatment orders  CAD:   ASA held, continue atorvastatin 20 mg  Continue metoprolol tartrate 25 mg twice daily  YOLY:   Home CPAP when available  Dementia:   Continue donepezil 10 mg daily  Continue to quetiapine 25 mg nightly    CC: Chest pain and syncope    HPI: Patient presents emergency department with left-sided chest pain rating to his left shoulder and left neck that started shortly before arrival.  Patient reports he believed he was having a heart attack.  Patient denies any shortness of breath associated with the pain.  Patient was recently diagnosed with pulmonary embolism and has been taking apixaban outpatient.  Upon evaluation the emergency department there are no EKG changes, no delta change in the troponin, and no respiratory distress.  However the patient is found to have a hemoglobin of 6 which is a new finding for the patient.  Patient is also found to have a positive Hemoccult.  Patient will be admitted for further evaluation of presumed GI bleed and continue treatment for pulmonary embolism with heparin drip until blood loss anemia workup is complete.    ROS: Review of Systems   Constitutional:  Positive for activity change and

## 2024-04-01 NOTE — ED TRIAGE NOTES
Presents to ER from home with concern of CP and SOB since yesterday by EMS. He reports no strenuous activity yesterday when it started. Pt given asa en route. Reports CP has subsided. 0/10 but he reports his sob is still present. Pt 100% RA. Will monitor.

## 2024-04-02 ENCOUNTER — APPOINTMENT (OUTPATIENT)
Dept: INTERVENTIONAL RADIOLOGY/VASCULAR | Age: 89
DRG: 377 | End: 2024-04-02
Payer: MEDICARE

## 2024-04-02 PROBLEM — I26.99 PULMONARY EMBOLISM (HCC): Status: ACTIVE | Noted: 2024-04-02

## 2024-04-02 PROBLEM — F03.90 DEMENTIA (HCC): Status: ACTIVE | Noted: 2024-04-02

## 2024-04-02 PROBLEM — I10 HYPERTENSION: Status: ACTIVE | Noted: 2024-04-02

## 2024-04-02 PROBLEM — I35.0 AORTIC VALVE STENOSIS: Status: ACTIVE | Noted: 2024-04-02

## 2024-04-02 PROBLEM — E78.5 HYPERLIPIDEMIA: Status: ACTIVE | Noted: 2024-04-02

## 2024-04-02 PROBLEM — Z86.718 HX OF DEEP VENOUS THROMBOSIS: Status: ACTIVE | Noted: 2024-04-02

## 2024-04-02 PROBLEM — I25.10 CORONARY ATHEROSCLEROSIS: Status: ACTIVE | Noted: 2024-04-02

## 2024-04-02 PROBLEM — G47.33 OSA (OBSTRUCTIVE SLEEP APNEA): Status: ACTIVE | Noted: 2024-04-02

## 2024-04-02 PROBLEM — R07.9 CHEST PAIN: Status: ACTIVE | Noted: 2024-04-02

## 2024-04-02 LAB
ANION GAP SERPL CALC-SCNC: 12 MEQ/L (ref 8–16)
APTT PPP: 82.9 SECONDS (ref 22–38)
BASOPHILS ABSOLUTE: 0 THOU/MM3 (ref 0–0.1)
BASOPHILS NFR BLD AUTO: 0.3 %
BUN SERPL-MCNC: 21 MG/DL (ref 7–22)
CALCIUM SERPL-MCNC: 8.5 MG/DL (ref 8.5–10.5)
CHLORIDE SERPL-SCNC: 106 MEQ/L (ref 98–111)
CO2 SERPL-SCNC: 22 MEQ/L (ref 23–33)
CREAT SERPL-MCNC: 0.6 MG/DL (ref 0.4–1.2)
DEPRECATED RDW RBC AUTO: 53.1 FL (ref 35–45)
EOSINOPHIL NFR BLD AUTO: 2.8 %
EOSINOPHILS ABSOLUTE: 0.2 THOU/MM3 (ref 0–0.4)
ERYTHROCYTE [DISTWIDTH] IN BLOOD BY AUTOMATED COUNT: 14.6 % (ref 11.5–14.5)
FERRITIN SERPL IA-MCNC: 26 NG/ML (ref 22–322)
GFR SERPL CREATININE-BSD FRML MDRD: > 90 ML/MIN/1.73M2
GLUCOSE BLD STRIP.AUTO-MCNC: 115 MG/DL (ref 70–108)
GLUCOSE BLD STRIP.AUTO-MCNC: 116 MG/DL (ref 70–108)
GLUCOSE BLD STRIP.AUTO-MCNC: 129 MG/DL (ref 70–108)
GLUCOSE BLD STRIP.AUTO-MCNC: 130 MG/DL (ref 70–108)
GLUCOSE SERPL-MCNC: 105 MG/DL (ref 70–108)
HCT VFR BLD AUTO: 20.6 % (ref 42–52)
HCT VFR BLD AUTO: 23 % (ref 42–52)
HCT VFR BLD AUTO: 23.7 % (ref 42–52)
HCT VFR BLD AUTO: 25.3 % (ref 42–52)
HGB BLD-MCNC: 6.1 GM/DL (ref 14–18)
HGB BLD-MCNC: 7.2 GM/DL (ref 14–18)
HGB BLD-MCNC: 7.6 GM/DL (ref 14–18)
HGB BLD-MCNC: 7.6 GM/DL (ref 14–18)
HGB RETIC QN AUTO: 24.7 PG (ref 28.2–35.7)
IMM GRANULOCYTES # BLD AUTO: 0.04 THOU/MM3 (ref 0–0.07)
IMM GRANULOCYTES NFR BLD AUTO: 0.5 %
IMM RETICS NFR: 32.6 % (ref 2.3–13.4)
IRON SATN MFR SERPL: 10 % (ref 20–50)
IRON SERPL-MCNC: 40 UG/DL (ref 65–195)
LYMPHOCYTES ABSOLUTE: 2 THOU/MM3 (ref 1–4.8)
LYMPHOCYTES NFR BLD AUTO: 22.3 %
MCH RBC QN AUTO: 29.9 PG (ref 26–33)
MCHC RBC AUTO-ENTMCNC: 29.6 GM/DL (ref 32.2–35.5)
MCV RBC AUTO: 101 FL (ref 80–94)
MONOCYTES ABSOLUTE: 0.6 THOU/MM3 (ref 0.4–1.3)
MONOCYTES NFR BLD AUTO: 7.1 %
NEUTROPHILS NFR BLD AUTO: 67 %
NRBC BLD AUTO-RTO: 0 /100 WBC
OSMOLALITY SERPL CALC.SUM OF ELEC: 282.7 MOSMOL/KG (ref 275–300)
PATHOLOGIST REVIEW: ABNORMAL
PLATELET # BLD AUTO: 441 THOU/MM3 (ref 130–400)
PMV BLD AUTO: 9.7 FL (ref 9.4–12.4)
POIKILOCYTES: ABNORMAL
POLYCHROMASIA BLD QL SMEAR: ABNORMAL
POTASSIUM SERPL-SCNC: 4.1 MEQ/L (ref 3.5–5.2)
RBC # BLD AUTO: 2.04 MILL/MM3 (ref 4.7–6.1)
RETICS # AUTO: 69 THOU/MM3 (ref 20–115)
RETICS/RBC NFR AUTO: 2.7 % (ref 0.5–2)
SEGMENTED NEUTROPHILS ABSOLUTE COUNT: 6 THOU/MM3 (ref 1.8–7.7)
SODIUM SERPL-SCNC: 140 MEQ/L (ref 135–145)
TARGETS BLD QL SMEAR: ABNORMAL
TIBC SERPL-MCNC: 411 UG/DL (ref 171–450)
WBC # BLD AUTO: 8.9 THOU/MM3 (ref 4.8–10.8)

## 2024-04-02 PROCEDURE — 93970 EXTREMITY STUDY: CPT

## 2024-04-02 PROCEDURE — 96375 TX/PRO/DX INJ NEW DRUG ADDON: CPT

## 2024-04-02 PROCEDURE — C9113 INJ PANTOPRAZOLE SODIUM, VIA: HCPCS | Performed by: INTERNAL MEDICINE

## 2024-04-02 PROCEDURE — 6370000000 HC RX 637 (ALT 250 FOR IP): Performed by: PHYSICIAN ASSISTANT

## 2024-04-02 PROCEDURE — 36415 COLL VENOUS BLD VENIPUNCTURE: CPT

## 2024-04-02 PROCEDURE — 96376 TX/PRO/DX INJ SAME DRUG ADON: CPT

## 2024-04-02 PROCEDURE — 85730 THROMBOPLASTIN TIME PARTIAL: CPT

## 2024-04-02 PROCEDURE — 85014 HEMATOCRIT: CPT

## 2024-04-02 PROCEDURE — 99233 SBSQ HOSP IP/OBS HIGH 50: CPT | Performed by: INTERNAL MEDICINE

## 2024-04-02 PROCEDURE — 80048 BASIC METABOLIC PNL TOTAL CA: CPT

## 2024-04-02 PROCEDURE — 6360000002 HC RX W HCPCS: Performed by: INTERNAL MEDICINE

## 2024-04-02 PROCEDURE — 83550 IRON BINDING TEST: CPT

## 2024-04-02 PROCEDURE — G0378 HOSPITAL OBSERVATION PER HR: HCPCS

## 2024-04-02 PROCEDURE — 93010 ELECTROCARDIOGRAM REPORT: CPT | Performed by: INTERNAL MEDICINE

## 2024-04-02 PROCEDURE — 83540 ASSAY OF IRON: CPT

## 2024-04-02 PROCEDURE — 96366 THER/PROPH/DIAG IV INF ADDON: CPT

## 2024-04-02 PROCEDURE — 85046 RETICYTE/HGB CONCENTRATE: CPT

## 2024-04-02 PROCEDURE — 6370000000 HC RX 637 (ALT 250 FOR IP): Performed by: NURSE PRACTITIONER

## 2024-04-02 PROCEDURE — 82728 ASSAY OF FERRITIN: CPT

## 2024-04-02 PROCEDURE — 82948 REAGENT STRIP/BLOOD GLUCOSE: CPT

## 2024-04-02 PROCEDURE — 85018 HEMOGLOBIN: CPT

## 2024-04-02 PROCEDURE — 2580000003 HC RX 258: Performed by: PHYSICIAN ASSISTANT

## 2024-04-02 PROCEDURE — 1200000003 HC TELEMETRY R&B

## 2024-04-02 RX ORDER — PANTOPRAZOLE SODIUM 40 MG/10ML
40 INJECTION, POWDER, LYOPHILIZED, FOR SOLUTION INTRAVENOUS 2 TIMES DAILY
Status: DISCONTINUED | OUTPATIENT
Start: 2024-04-02 | End: 2024-04-04

## 2024-04-02 RX ORDER — INSULIN LISPRO 100 [IU]/ML
0-4 INJECTION, SOLUTION INTRAVENOUS; SUBCUTANEOUS
Status: DISCONTINUED | OUTPATIENT
Start: 2024-04-02 | End: 2024-04-03

## 2024-04-02 RX ORDER — INSULIN LISPRO 100 [IU]/ML
0-4 INJECTION, SOLUTION INTRAVENOUS; SUBCUTANEOUS EVERY 4 HOURS
Status: DISCONTINUED | OUTPATIENT
Start: 2024-04-02 | End: 2024-04-02

## 2024-04-02 RX ORDER — GLUCAGON 1 MG/ML
1 KIT INJECTION PRN
Status: DISCONTINUED | OUTPATIENT
Start: 2024-04-02 | End: 2024-04-16 | Stop reason: HOSPADM

## 2024-04-02 RX ORDER — DEXTROSE MONOHYDRATE 100 MG/ML
INJECTION, SOLUTION INTRAVENOUS CONTINUOUS PRN
Status: DISCONTINUED | OUTPATIENT
Start: 2024-04-02 | End: 2024-04-16 | Stop reason: HOSPADM

## 2024-04-02 RX ADMIN — DOCUSATE SODIUM 100 MG: 50 LIQUID ORAL at 10:15

## 2024-04-02 RX ADMIN — METOPROLOL TARTRATE 25 MG: 25 TABLET, FILM COATED ORAL at 20:17

## 2024-04-02 RX ADMIN — DONEPEZIL HYDROCHLORIDE 10 MG: 10 TABLET, FILM COATED ORAL at 10:15

## 2024-04-02 RX ADMIN — METOPROLOL TARTRATE 25 MG: 25 TABLET, FILM COATED ORAL at 10:15

## 2024-04-02 RX ADMIN — PANTOPRAZOLE SODIUM 40 MG: 40 INJECTION, POWDER, FOR SOLUTION INTRAVENOUS at 20:17

## 2024-04-02 RX ADMIN — PANTOPRAZOLE SODIUM 40 MG: 40 INJECTION, POWDER, FOR SOLUTION INTRAVENOUS at 12:28

## 2024-04-02 RX ADMIN — ATORVASTATIN CALCIUM 20 MG: 20 TABLET, FILM COATED ORAL at 10:15

## 2024-04-02 RX ADMIN — QUETIAPINE FUMARATE 25 MG: 25 TABLET ORAL at 20:17

## 2024-04-02 RX ADMIN — SODIUM CHLORIDE, PRESERVATIVE FREE 10 ML: 5 INJECTION INTRAVENOUS at 20:17

## 2024-04-02 ASSESSMENT — PAIN SCALES - GENERAL: PAINLEVEL_OUTOF10: 0

## 2024-04-02 NOTE — ED NOTES
Pt in bed. Voiced no concerns at this time other then wanting to know where he will be heading in the hospital. Pt made aware we are awaiting blood and floor assignment. Will monitor

## 2024-04-02 NOTE — CARE COORDINATION
Case Management Assessment  Initial Evaluation    Date/Time of Evaluation: 4/2/2024 11:46 AM  Assessment Completed by: Poornima Fong RN    If patient is discharged prior to next notation, then this note serves as note for discharge by case management.    Patient Name: Sherwin Phelps                   YOB: 1935  Diagnosis: Acute blood loss anemia [D62]  Chest pain, unspecified type [R07.9]  Acute anemia [D64.9]                   Date / Time: 4/1/2024  5:04 PM  Location: [facility]     Patient Admission Status: Inpatient   Readmission Risk Low 0-14, Mod 15-19), High > 20: Readmission Risk Score: 14.2    Current PCP: Diego Silva Jr., DO  PCP verified by CM? Yes    Chart Reviewed: Yes      History Provided by: Patient  Patient Orientation: Alert and Oriented    Patient Cognition: Alert    Hospitalization in the last 30 days (Readmission):  No    If yes, Readmission Assessment in CM Navigator will be completed.    Advance Directives:      Code Status: Full Code   Patient's Primary Decision Maker is: Legal Next of Kin (Wishes to complete, consult placed)      Discharge Planning:    Patient lives with: Spouse/Significant Other Type of Home: Apartment  Primary Care Giver: Self  Patient Support Systems include: Spouse/Significant Other, Family Members   Current Financial resources: Medicare  Current community resources: ECF/Home Care  Current services prior to admission: Durable Medical Equipment, Home Care            Current DME: Walker            Type of Home Care services:  Aide Services, PT, OT, Nursing Services    ADLS  Prior functional level: Assistance with the following:, Bathing, Housework, Shopping  Current functional level: Assistance with the following:, Bathing, Toileting, Housework, Shopping    Family can provide assistance at DC: Yes  Would you like Case Management to discuss the discharge plan with any other family members/significant others, and if so, who? No  Plans to Return to

## 2024-04-02 NOTE — PROGRESS NOTES
Medicine Progress Note    Patient:  Sherwin Phelps    Unit/Bed:4A-07/007-A  YOB: 1935  MRN: [id number]   Acct: [id number]   PCP: Diego Silva Jr.,   Date of Admission: 4/1/2024    Hospital Course     Briefly, pt Sherwin Phelps is a 88 y.o. male with a PMH of Alzheimer's Dementia / CVA / DVT&PE / NIRDMT2 who presented with GIB    Assessment / Plan     #Non-Variceal Lower vs Upper GIB: Active  Bleeding source: Lower vs Upper. No h/o EGD/Colonoscopy in EMR.   Plan:  -GI Consulted for GIB; appreciate recs    +CLD; no red dye    +EGD tmrw    +NPO @ MN  -IV Pantoprazole 80 mg bolus followed by 40 mg BID  -If elevated INR and suspected nutritional deficiency of vitamin K or warfarin use. Any bleeding, Give IV Vitamin K 10 mg x1.   -Hold AC & AP for now  -H&H every 8 hours  -Type and Screen; Prepare 2U pRBC  -Transfuse if Hgb <7.0 mg/dL and s/s of acute anemia    #Well-controlled NIRDMT2 A1c 6.8% on  11/21/22 : Stable  Prescribed glimepiride 2 mg daily / pioglitazone 30 mg daily / metformin 1500 mg twice daily  DM Nephropathy Screening: mAlb:Cr not performed previously  DM Retinopathy Screening: Unable to find documentation  DM Neuropathy Screening: Bilateral LE sensation intact to pinprick  Plan:  -Hold ALL Non-insulin diabetic agents  -Order A1c  -Low Dose SSI Algorithm  -Will consider initiation of basal based on insulin requirement  -Hypoglycemia protocol  -POCT glucose QID while NPO then qAC/HS when eating  -Diabetes education, diet, and exercise  -Recommend yearly optometry appointments for retinopathy screening  -Recommend yearly mAlb:sCr for nephropathy screening when patient is stable  -Recommend yearly foot exams    #DVT & PE: Stable. Pt had CTA Chest before 02/2024 at [facility] demonstrating b/l PE and had vascular study showing R CFA / DFA / PA / PV DVTs and was started on Eliquis. AC as above  #Alzheimer's Dementia: Stable. Continue donepezil.   #Insomnia: Stable. Continue quetiapine.

## 2024-04-02 NOTE — PROGRESS NOTES
Called [facility] to have medical records from GI sent over. Their medical records department states they do not have any records from GI in the patients file. CTA/ Vascular studies being pushed to our cloud.

## 2024-04-02 NOTE — PROGRESS NOTES
Pt was alone at the time of the visit. He was dealing with acute blood loss anemia. He was encouraged   04/02/24 0909   Encounter Summary   Encounter Overview/Reason  Initial Encounter   Service Provided For: Patient   Referral/Consult From: Nurse   Support System Family members   Last Encounter  04/02/24   Complexity of Encounter Low   Begin Time 0650   End Time  0657   Total Time Calculated 7 min   Spiritual/Emotional needs   Type Spiritual Support   Assessment/Intervention/Outcome   Assessment Hopeful   Intervention Empowerment   Outcome Engaged in conversation;Encouraged      and blessed. It was appreciated.

## 2024-04-02 NOTE — ED NOTES
ED to inpatient nurses report      Chief Complaint:  Chief Complaint   Patient presents with    Chest Pain    Shortness of Breath     Present to ED from: Home    MOA:     LOC: alert and orientated to name, place, date  Mobility: Requires assistance * 1  Oxygen Baseline: Room Air    Current needs required: None     Code Status:   Full Code    What abnormal results were found and what did you give/do to treat them? Blood transfusion started for Hgb of 6.0.  Any procedures or intervention occur? N/A    Mental Status:  Level of Consciousness: Alert (0)    Psych Assessment:        Vitals:  Patient Vitals for the past 24 hrs:   BP Temp Temp src Pulse Resp SpO2   04/01/24 2050 133/74 98.2 °F (36.8 °C) Oral (!) 102 16 100 %   04/01/24 2039 133/84 97.9 °F (36.6 °C) -- (!) 104 18 100 %   04/01/24 2029 125/80 98 °F (36.7 °C) -- (!) 106 16 100 %   04/01/24 2000 117/83 -- -- (!) 104 20 99 %   04/01/24 1847 (!) 127/57 98.1 °F (36.7 °C) Oral 99 24 100 %   04/01/24 1825 128/74 -- -- 99 24 96 %   04/01/24 1807 119/72 -- -- 98 18 100 %   04/01/24 1707 119/69 98.3 °F (36.8 °C) Oral 98 20 100 %        LDAs:   Peripheral IV 04/01/24 Right Antecubital (Active)   Site Assessment Clean, dry & intact 04/01/24 2048   Phlebitis Assessment No symptoms 04/01/24 2048   Infiltration Assessment 0 04/01/24 2048       Peripheral IV 04/01/24 Left Antecubital (Active)   Site Assessment Clean, dry & intact 04/01/24 2048   Phlebitis Assessment No symptoms 04/01/24 2048   Infiltration Assessment 0 04/01/24 2048       Ambulatory Status:  No data recorded    Diagnosis:  DISPOSITION Admitted 04/01/2024 07:56:41 PM   Final diagnoses:   Chest pain, unspecified type   Acute anemia        Consults:  STR ED TO IP CONSULT     Pain Score:  Pain Assessment  Pain Assessment: None - Denies Pain    C-SSRS:   Risk of Suicide: No Risk    Sepsis Screening:  Sepsis Risk Score: 3.2    [place] Fall Risk:       Swallow Screening        Preferred Language:

## 2024-04-02 NOTE — PROGRESS NOTES
04/02/24 1247   Encounter Summary   Encounter Overview/Reason  Spiritual/Emotional Needs   Service Provided For: Patient and family together   Referral/Consult From: Multi-disciplinary team   Support System Spouse;Family members;Children   Last Encounter  04/02/24   Complexity of Encounter Moderate   Begin Time 1237   End Time  1247   Total Time Calculated 10 min     Assessment:  In my encounter with the  yr old patient in (they were non-responsive/ resting) so I had conversation with the patient's spouse Romy. I also came to assess the present spiritual needs of the patient and the family. The pt was admitted due to acute blood loss anemia.     The  also came to verify if the pt had a Advance Directive.      Interventions:  I provided, prayer, emotional support and words of comfort.  provided a listening presence and encouraged pt to share their beliefs and how they support them during their hospitalization.     The pt's wife shared that they have a Advance Directive so I urged her to bring in a copy so that it can be scanned in the pt's records. I gave the pt's spouse a copy of the Advance Directive in the event that she didn't have a completed copy.       Outcomes:  The patient's family was grateful and didn't share any further spiritual needs at this time. The pt's family shared that they were appreciative for the support.     Plan:  Chaplains will follow-up at a later time for assessment of any spiritual care needs present.  The Chaplains will be available to provide further emotional support per request.

## 2024-04-02 NOTE — PLAN OF CARE
Pt c/o dark BM last night. Now thinking this is likely UGIB.     Working on obtaining medical records from [facility] as this all started 02/2024. In fact, it seems that the Hgb drop from 15 --> 10 was attributed to hemodilution. It did somewhat stabilize around 9.4 but it seems that based on the clinical course from that point on, he does, in fact have a GIB. Given melena-type BM, this is likely NVUGIB.    EGD in AM per GI  Waiting on additional records about DVT / PE / prior EGD / prior Colonscopy from [facility].     Electronically signed by Jose Angel Ramírez MD on 4/2/2024 at 12:12 PM

## 2024-04-02 NOTE — CARE COORDINATION
DISCHARGE PLANNING EVALUATION  4/2/24, 3:20 PM EDT    Reason for Referral: Current with home health   Mental Status: Alert and oriented to person, place, situation and time.   Decision Making: Family helps with decision making at times.    Family/Social/Home Environment: From home with his spouse who is very supportive.  He has two daughters Elissa and Jessica who are very supportive.  Supportive grandchildren also.    Current Services including food security, transportation and housekeeping: Current with [facility].  Grandchild Margareth takes them grocery shopping.  Uses [employer] on [employer] for transport.    Current Equipment:4 wheeled walker, shower bench, grab bars  Payment Source:[employer] Medicare  Concerns or Barriers to Discharge: None  Post-acute (PAC) provider list was provided to patient. Patient was informed of their freedom to choose PAC provider. Discussed and offered to show the patient the relevant PAC Providers quality and resource use measures on Medicare Compare web site via computer based on patient's goals of care and treatment preferences. Questions regarding selection process were answered.      Teach Back Method used with Sherwin regarding care plan and discharge planning.   Patient verbalized understanding of the plan of care and contribute to goal setting.       Patient goals, treatment preferences and discharge plan: Sherwin plans on returning home with spouse, help from family and [facility].  Denies other needs at this time.      Electronically signed by IRIS Lopez on 4/2/2024 at 3:20 PM

## 2024-04-02 NOTE — CONSULTS
Consult History & Physical      Patient:  Sherwin Phelps  YOB: 1935  MRN: [id number]     Acct: [id number]    Chief Complaint:  GI bleed    Date of Service: Pt seen/examined in consultation on 4/2/2024    History Of Present Illness:      88 y.o. male who we are asked to see/evaluate by Gunner Rick MD for medical management of GI bleed. Patient came to the ED yesterday for left -sided chest pain and SOB that started yesterday. Patient's symptoms have since resolved. EKG unremarkable. Patient was noted to have worsening anemia with a Hgb of 6.1. He received 1 unit PRBC, most recent Hgb 7.2. Patient is on Eliquis for a history of PE and stroke, and ASA for a history of DVT. Patient denies prior history of blood transfusion or GI bleed. Denies abdominal pain, nausea, vomiting, diarrhea, melena, and hematochezia. Staff is documenting that the patient's stools have been brown. Patient endorses occasional constipation and was started on a stool softener which has helped.    Past Medical History:    Past Medical History:   Diagnosis Date    Alzheimer's dementia (HCC)     Arthritis     Cerebral artery occlusion with cerebral infarction (HCC)     weakness right hand    DM (diabetes mellitus) (HCC)     DVT (deep venous thrombosis) ([facility])     Hyperlipidemia     Hypertension     Pulmonary embolism (HCC)     Stroke (HCC)     Urinary incontinence     Urinary retention        Home Medications:  Prior to Admission medications    Medication Sig Start Date End Date Taking? Authorizing Provider   metoprolol succinate (TOPROL XL) 25 MG extended release tablet Take 1 tablet by mouth daily  Patient not taking: Reported on 4/1/2024    Dutch Hightower MD   polyethylene glycol (GLYCOLAX) 17 g packet Take 1 packet by mouth daily as needed for Constipation  Patient not taking: Reported on 4/1/2024    Dutch Hightower MD   aspirin 81 MG EC tablet Take 1 tablet by mouth daily    Dutch Hightower MD

## 2024-04-03 ENCOUNTER — ANESTHESIA (OUTPATIENT)
Dept: ENDOSCOPY | Age: 89
End: 2024-04-03
Payer: MEDICARE

## 2024-04-03 ENCOUNTER — ANESTHESIA EVENT (OUTPATIENT)
Dept: ENDOSCOPY | Age: 89
End: 2024-04-03
Payer: MEDICARE

## 2024-04-03 LAB
DEPRECATED MEAN GLUCOSE BLD GHB EST-ACNC: 123 MG/DL (ref 70–126)
FOLATE SERPL-MCNC: 12.1 NG/ML (ref 4.8–24.2)
GLUCOSE BLD STRIP.AUTO-MCNC: 115 MG/DL (ref 70–108)
GLUCOSE BLD STRIP.AUTO-MCNC: 118 MG/DL (ref 70–108)
GLUCOSE BLD STRIP.AUTO-MCNC: 134 MG/DL (ref 70–108)
GLUCOSE BLD STRIP.AUTO-MCNC: 235 MG/DL (ref 70–108)
HBA1C MFR BLD HPLC: 6.1 % (ref 4.4–6.4)
HCT VFR BLD AUTO: 25 % (ref 42–52)
HCT VFR BLD AUTO: 25.4 % (ref 42–52)
HCT VFR BLD AUTO: 25.5 % (ref 42–52)
HGB BLD-MCNC: 7.7 GM/DL (ref 14–18)
HGB BLD-MCNC: 7.8 GM/DL (ref 14–18)
HGB BLD-MCNC: 7.8 GM/DL (ref 14–18)
VIT B12 SERPL-MCNC: 506 PG/ML (ref 211–911)

## 2024-04-03 PROCEDURE — 1200000003 HC TELEMETRY R&B

## 2024-04-03 PROCEDURE — 96376 TX/PRO/DX INJ SAME DRUG ADON: CPT

## 2024-04-03 PROCEDURE — 88305 TISSUE EXAM BY PATHOLOGIST: CPT

## 2024-04-03 PROCEDURE — 83036 HEMOGLOBIN GLYCOSYLATED A1C: CPT

## 2024-04-03 PROCEDURE — G0378 HOSPITAL OBSERVATION PER HR: HCPCS

## 2024-04-03 PROCEDURE — C9113 INJ PANTOPRAZOLE SODIUM, VIA: HCPCS | Performed by: INTERNAL MEDICINE

## 2024-04-03 PROCEDURE — 85014 HEMATOCRIT: CPT

## 2024-04-03 PROCEDURE — 82607 VITAMIN B-12: CPT

## 2024-04-03 PROCEDURE — 3609013500 HC EGD REMOVAL TUMOR POLYP/OTHER LESION SNARE TECH: Performed by: INTERNAL MEDICINE

## 2024-04-03 PROCEDURE — 2500000003 HC RX 250 WO HCPCS

## 2024-04-03 PROCEDURE — 6360000002 HC RX W HCPCS

## 2024-04-03 PROCEDURE — 6360000002 HC RX W HCPCS: Performed by: INTERNAL MEDICINE

## 2024-04-03 PROCEDURE — 6370000000 HC RX 637 (ALT 250 FOR IP): Performed by: PHYSICIAN ASSISTANT

## 2024-04-03 PROCEDURE — 2580000003 HC RX 258: Performed by: PHYSICIAN ASSISTANT

## 2024-04-03 PROCEDURE — 82746 ASSAY OF FOLIC ACID SERUM: CPT

## 2024-04-03 PROCEDURE — 3700000000 HC ANESTHESIA ATTENDED CARE: Performed by: INTERNAL MEDICINE

## 2024-04-03 PROCEDURE — 3700000001 HC ADD 15 MINUTES (ANESTHESIA): Performed by: INTERNAL MEDICINE

## 2024-04-03 PROCEDURE — 82948 REAGENT STRIP/BLOOD GLUCOSE: CPT

## 2024-04-03 PROCEDURE — 6370000000 HC RX 637 (ALT 250 FOR IP)

## 2024-04-03 PROCEDURE — 99232 SBSQ HOSP IP/OBS MODERATE 35: CPT | Performed by: INTERNAL MEDICINE

## 2024-04-03 PROCEDURE — 6370000000 HC RX 637 (ALT 250 FOR IP): Performed by: NURSE PRACTITIONER

## 2024-04-03 PROCEDURE — 7100000010 HC PHASE II RECOVERY - FIRST 15 MIN: Performed by: INTERNAL MEDICINE

## 2024-04-03 PROCEDURE — 0DB68ZX EXCISION OF STOMACH, VIA NATURAL OR ARTIFICIAL OPENING ENDOSCOPIC, DIAGNOSTIC: ICD-10-PCS | Performed by: INTERNAL MEDICINE

## 2024-04-03 PROCEDURE — 36415 COLL VENOUS BLD VENIPUNCTURE: CPT

## 2024-04-03 PROCEDURE — 2720000010 HC SURG SUPPLY STERILE: Performed by: INTERNAL MEDICINE

## 2024-04-03 PROCEDURE — 85018 HEMOGLOBIN: CPT

## 2024-04-03 RX ORDER — PANTOPRAZOLE SODIUM 40 MG/1
40 TABLET, DELAYED RELEASE ORAL
Status: CANCELLED | OUTPATIENT
Start: 2024-04-03

## 2024-04-03 RX ORDER — LIDOCAINE HYDROCHLORIDE 20 MG/ML
INJECTION, SOLUTION INFILTRATION; PERINEURAL PRN
Status: DISCONTINUED | OUTPATIENT
Start: 2024-04-03 | End: 2024-04-03 | Stop reason: SDUPTHER

## 2024-04-03 RX ORDER — INSULIN LISPRO 100 [IU]/ML
0-4 INJECTION, SOLUTION INTRAVENOUS; SUBCUTANEOUS 4 TIMES DAILY
Status: DISCONTINUED | OUTPATIENT
Start: 2024-04-03 | End: 2024-04-04

## 2024-04-03 RX ORDER — SODIUM CHLORIDE 0.9 % (FLUSH) 0.9 %
5-40 SYRINGE (ML) INJECTION EVERY 12 HOURS SCHEDULED
Status: CANCELLED | OUTPATIENT
Start: 2024-04-03

## 2024-04-03 RX ORDER — SODIUM CHLORIDE 9 MG/ML
25 INJECTION, SOLUTION INTRAVENOUS PRN
Status: CANCELLED | OUTPATIENT
Start: 2024-04-03

## 2024-04-03 RX ORDER — SODIUM CHLORIDE 0.9 % (FLUSH) 0.9 %
5-40 SYRINGE (ML) INJECTION PRN
Status: CANCELLED | OUTPATIENT
Start: 2024-04-03

## 2024-04-03 RX ADMIN — PROPOFOL 70 MG: 10 INJECTION, EMULSION INTRAVENOUS at 15:11

## 2024-04-03 RX ADMIN — LIDOCAINE HYDROCHLORIDE 100 MG: 20 INJECTION, SOLUTION INFILTRATION; PERINEURAL at 15:11

## 2024-04-03 RX ADMIN — PROPOFOL 30 MG: 10 INJECTION, EMULSION INTRAVENOUS at 15:16

## 2024-04-03 RX ADMIN — METOPROLOL TARTRATE 25 MG: 25 TABLET, FILM COATED ORAL at 19:54

## 2024-04-03 RX ADMIN — PANTOPRAZOLE SODIUM 40 MG: 40 INJECTION, POWDER, FOR SOLUTION INTRAVENOUS at 08:27

## 2024-04-03 RX ADMIN — SODIUM CHLORIDE, PRESERVATIVE FREE 10 ML: 5 INJECTION INTRAVENOUS at 09:08

## 2024-04-03 RX ADMIN — DOCUSATE SODIUM 100 MG: 50 LIQUID ORAL at 08:28

## 2024-04-03 RX ADMIN — METOPROLOL TARTRATE 25 MG: 25 TABLET, FILM COATED ORAL at 08:27

## 2024-04-03 RX ADMIN — QUETIAPINE FUMARATE 25 MG: 25 TABLET ORAL at 19:54

## 2024-04-03 RX ADMIN — SODIUM CHLORIDE, PRESERVATIVE FREE 10 ML: 5 INJECTION INTRAVENOUS at 19:54

## 2024-04-03 RX ADMIN — DONEPEZIL HYDROCHLORIDE 10 MG: 10 TABLET, FILM COATED ORAL at 08:27

## 2024-04-03 RX ADMIN — PROPOFOL 30 MG: 10 INJECTION, EMULSION INTRAVENOUS at 15:18

## 2024-04-03 RX ADMIN — ATORVASTATIN CALCIUM 20 MG: 20 TABLET, FILM COATED ORAL at 08:27

## 2024-04-03 RX ADMIN — PHENYLEPHRINE HYDROCHLORIDE 200 MCG: 10 INJECTION INTRAVENOUS at 15:25

## 2024-04-03 RX ADMIN — PANTOPRAZOLE SODIUM 40 MG: 40 INJECTION, POWDER, FOR SOLUTION INTRAVENOUS at 19:53

## 2024-04-03 ASSESSMENT — PAIN - FUNCTIONAL ASSESSMENT
PAIN_FUNCTIONAL_ASSESSMENT: NONE - DENIES PAIN

## 2024-04-03 ASSESSMENT — PAIN SCALES - GENERAL: PAINLEVEL_OUTOF10: 0

## 2024-04-03 NOTE — PROGRESS NOTES
Medicine Progress Note    Patient:  Sherwin Phelps    Unit/Bed:4A-07/007-A  YOB: 1935  MRN: [id number]   Acct: [id number]   PCP: Diego Silva Jr.,   Date of Admission: 4/1/2024    Hospital Course     Briefly, pt Sherwin Phelps is a 88 y.o. male with a PMH of Alzheimer's Dementia / CVA / DVT&PE / NIRDMT2 who presented with GIB. EGD 04/03/24.    Assessment / Plan     #Non-Variceal Lower vs Upper GIB: Active  Bleeding source: Lower vs Upper. No h/o EGD/Colonoscopy in EMR. 1 unit pRBC 04/01/24 - Hgb has been steady b/t 7-8 g/dL.   Plan:  -GI Consulted for GIB; appreciate recs    +NPO    +EGD today  -IV Pantoprazole 40 mg BID  -If elevated INR and suspected nutritional deficiency of vitamin K or warfarin use. Any bleeding, Give IV Vitamin K 10 mg x1.   -Hold AC & AP for now  -H&H every 8 hours  -Type and Screen; Prepare 2U pRBC  -Transfuse if Hgb <7.0 mg/dL and s/s of acute anemia    #Acute on Subacute Macrocytic Anemia 2/2 ABLA: Stable  Hgb 6.0 g/dL on baseline 15 g/dL.  fL. Anemia workup performed  04/02/24  demonstrated Reticulocyte Index: 1.09, hypoproliferative, Serum iron levels: 40 ug/dL, Serum ferritin: 26 ng/mL, Serum iron saturation: 10%, Serum IBC: 411 ug/dL, Serum soluble transferrin receptor: not obtained, B12: 506 pg/mL, and Folate: 12.1 ng/mL. Consistent with CHAPIN from ABLA.  Plan: As above    #Well-controlled NIRDMT2 A1c 6.8% on  11/21/22 : Stable  Prescribed glimepiride 2 mg daily / pioglitazone 30 mg daily / metformin 1500 mg twice daily.  DM Nephropathy Screening: mAlb:Cr not performed previously  DM Retinopathy Screening: Unable to find documentation  DM Neuropathy Screening: Bilateral LE sensation intact to pinprick  Plan:  -Hold ALL Non-insulin diabetic agents  -Order A1c  -Low Dose SSI Algorithm  -Will consider initiation of basal based on insulin requirement  -Hypoglycemia protocol  -POCT glucose QID while NPO then qAC/HS when eating  -Diabetes education, diet, and  vasculature is prominent with normal heart size. No pulmonary edema. No consolidation or pleural effusion. This document has been electronically signed by: Lavelle Riley MD on 04/01/2024 06:19 PM      EKG:   No new.    Micro:   Patient Infection Status       None to display                     Path:   N/A    Signed:  Jose Angel Ramírez MD  Internal Medicine, PGY-3, Chief Resident  4/3/2024  6:04 AM    Staff: Ben Justin MD

## 2024-04-03 NOTE — CARE COORDINATION
4/3/24, 3:02 PM EDT    DISCHARGE PLANNING EVALUATION    Spoke with Shantel from [facility].  She reports Sherwin is current with their services for RN, PT, OT and aid services.  She would like new orders at discharge.

## 2024-04-03 NOTE — BRIEF OP NOTE
Brief Postoperative Note      Patient: Sherwin Phelps  YOB: 1935  MRN: [id number]    Date of Procedure: 4/3/2024    Pre-Op Diagnosis Codes:     * Anemia, unspecified type [D64.9]    Post-Op Diagnosis: Same       Procedure(s):  ESOPHAGOGASTRODUODENOSCOPY POLYP SNARE    Surgeon(s):  Gilbert Hinojosa MD    Assistant:  * No surgical staff found *    Anesthesia: Monitor Anesthesia Care    Estimated Blood Loss (mL): Minimal    Complications: None    Specimens:   ID Type Source Tests Collected by Time Destination   A : gastric polyps Tissue Stomach SURGICAL PATHOLOGY Gilbert Hinojosa MD 4/3/2024 1520        Implants:  * No implants in log *      Drains: * No LDAs found *    Findings:  Infection Present At Time Of Surgery (PATOS) (choose all levels that have infection present):  No infection present  Other Findings: above    Electronically signed by Gilbert Hinojosa MD on 4/3/2024 at 3:43 PM

## 2024-04-03 NOTE — ANESTHESIA PRE PROCEDURE
Department of Anesthesiology  Preprocedure Note       Name:  Sherwin Phelps   Age:  88 y.o.  :  1935                                          MRN:  [id number]         Date:  4/3/2024      Surgeon: Surgeon(s):  Gilbert Hinojosa MD    Procedure: Procedure(s):  ESOPHAGOGASTRODUODENOSCOPY    Medications prior to admission:   Prior to Admission medications    Medication Sig Start Date End Date Taking? Authorizing Provider   metoprolol succinate (TOPROL XL) 25 MG extended release tablet Take 1 tablet by mouth daily  Patient not taking: Reported on 2024    Dutch Hightower MD   polyethylene glycol (GLYCOLAX) 17 g packet Take 1 packet by mouth daily as needed for Constipation  Patient not taking: Reported on 2024    Dutch Hightower MD   aspirin 81 MG EC tablet Take 1 tablet by mouth daily    Dutch Hightower MD   docusate (COLACE) 50 MG/5ML liquid Take 5 mLs by mouth daily    Dutch Hightower MD   ELIQUIS 5 MG TABS tablet Take 1 tablet by mouth 2 times daily    Dutch Hightower MD   Cyanocobalamin (VITAMIN B-12 IJ) Inject as directed    ProviderDutch MD   Cholecalciferol (VITAMIN D3) 50 MCG ( UT) CAPS Take by mouth    Dutch Hightower MD   QUEtiapine (SEROQUEL) 25 MG tablet Take 1 tablet by mouth nightly    Dutch Hightower MD   donepezil (ARICEPT ODT) 10 MG disintegrating tablet Take 1 tablet by mouth nightly    Dutch Hightower MD   glimepiride (AMARYL) 2 MG tablet Take 1 tablet by mouth daily (with breakfast) 20   Adarsh Pham DO   pioglitazone (ACTOS) 30 MG tablet Take 1 tablet by mouth daily    Dutch Hightower MD   simvastatin (ZOCOR) 40 MG tablet Take 1 tablet by mouth nightly    Dutch Hightower MD   metFORMIN (GLUCOPHAGE-XR) 750 MG extended release tablet Take 2 tablets by mouth Daily with supper 2 tab    Instructed to hold for surgery    Dutch Hightowre MD       Current medications:    Current Facility-Administered

## 2024-04-03 NOTE — OP NOTE
Operative Note      Patient: Sherwin Phelps  YOB: 1935  MRN: [id number]    Date of Procedure: 4/3/2024    Pre-Op Diagnosis Codes:     * Anemia, unspecified type [D64.9]    Post-Op Diagnosis: Same:    Gastritis present.  Multiple gastric polyps:   2 larger polyps on stalks with eroded surfaces  (removed with hot snare)  which could have bleed with anti coagulation.    (Wife reports colonoscopy about 1 year ago).  Will resume diet;  Protonix BID.  Can resume Eliquis 4/4/2024 as needed.       Procedure(s):  ESOPHAGOGASTRODUODENOSCOPY POLYP SNARE    Surgeon(s):  Gilbert Hinojosa MD    Assistant:   * No surgical staff found *    Anesthesia: Monitor Anesthesia Care    Estimated Blood Loss (mL): Minimal    Complications: None    Specimens:   ID Type Source Tests Collected by Time Destination   A : gastric polyps Tissue Stomach SURGICAL PATHOLOGY Gilbert Hinojosa MD 4/3/2024 1520        Implants:  * No implants in log *      Drains: * No LDAs found *    Findings:  Infection Present At Time Of Surgery (PATOS) (choose all levels that have infection present):  No infection present  Other Findings: above    Detailed Description of Procedure:   dictated    Electronically signed by Gilbert Hinojosa MD on 4/3/2024 at 3:44 PM

## 2024-04-03 NOTE — PROGRESS NOTES
Pt in phase 2 of recovery. Denies pain, Dr. Hinojosa in to speak with wife Romy at bedside regarding findings.   Report called to 4A nurse

## 2024-04-03 NOTE — PROGRESS NOTES
A&O x3 - disoriented to time.   Speech is clear and appropriate, pleasantly confused at times.   All 4 extremeties active, full sensation, no numbness or tingling  Lung sounds clear throughout with normal depth,rate, and symmetrical chest expansion.  Regular cardiac rhythm   Bowel sounds active in all four quadrants, abdomen soft and non-tender.   +1 pitting edmea in lower extremities  Moves self in bed   No other complaints voiced at this time.   Isidra Gloria RSC, SN.

## 2024-04-03 NOTE — PROGRESS NOTES
EGD completed, Pictures taken. 2 gastric Polyps removed by hot snare. Pt tolerated well.     Scope  Used.

## 2024-04-03 NOTE — CARE COORDINATION
4/3/24, 9:07 AM EDT    DISCHARGE ON GOING EVALUATION    [facility] day: 2  Location: -07/007-A Reason for admit: Acute blood loss anemia [D62]  Chest pain, unspecified type [R07.9]  Acute anemia [D64.9]   Procedure:   4/2 CXR: Pulmonary vasculature is prominent with normal heart size. No pulmonary edema. No consolidation or pleural effusion.  4/3 EGD pending  Barriers to Discharge: Hgb 7.8, diabetes management, GI following, planning EGD today.  IV Protonix, electrolyte replacement protocols.   PCP: Diego Silva Jr., DO  Readmission Risk Score: 15.6%  Patient Goals/Plan/Treatment Preferences: Plans home with spouse, resume [facility]. SW following.

## 2024-04-03 NOTE — ANESTHESIA POSTPROCEDURE EVALUATION
Department of Anesthesiology  Postprocedure Note    Patient: Sherwin Phelps  MRN: [id number]  YOB: 1935  Date of evaluation: 4/3/2024    Procedure Summary       Date: 04/03/24 Room / Location: [place] [address] / [facility]    Anesthesia Start: 1509 Anesthesia Stop: 1530    Procedure: ESOPHAGOGASTRODUODENOSCOPY POLYP SNARE Diagnosis:       Anemia, unspecified type      (Anemia, unspecified type [D64.9])    Surgeons: Gilbert Hinojosa MD Responsible Provider: Christian Odom DO    Anesthesia Type: MAC ASA Status: 3            Anesthesia Type: MAC    Mateus Phase I: Mateus Score: 10    Mateus Phase II:      Anesthesia Post Evaluation    Patient location during evaluation: bedside  Patient participation: complete - patient participated  Level of consciousness: awake and alert  Pain score: 0  Airway patency: patent  Nausea & Vomiting: no nausea and no vomiting  Cardiovascular status: blood pressure returned to baseline  Respiratory status: acceptable  Hydration status: euvolemic        No notable events documented.

## 2024-04-04 LAB
ANION GAP SERPL CALC-SCNC: 16 MEQ/L (ref 8–16)
BUN SERPL-MCNC: 19 MG/DL (ref 7–22)
CALCIUM SERPL-MCNC: 8.8 MG/DL (ref 8.5–10.5)
CHLORIDE SERPL-SCNC: 102 MEQ/L (ref 98–111)
CO2 SERPL-SCNC: 20 MEQ/L (ref 23–33)
CREAT SERPL-MCNC: 0.8 MG/DL (ref 0.4–1.2)
DEPRECATED RDW RBC AUTO: 53.1 FL (ref 35–45)
EKG ATRIAL RATE: 98 BPM
EKG P AXIS: 40 DEGREES
EKG P-R INTERVAL: 170 MS
EKG Q-T INTERVAL: 372 MS
EKG QRS DURATION: 88 MS
EKG QTC CALCULATION (BAZETT): 474 MS
EKG T AXIS: 65 DEGREES
EKG VENTRICULAR RATE: 98 BPM
ERYTHROCYTE [DISTWIDTH] IN BLOOD BY AUTOMATED COUNT: 15.2 % (ref 11.5–14.5)
GFR SERPL CREATININE-BSD FRML MDRD: 85 ML/MIN/1.73M2
GLUCOSE BLD STRIP.AUTO-MCNC: 154 MG/DL (ref 70–108)
GLUCOSE BLD STRIP.AUTO-MCNC: 158 MG/DL (ref 70–108)
GLUCOSE BLD STRIP.AUTO-MCNC: 185 MG/DL (ref 70–108)
GLUCOSE BLD STRIP.AUTO-MCNC: 187 MG/DL (ref 70–108)
GLUCOSE SERPL-MCNC: 147 MG/DL (ref 70–108)
HCT VFR BLD AUTO: 24.9 % (ref 42–52)
HCT VFR BLD AUTO: 25.2 % (ref 42–52)
HCT VFR BLD AUTO: 26.5 % (ref 42–52)
HGB BLD-MCNC: 7.5 GM/DL (ref 14–18)
HGB BLD-MCNC: 7.6 GM/DL (ref 14–18)
HGB BLD-MCNC: 8.1 GM/DL (ref 14–18)
MCH RBC QN AUTO: 29.6 PG (ref 26–33)
MCHC RBC AUTO-ENTMCNC: 30.6 GM/DL (ref 32.2–35.5)
MCV RBC AUTO: 96.7 FL (ref 80–94)
PLATELET # BLD AUTO: 518 THOU/MM3 (ref 130–400)
PMV BLD AUTO: 10.1 FL (ref 9.4–12.4)
POTASSIUM SERPL-SCNC: 4.2 MEQ/L (ref 3.5–5.2)
RBC # BLD AUTO: 2.74 MILL/MM3 (ref 4.7–6.1)
SODIUM SERPL-SCNC: 138 MEQ/L (ref 135–145)
WBC # BLD AUTO: 11.3 THOU/MM3 (ref 4.8–10.8)

## 2024-04-04 PROCEDURE — 85014 HEMATOCRIT: CPT

## 2024-04-04 PROCEDURE — 96376 TX/PRO/DX INJ SAME DRUG ADON: CPT

## 2024-04-04 PROCEDURE — 80048 BASIC METABOLIC PNL TOTAL CA: CPT

## 2024-04-04 PROCEDURE — 1200000003 HC TELEMETRY R&B

## 2024-04-04 PROCEDURE — C9113 INJ PANTOPRAZOLE SODIUM, VIA: HCPCS | Performed by: INTERNAL MEDICINE

## 2024-04-04 PROCEDURE — 82948 REAGENT STRIP/BLOOD GLUCOSE: CPT

## 2024-04-04 PROCEDURE — 6370000000 HC RX 637 (ALT 250 FOR IP): Performed by: NURSE PRACTITIONER

## 2024-04-04 PROCEDURE — 85027 COMPLETE CBC AUTOMATED: CPT

## 2024-04-04 PROCEDURE — 6370000000 HC RX 637 (ALT 250 FOR IP)

## 2024-04-04 PROCEDURE — 85018 HEMOGLOBIN: CPT

## 2024-04-04 PROCEDURE — 6370000000 HC RX 637 (ALT 250 FOR IP): Performed by: PHYSICIAN ASSISTANT

## 2024-04-04 PROCEDURE — 36415 COLL VENOUS BLD VENIPUNCTURE: CPT

## 2024-04-04 PROCEDURE — G0378 HOSPITAL OBSERVATION PER HR: HCPCS

## 2024-04-04 PROCEDURE — 99232 SBSQ HOSP IP/OBS MODERATE 35: CPT | Performed by: INTERNAL MEDICINE

## 2024-04-04 PROCEDURE — 6360000002 HC RX W HCPCS: Performed by: INTERNAL MEDICINE

## 2024-04-04 PROCEDURE — 2580000003 HC RX 258: Performed by: PHYSICIAN ASSISTANT

## 2024-04-04 RX ORDER — LANOLIN ALCOHOL/MO/W.PET/CERES
6 CREAM (GRAM) TOPICAL NIGHTLY PRN
Status: DISCONTINUED | OUTPATIENT
Start: 2024-04-04 | End: 2024-04-16 | Stop reason: HOSPADM

## 2024-04-04 RX ORDER — INSULIN LISPRO 100 [IU]/ML
0-4 INJECTION, SOLUTION INTRAVENOUS; SUBCUTANEOUS
Status: DISCONTINUED | OUTPATIENT
Start: 2024-04-04 | End: 2024-04-16 | Stop reason: HOSPADM

## 2024-04-04 RX ORDER — PANTOPRAZOLE SODIUM 40 MG/1
40 TABLET, DELAYED RELEASE ORAL
Status: DISCONTINUED | OUTPATIENT
Start: 2024-04-04 | End: 2024-04-16 | Stop reason: HOSPADM

## 2024-04-04 RX ADMIN — Medication 6 MG: at 21:39

## 2024-04-04 RX ADMIN — SODIUM CHLORIDE, PRESERVATIVE FREE 10 ML: 5 INJECTION INTRAVENOUS at 22:02

## 2024-04-04 RX ADMIN — METOPROLOL TARTRATE 25 MG: 25 TABLET, FILM COATED ORAL at 08:59

## 2024-04-04 RX ADMIN — ATORVASTATIN CALCIUM 20 MG: 20 TABLET, FILM COATED ORAL at 09:04

## 2024-04-04 RX ADMIN — DOCUSATE SODIUM 100 MG: 50 LIQUID ORAL at 08:59

## 2024-04-04 RX ADMIN — DONEPEZIL HYDROCHLORIDE 10 MG: 10 TABLET, FILM COATED ORAL at 08:59

## 2024-04-04 RX ADMIN — QUETIAPINE FUMARATE 25 MG: 25 TABLET ORAL at 21:39

## 2024-04-04 RX ADMIN — ACETAMINOPHEN 650 MG: 325 TABLET ORAL at 14:24

## 2024-04-04 RX ADMIN — PANTOPRAZOLE SODIUM 40 MG: 40 INJECTION, POWDER, FOR SOLUTION INTRAVENOUS at 08:59

## 2024-04-04 RX ADMIN — SODIUM CHLORIDE, PRESERVATIVE FREE 10 ML: 5 INJECTION INTRAVENOUS at 08:59

## 2024-04-04 RX ADMIN — ACETAMINOPHEN 650 MG: 325 TABLET ORAL at 21:39

## 2024-04-04 RX ADMIN — METOPROLOL TARTRATE 25 MG: 25 TABLET, FILM COATED ORAL at 21:39

## 2024-04-04 ASSESSMENT — PAIN SCALES - GENERAL
PAINLEVEL_OUTOF10: 3
PAINLEVEL_OUTOF10: 5
PAINLEVEL_OUTOF10: 4

## 2024-04-04 ASSESSMENT — PAIN DESCRIPTION - DESCRIPTORS
DESCRIPTORS: ACHING

## 2024-04-04 ASSESSMENT — PAIN DESCRIPTION - FREQUENCY: FREQUENCY: CONTINUOUS

## 2024-04-04 ASSESSMENT — PAIN DESCRIPTION - PAIN TYPE
TYPE: ACUTE PAIN

## 2024-04-04 ASSESSMENT — PAIN DESCRIPTION - ONSET: ONSET: ON-GOING

## 2024-04-04 ASSESSMENT — PAIN DESCRIPTION - ORIENTATION
ORIENTATION: LEFT
ORIENTATION: MID
ORIENTATION: LEFT

## 2024-04-04 ASSESSMENT — PAIN - FUNCTIONAL ASSESSMENT
PAIN_FUNCTIONAL_ASSESSMENT: ACTIVITIES ARE NOT PREVENTED

## 2024-04-04 ASSESSMENT — PAIN DESCRIPTION - LOCATION
LOCATION: HIP
LOCATION: HIP
LOCATION: HEAD

## 2024-04-04 NOTE — CARE COORDINATION
4/4/24, 9:09 AM EDT    DISCHARGE ON GOING EVALUATION    [facility] day: 3  Location: -07/007-A Reason for admit: Acute blood loss anemia [D62]  Chest pain, unspecified type [R07.9]  Acute anemia [D64.9]   Procedure:   4/2 CXR: Pulmonary vasculature is prominent with normal heart size. No pulmonary edema. No consolidation or pleural effusion.  4/3 EGD Gastritis present. Multiple gastric polyps: 2 larger polyps on stalks with eroded surfaces (removed with hot snare) which could have bleed with anti coagulation.   Barriers to Discharge: Hgb 8.1, GI following, diabetes management, PT/OT,  IV Protonix, electrolyte replacement protocols.   PCP: Diego Silva Jr.,   Readmission Risk Score: 15.6%  Patient Goals/Plan/Treatment Preferences:  Plans home with spouse, resume [facility]. SW following.

## 2024-04-04 NOTE — PROGRESS NOTES
Medicine Progress Note    Patient:  Sherwin Phelps    Unit/Bed:4A-07/007-A  YOB: 1935  MRN: [id number]   Acct: [id number]   PCP: Diego Silva Jr.,   Date of Admission: 4/1/2024    Hospital Course     Briefly, pt Sherwin Phelps is a 88 y.o. male with a PMH of Alzheimer's Dementia / CVA / DVT&PE / NIRDMT2 who presented with GIB. EGD 04/03/24: distal esophagitis / gastric polyps x2 (large) removed.    Assessment / Plan     #Non-Variceal Lower vs Upper GIB: Active  Bleeding source: Lower vs Upper. No h/o EGD/Colonoscopy in EMR. 1 unit pRBC 04/01/24 - Hgb has been steady b/t 7-8 g/dL.   Plan:  -GI Consulted for GIB; appreciate recs    +Obtain records from colonscopy    +May possible resume eliquis today  -IV Pantoprazole 40 mg BID  -If elevated INR and suspected nutritional deficiency of vitamin K or warfarin use. Any bleeding, Give IV Vitamin K 10 mg x1.   -H&H every 12 hours; will de-escalate further PRN  -Type and Screen; Prepare 2U pRBC  -Transfuse if Hgb <7.0 mg/dL and s/s of acute anemia    #Acute on Subacute Macrocytic Anemia 2/2 ABLA: Stable  Hgb 6.0 g/dL on baseline 15 g/dL.  fL. Anemia workup performed  04/02/24  demonstrated Reticulocyte Index: 1.09, hypoproliferative, Serum iron levels: 40 ug/dL, Serum ferritin: 26 ng/mL, Serum iron saturation: 10%, Serum IBC: 411 ug/dL, Serum soluble transferrin receptor: not obtained, B12: 506 pg/mL, and Folate: 12.1 ng/mL. Consistent with CHAPIN from ABLA.  Plan: As above    #Well-controlled NIRDMT2 A1c 6.8% on  11/21/22 : Stable  Prescribed glimepiride 2 mg daily / pioglitazone 30 mg daily / metformin 1500 mg twice daily.  DM Nephropathy Screening: mAlb:Cr not performed previously  DM Retinopathy Screening: Unable to find documentation  DM Neuropathy Screening: Bilateral LE sensation intact to pinprick  Plan:  -Hold ALL Non-insulin diabetic agents  -Order A1c  -Low Dose SSI Algorithm  -Will consider initiation of basal based on insulin  apixaban  5 mg Oral BID    [Held by provider] glipiZIDE  5 mg Oral QAM AC    metoprolol tartrate  25 mg Oral BID    [Held by provider] pioglitazone  30 mg Oral Daily    QUEtiapine  25 mg Oral Nightly    atorvastatin  20 mg Oral Daily    sodium chloride flush  5-40 mL IntraVENous 2 times per day     Continuous Infusions:   dextrose      sodium chloride      sodium chloride       PRN Meds:glucose, dextrose bolus **OR** dextrose bolus, glucagon (rDNA), dextrose, sodium chloride, sodium chloride flush, sodium chloride, potassium chloride **OR** potassium alternative oral replacement **OR** potassium chloride, magnesium sulfate, ondansetron **OR** ondansetron, polyethylene glycol, acetaminophen **OR** acetaminophen    Physical Exam:     Physical Exam  Constitutional:       Appearance: Normal appearance. He is obese. He is not diaphoretic.   HENT:      Head: Normocephalic and atraumatic.      Mouth/Throat:      Mouth: Mucous membranes are moist.      Pharynx: Oropharynx is clear. No oropharyngeal exudate or posterior oropharyngeal erythema.   Eyes:      General: No scleral icterus.     Extraocular Movements: Extraocular movements intact.      Pupils: Pupils are equal, round, and reactive to light.   Cardiovascular:      Rate and Rhythm: Normal rate and regular rhythm.      Pulses: Normal pulses.      Heart sounds: Murmur heard.      Systolic murmur is present with a grade of 1/6.      No friction rub. No gallop.   Pulmonary:      Effort: Pulmonary effort is normal.      Breath sounds: Normal breath sounds. No wheezing, rhonchi or rales.   Abdominal:      General: Bowel sounds are normal.      Palpations: Abdomen is soft.      Tenderness: There is no abdominal tenderness. There is no guarding or rebound.   Musculoskeletal:      Cervical back: Normal range of motion and neck supple.      Right lower leg: No edema.      Left lower leg: No edema.   Skin:     General: Skin is warm and dry.      Capillary Refill: Capillary

## 2024-04-04 NOTE — CARE COORDINATION
4/4/24, 1:47 PM EDT    DISCHARGE PLANNING EVALUATION    Met with Sherwin and his spouse.  Spouse Romy is requesting a referral be made to [facility].  Called and made a referral to [facility] at the facility.  He will be a precert.

## 2024-04-04 NOTE — PROGRESS NOTES
Gastroenterology Progress Note:     Patient Name:  Sherwin Phelps   MRN: [id number]  [id number]  YOB: 1935  Admit Date: 4/1/2024  5:04 PM  Primary Care Physician: Diego Silva Jr.,    4A-07/007-A     Patient seen and examined.  24 hours events and chart reviewed.    Subjective: Patient sleeping in bed, arouses easily. Denies abd pain, n/v. 1 brown BM documented yesterday, no BM yet today. EGD results reviewed. Hgb 8.1 Per chart review, patient's wife stated patient had a colonoscopy a year ago, however no records have been found. RN to clarify with wife.    Objective:  BP 98/69   Pulse 97   Temp 98 °F (36.7 °C) (Oral)   Resp 17   Wt 84.1 kg (185 lb 6.5 oz)   SpO2 99%   BMI 27.38 kg/m²     Physical Exam:    General:  Chronically ill appearing male  HEENT: Atraumatic, normocephalic. Moist oral mucous membranes.  Neck: Supple without adenopathy, JVD, thyromegaly or masses. Trachea midline.  CV: Heart RRR, no murmurs, rubs, gallops.  Resp: Even, easy without cough or accessory use. Lungs clear to ascultation bilaterally.   Abd: Round, soft, obese, non-tender. No hepatosplenomegaly or mass present. Active bowel sounds heard. No distention noted.   Ext:  Without cyanosis, clubbing, edema.   Skin: Pink, warm, dry  Neuro:  Alert, oriented x 3 with no obvious deficits.       Rectal: deferred    Labs:   CBC:   Lab Results   Component Value Date/Time    WBC 11.3 04/04/2024 07:38 AM    HGB 8.1 04/04/2024 07:38 AM    HCT 26.5 04/04/2024 07:38 AM    MCV 96.7 04/04/2024 07:38 AM     04/04/2024 07:38 AM     BMP:   Lab Results   Component Value Date/Time     04/04/2024 07:38 AM    K 4.2 04/04/2024 07:38 AM    K 4.1 04/02/2024 04:34 AM     04/04/2024 07:38 AM    CO2 20 04/04/2024 07:38 AM    BUN 19 04/04/2024 07:38 AM    CREATININE 0.8 04/04/2024 07:38 AM    CALCIUM 8.8 04/04/2024 07:38 AM     PT/INR:   Lab Results   Component Value Date/Time    PROTIME 13.3 02/15/2024 06:10 PM

## 2024-04-05 LAB
ANION GAP SERPL CALC-SCNC: 13 MEQ/L (ref 8–16)
BUN SERPL-MCNC: 20 MG/DL (ref 7–22)
CALCIUM SERPL-MCNC: 8.5 MG/DL (ref 8.5–10.5)
CHLORIDE SERPL-SCNC: 98 MEQ/L (ref 98–111)
CO2 SERPL-SCNC: 22 MEQ/L (ref 23–33)
CREAT SERPL-MCNC: 0.8 MG/DL (ref 0.4–1.2)
DEPRECATED MEAN GLUCOSE BLD GHB EST-ACNC: 120 MG/DL (ref 70–126)
DEPRECATED RDW RBC AUTO: 54.4 FL (ref 35–45)
ERYTHROCYTE [DISTWIDTH] IN BLOOD BY AUTOMATED COUNT: 15.4 % (ref 11.5–14.5)
GFR SERPL CREATININE-BSD FRML MDRD: 85 ML/MIN/1.73M2
GLUCOSE BLD STRIP.AUTO-MCNC: 116 MG/DL (ref 70–108)
GLUCOSE BLD STRIP.AUTO-MCNC: 129 MG/DL (ref 70–108)
GLUCOSE BLD STRIP.AUTO-MCNC: 152 MG/DL (ref 70–108)
GLUCOSE BLD STRIP.AUTO-MCNC: 217 MG/DL (ref 70–108)
GLUCOSE SERPL-MCNC: 119 MG/DL (ref 70–108)
HBA1C MFR BLD HPLC: 6 % (ref 4.4–6.4)
HCT VFR BLD AUTO: 24.6 % (ref 42–52)
HGB BLD-MCNC: 7.4 GM/DL (ref 14–18)
MCH RBC QN AUTO: 29.5 PG (ref 26–33)
MCHC RBC AUTO-ENTMCNC: 30.1 GM/DL (ref 32.2–35.5)
MCV RBC AUTO: 98 FL (ref 80–94)
PLATELET # BLD AUTO: 387 THOU/MM3 (ref 130–400)
PMV BLD AUTO: 9.6 FL (ref 9.4–12.4)
POTASSIUM SERPL-SCNC: 3.7 MEQ/L (ref 3.5–5.2)
RBC # BLD AUTO: 2.51 MILL/MM3 (ref 4.7–6.1)
SODIUM SERPL-SCNC: 133 MEQ/L (ref 135–145)
WBC # BLD AUTO: 10.7 THOU/MM3 (ref 4.8–10.8)

## 2024-04-05 PROCEDURE — 85027 COMPLETE CBC AUTOMATED: CPT

## 2024-04-05 PROCEDURE — 6370000000 HC RX 637 (ALT 250 FOR IP)

## 2024-04-05 PROCEDURE — 97530 THERAPEUTIC ACTIVITIES: CPT

## 2024-04-05 PROCEDURE — 97535 SELF CARE MNGMENT TRAINING: CPT

## 2024-04-05 PROCEDURE — 97110 THERAPEUTIC EXERCISES: CPT

## 2024-04-05 PROCEDURE — G0378 HOSPITAL OBSERVATION PER HR: HCPCS

## 2024-04-05 PROCEDURE — 2580000003 HC RX 258: Performed by: PHYSICIAN ASSISTANT

## 2024-04-05 PROCEDURE — 97162 PT EVAL MOD COMPLEX 30 MIN: CPT

## 2024-04-05 PROCEDURE — 6370000000 HC RX 637 (ALT 250 FOR IP): Performed by: PHYSICIAN ASSISTANT

## 2024-04-05 PROCEDURE — 83036 HEMOGLOBIN GLYCOSYLATED A1C: CPT

## 2024-04-05 PROCEDURE — 6370000000 HC RX 637 (ALT 250 FOR IP): Performed by: NURSE PRACTITIONER

## 2024-04-05 PROCEDURE — 97166 OT EVAL MOD COMPLEX 45 MIN: CPT

## 2024-04-05 PROCEDURE — 82948 REAGENT STRIP/BLOOD GLUCOSE: CPT

## 2024-04-05 PROCEDURE — 80048 BASIC METABOLIC PNL TOTAL CA: CPT

## 2024-04-05 PROCEDURE — 1200000003 HC TELEMETRY R&B

## 2024-04-05 PROCEDURE — 99232 SBSQ HOSP IP/OBS MODERATE 35: CPT | Performed by: INTERNAL MEDICINE

## 2024-04-05 PROCEDURE — 36415 COLL VENOUS BLD VENIPUNCTURE: CPT

## 2024-04-05 RX ORDER — PANTOPRAZOLE SODIUM 40 MG/1
40 TABLET, DELAYED RELEASE ORAL
Qty: 60 TABLET | Refills: 2 | Status: SHIPPED | OUTPATIENT
Start: 2024-04-05

## 2024-04-05 RX ADMIN — DOCUSATE SODIUM 100 MG: 50 LIQUID ORAL at 08:09

## 2024-04-05 RX ADMIN — PANTOPRAZOLE SODIUM 40 MG: 40 TABLET, DELAYED RELEASE ORAL at 03:39

## 2024-04-05 RX ADMIN — ATORVASTATIN CALCIUM 20 MG: 20 TABLET, FILM COATED ORAL at 08:09

## 2024-04-05 RX ADMIN — INSULIN LISPRO 1 UNITS: 100 INJECTION, SOLUTION INTRAVENOUS; SUBCUTANEOUS at 11:05

## 2024-04-05 RX ADMIN — SODIUM CHLORIDE, PRESERVATIVE FREE 10 ML: 5 INJECTION INTRAVENOUS at 08:11

## 2024-04-05 RX ADMIN — QUETIAPINE FUMARATE 25 MG: 25 TABLET ORAL at 19:29

## 2024-04-05 RX ADMIN — METOPROLOL TARTRATE 25 MG: 25 TABLET, FILM COATED ORAL at 19:29

## 2024-04-05 RX ADMIN — METOPROLOL TARTRATE 25 MG: 25 TABLET, FILM COATED ORAL at 08:09

## 2024-04-05 RX ADMIN — PANTOPRAZOLE SODIUM 40 MG: 40 TABLET, DELAYED RELEASE ORAL at 15:53

## 2024-04-05 RX ADMIN — SODIUM CHLORIDE, PRESERVATIVE FREE 10 ML: 5 INJECTION INTRAVENOUS at 19:30

## 2024-04-05 RX ADMIN — APIXABAN 5 MG: 5 TABLET, FILM COATED ORAL at 19:29

## 2024-04-05 RX ADMIN — DONEPEZIL HYDROCHLORIDE 10 MG: 10 TABLET, FILM COATED ORAL at 08:09

## 2024-04-05 ASSESSMENT — PAIN SCALES - GENERAL
PAINLEVEL_OUTOF10: 0
PAINLEVEL_OUTOF10: 0

## 2024-04-05 NOTE — PROGRESS NOTES
04/01/2024 05:22 PM    AST 11 04/01/2024 05:22 PM    BILITOT <0.2 04/01/2024 05:22 PM    BILIDIR <0.2 04/01/2024 05:22 PM    LABALBU 3.8 04/01/2024 05:22 PM    LIPASE 26.2 04/01/2024 05:22 PM     Significant Diagnostic Studies:   None     Current Meds:  Scheduled Meds:   insulin lispro  0-4 Units SubCUTAneous 4x Daily AC & HS    pantoprazole  40 mg Oral BID AC    docusate  100 mg Oral Daily    [Held by provider] aspirin  81 mg Oral Daily    donepezil  10 mg Oral QAM    [Held by provider] apixaban  5 mg Oral BID    [Held by provider] glipiZIDE  5 mg Oral QAM AC    metoprolol tartrate  25 mg Oral BID    [Held by provider] pioglitazone  30 mg Oral Daily    QUEtiapine  25 mg Oral Nightly    atorvastatin  20 mg Oral Daily    sodium chloride flush  5-40 mL IntraVENous 2 times per day     Continuous Infusions:   dextrose      sodium chloride         Assessment:  89 yo M admitted 04/01/24 for left-sided chest pain & SOB. EKG unremarkable. GI consulted for worsening anemia. Hx of PE, on Eliquis. EGD 04/03/24 demonstrated distal esophagitis, gastritis & gastric polyps, the 2 larger polyps on stalks were removed.     Acute on chronic anemia- s/p 1 unit PRBC  FOBT+  Left-sided chest pain- resolved, likely demand ischemia  SOB- resolved, likely 2/2 #1  Distal esophagitis, gastritis, gastric polyps- 2 with larger stalks which were removed on EGD 04/03/24  Thrombocytosis  Hx of PE- on Eliquis PTA  Hx of CAD- on ASA PTA  Hx of CVA- on Eliquis & ASA PTA  Type II DM  YOLY- CPAP at HS  Hx of dementia- A & O x 4 on exam, answers questions appropriately  Intermittent constipation- on stool softener PTA     Plan:    Monitor H & H, transfuse prn  Nursing to monitor stool output & document  Continue PPI BID, script sent  Continue Colace daily, home dose  Okay to resume ASA & Plavix  Diet as tolerated  RN updated  Case discussed with primary attending  Supportive care per primary team  Will need a 1 month follow-up with [facility]  GI

## 2024-04-05 NOTE — CARE COORDINATION
4/5/24, 8:37 AM EDT    DISCHARGE PLANNING EVALUATION    Left a message for [facility].  Waiting to hear if they are able to accept at discharge.      Update 12:09 pm:[facility] left a message and they are able to accept.  Will start precert once PT has a note in.  Spoke with patient and spouse and made them aware of the acceptance.      Update 3:14 pm: [facility] will start insurance precert today.

## 2024-04-05 NOTE — PROGRESS NOTES
Physician Progress Note      PATIENT:               HERNAN FINCH  CSN #:                  [id number]  :                       1935  ADMIT DATE:       2024 5:04 PM  DISCH DATE:  RESPONDING  PROVIDER #:        MARGAUX RAMÍREZ          QUERY TEXT:    Dr Gamboa, Dr Ramírez,    Patient admitted with GI bleeding, noted to have EGD 24: distal   esophagitis / gastric polyps x2 (large) removed.  If possible, please document   in progress notes and discharge summary the cause of the GI bleeding:    The medical record reflects the following:  Risk Factors: GIB  Clinical Indicators: Per GI-EGD-2 larger polyps on stalks with eroded surfaces    (removed with hot snare)  which could have bleed with anti coagulation  Treatment: EGD    Emma An BSN, RN  Options provided:  -- GI bleeding due to gastric polyps  -- GI bleeding due to distal esophagitis  -- Other - I will add my own diagnosis  -- Disagree - Not applicable / Not valid  -- Disagree - Clinically unable to determine / Unknown  -- Refer to Clinical Documentation Reviewer    PROVIDER RESPONSE TEXT:    It is currently unclear where GI bleed is from.    Query created by: Ana An on 2024 9:57 AM      Electronically signed by:  MARGAUX RAMÍREZ 2024 12:04 PM

## 2024-04-05 NOTE — CARE COORDINATION
4/5/24, 8:53 AM EDT    DISCHARGE ON GOING EVALUATION    [facility] day: 4  Location: 4A-07/007-A Reason for admit: Acute blood loss anemia [D62]  Chest pain, unspecified type [R07.9]  Acute anemia [D64.9]   Procedure:   4/2 CXR: Pulmonary vasculature is prominent with normal heart size. No pulmonary edema. No consolidation or pleural effusion.  4/3 EGD Gastritis present. Multiple gastric polyps: 2 larger polyps on stalks with eroded surfaces (removed with hot snare) which could have bleed with anti coagulation.   Barriers to Discharge: Hgb 7.4, GI following, diabetes management, PT/OT, Oral Protonix.   PCP: Diego Silva Jr., DO  Readmission Risk Score: 15.8%  Patient Goals/Plan/Treatment Preferences:  From home with spouse, Referral made to [facility]. SW following. Refer to SW note.

## 2024-04-05 NOTE — PROGRESS NOTES
posture  Assessment: This patient is a 88 y.o. who presents with anemia. This is a decline from the patient's baseline status of residing with his family with assist needed for safe mobility. The patient is observed to have deficits in strength, balance, activity tolerance, and safety awareness and would benefit from skilled PT services to progress functional mobility, safety awareness, and to decrease overall risk of falls.    Therapy Prognosis: Good    Requires PT Follow-Up: Yes    Discharge Recommendations:  Discharge Recommendations: Subacute/Skilled Nursing Facility, 24 hour supervision or assist    Patient Education:      .    Patient Education  Education Given To: Patient  Education Provided: Role of Therapy, Plan of Care, Transfer Training, Fall Prevention Strategies  Education Method: Demonstration, Verbal  Education Outcome: Verbalized understanding, Continued education needed       Equipment Recommendations:  Equipment Needed: No    Plan:  Current Treatment Recommendations: Strengthening, Balance training, Functional mobility training, Transfer training, Neuromuscular re-education, Gait training, Endurance training, Return to work related activity, Home exercise program, Equipment evaluation, education, & procurement, Patient/Caregiver education & training, Safety education & training, Therapeutic activities  General Plan:  (3-5x GM)    Goals:  Patient Goals : None stated  Short Term Goals  Time Frame for Short Term Goals: by hospital d/c  Short Term Goal 1: Pt to complete supine <->Sit with CGA for ease getting in bed  Short Term Goal 2: Pt to complete sit <->stand with RW and CGA for safe transfers  Short Term Goal 3: Pt to ambulate >=10' with RW and CGA for safe ambulation  Long Term Goals  Time Frame for Long Term Goals : NA due to short ELOS    Following session, patient left in safe position with all fall risk precautions in place.

## 2024-04-05 NOTE — PROGRESS NOTES
[facility]  INPATIENT OCCUPATIONAL THERAPY  [facility] 4A  EVALUATION    Time:    Time In: 920  Time Out: 1003  Timed Code Treatment Minutes: 33 Minutes  Minutes: 43          Date: 2024  Patient Name: Sherwin Phelps,   Gender: male      MRN: [id number]  : 1935  (88 y.o.)  Referring Practitioner: Jose Angel Ramírez MD  Diagnosis: acute blood loss anemia  Additional Pertinent Hx: Per ER note on 2024:88 y.o. male who presents today for chest pain shortness of breath.  Patient states he had a yesterday he had a day.  Patient states he feels fine now.  Patient states that it is mostly in his left shoulder.  Had no overt chest pain.  It was in the left scapular area.  Patient did not have any pain when EMS arrived. 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 on  at [facility] after a fall    Restrictions/Precautions:  Restrictions/Precautions: Fall Risk  Position Activity Restriction  Hip Precautions: No hip flexion > 90 degrees  Other position/activity restrictions: hx of dementia per chart, reports hx of  R THR    Subjective  Chart Reviewed: Yes, Orders, Progress Notes, History and Physical  Patient assessed for rehabilitation services?: Yes  Family / Caregiver Present: No    Subjective: cooperative, motivated to get up moving    Pain: 0/10: no # given, c/o pain in bottom    Vitals: Vitals not assessed per clinical judgement, see nursing flowsheet    Social/Functional History:  Lives With: Family (wife & daughter)  Type of Home: Apartment  Home Layout: One level  Home Access: Level entry  Home Equipment: Walker, 4 wheeled, Wheelchair-manual   Bathroom Shower/Tub: Tub/Shower unit  Bathroom Equipment: Grab bars in shower, Tub transfer bench       ADL Assistance: Needs assistance  Ambulation Assistance: Needs assistance  Transfer Assistance: Needs assistance    Active : No  Patient's  Info: Uses [facility] on aging     Additional Comments: Pt reports needing A since sx in Feb.

## 2024-04-05 NOTE — PROGRESS NOTES
Medicine Progress Note    Patient:  Sherwin Phelps    Unit/Bed:4A-07/007-A  YOB: 1935  MRN: [id number]   Acct: [id number]   PCP: Diego Silva Jr.,   Date of Admission: 4/1/2024    Hospital Course     Briefly, pt Sherwin Phelps is a 88 y.o. male with a PMH of Alzheimer's Dementia / CVA / DVT&PE / NIRDMT2 who presented with GIB. EGD 04/03/24: distal esophagitis / gastric polyps x2 (large) removed. 04/05/24 Working on obtaining records for colonoscopy. Working on placement    Assessment / Plan     #Non-Variceal Lower vs Upper GIB: Active  Bleeding source: Lower vs Upper. No prior h/o EGD/Colonoscopy in EMR however pt's wife states he had a colonoscopy w/i the last year. 1 unit pRBC 04/01/24 - Hgb has been steady b/t 7-8 g/dL. EGD 04/03/24: distal esophagitis / gastric polyps x2 (large) removed.  Plan:  -GI Consulted for GIB; appreciate recs    +Obtain records from colonscopy    +Hold AC / AP  -PO Pantoprazole 40 mg BID   -H&H daily  -Type and Screen; Prepare 2U pRBC  -Transfuse if Hgb <7.0 mg/dL and s/s of acute anemia    #Acute on Subacute Macrocytic Anemia 2/2 ABLA: Stable  Hgb 6.0 g/dL on baseline 15 g/dL.  fL. Anemia workup performed  04/02/24  demonstrated Reticulocyte Index: 1.09, hypoproliferative, Serum iron levels: 40 ug/dL, Serum ferritin: 26 ng/mL, Serum iron saturation: 10%, Serum IBC: 411 ug/dL, Serum soluble transferrin receptor: not obtained, B12: 506 pg/mL, and Folate: 12.1 ng/mL. Consistent with CHAPIN from ABLA.  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  DM Nephropathy Screening: mAlb:Cr not performed previously  DM Retinopathy Screening: Unable to find documentation  DM Neuropathy Screening: Bilateral LE sensation intact to pinprick  Plan:  -Hold ALL Non-insulin diabetic agents  -Order

## 2024-04-05 NOTE — PLAN OF CARE
Problem: Discharge Planning  Goal: Discharge to home or other facility with appropriate resources  4/5/2024 1038 by Patricia Vega RN  Outcome: Progressing  Flowsheets (Taken 4/4/2024 2229 by Nancy Hopper RN)  Discharge to home or other facility with appropriate resources: Identify barriers to discharge with patient and caregiver  4/4/2024 2229 by Nancy Hopper RN  Outcome: Progressing  Flowsheets (Taken 4/4/2024 2229)  Discharge to home or other facility with appropriate resources: Identify barriers to discharge with patient and caregiver     Problem: Safety - Adult  Goal: Free from fall injury  4/5/2024 1038 by Patricia Vega RN  Outcome: Progressing  Flowsheets (Taken 4/4/2024 2229 by Nancy Hopper RN)  Free From Fall Injury: Instruct family/caregiver on patient safety  4/4/2024 2229 by Nancy Hopper RN  Outcome: Progressing  Flowsheets (Taken 4/4/2024 2229)  Free From Fall Injury: Instruct family/caregiver on patient safety     Problem: ABCDS Injury Assessment  Goal: Absence of physical injury  4/5/2024 1038 by Patricia Vega RN  Outcome: Progressing  Flowsheets (Taken 4/4/2024 2229 by Nancy Hopper RN)  Absence of Physical Injury: Implement safety measures based on patient assessment  4/4/2024 2229 by Nancy Hopper RN  Outcome: Progressing  Flowsheets (Taken 4/4/2024 2229)  Absence of Physical Injury: Implement safety measures based on patient assessment     Problem: Chronic Conditions and Co-morbidities  Goal: Patient's chronic conditions and co-morbidity symptoms are monitored and maintained or improved  4/5/2024 1038 by Patricia Vega RN  Outcome: Progressing  Flowsheets (Taken 4/4/2024 2229 by Nancy Hopper, RN)  Care Plan - Patient's Chronic Conditions and Co-Morbidity Symptoms are Monitored and Maintained or Improved: Monitor and assess patient's chronic conditions and comorbid symptoms for stability, deterioration, or improvement  4/4/2024 2229 by Nancy Hopper RN  Outcome:

## 2024-04-05 NOTE — PLAN OF CARE
Okay to resume AP / AC per GI.    Resumed ASA / Eliquis    Signed:  Dr. Jose Angel Ramírez MD  Internal Medicine, PGY-3, Chief Resident  04/05/24  5:41 PM    Staff: Ben Justin MD

## 2024-04-05 NOTE — PLAN OF CARE
Problem: Discharge Planning  Goal: Discharge to home or other facility with appropriate resources  Outcome: Progressing  Flowsheets (Taken 4/4/2024 2229)  Discharge to home or other facility with appropriate resources: Identify barriers to discharge with patient and caregiver     Problem: Safety - Adult  Goal: Free from fall injury  Outcome: Progressing  Flowsheets (Taken 4/4/2024 2229)  Free From Fall Injury: Instruct family/caregiver on patient safety     Problem: ABCDS Injury Assessment  Goal: Absence of physical injury  Outcome: Progressing  Flowsheets (Taken 4/4/2024 2229)  Absence of Physical Injury: Implement safety measures based on patient assessment     Problem: Chronic Conditions and Co-morbidities  Goal: Patient's chronic conditions and co-morbidity symptoms are monitored and maintained or improved  Outcome: Progressing  Flowsheets (Taken 4/4/2024 2229)  Care Plan - Patient's Chronic Conditions and Co-Morbidity Symptoms are Monitored and Maintained or Improved: Monitor and assess patient's chronic conditions and comorbid symptoms for stability, deterioration, or improvement     Problem: Skin/Tissue Integrity  Goal: Absence of new skin breakdown  Description: 1.  Monitor for areas of redness and/or skin breakdown  2.  Assess vascular access sites hourly  3.  Every 4-6 hours minimum:  Change oxygen saturation probe site  4.  Every 4-6 hours:  If on nasal continuous positive airway pressure, respiratory therapy assess nares and determine need for appliance change or resting period.  Outcome: Progressing     Problem: Pain  Goal: Verbalizes/displays adequate comfort level or baseline comfort level  Outcome: Progressing  Flowsheets (Taken 4/4/2024 2229)  Verbalizes/displays adequate comfort level or baseline comfort level: Encourage patient to monitor pain and request assistance

## 2024-04-06 LAB
GLUCOSE BLD STRIP.AUTO-MCNC: 124 MG/DL (ref 70–108)
GLUCOSE BLD STRIP.AUTO-MCNC: 131 MG/DL (ref 70–108)
GLUCOSE BLD STRIP.AUTO-MCNC: 171 MG/DL (ref 70–108)
GLUCOSE BLD STRIP.AUTO-MCNC: 177 MG/DL (ref 70–108)
HCT VFR BLD AUTO: 28 % (ref 42–52)
HGB BLD-MCNC: 7.8 GM/DL (ref 14–18)

## 2024-04-06 PROCEDURE — 1200000000 HC SEMI PRIVATE

## 2024-04-06 PROCEDURE — 6370000000 HC RX 637 (ALT 250 FOR IP)

## 2024-04-06 PROCEDURE — G0378 HOSPITAL OBSERVATION PER HR: HCPCS

## 2024-04-06 PROCEDURE — 6370000000 HC RX 637 (ALT 250 FOR IP): Performed by: NURSE PRACTITIONER

## 2024-04-06 PROCEDURE — 36415 COLL VENOUS BLD VENIPUNCTURE: CPT

## 2024-04-06 PROCEDURE — 2580000003 HC RX 258: Performed by: PHYSICIAN ASSISTANT

## 2024-04-06 PROCEDURE — 6370000000 HC RX 637 (ALT 250 FOR IP): Performed by: PHYSICIAN ASSISTANT

## 2024-04-06 PROCEDURE — 30233N1 TRANSFUSION OF NONAUTOLOGOUS RED BLOOD CELLS INTO PERIPHERAL VEIN, PERCUTANEOUS APPROACH: ICD-10-PCS

## 2024-04-06 PROCEDURE — 85014 HEMATOCRIT: CPT

## 2024-04-06 PROCEDURE — 99231 SBSQ HOSP IP/OBS SF/LOW 25: CPT | Performed by: INTERNAL MEDICINE

## 2024-04-06 PROCEDURE — 82948 REAGENT STRIP/BLOOD GLUCOSE: CPT

## 2024-04-06 PROCEDURE — 85018 HEMOGLOBIN: CPT

## 2024-04-06 RX ADMIN — QUETIAPINE FUMARATE 25 MG: 25 TABLET ORAL at 21:36

## 2024-04-06 RX ADMIN — APIXABAN 5 MG: 5 TABLET, FILM COATED ORAL at 21:36

## 2024-04-06 RX ADMIN — SODIUM CHLORIDE, PRESERVATIVE FREE 10 ML: 5 INJECTION INTRAVENOUS at 10:09

## 2024-04-06 RX ADMIN — ASPIRIN 81 MG: 81 TABLET, COATED ORAL at 10:09

## 2024-04-06 RX ADMIN — PANTOPRAZOLE SODIUM 40 MG: 40 TABLET, DELAYED RELEASE ORAL at 04:05

## 2024-04-06 RX ADMIN — PANTOPRAZOLE SODIUM 40 MG: 40 TABLET, DELAYED RELEASE ORAL at 15:40

## 2024-04-06 RX ADMIN — METOPROLOL TARTRATE 25 MG: 25 TABLET, FILM COATED ORAL at 10:09

## 2024-04-06 RX ADMIN — METOPROLOL TARTRATE 25 MG: 25 TABLET, FILM COATED ORAL at 21:36

## 2024-04-06 RX ADMIN — DONEPEZIL HYDROCHLORIDE 10 MG: 10 TABLET, FILM COATED ORAL at 10:09

## 2024-04-06 RX ADMIN — SODIUM CHLORIDE, PRESERVATIVE FREE 10 ML: 5 INJECTION INTRAVENOUS at 21:37

## 2024-04-06 RX ADMIN — DOCUSATE SODIUM 100 MG: 50 LIQUID ORAL at 10:09

## 2024-04-06 RX ADMIN — APIXABAN 5 MG: 5 TABLET, FILM COATED ORAL at 10:09

## 2024-04-06 RX ADMIN — ATORVASTATIN CALCIUM 20 MG: 20 TABLET, FILM COATED ORAL at 10:09

## 2024-04-06 RX ADMIN — ACETAMINOPHEN 650 MG: 325 TABLET ORAL at 04:06

## 2024-04-06 ASSESSMENT — PAIN SCALES - GENERAL
PAINLEVEL_OUTOF10: 0
PAINLEVEL_OUTOF10: 0
PAINLEVEL_OUTOF10: 5

## 2024-04-06 ASSESSMENT — PAIN DESCRIPTION - ORIENTATION: ORIENTATION: MID

## 2024-04-06 ASSESSMENT — PAIN DESCRIPTION - ONSET: ONSET: ON-GOING

## 2024-04-06 ASSESSMENT — PAIN DESCRIPTION - LOCATION: LOCATION: BACK

## 2024-04-06 ASSESSMENT — PAIN DESCRIPTION - PAIN TYPE: TYPE: ACUTE PAIN

## 2024-04-06 ASSESSMENT — PAIN DESCRIPTION - FREQUENCY: FREQUENCY: CONTINUOUS

## 2024-04-06 ASSESSMENT — PAIN - FUNCTIONAL ASSESSMENT: PAIN_FUNCTIONAL_ASSESSMENT: ACTIVITIES ARE NOT PREVENTED

## 2024-04-06 ASSESSMENT — PAIN DESCRIPTION - DESCRIPTORS: DESCRIPTORS: ACHING

## 2024-04-06 NOTE — PROGRESS NOTES
AC    metoprolol tartrate  25 mg Oral BID    [Held by provider] pioglitazone  30 mg Oral Daily    QUEtiapine  25 mg Oral Nightly    atorvastatin  20 mg Oral Daily    sodium chloride flush  5-40 mL IntraVENous 2 times per day     Continuous Infusions:   dextrose      sodium chloride       PRN Meds:melatonin, glucose, dextrose bolus **OR** dextrose bolus, glucagon (rDNA), dextrose, sodium chloride flush, sodium chloride, potassium chloride **OR** potassium alternative oral replacement **OR** potassium chloride, magnesium sulfate, ondansetron **OR** ondansetron, polyethylene glycol, acetaminophen **OR** acetaminophen    Physical Exam:     Physical Exam  Constitutional:       Appearance: Normal appearance. He is obese. He is not diaphoretic.   HENT:      Head: Normocephalic and atraumatic.      Mouth/Throat:      Mouth: Mucous membranes are moist.      Pharynx: Oropharynx is clear. No oropharyngeal exudate or posterior oropharyngeal erythema.   Eyes:      General: No scleral icterus.     Extraocular Movements: Extraocular movements intact.      Pupils: Pupils are equal, round, and reactive to light.   Cardiovascular:      Rate and Rhythm: Normal rate and regular rhythm.      Pulses: Normal pulses.      Heart sounds: Murmur heard.      Systolic murmur is present with a grade of 1/6.      No friction rub. No gallop.   Pulmonary:      Effort: Pulmonary effort is normal.      Breath sounds: Normal breath sounds. No wheezing, rhonchi or rales.   Abdominal:      General: Bowel sounds are normal.      Palpations: Abdomen is soft.      Tenderness: There is no abdominal tenderness. There is no guarding or rebound.   Musculoskeletal:      Cervical back: Normal range of motion and neck supple.      Right lower leg: No edema.      Left lower leg: No edema.   Skin:     General: Skin is warm and dry.      Capillary Refill: Capillary refill takes less than 2 seconds.   Neurological:      General: No focal deficit present.      Mental  fracture     Impression: Pulmonary vasculature is prominent with normal heart size. No pulmonary edema. No consolidation or pleural effusion. This document has been electronically signed by: Lavelle Riley MD on 04/01/2024 06:19 PM      EKG:   No new.    Micro:   Patient Infection Status       None to display                     Path:   N/A    Signed:  Ben Gamboa MD  Internal Medicine, PGY-3, Chief Resident  4/6/2024  4:19 PM    Staff: Ben Justin MD

## 2024-04-06 NOTE — PLAN OF CARE
Problem: Discharge Planning  Goal: Discharge to home or other facility with appropriate resources  Outcome: Progressing  Flowsheets (Taken 4/6/2024 1349)  Discharge to home or other facility with appropriate resources:   Identify barriers to discharge with patient and caregiver   Identify discharge learning needs (meds, wound care, etc)   Refer to discharge planning if patient needs post-hospital services based on physician order or complex needs related to functional status, cognitive ability or social support system   Arrange for needed discharge resources and transportation as appropriate   Arrange for interpreters to assist at discharge as needed     Problem: Safety - Adult  Goal: Free from fall injury  Outcome: Progressing  Flowsheets (Taken 4/6/2024 1349)  Free From Fall Injury: Instruct family/caregiver on patient safety     Problem: ABCDS Injury Assessment  Goal: Absence of physical injury  Outcome: Progressing  Flowsheets (Taken 4/6/2024 1349)  Absence of Physical Injury: Implement safety measures based on patient assessment     Problem: Chronic Conditions and Co-morbidities  Goal: Patient's chronic conditions and co-morbidity symptoms are monitored and maintained or improved  Outcome: Progressing  Flowsheets (Taken 4/6/2024 1349)  Care Plan - Patient's Chronic Conditions and Co-Morbidity Symptoms are Monitored and Maintained or Improved:   Monitor and assess patient's chronic conditions and comorbid symptoms for stability, deterioration, or improvement   Collaborate with multidisciplinary team to address chronic and comorbid conditions and prevent exacerbation or deterioration   Update acute care plan with appropriate goals if chronic or comorbid symptoms are exacerbated and prevent overall improvement and discharge     Problem: Skin/Tissue Integrity  Goal: Absence of new skin breakdown  Description: 1.  Monitor for areas of redness and/or skin breakdown  2.  Assess vascular access sites hourly  3.

## 2024-04-07 LAB
GLUCOSE BLD STRIP.AUTO-MCNC: 132 MG/DL (ref 70–108)
GLUCOSE BLD STRIP.AUTO-MCNC: 178 MG/DL (ref 70–108)
GLUCOSE BLD STRIP.AUTO-MCNC: 205 MG/DL (ref 70–108)
GLUCOSE BLD STRIP.AUTO-MCNC: 233 MG/DL (ref 70–108)
HCT VFR BLD AUTO: 25.8 % (ref 42–52)
HGB BLD-MCNC: 7.8 GM/DL (ref 14–18)

## 2024-04-07 PROCEDURE — 85014 HEMATOCRIT: CPT

## 2024-04-07 PROCEDURE — G0378 HOSPITAL OBSERVATION PER HR: HCPCS

## 2024-04-07 PROCEDURE — 6370000000 HC RX 637 (ALT 250 FOR IP)

## 2024-04-07 PROCEDURE — 85018 HEMOGLOBIN: CPT

## 2024-04-07 PROCEDURE — 36415 COLL VENOUS BLD VENIPUNCTURE: CPT

## 2024-04-07 PROCEDURE — 82948 REAGENT STRIP/BLOOD GLUCOSE: CPT

## 2024-04-07 PROCEDURE — 6370000000 HC RX 637 (ALT 250 FOR IP): Performed by: PHYSICIAN ASSISTANT

## 2024-04-07 PROCEDURE — 99231 SBSQ HOSP IP/OBS SF/LOW 25: CPT | Performed by: INTERNAL MEDICINE

## 2024-04-07 PROCEDURE — 6370000000 HC RX 637 (ALT 250 FOR IP): Performed by: NURSE PRACTITIONER

## 2024-04-07 PROCEDURE — 2580000003 HC RX 258: Performed by: PHYSICIAN ASSISTANT

## 2024-04-07 PROCEDURE — 1200000000 HC SEMI PRIVATE

## 2024-04-07 RX ADMIN — SODIUM CHLORIDE, PRESERVATIVE FREE 10 ML: 5 INJECTION INTRAVENOUS at 20:13

## 2024-04-07 RX ADMIN — QUETIAPINE FUMARATE 25 MG: 25 TABLET ORAL at 20:13

## 2024-04-07 RX ADMIN — DOCUSATE SODIUM 100 MG: 50 LIQUID ORAL at 07:59

## 2024-04-07 RX ADMIN — PANTOPRAZOLE SODIUM 40 MG: 40 TABLET, DELAYED RELEASE ORAL at 07:59

## 2024-04-07 RX ADMIN — DONEPEZIL HYDROCHLORIDE 10 MG: 10 TABLET, FILM COATED ORAL at 08:00

## 2024-04-07 RX ADMIN — ATORVASTATIN CALCIUM 20 MG: 20 TABLET, FILM COATED ORAL at 08:00

## 2024-04-07 RX ADMIN — PANTOPRAZOLE SODIUM 40 MG: 40 TABLET, DELAYED RELEASE ORAL at 17:01

## 2024-04-07 RX ADMIN — APIXABAN 5 MG: 5 TABLET, FILM COATED ORAL at 20:13

## 2024-04-07 RX ADMIN — APIXABAN 5 MG: 5 TABLET, FILM COATED ORAL at 08:00

## 2024-04-07 RX ADMIN — ASPIRIN 81 MG: 81 TABLET, COATED ORAL at 07:59

## 2024-04-07 RX ADMIN — METOPROLOL TARTRATE 25 MG: 25 TABLET, FILM COATED ORAL at 08:00

## 2024-04-07 NOTE — PROGRESS NOTES
Advance Care Planning     Advance Care Planning Inpatient Note  [facility]    Today's Date: 4/7/2024  Unit: [facility] 8AB    Received request from admission screening.  Upon review of chart and communication with care team, patient's decision making abilities are not in question.. Patient was/were present in the room during visit.    Goals of ACP Conversation:  Discuss advance care planning documents    Health Care Decision Makers:     No healthcare decision makers have been documented.  Click here to complete HealthCare Decision Makers including selection of the Healthcare Decision Maker Relationship (ie \"Primary\")  Summary:  No Decision Maker named by patient at this time    Advance Care Planning Documents (Patient Wishes):  None     Assessment:  Sherwin is being cared for on 8B.  He told me that he has a health care power of  document that he has already filled out.  He said he gave it to a family member to look over.  He told me that his family member will bring his documents in to the hospital to be scanned and put on file.    Interventions:  Encouraged ongoing ACP conversation with future decision makers and loved ones    Care Preferences Communicated:   No    Outcomes/Plan:  ACP Discussion: Postponed    Electronically signed by JAIME MORRISON on 4/7/2024 at 4:59 PM

## 2024-04-07 NOTE — DISCHARGE INSTR - COC
Continuity of Care Form    Patient Name: Sherwin Phelps   :  1935  MRN:  [id number]    Admit date:  2024  Discharge date:  ***    Code Status Order: Full Code   Advance Directives:     Admitting Physician:  Ben Gamboa MD  PCP: Diego Silva Jr., DO    Discharging Nurse: ***  Discharging Hospital Unit/Room#: 8B-24/024-A  Discharging Unit Phone Number: ***    Emergency Contact:   Extended Emergency Contact Information  Primary Emergency Contact: ANT PHELPS  Address: [address] [place] [address]  Home Phone: [phone]  Mobile Phone: [phone]  Relation: Spouse  Preferred language: English   needed? No  Secondary Emergency Contact: Elissa Frederick  Home Phone: [phone]  Relation: Child    Past Surgical History:  Past Surgical History:   Procedure Laterality Date    CARDIAC CATHETERIZATION  2020    CARDIOVASCULAR STRESS TEST      CYSTOSCOPY N/A 2020    CYSTOSCOPY, GREENLIGHT PHOTOVAPORIZATION OF PROSTATE performed by Jose Rahman MD at [facility] OR    CYSTOSCOPY N/A 02/15/2023    Cystoscopy Bladder Botox Injection 100 units performed by Jose Rahman MD at [facility] OR    EYE SURGERY      HIP SURGERY Right 2024    JOINT REPLACEMENT      total right knee    KNEE ARTHROPLASTY Right     STIMULATOR SURGERY N/A 2021    STAGE I INTERSTIM performed by Jose Rahman MD at [facility] OR    STIMULATOR SURGERY N/A 10/05/2021    STAGE 2 INTERSTIM performed by Jose Rahman MD at [facility] OR    TONSILLECTOMY      UPPER GASTROINTESTINAL ENDOSCOPY N/A 4/3/2024    ESOPHAGOGASTRODUODENOSCOPY POLYP SNARE performed by Gilbert Hinojosa MD at [facility] ENDOSCOPY       Immunization History:   Immunization History   Administered Date(s) Administered    COVID-19, PFIZER Bivalent, DO NOT Dilute, (age 12y+), IM, 30 mcg/0.3 mL 10/26/2022    COVID-19, PFIZER, ( formula), (age 12y+), IM, 30mcg/0.3mL 10/12/2023       Active Problems:  Patient Active Problem List   Diagnosis  ***    Physician Certification: I certify the above information and transfer of Sherwin Phelps  is necessary for the continuing treatment of the diagnosis listed and that he requires {Admit to Appropriate Level of Care:22344} for {GREATER/LESS:310893235} 30 days.     Update Admission H&P: {CHP DME Changes in HandP:023699089}    PHYSICIAN SIGNATURE:  {Esignature:[id number]}

## 2024-04-07 NOTE — PROGRESS NOTES
Medicine Progress Note    Patient:  Sherwin Phelps    Unit/Bed:8B-24/024-A  YOB: 1935  MRN: [id number]   Acct: [id number]   PCP: Diego Silva Jr.,   Date of Admission: 4/1/2024    Hospital Course     Briefly, pt Sherwin Phelps is a 88 y.o. male with a PMH of Alzheimer's Dementia / CVA / DVT&PE / NIRDMT2 who presented with GIB. EGD 04/03/24: distal esophagitis / gastric polyps x2 (large) removed. 04/05/24 Working on obtaining records for colonoscopy. Working on placement    Assessment / Plan     #Non-Variceal Lower vs Upper GIB: Active  Bleeding source: Lower vs Upper. No prior h/o EGD/Colonoscopy in EMR however pt's wife states he had a colonoscopy w/i the last year. 1 unit pRBC 04/01/24 - Hgb has been steady b/t 7-8 g/dL. EGD 04/03/24: distal esophagitis / gastric polyps x2 (large) removed.  Plan:  -GI Consulted for GIB; appreciate recs    +1 month f/u w/ Missy BRISENO    +Signed off 04/05/24  -PO Pantoprazole 40 mg BID   -H&H q48h  -Type and Screen; Prepare 2U pRBC  -Transfuse if Hgb <7.0 mg/dL and/or s/s of acute anemia    #Acute on Subacute Macrocytic Anemia 2/2 ABLA: Stable  Hgb 6.0 g/dL on baseline 15 g/dL.  fL. Anemia workup performed  04/02/24  demonstrated Reticulocyte Index: 1.09, hypoproliferative, Serum iron levels: 40 ug/dL, Serum ferritin: 26 ng/mL, Serum iron saturation: 10%, Serum IBC: 411 ug/dL, Serum soluble transferrin receptor: not obtained, B12: 506 pg/mL, and Folate: 12.1 ng/mL. Consistent with CHAPIN from ABLA.  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  DM Nephropathy Screening: mAlb:Cr not performed previously  DM Retinopathy Screening: Unable to find documentation  DM Neuropathy Screening: Bilateral LE sensation intact to pinprick  Plan:  -Hold ALL Non-insulin diabetic

## 2024-04-07 NOTE — PLAN OF CARE
Problem: Discharge Planning  Goal: Discharge to home or other facility with appropriate resources  4/7/2024 0254 by Janet Lobo RN  Outcome: Progressing  Flowsheets (Taken 4/7/2024 0254)  Discharge to home or other facility with appropriate resources:   Identify barriers to discharge with patient and caregiver   Identify discharge learning needs (meds, wound care, etc)   Refer to discharge planning if patient needs post-hospital services based on physician order or complex needs related to functional status, cognitive ability or social support system   Arrange for needed discharge resources and transportation as appropriate     Problem: Safety - Adult  Goal: Free from fall injury  4/7/2024 0254 by Janet Lobo RN  Outcome: Progressing  Flowsheets (Taken 4/7/2024 0254)  Free From Fall Injury: Instruct family/caregiver on patient safety  Note: Bed locked & in low position, call light in reach, side-rails up x2, bed/chair alarm utilized, non-slip socks on when ambulating, reminded patient to use call light to call for assistance.      Problem: ABCDS Injury Assessment  Goal: Absence of physical injury  4/7/2024 0254 by Janet Lobo RN  Outcome: Progressing  Flowsheets (Taken 4/7/2024 0254)  Absence of Physical Injury: Implement safety measures based on patient assessment  Note: Bed locked & in low position, call light in reach, side-rails up x2, bed/chair alarm utilized, non-slip socks on when ambulating, reminded patient to use call light to call for assistance.      Problem: Chronic Conditions and Co-morbidities  Goal: Patient's chronic conditions and co-morbidity symptoms are monitored and maintained or improved  4/7/2024 0254 by Janet Lobo RN  Outcome: Progressing  Flowsheets (Taken 4/7/2024 0254)  Care Plan - Patient's Chronic Conditions and Co-Morbidity Symptoms are Monitored and Maintained or Improved:   Collaborate with multidisciplinary team to address chronic and comorbid conditions and

## 2024-04-07 NOTE — CARE COORDINATION
4/7/24, 8:37 AM EDT    DISCHARGE PLANNING EVALUATION     Call to [employer] with [employer] to check on precert, left a voicemail for a call back.     9:10 AM EDT  Call from [employer] with [employer], sparkle still pending, she keep SW updated if she hear back on this today.

## 2024-04-08 LAB
GLUCOSE BLD STRIP.AUTO-MCNC: 148 MG/DL (ref 70–108)
GLUCOSE BLD STRIP.AUTO-MCNC: 149 MG/DL (ref 70–108)
GLUCOSE BLD STRIP.AUTO-MCNC: 185 MG/DL (ref 70–108)
GLUCOSE BLD STRIP.AUTO-MCNC: 224 MG/DL (ref 70–108)

## 2024-04-08 PROCEDURE — 6370000000 HC RX 637 (ALT 250 FOR IP): Performed by: NURSE PRACTITIONER

## 2024-04-08 PROCEDURE — 97535 SELF CARE MNGMENT TRAINING: CPT

## 2024-04-08 PROCEDURE — 6370000000 HC RX 637 (ALT 250 FOR IP)

## 2024-04-08 PROCEDURE — 1200000000 HC SEMI PRIVATE

## 2024-04-08 PROCEDURE — 2580000003 HC RX 258: Performed by: PHYSICIAN ASSISTANT

## 2024-04-08 PROCEDURE — 99232 SBSQ HOSP IP/OBS MODERATE 35: CPT | Performed by: INTERNAL MEDICINE

## 2024-04-08 PROCEDURE — 6370000000 HC RX 637 (ALT 250 FOR IP): Performed by: PHYSICIAN ASSISTANT

## 2024-04-08 PROCEDURE — 82948 REAGENT STRIP/BLOOD GLUCOSE: CPT

## 2024-04-08 PROCEDURE — 0DB68ZX EXCISION OF STOMACH, VIA NATURAL OR ARTIFICIAL OPENING ENDOSCOPIC, DIAGNOSTIC: ICD-10-PCS | Performed by: INTERNAL MEDICINE

## 2024-04-08 RX ADMIN — INSULIN LISPRO 1 UNITS: 100 INJECTION, SOLUTION INTRAVENOUS; SUBCUTANEOUS at 12:06

## 2024-04-08 RX ADMIN — PANTOPRAZOLE SODIUM 40 MG: 40 TABLET, DELAYED RELEASE ORAL at 15:48

## 2024-04-08 RX ADMIN — APIXABAN 5 MG: 5 TABLET, FILM COATED ORAL at 12:00

## 2024-04-08 RX ADMIN — APIXABAN 5 MG: 5 TABLET, FILM COATED ORAL at 19:31

## 2024-04-08 RX ADMIN — PANTOPRAZOLE SODIUM 40 MG: 40 TABLET, DELAYED RELEASE ORAL at 10:17

## 2024-04-08 RX ADMIN — DOCUSATE SODIUM 100 MG: 50 LIQUID ORAL at 10:17

## 2024-04-08 RX ADMIN — DONEPEZIL HYDROCHLORIDE 10 MG: 10 TABLET, FILM COATED ORAL at 12:01

## 2024-04-08 RX ADMIN — SODIUM CHLORIDE, PRESERVATIVE FREE 10 ML: 5 INJECTION INTRAVENOUS at 19:32

## 2024-04-08 RX ADMIN — METOPROLOL TARTRATE 25 MG: 25 TABLET, FILM COATED ORAL at 10:17

## 2024-04-08 RX ADMIN — ASPIRIN 81 MG: 81 TABLET, COATED ORAL at 10:17

## 2024-04-08 RX ADMIN — ATORVASTATIN CALCIUM 20 MG: 20 TABLET, FILM COATED ORAL at 12:01

## 2024-04-08 RX ADMIN — METOPROLOL TARTRATE 25 MG: 25 TABLET, FILM COATED ORAL at 19:31

## 2024-04-08 RX ADMIN — SODIUM CHLORIDE, PRESERVATIVE FREE 10 ML: 5 INJECTION INTRAVENOUS at 10:19

## 2024-04-08 RX ADMIN — QUETIAPINE FUMARATE 25 MG: 25 TABLET ORAL at 19:31

## 2024-04-08 ASSESSMENT — PAIN SCALES - GENERAL: PAINLEVEL_OUTOF10: 0

## 2024-04-08 NOTE — PLAN OF CARE
Problem: Discharge Planning  Goal: Discharge to home or other facility with appropriate resources  Outcome: Progressing     Problem: Safety - Adult  Goal: Free from fall injury  Outcome: Progressing  Flowsheets (Taken 4/8/2024 1400)  Free From Fall Injury: Instruct family/caregiver on patient safety     Problem: ABCDS Injury Assessment  Goal: Absence of physical injury  Outcome: Progressing  Flowsheets (Taken 4/8/2024 1400)  Absence of Physical Injury: Implement safety measures based on patient assessment     Problem: Chronic Conditions and Co-morbidities  Goal: Patient's chronic conditions and co-morbidity symptoms are monitored and maintained or improved  Outcome: Progressing     Problem: Skin/Tissue Integrity  Goal: Absence of new skin breakdown  Description: 1.  Monitor for areas of redness and/or skin breakdown  2.  Assess vascular access sites hourly  3.  Every 4-6 hours minimum:  Change oxygen saturation probe site  4.  Every 4-6 hours:  If on nasal continuous positive airway pressure, respiratory therapy assess nares and determine need for appliance change or resting period.  Outcome: Progressing     Problem: Pain  Goal: Verbalizes/displays adequate comfort level or baseline comfort level  Outcome: Progressing  Flowsheets (Taken 4/8/2024 1000)  Verbalizes/displays adequate comfort level or baseline comfort level: Encourage patient to monitor pain and request assistance

## 2024-04-08 NOTE — PROGRESS NOTES
[facility]  [facility] MED SURG 8AB  Occupational Therapy  Daily Note  Time:   Time In: 08  Time Out: 832  Timed Code Treatment Minutes: 31 Minutes  Minutes: 31          Date: 2024  Patient Name: Sherwin Phelps,   Gender: male      Room: 8B-24/024-A  MRN: [id number]  : 1935  (88 y.o.)  Referring Practitioner: Jose Angel Ramírez MD  Diagnosis: acute blood loss anemia  Additional Pertinent Hx: Per ER note on 2024:88 y.o. male who presents today for chest pain shortness of breath.  Patient states he had a yesterday he had a day.  Patient states he feels fine now.  Patient states that it is mostly in his left shoulder.  Had no overt chest pain.  It was in the left scapular area.  Patient did not have any pain when EMS arrived. 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 on  at [facility] after a fall    Restrictions/Precautions:  Restrictions/Precautions: Fall Risk, General Precautions  Position Activity Restriction  Hip Precautions: No hip flexion > 90 degrees  Other position/activity restrictions: hx of dementia per chart, reports hx of  R THR     Social/Functional History:  Lives With: Family (wife & daughter)  Type of Home: Apartment  Home Layout: One level  Home Access: Level entry  Home Equipment: Walker, 4 wheeled, Wheelchair-manual   Bathroom Shower/Tub: Tub/Shower unit  Bathroom Equipment: Grab bars in shower, Tub transfer bench       ADL Assistance: Needs assistance  Ambulation Assistance: Needs assistance  Transfer Assistance: Needs assistance    Active : No  Patient's  Info: Uses [facility] on aging     Additional Comments: Pt reports needing A since sx in Feb. Pt reports using at walker PLOF. Reports frequent falls.    SUBJECTIVE: RN approved OT session, patient side lying in bed and agreeable to OT session. Patient able to verbalize all hip restrictions independently. Patient communicating properly and alert and oriented at beginning of session, however after toileting and

## 2024-04-08 NOTE — PLAN OF CARE
Problem: Discharge Planning  Goal: Discharge to home or other facility with appropriate resources  Outcome: Progressing     Problem: Safety - Adult  Goal: Free from fall injury  Outcome: Progressing     Problem: ABCDS Injury Assessment  Goal: Absence of physical injury  Outcome: Progressing     Problem: Chronic Conditions and Co-morbidities  Goal: Patient's chronic conditions and co-morbidity symptoms are monitored and maintained or improved  Outcome: Progressing     Problem: Skin/Tissue Integrity  Goal: Absence of new skin breakdown  Description: 1.  Monitor for areas of redness and/or skin breakdown  2.  Assess vascular access sites hourly  3.  Every 4-6 hours minimum:  Change oxygen saturation probe site  4.  Every 4-6 hours:  If on nasal continuous positive airway pressure, respiratory therapy assess nares and determine need for appliance change or resting period.  Outcome: Progressing     Problem: Pain  Goal: Verbalizes/displays adequate comfort level or baseline comfort level  Outcome: Progressing

## 2024-04-08 NOTE — PROGRESS NOTES
Medicine Progress Note    Patient:  Sherwin Phelps    Unit/Bed:8B-24/024-A  YOB: 1935  MRN: [id number]   Acct: [id number]   PCP: Diego Silva Jr.,   Date of Admission: 4/1/2024    Hospital Course     Briefly, pt Sherwin Phelps is a 88 y.o. male with a PMH of Alzheimer's Dementia / CVA / DVT&PE / NIRDMT2 who presented with GIB. EGD 04/03/24: distal esophagitis / gastric polyps x2 (large) removed. 04/05/24 Working on obtaining records for colonoscopy. No further episodes of bleeding. Working on placement    Assessment / Plan     #Non-Variceal Lower vs Upper GIB: Resolved  Bleeding source: Unknown. No prior h/o EGD/Colonoscopy in EMR however pt's wife states he had a colonoscopy w/i the last year. 1 unit pRBC 04/01/24 - Hgb has been steady b/t 7-8 g/dL. EGD 04/03/24: distal esophagitis / gastric polyps x2 (large) removed. Pathology: microscopic examination demonstrates ulcerated and inflamed hyperplastic gastric polyps. There is no evidence of dysplasia or malignancy. Pt's precipitous drop over the course of 2 months from 15 g/dL --> 6.0 g/dL is not well-accounted for by findings on EGD. He will likely need further evaluation outpatient.   Plan:  -GI Consulted for GIB; appreciate recs    +1 month f/u w/ Missy Spencer APRN    +Signed off 04/05/24  -PO Pantoprazole 40 mg BID   -H&H q48h  -Type and Screen; Prepare 2U pRBC  -Transfuse if Hgb <7.0 mg/dL and/or s/s of acute anemia    #Acute on Subacute Macrocytic Anemia 2/2 ABLA: Stable  Hgb 6.0 g/dL on baseline 15 g/dL.  fL. Anemia workup performed  04/02/24  demonstrated Reticulocyte Index: 1.09, hypoproliferative, Serum iron levels: 40 ug/dL, Serum ferritin: 26 ng/mL, Serum iron saturation: 10%, Serum IBC: 411 ug/dL, Serum soluble transferrin receptor: not obtained, B12: 506 pg/mL, and Folate: 12.1 ng/mL. Consistent with CHAPIN from ABLA.  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      Palpations: Abdomen is soft.      Tenderness: There is no abdominal tenderness. There is no guarding or rebound.   Musculoskeletal:      Cervical back: Normal range of motion and neck supple.      Right lower leg: No edema.      Left lower leg: No edema.   Skin:     General: Skin is warm and dry.      Capillary Refill: Capillary refill takes less than 2 seconds.   Neurological:      General: No focal deficit present.      Mental Status: He is alert and oriented to person, place, and time. Mental status is at baseline.   Psychiatric:         Mood and Affect: Mood normal.         Behavior: Behavior normal.       Labs:     Recent Labs     04/06/24  0428 04/07/24  0600   HGB 7.8* 7.8*   HCT 28.0* 25.8*       No results for input(s): \"NA\", \"K\", \"CL\", \"CO2\", \"BUN\", \"CREATININE\", \"GFR\", \"GLUCOSE\", \"CA\", \"PHOS\", \"MG\" in the last 72 hours.    No results for input(s): \"BILITOT\", \"BILIDIR\", \"PROT\", \"ALBUMIN\", \"AST\" in the last 72 hours.    Invalid input(s): \"ALK\", \"LABALT\"    No results for input(s): \"TROPONINI\", \"CKMB\" in the last 72 hours.    Invalid input(s): \"CKTOT\"  No results for input(s): \"CHOL\", \"TRIG\", \"HDL\", \"LDL\" in the last 72 hours.  No results for input(s): \"SEDRATE\", \"HSCRP\" in the last 72 hours.  No results for input(s): \"APTT\", \"INR\" in the last 72 hours.    Invalid input(s): \"PT\"    Invalid input(s): \"HGBA1C\"  No results for input(s): \"PH\", \"CVR5IKC\", \"PO2\", \"BASE\" in the last 72 hours.    Invalid input(s): \"ZHN2BJB\", \"SO2\", \"INSPIREDO2\"  No results for input(s): \"ABO\", \"RH\" in the last 72 hours.    @LABALLVALUEIP(AMYLASE:5)@  No results for input(s): \"COLORU\", \"PROTUR\", \"UROBILINOGEN\", \"MUCOUS\", \"OSMOLALITY\", \"NAUR\", \"KUR\", \"CLUR\", \"AMPHETAMINE\", \"BENZOURQL\", \"CANNABINOIDS\", \"COCAINE\", \"PCP\" in the last 72 hours.    Invalid input(s): \"CLARITYUA\", \"SPECGRAVUR\", \"GIORGIO\", \"GLUCUA\", \"KETONESU\", \"BILIRUBIN\", \"HGBUA\", \"NITRITEU\", \"LEUKOCTEST\", \"WBCU\", \"RBCU\", \"BARBSCRNUR\",

## 2024-04-08 NOTE — PROGRESS NOTES
Physician Progress Note      PATIENT:               HERNAN FINCH  CSN #:                  [id number]  :                       1935  ADMIT DATE:       2024 5:04 PM  DISCH DATE:  RESPONDING  PROVIDER #:        MARGAUX RAMÍREZ          QUERY TEXT:    Dr Gamboa, Dr Ramírez,    Patient admitted with GI bleeding, noted to have EGD 24: distal   esophagitis / gastric polyps x2 (large) removed.  If possible, please document   in progress notes and discharge summary the cause of the GI bleeding:    The medical record reflects the following:  Risk Factors: GIB  Clinical Indicators: Per GI-EGD-2 larger polyps on stalks with eroded surfaces    (removed with hot snare)  which could have bleed with anti coagulation  Treatment: EGD    Emma An BSN, RN  Options provided:  -- GI bleeding due to gastric polyps  -- GI bleeding due to distal esophagitis  -- Other - I will add my own diagnosis  -- Disagree - Not applicable / Not valid  -- Disagree - Clinically unable to determine / Unknown  -- Refer to Clinical Documentation Reviewer    PROVIDER RESPONSE TEXT:    The cause is still unknown. EGD showed gastric polyps but those don't usually   cause GIB over the course of 2 months. The Hgb dropped from 15 to 9.4 to 6.0.   May have been a more distal bleed.    Query created by: Ana An on 2024 6:19 AM      Electronically signed by:  MARGAUX RAMÍREZ 2024 6:26 AM

## 2024-04-09 LAB
GLUCOSE BLD STRIP.AUTO-MCNC: 122 MG/DL (ref 70–108)
GLUCOSE BLD STRIP.AUTO-MCNC: 134 MG/DL (ref 70–108)
GLUCOSE BLD STRIP.AUTO-MCNC: 149 MG/DL (ref 70–108)
GLUCOSE BLD STRIP.AUTO-MCNC: 272 MG/DL (ref 70–108)
HCT VFR BLD AUTO: 25.6 % (ref 42–52)
HGB BLD-MCNC: 7.8 GM/DL (ref 14–18)

## 2024-04-09 PROCEDURE — 6370000000 HC RX 637 (ALT 250 FOR IP)

## 2024-04-09 PROCEDURE — 2580000003 HC RX 258: Performed by: PHYSICIAN ASSISTANT

## 2024-04-09 PROCEDURE — 36415 COLL VENOUS BLD VENIPUNCTURE: CPT

## 2024-04-09 PROCEDURE — 99232 SBSQ HOSP IP/OBS MODERATE 35: CPT | Performed by: INTERNAL MEDICINE

## 2024-04-09 PROCEDURE — 6370000000 HC RX 637 (ALT 250 FOR IP): Performed by: NURSE PRACTITIONER

## 2024-04-09 PROCEDURE — 85014 HEMATOCRIT: CPT

## 2024-04-09 PROCEDURE — 82948 REAGENT STRIP/BLOOD GLUCOSE: CPT

## 2024-04-09 PROCEDURE — 97535 SELF CARE MNGMENT TRAINING: CPT

## 2024-04-09 PROCEDURE — 97530 THERAPEUTIC ACTIVITIES: CPT

## 2024-04-09 PROCEDURE — 6370000000 HC RX 637 (ALT 250 FOR IP): Performed by: PHYSICIAN ASSISTANT

## 2024-04-09 PROCEDURE — 85018 HEMOGLOBIN: CPT

## 2024-04-09 PROCEDURE — 1200000000 HC SEMI PRIVATE

## 2024-04-09 RX ADMIN — INSULIN LISPRO 2 UNITS: 100 INJECTION, SOLUTION INTRAVENOUS; SUBCUTANEOUS at 12:32

## 2024-04-09 RX ADMIN — DOCUSATE SODIUM 100 MG: 50 LIQUID ORAL at 07:51

## 2024-04-09 RX ADMIN — PANTOPRAZOLE SODIUM 40 MG: 40 TABLET, DELAYED RELEASE ORAL at 17:39

## 2024-04-09 RX ADMIN — ATORVASTATIN CALCIUM 20 MG: 20 TABLET, FILM COATED ORAL at 07:52

## 2024-04-09 RX ADMIN — METOPROLOL TARTRATE 25 MG: 25 TABLET, FILM COATED ORAL at 07:52

## 2024-04-09 RX ADMIN — ASPIRIN 81 MG: 81 TABLET, COATED ORAL at 07:52

## 2024-04-09 RX ADMIN — PANTOPRAZOLE SODIUM 40 MG: 40 TABLET, DELAYED RELEASE ORAL at 05:58

## 2024-04-09 RX ADMIN — QUETIAPINE FUMARATE 25 MG: 25 TABLET ORAL at 20:48

## 2024-04-09 RX ADMIN — SODIUM CHLORIDE, PRESERVATIVE FREE 10 ML: 5 INJECTION INTRAVENOUS at 20:48

## 2024-04-09 RX ADMIN — SODIUM CHLORIDE, PRESERVATIVE FREE 10 ML: 5 INJECTION INTRAVENOUS at 07:52

## 2024-04-09 RX ADMIN — DONEPEZIL HYDROCHLORIDE 10 MG: 10 TABLET, FILM COATED ORAL at 07:51

## 2024-04-09 RX ADMIN — APIXABAN 5 MG: 5 TABLET, FILM COATED ORAL at 20:48

## 2024-04-09 RX ADMIN — APIXABAN 5 MG: 5 TABLET, FILM COATED ORAL at 07:51

## 2024-04-09 ASSESSMENT — PAIN SCALES - GENERAL
PAINLEVEL_OUTOF10: 0
PAINLEVEL_OUTOF10: 0

## 2024-04-09 NOTE — CARE COORDINATION
4/9/24, 10:27 AM EDT    DISCHARGE PLANNING EVALUATION    Left message for Genesis at [facility] regarding any updates on pending precert. Requested return call.    1:35 PM  Genesis from [facility] left message requesting update PT as patient had not been seen since 4/5.      2:20 PM  Spoke wit patient wife, advised waiting on precert approval. Granddaughter may be able to transport patient at discharge.

## 2024-04-09 NOTE — CARE COORDINATION
4/9/24, 1:41 PM EDT    DISCHARGE ON GOING EVALUATION    Sherwin D [facility] day: 1  Location: [facility]24/024-A Reason for admit: Acute blood loss anemia [D62]  Chest pain, unspecified type [R07.9]  Acute anemia [D64.9]     Procedures:   4/3 EGD:  Gastritis present. Multiple gastric polyps: 2 larger polyps on stalks with eroded surfaces (removed with hot snare) which could have bleed with anti coagulation.     Imaging since last note: n/a    Barriers to Discharge: Medically cleared for discharge per hospitalist. Awaiting precert.     PCP: Diego Silva Jr., DO  Readmission Risk Score: 13.8%    Patient Goals/Plan/Treatment Preferences: See SW note for updates on precert to [facility]gen[facility] Velas[facility] of [facility].

## 2024-04-09 NOTE — PLAN OF CARE
Problem: Discharge Planning  Goal: Discharge to home or other facility with appropriate resources  4/8/2024 2136 by Alexia Han RN  Outcome: Progressing  4/8/2024 1646 by Ines Roe RN  Outcome: Progressing     Problem: Safety - Adult  Goal: Free from fall injury  4/8/2024 2136 by Alexia Han RN  Outcome: Progressing  4/8/2024 1646 by Ines Roe RN  Outcome: Progressing  Flowsheets (Taken 4/8/2024 1400)  Free From Fall Injury: Instruct family/caregiver on patient safety     Problem: ABCDS Injury Assessment  Goal: Absence of physical injury  4/8/2024 2136 by Alexia Han RN  Outcome: Progressing  4/8/2024 1646 by Ines Roe RN  Outcome: Progressing  Flowsheets (Taken 4/8/2024 1400)  Absence of Physical Injury: Implement safety measures based on patient assessment     Problem: Chronic Conditions and Co-morbidities  Goal: Patient's chronic conditions and co-morbidity symptoms are monitored and maintained or improved  4/8/2024 2136 by Alexia Han RN  Outcome: Progressing  4/8/2024 1646 by Ines Roe RN  Outcome: Progressing     Problem: Skin/Tissue Integrity  Goal: Absence of new skin breakdown  Description: 1.  Monitor for areas of redness and/or skin breakdown  2.  Assess vascular access sites hourly  3.  Every 4-6 hours minimum:  Change oxygen saturation probe site  4.  Every 4-6 hours:  If on nasal continuous positive airway pressure, respiratory therapy assess nares and determine need for appliance change or resting period.  4/8/2024 2136 by Alexia Han RN  Outcome: Progressing  4/8/2024 1646 by Ines Roe RN  Outcome: Progressing     Problem: Pain  Goal: Verbalizes/displays adequate comfort level or baseline comfort level  4/8/2024 2136 by Alexia Han RN  Outcome: Progressing  4/8/2024 1646 by Ines Roe RN  Outcome: Progressing  Flowsheets (Taken 4/8/2024 1000)  Verbalizes/displays adequate comfort level or baseline comfort level: Encourage

## 2024-04-09 NOTE — PROGRESS NOTES
[facility]  [facility] MED SURG 8AB  Occupational Therapy  Daily Note  Time:   Time In: 1018  Time Out: 1050  Timed Code Treatment Minutes: 32 Minutes  Minutes: 32          Date: 2024  Patient Name: Sherwin Phelps,   Gender: male      Room: 8B-24/024-A  MRN: [id number]  : 1935  (88 y.o.)  Referring Practitioner: Jose Angel Ramírez MD  Diagnosis: acute blood loss anemia  Additional Pertinent Hx: Per ER note on 2024:88 y.o. male who presents today for chest pain shortness of breath.  Patient states he had a yesterday he had a day.  Patient states he feels fine now.  Patient states that it is mostly in his left shoulder.  Had no overt chest pain.  It was in the left scapular area.  Patient did not have any pain when EMS arrived. 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 on  at [facility] after a fall    Restrictions/Precautions:  Restrictions/Precautions: Fall Risk, General Precautions  Position Activity Restriction  Hip Precautions: No hip flexion > 90 degrees  Other position/activity restrictions: hx of dementia per chart, reports hx of  R THR     Social/Functional History:  Lives With: Family (wife & daughter)  Type of Home: Apartment  Home Layout: One level  Home Access: Level entry  Home Equipment: Walker, 4 wheeled, Wheelchair-manual   Bathroom Shower/Tub: Tub/Shower unit  Bathroom Equipment: Grab bars in shower, Tub transfer bench       ADL Assistance: Needs assistance  Ambulation Assistance: Needs assistance  Transfer Assistance: Needs assistance    Active : No  Patient's  Info: Uses [facility] on aging     Additional Comments: Pt reports needing A since sx in Feb. Pt reports using at walker PLOF. Reports frequent falls.    SUBJECTIVE: Pt laying in bed upon arrival. Agreeable to OT session and verbal approval from RN. Wife in and out of room throughout session.    PAIN: 0/10:     Vitals: Vitals not assessed per clinical judgement, see nursing flowsheet    COGNITION: Slow  training, Strengthening, Safety education & training    Education:  Learners: Patient  ADL's    Goals  Short Term Goals  Time Frame for Short Term Goals: until discharge  Short Term Goal 1: Pt will complete various t/fs including ETS with S, SBA, & 0-2 vcs for safe technique  Short Term Goal 2: Pt will tolerate standing 3-4 min with 1 UE release to increase standing balance for sinkside grooming  Short Term Goal 3: Pt will complete mobility to/from bathroom + with ADL item retrieval  with RW, SBA, & 0-2 vcs for safety  Short Term Goal 4: Pt will complete LE dressing with mod A, AE, & 0-2 vcs for precautions  Long Term Goals  Time Frame for Long Term Goals : No LTG set d/t short ELOS    Following session, patient left in safe position with all fall risk precautions in place.      Occupational therapy session supervised and note reviewed by Cyn ZAPATA/MINDY

## 2024-04-09 NOTE — PLAN OF CARE
assistance   Assess pain using appropriate pain scale     Care plan reviewed with patient.  Patient verbalizes understanding of the plan of care and contributes to goal setting.

## 2024-04-09 NOTE — PROGRESS NOTES
Medicine Progress Note    Patient:  Sherwin Phelps    Unit/Bed:8B-24/024-A  YOB: 1935  MRN: [id number]   Acct: [id number]   PCP: Diego Silva Jr.,   Date of Admission: 4/1/2024    Hospital Course     Briefly, pt Sherwin Phelps is a 88 y.o. male with a PMH of Alzheimer's Dementia / CVA / DVT&PE / NIRDMT2 who presented with GIB. EGD 04/03/24: distal esophagitis / gastric polyps x2 (large) removed. 04/05/24 Working on obtaining records for colonoscopy. No further episodes of bleeding. Working on placement    Assessment / Plan     #Non-Variceal Lower vs Upper GIB: Resolved  Bleeding source: Unknown. No prior h/o EGD/Colonoscopy in EMR however pt's wife states he had a colonoscopy w/i the last year. 1 unit pRBC 04/01/24 - Hgb has been steady b/t 7-8 g/dL. EGD 04/03/24: distal esophagitis / gastric polyps x2 (large) removed. Pathology: microscopic examination demonstrates ulcerated and inflamed hyperplastic gastric polyps. There is no evidence of dysplasia or malignancy. Pt's precipitous drop over the course of 2 months from 15 g/dL --> 6.0 g/dL is not well-accounted for by findings on EGD. He will likely need further evaluation outpatient.   Plan:  -GI Consulted for GIB; appreciate recs    +1 month f/u w/ Missy Spencer APRN    +Signed off 04/05/24  -PO Pantoprazole 40 mg BID   -H&H PRN  -Type and Screen; Prepare 2U pRBC  -Transfuse if Hgb <7.0 mg/dL and/or s/s of acute anemia    #Acute on Subacute Macrocytic Anemia 2/2 ABLA: Stable  Hgb 6.0 g/dL on baseline 15 g/dL.  fL. Anemia workup performed  04/02/24  demonstrated Reticulocyte Index: 1.09, hypoproliferative, Serum iron levels: 40 ug/dL, Serum ferritin: 26 ng/mL, Serum iron saturation: 10%, Serum IBC: 411 ug/dL, Serum soluble transferrin receptor: not obtained, B12: 506 pg/mL, and Folate: 12.1 ng/mL. Consistent with CHAPIN from ABLA.  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%        Intake/Output Summary (Last 24 hours) at 4/9/2024 0622  Last data filed at 4/8/2024 1648  Gross per 24 hour   Intake 1020 ml   Output --   Net 1020 ml         Outpatient Medications:     Scheduled Meds:   insulin lispro  0-4 Units SubCUTAneous 4x Daily AC & HS    pantoprazole  40 mg Oral BID AC    docusate  100 mg Oral Daily    aspirin  81 mg Oral Daily    donepezil  10 mg Oral QAM    apixaban  5 mg Oral BID    [Held by provider] glipiZIDE  5 mg Oral QAM AC    metoprolol tartrate  25 mg Oral BID    [Held by provider] pioglitazone  30 mg Oral Daily    QUEtiapine  25 mg Oral Nightly    atorvastatin  20 mg Oral Daily    sodium chloride flush  5-40 mL IntraVENous 2 times per day     Continuous Infusions:   dextrose      sodium chloride       PRN Meds:melatonin, glucose, dextrose bolus **OR** dextrose bolus, glucagon (rDNA), dextrose, sodium chloride flush, sodium chloride, potassium chloride **OR** potassium alternative oral replacement **OR** potassium chloride, magnesium sulfate, ondansetron **OR** ondansetron, polyethylene glycol, acetaminophen **OR** acetaminophen    Physical Exam:     Physical Exam  Constitutional:       Appearance: Normal appearance. He is obese. He is not diaphoretic.   HENT:      Head: Normocephalic and atraumatic.      Mouth/Throat:      Mouth: Mucous membranes are moist.      Pharynx: Oropharynx is clear. No oropharyngeal exudate or posterior oropharyngeal erythema.   Eyes:      General: No scleral icterus.     Extraocular Movements: Extraocular movements intact.      Pupils: Pupils are equal, round, and reactive to light.   Cardiovascular:      Rate and Rhythm: Normal rate and regular rhythm.      Pulses: Normal pulses.      Heart sounds: Murmur heard.      Systolic murmur is present with a grade of 1/6.      No friction rub. No gallop.   Pulmonary:      Effort: Pulmonary effort is normal.      Breath sounds: Normal breath sounds. No wheezing, rhonchi or rales.   Abdominal:

## 2024-04-10 LAB
GLUCOSE BLD STRIP.AUTO-MCNC: 153 MG/DL (ref 70–108)
GLUCOSE BLD STRIP.AUTO-MCNC: 216 MG/DL (ref 70–108)
GLUCOSE BLD STRIP.AUTO-MCNC: 249 MG/DL (ref 70–108)
GLUCOSE BLD STRIP.AUTO-MCNC: 266 MG/DL (ref 70–108)

## 2024-04-10 PROCEDURE — 6370000000 HC RX 637 (ALT 250 FOR IP)

## 2024-04-10 PROCEDURE — 2580000003 HC RX 258: Performed by: PHYSICIAN ASSISTANT

## 2024-04-10 PROCEDURE — 97110 THERAPEUTIC EXERCISES: CPT

## 2024-04-10 PROCEDURE — 6370000000 HC RX 637 (ALT 250 FOR IP): Performed by: PHYSICIAN ASSISTANT

## 2024-04-10 PROCEDURE — 1200000003 HC TELEMETRY R&B

## 2024-04-10 PROCEDURE — 1200000000 HC SEMI PRIVATE

## 2024-04-10 PROCEDURE — 82948 REAGENT STRIP/BLOOD GLUCOSE: CPT

## 2024-04-10 PROCEDURE — 6370000000 HC RX 637 (ALT 250 FOR IP): Performed by: NURSE PRACTITIONER

## 2024-04-10 PROCEDURE — 99232 SBSQ HOSP IP/OBS MODERATE 35: CPT | Performed by: INTERNAL MEDICINE

## 2024-04-10 RX ADMIN — Medication 6 MG: at 20:15

## 2024-04-10 RX ADMIN — APIXABAN 5 MG: 5 TABLET, FILM COATED ORAL at 09:12

## 2024-04-10 RX ADMIN — ATORVASTATIN CALCIUM 20 MG: 20 TABLET, FILM COATED ORAL at 09:12

## 2024-04-10 RX ADMIN — SODIUM CHLORIDE, PRESERVATIVE FREE 10 ML: 5 INJECTION INTRAVENOUS at 20:13

## 2024-04-10 RX ADMIN — INSULIN LISPRO 2 UNITS: 100 INJECTION, SOLUTION INTRAVENOUS; SUBCUTANEOUS at 21:24

## 2024-04-10 RX ADMIN — APIXABAN 5 MG: 5 TABLET, FILM COATED ORAL at 20:13

## 2024-04-10 RX ADMIN — PANTOPRAZOLE SODIUM 40 MG: 40 TABLET, DELAYED RELEASE ORAL at 09:12

## 2024-04-10 RX ADMIN — METOPROLOL TARTRATE 25 MG: 25 TABLET, FILM COATED ORAL at 09:12

## 2024-04-10 RX ADMIN — SODIUM CHLORIDE, PRESERVATIVE FREE 10 ML: 5 INJECTION INTRAVENOUS at 09:12

## 2024-04-10 RX ADMIN — INSULIN LISPRO 1 UNITS: 100 INJECTION, SOLUTION INTRAVENOUS; SUBCUTANEOUS at 17:47

## 2024-04-10 RX ADMIN — DONEPEZIL HYDROCHLORIDE 10 MG: 10 TABLET, FILM COATED ORAL at 09:12

## 2024-04-10 RX ADMIN — DOCUSATE SODIUM 100 MG: 50 LIQUID ORAL at 09:12

## 2024-04-10 RX ADMIN — QUETIAPINE FUMARATE 25 MG: 25 TABLET ORAL at 20:13

## 2024-04-10 RX ADMIN — INSULIN LISPRO 1 UNITS: 100 INJECTION, SOLUTION INTRAVENOUS; SUBCUTANEOUS at 13:19

## 2024-04-10 RX ADMIN — ASPIRIN 81 MG: 81 TABLET, COATED ORAL at 09:12

## 2024-04-10 RX ADMIN — PANTOPRAZOLE SODIUM 40 MG: 40 TABLET, DELAYED RELEASE ORAL at 17:32

## 2024-04-10 RX ADMIN — METOPROLOL TARTRATE 25 MG: 25 TABLET, FILM COATED ORAL at 20:13

## 2024-04-10 ASSESSMENT — PAIN SCALES - GENERAL
PAINLEVEL_OUTOF10: 0
PAINLEVEL_OUTOF10: 0

## 2024-04-10 NOTE — PROGRESS NOTES
normal.      Palpations: Abdomen is soft.      Tenderness: There is no abdominal tenderness. There is no guarding or rebound.   Musculoskeletal:      Cervical back: Normal range of motion and neck supple.      Right lower leg: No edema.      Left lower leg: No edema.   Skin:     General: Skin is warm and dry.      Capillary Refill: Capillary refill takes less than 2 seconds.   Neurological:      General: No focal deficit present.      Mental Status: He is alert and oriented to person, place, and time. Mental status is at baseline.   Psychiatric:         Mood and Affect: Mood normal.         Behavior: Behavior normal.       Labs:     Recent Labs     04/09/24  0548   HGB 7.8*   HCT 25.6*       No results for input(s): \"NA\", \"K\", \"CL\", \"CO2\", \"BUN\", \"CREATININE\", \"GFR\", \"GLUCOSE\", \"CA\", \"PHOS\", \"MG\" in the last 72 hours.    No results for input(s): \"BILITOT\", \"BILIDIR\", \"PROT\", \"ALBUMIN\", \"AST\" in the last 72 hours.    Invalid input(s): \"ALK\", \"LABALT\"    No results for input(s): \"TROPONINI\", \"CKMB\" in the last 72 hours.    Invalid input(s): \"CKTOT\"  No results for input(s): \"CHOL\", \"TRIG\", \"HDL\", \"LDL\" in the last 72 hours.  No results for input(s): \"SEDRATE\", \"HSCRP\" in the last 72 hours.  No results for input(s): \"APTT\", \"INR\" in the last 72 hours.    Invalid input(s): \"PT\"    Invalid input(s): \"HGBA1C\"  No results for input(s): \"PH\", \"YIH9DMO\", \"PO2\", \"BASE\" in the last 72 hours.    Invalid input(s): \"VUP1SZW\", \"SO2\", \"INSPIREDO2\"  No results for input(s): \"ABO\", \"RH\" in the last 72 hours.    @LABALLVALUEIP(AMYLASE:5)@  No results for input(s): \"COLORU\", \"PROTUR\", \"UROBILINOGEN\", \"MUCOUS\", \"OSMOLALITY\", \"NAUR\", \"KUR\", \"CLUR\", \"AMPHETAMINE\", \"BENZOURQL\", \"CANNABINOIDS\", \"COCAINE\", \"PCP\" in the last 72 hours.    Invalid input(s): \"CLARITYUA\", \"SPECGRAVUR\", \"GIORGIO\", \"GLUCUA\", \"KETONESU\", \"BILIRUBIN\", \"HGBUA\", \"NITRITEU\", \"LEUKOCTEST\", \"WBCU\", \"RBCU\", \"BARBSCRNUR\", \"DOAUR\"    No results for input(s): \"TSH\", \"T3FREE\",

## 2024-04-10 NOTE — CARE COORDINATION
4/10/24, 2:24 PM EDT    DISCHARGE ON GOING EVALUATION    [facility] day: 2  Location: -24/024-A Reason for admit: Acute blood loss anemia [D62]  Chest pain, unspecified type [R07.9]  Acute anemia [D64.9]     Procedures: 4/3 EGD:  Gastritis present. Multiple gastric polyps: 2 larger polyps on stalks with eroded surfaces (removed with hot snare) which could have bleed with anti coagulation.     Imaging since last note: No    Barriers to Discharge: Awaiting placement.     PCP: Diego Silva Jr.,   Readmission Risk Score: 13.8%    Patient Goals/Plan/Treatment Preferences: SW following. Awaiting return of precert for [place] [facility].

## 2024-04-10 NOTE — PLAN OF CARE
Problem: Discharge Planning  Goal: Discharge to home or other facility with appropriate resources  4/9/2024 2138 by Maria Esther Felix, RN  Outcome: Progressing  Flowsheets (Taken 4/9/2024 2138)  Discharge to home or other facility with appropriate resources:   Identify barriers to discharge with patient and caregiver   Arrange for needed discharge resources and transportation as appropriate   Identify discharge learning needs (meds, wound care, etc)   Arrange for interpreters to assist at discharge as needed   Refer to discharge planning if patient needs post-hospital services based on physician order or complex needs related to functional status, cognitive ability or social support system  Note: Feedback readiness for discharge.  Promoting inclusion for discharge planning.   4/9/2024 1328 by Latasha Rodriguez RN  Outcome: Progressing  Flowsheets (Taken 4/9/2024 1328)  Discharge to home or other facility with appropriate resources: Identify barriers to discharge with patient and caregiver     Problem: Safety - Adult  Goal: Free from fall injury  4/9/2024 2138 by Maria Esther Felix, RN  Outcome: Progressing  Flowsheets (Taken 4/9/2024 2138)  Free From Fall Injury:   Instruct family/caregiver on patient safety   Based on caregiver fall risk screen, instruct family/caregiver to ask for assistance with transferring infant if caregiver noted to have fall risk factors  Note: Bed in low position  Call light in reach  Bed wheel lock  Teaching to use call light for assistance  Hourly rounding  Non Skid Socks  Bed Alarm in use    4/9/2024 1328 by Latasha Rodriguez, RN  Outcome: Progressing  Flowsheets (Taken 4/9/2024 1328)  Free From Fall Injury: Instruct family/caregiver on patient safety     Problem: ABCDS Injury Assessment  Goal: Absence of physical injury  4/9/2024 2138 by Maria Esther Felix, RN  Outcome: Progressing  Flowsheets (Taken 4/9/2024 2138)  Absence of Physical Injury: Implement safety measures based on patient

## 2024-04-10 NOTE — CARE COORDINATION
4/10/24, 3:00 PM EDT    DISCHARGE PLANNING EVALUATION    Spoke with Darinel from [facility], precert still pending.  She is sending updted therapy notes.

## 2024-04-10 NOTE — PROGRESS NOTES
[facility]  INPATIENT PHYSICAL THERAPY  DAILY NOTE  STRZ MED SURG 8AB - 8B-24/024-A     Time In: 926  Time Out: 942  Timed Code Treatment Minutes: 16 Minutes  Minutes: 16          Date: 4/10/2024  Patient Name: Sherwin Phelps,  Gender:  male        MRN: [id number]  : 1935  (88 y.o.)     Referring Practitioner: Jose Angel Ramírez MD  Diagnosis: acute blood loss anemia  Additional Pertinent Hx: Per H&P :Patient presents emergency department with left-sided chest pain rating to his left shoulder and left neck that started shortly before arrival.  Patient reports he believed he was having a heart attack.  Patient denies any shortness of breath associated with the pain.  Patient was recently diagnosed with pulmonary embolism and has been taking apixaban outpatient.  Upon evaluation the emergency department there are no EKG changes, no delta change in the troponin, and no respiratory distress.  However the patient is found to have a hemoglobin of 6 which is a new finding for the patient.  Patient is also found to have a positive Hemoccult.  Patient will be admitted for further evaluation of presumed GI bleed and continue treatment for pulmonary embolism with heparin drip until blood loss anemia workup is complete. Pt presents with dementia, anemia, and hx of PE. Pt s/p ESOPHAGOGASTRODUODENOSCOPY POLYP SNARE 4/3 with GI bleed. Currently anticoagulation is on hold due to GI bleed, RN states pt okay for mobility despite this     Prior Level of Function:  Lives With: Family (wife & daughter)  Type of Home: Apartment  Home Layout: One level  Home Access: Level entry  Home Equipment: Walker, 4 wheeled, Wheelchair-manual   Bathroom Shower/Tub: Tub/Shower unit  Bathroom Equipment: Grab bars in shower, Tub transfer bench    ADL Assistance: Needs assistance  Ambulation Assistance: Needs assistance  Transfer Assistance: Needs assistance  Active : No  Additional Comments: Pt reports needing A since sx  in Feb. Pt reports using at walker PLOF. Reports frequent falls.    Restrictions/Precautions:  Restrictions/Precautions: Fall Risk, General Precautions  Position Activity Restriction  Hip Precautions: No hip flexion > 90 degrees  Other position/activity restrictions: hx of dementia per chart, reports hx of 2/9 R THR      SUBJECTIVE: Pt resting in bed having recently finished with breakfast. Pt agrees to therapy and RN approved session.    PAIN: denied    Vitals: Vitals not assessed per clinical judgement, see nursing flowsheet    OBJECTIVE:  Bed Mobility:  Supine to Sit: Stand By Assistance, with head of bed raised, with rail    Transfers:  Sit to Stand: Contact Guard Assistance  Stand to Sit:Contact Guard Assistance    Ambulation:  Contact Guard Assistance  Distance: 20 ft  Surface: Level Tile  Device:Rolling Walker  Gait Deviations:  Decreased Step Length Bilaterally, Decreased Gait Speed, Decreased Heel Strike Bilaterally, Narrow Base of Support, Unsteady Gait, and appears to possibly have a leglength discrepancy.    Balance:  Static Sitting Balance:  Supervision  Dynamic Sitting Balance: Stand By Assistance  Static Standing Balance: Contact Guard Assistance  Dynamic Standing Balance: Contact Guard Assistance    Exercise:  Patient was guided in 1 set(s) 10-12 reps of exercise to both lower extremities.  Ankle pumps, Glut sets, Quad sets, Heelslides, Hip abduction/adduction, Seated marches, and Long arc quads.  Exercises were completed for increased independence with functional mobility.    Functional Outcome Measures: Completed  AM-PAC Inpatient Mobility without Stair Climbing Raw Score : 15  AM-PAC Inpatient without Stair Climbing T-Scale Score : 43.03  Modified [place] Scale:  Not Applicable    ASSESSMENT:  Assessment: Patient progressing toward established goals.  Activity Tolerance:  Patient tolerance of  treatment: good.       Equipment Recommendations:Equipment Needed: No  Discharge Recommendations: Continue

## 2024-04-10 NOTE — PROGRESS NOTES
Called pt and left message informing of normal results below. Letter sent.   [facility]  OCCUPATIONAL THERAPY MISSED TREATMENT NOTE  STRZ MED SURG 8AB  8B-24/024-A      Date: 4/10/2024  Patient Name: Sherwin Phelps        CSN: [id number]   : 1935  (88 y.o.)  Gender: male   Referring Practitioner: Jose Angel Ramírez MD  Diagnosis: acute blood loss anemia         REASON FOR MISSED TREATMENT:  1st attempt upon arrival patient returning from bathroom with nurse tech present and had slight unresponsive episode. 2nd attempt patient working with PT. Will check back as time allows .

## 2024-04-11 LAB
GLUCOSE BLD STRIP.AUTO-MCNC: 132 MG/DL (ref 70–108)
GLUCOSE BLD STRIP.AUTO-MCNC: 246 MG/DL (ref 70–108)
GLUCOSE BLD STRIP.AUTO-MCNC: 259 MG/DL (ref 70–108)
GLUCOSE BLD STRIP.AUTO-MCNC: 311 MG/DL (ref 70–108)

## 2024-04-11 PROCEDURE — 2580000003 HC RX 258: Performed by: PHYSICIAN ASSISTANT

## 2024-04-11 PROCEDURE — 97530 THERAPEUTIC ACTIVITIES: CPT

## 2024-04-11 PROCEDURE — 97535 SELF CARE MNGMENT TRAINING: CPT

## 2024-04-11 PROCEDURE — 6370000000 HC RX 637 (ALT 250 FOR IP)

## 2024-04-11 PROCEDURE — 6370000000 HC RX 637 (ALT 250 FOR IP): Performed by: NURSE PRACTITIONER

## 2024-04-11 PROCEDURE — 97116 GAIT TRAINING THERAPY: CPT

## 2024-04-11 PROCEDURE — 1200000003 HC TELEMETRY R&B

## 2024-04-11 PROCEDURE — 97110 THERAPEUTIC EXERCISES: CPT

## 2024-04-11 PROCEDURE — 6370000000 HC RX 637 (ALT 250 FOR IP): Performed by: PHYSICIAN ASSISTANT

## 2024-04-11 PROCEDURE — 1200000000 HC SEMI PRIVATE

## 2024-04-11 PROCEDURE — 82948 REAGENT STRIP/BLOOD GLUCOSE: CPT

## 2024-04-11 PROCEDURE — 99232 SBSQ HOSP IP/OBS MODERATE 35: CPT | Performed by: INTERNAL MEDICINE

## 2024-04-11 RX ADMIN — ATORVASTATIN CALCIUM 20 MG: 20 TABLET, FILM COATED ORAL at 08:07

## 2024-04-11 RX ADMIN — DOCUSATE SODIUM 100 MG: 50 LIQUID ORAL at 08:07

## 2024-04-11 RX ADMIN — PANTOPRAZOLE SODIUM 40 MG: 40 TABLET, DELAYED RELEASE ORAL at 17:14

## 2024-04-11 RX ADMIN — DONEPEZIL HYDROCHLORIDE 10 MG: 10 TABLET, FILM COATED ORAL at 08:08

## 2024-04-11 RX ADMIN — SODIUM CHLORIDE, PRESERVATIVE FREE 10 ML: 5 INJECTION INTRAVENOUS at 20:48

## 2024-04-11 RX ADMIN — APIXABAN 5 MG: 5 TABLET, FILM COATED ORAL at 08:07

## 2024-04-11 RX ADMIN — INSULIN LISPRO 1 UNITS: 100 INJECTION, SOLUTION INTRAVENOUS; SUBCUTANEOUS at 17:14

## 2024-04-11 RX ADMIN — METOPROLOL TARTRATE 25 MG: 25 TABLET, FILM COATED ORAL at 20:48

## 2024-04-11 RX ADMIN — METOPROLOL TARTRATE 25 MG: 25 TABLET, FILM COATED ORAL at 08:07

## 2024-04-11 RX ADMIN — INSULIN LISPRO 2 UNITS: 100 INJECTION, SOLUTION INTRAVENOUS; SUBCUTANEOUS at 20:47

## 2024-04-11 RX ADMIN — APIXABAN 5 MG: 5 TABLET, FILM COATED ORAL at 20:48

## 2024-04-11 RX ADMIN — PANTOPRAZOLE SODIUM 40 MG: 40 TABLET, DELAYED RELEASE ORAL at 05:43

## 2024-04-11 RX ADMIN — QUETIAPINE FUMARATE 25 MG: 25 TABLET ORAL at 20:48

## 2024-04-11 RX ADMIN — ASPIRIN 81 MG: 81 TABLET, COATED ORAL at 08:07

## 2024-04-11 RX ADMIN — INSULIN LISPRO 3 UNITS: 100 INJECTION, SOLUTION INTRAVENOUS; SUBCUTANEOUS at 13:25

## 2024-04-11 RX ADMIN — Medication 6 MG: at 20:48

## 2024-04-11 ASSESSMENT — PAIN SCALES - GENERAL
PAINLEVEL_OUTOF10: 0
PAINLEVEL_OUTOF10: 0

## 2024-04-11 NOTE — CARE COORDINATION
4/11/24, 2:36 PM EDT    DISCHARGE ON GOING EVALUATION    [facility] day: 3  Location: [facility]24/024-A Reason for admit: Acute blood loss anemia [D62]  Chest pain, unspecified type [R07.9]  Acute anemia [D64.9]     Procedures:  4/3 EGD:  Gastritis present. Multiple gastric polyps: 2 larger polyps on stalks with eroded surfaces (removed with hot snare) which could have bleed with anti coagulation.     Imaging since last note: N/A    Barriers to Discharge: Medically ready for discharge. Await precert.     PCP: Diego Silva Jr., DO  Readmission Risk Score: 13.8%    Patient Goals/Plan/Treatment Preferences: SW following. Awaiting precert for admission to [facility].

## 2024-04-11 NOTE — PROGRESS NOTES
nursing flowsheet    COGNITION: Decreased Insight, Decreased Problem Solving, Decreased Safety Awareness, and Difficulty Following Commands    ADL:   Lower Extremity Dressing: Contact Guard Assistance, X 1, with verbal cues , and with increased time for completion.  With standing for management to rhiannon brief. Minimal assistance to unthread B LE from brief  Toileting: Moderate Assistance.  Clothing management with encouragement to complete.  Patient attempts to descend to toilet prior to brief completely down requiring maximal verbal cues to continue task.  Toilet Transfer: Minimal Assistance, X 1, with verbal cues , and with increased time for completion. Standard toilet utilizing GB .    BALANCE:  Sitting Balance:  Stand By Assistance.    Standing Balance: Minimal Assistance, X 1, with cues for safety, with verbal cues , with increased time for completion.      BED MOBILITY:  Supine to Sit: Stand By Assistance with increased time and bedrails    TRANSFERS:  Sit to Stand:  Minimal Assistance, X 1, with increased time for completion, cues for hand placement, with verbal cues.    Stand to Sit: Contact Guard Assistance, X 1, with increased time for completion, cues for hand placement, with verbal cues, to/from chair with arms.      FUNCTIONAL MOBILITY:  Assistive Device: Rolling Walker  Assist Level:  Contact Guard Assistance.   Distance: To and from bathroom  Slow pace, no LOB     ADDITIONAL ACTIVITIES:  Patient completed BUE strengthening exercises with skilled education on HEP: completed x10 reps x1 set with a 1lb hand weight in all joints and all planes in order to improve UE strength and activity tolerance required for BADL routine and toilet / shower transfers. Patient tolerated good, requiring minimal rest breaks. Patient also required minimal verbal/visual cues for technique.      AM-PAC Inpatient Daily Activity Raw Score: 16  AM-PAC Inpatient ADL T-Scale Score : 35.96  ADL Inpatient CMS 0-100% Score: 53.32

## 2024-04-11 NOTE — PROGRESS NOTES
[facility]  INPATIENT PHYSICAL THERAPY  DAILY NOTE  STRZ MED SURG 8AB - 8B-24/024-A    Time In: 1436  Time Out: 1504  Timed Code Treatment Minutes: 28 Minutes  Minutes: 28          Date: 2024  Patient Name: Sherwin Phelps,  Gender:  male        MRN: [id number]  : 1935  (88 y.o.)     Referring Practitioner: Jose Angel Ramírez MD  Diagnosis: acute blood loss anemia  Additional Pertinent Hx: Per H&P :Patient presents emergency department with left-sided chest pain rating to his left shoulder and left neck that started shortly before arrival.  Patient reports he believed he was having a heart attack.  Patient denies any shortness of breath associated with the pain.  Patient was recently diagnosed with pulmonary embolism and has been taking apixaban outpatient.  Upon evaluation the emergency department there are no EKG changes, no delta change in the troponin, and no respiratory distress.  However the patient is found to have a hemoglobin of 6 which is a new finding for the patient.  Patient is also found to have a positive Hemoccult.  Patient will be admitted for further evaluation of presumed GI bleed and continue treatment for pulmonary embolism with heparin drip until blood loss anemia workup is complete. Pt presents with dementia, anemia, and hx of PE. Pt s/p ESOPHAGOGASTRODUODENOSCOPY POLYP SNARE 4/3 with GI bleed. Currently anticoagulation is on hold due to GI bleed, RN states pt okay for mobility despite this     Prior Level of Function:  Lives With: Family (wife & daughter)  Type of Home: Apartment  Home Layout: One level  Home Access: Level entry  Home Equipment: Walker, 4 wheeled, Wheelchair-manual   Bathroom Shower/Tub: Tub/Shower unit  Bathroom Equipment: Grab bars in shower, Tub transfer bench    ADL Assistance: Needs assistance  Ambulation Assistance: Needs assistance  Transfer Assistance: Needs assistance  Active : No  Additional Comments: Pt reports needing A since sx

## 2024-04-11 NOTE — PROGRESS NOTES
on 04/05/24: Stable  Prescribed glimepiride 2 mg daily / pioglitazone 30 mg daily / metformin 1500 mg twice daily.  DM Nephropathy Screening: mAlb:Cr not performed previously  DM Retinopathy Screening: Unable to find documentation  DM Neuropathy Screening: Bilateral LE sensation intact to pinprick  Plan:  -Hold ALL Non-insulin diabetic agents  -Low Dose SSI Algorithm  -Will consider initiation of basal based on insulin requirement  -Hypoglycemia protocol  -POCT glucose QID while NPO then qAC/HS when eating  -Diabetes education, diet, and exercise  -Recommend yearly optometry appointments for retinopathy screening  -Recommend yearly mAlb:sCr for nephropathy screening when patient is stable  -Recommend yearly foot exams    #DVT & PE: Stable. Pt had CTA Chest before 02/2024 at [facility] demonstrating b/l PE and had vascular study showing R CFA / DFA / PA / PV DVTs and was started on Eliquis. AC as above  #Alzheimer's Dementia: Stable. Continue donepezil.   #Insomnia: Stable. Continue quetiapine.   #Non-obstructive CAD: Stable. [facility] 06/19/20: 50-60% stenosis of LAD. Continue simvastatin. ASA as above.   #Mild-Moderate AS: Stable. TTE 06/15/20 demonstrated FRANK 1.47 cm^2 / mgrad 15 mmHg / pvel 2.6 m/s. Noted.   #Mild MR: Stable. Noted on TTE 06/15/20.   #CVA: Stable. NCCT Head 02/08/24 demonstrated old R temporoparietal infarct. ASA / Statin as above  #YOLY on CPAP: Stable. Continue CPAP.   #HTN: Stable. Continue metoprolol tartrate.   #HLD: Stable. Continue statin.   #VDD: Stable. Continue supplementation.     Dispo: home w/ wife; has universal HH, needs new orders for RN, PT, OT, aid. Has DME.     Fluids: Encourage PO fluid intake  Electrolyte: Replete as needed   Nutrition: ADULT DIET; Regular; Low Fat (less than or equal to 50 gm/day)  GI: PPI    Lines/Tubes:      Peripheral IV 04/01/24 Right Antecubital (Active)   Site Assessment Clean, dry & intact 04/02/24 0830   Line Status Infusing 04/02/24 0830   Line Care Connections  Summary (Last 24 hours) at 4/11/2024 0714  Last data filed at 4/11/2024 0441  Gross per 24 hour   Intake 470 ml   Output --   Net 470 ml         Outpatient Medications:     Scheduled Meds:   insulin lispro  0-4 Units SubCUTAneous 4x Daily AC & HS    pantoprazole  40 mg Oral BID AC    docusate  100 mg Oral Daily    aspirin  81 mg Oral Daily    donepezil  10 mg Oral QAM    apixaban  5 mg Oral BID    [Held by provider] glipiZIDE  5 mg Oral QAM AC    metoprolol tartrate  25 mg Oral BID    [Held by provider] pioglitazone  30 mg Oral Daily    QUEtiapine  25 mg Oral Nightly    atorvastatin  20 mg Oral Daily    sodium chloride flush  5-40 mL IntraVENous 2 times per day     Continuous Infusions:   dextrose      sodium chloride       PRN Meds:melatonin, glucose, dextrose bolus **OR** dextrose bolus, glucagon (rDNA), dextrose, sodium chloride flush, sodium chloride, potassium chloride **OR** potassium alternative oral replacement **OR** potassium chloride, magnesium sulfate, ondansetron **OR** ondansetron, polyethylene glycol, acetaminophen **OR** acetaminophen    Physical Exam:     Physical Exam  Constitutional:       Appearance: Normal appearance. He is obese. He is not diaphoretic.   HENT:      Head: Normocephalic and atraumatic.      Mouth/Throat:      Mouth: Mucous membranes are moist.      Pharynx: Oropharynx is clear. No oropharyngeal exudate or posterior oropharyngeal erythema.   Eyes:      General: No scleral icterus.     Extraocular Movements: Extraocular movements intact.      Pupils: Pupils are equal, round, and reactive to light.   Cardiovascular:      Rate and Rhythm: Normal rate and regular rhythm.      Pulses: Normal pulses.      Heart sounds: Murmur heard.      Systolic murmur is present with a grade of 1/6.      No friction rub. No gallop.   Pulmonary:      Effort: Pulmonary effort is normal.      Breath sounds: Normal breath sounds. No wheezing, rhonchi or rales.   Abdominal:      General: Bowel sounds are

## 2024-04-11 NOTE — PLAN OF CARE
Problem: Discharge Planning  Goal: Discharge to home or other facility with appropriate resources  Outcome: Progressing     Problem: Safety - Adult  Goal: Free from fall injury  Outcome: Progressing     Problem: ABCDS Injury Assessment  Goal: Absence of physical injury  Outcome: Progressing     Problem: Chronic Conditions and Co-morbidities  Goal: Patient's chronic conditions and co-morbidity symptoms are monitored and maintained or improved  Outcome: Progressing     Problem: Skin/Tissue Integrity  Goal: Absence of new skin breakdown  Description: 1.  Monitor for areas of redness and/or skin breakdown  2.  Assess vascular access sites hourly  3.  Every 4-6 hours minimum:  Change oxygen saturation probe site  4.  Every 4-6 hours:  If on nasal continuous positive airway pressure, respiratory therapy assess nares and determine need for appliance change or resting period.  Outcome: Progressing     Problem: Pain  Goal: Verbalizes/displays adequate comfort level or baseline comfort level  Outcome: Progressing   Care plan reviewed with patient.  Patient verbalize understanding of the plan of care and contribute to goal setting.

## 2024-04-12 LAB
ANION GAP SERPL CALC-SCNC: 16 MEQ/L (ref 8–16)
BUN SERPL-MCNC: 31 MG/DL (ref 7–22)
CALCIUM SERPL-MCNC: 9.1 MG/DL (ref 8.5–10.5)
CHLORIDE SERPL-SCNC: 99 MEQ/L (ref 98–111)
CO2 SERPL-SCNC: 23 MEQ/L (ref 23–33)
CREAT SERPL-MCNC: 1 MG/DL (ref 0.4–1.2)
DEPRECATED RDW RBC AUTO: 52.3 FL (ref 35–45)
ERYTHROCYTE [DISTWIDTH] IN BLOOD BY AUTOMATED COUNT: 15.1 % (ref 11.5–14.5)
GFR SERPL CREATININE-BSD FRML MDRD: 73 ML/MIN/1.73M2
GLUCOSE BLD STRIP.AUTO-MCNC: 155 MG/DL (ref 70–108)
GLUCOSE BLD STRIP.AUTO-MCNC: 156 MG/DL (ref 70–108)
GLUCOSE BLD STRIP.AUTO-MCNC: 166 MG/DL (ref 70–108)
GLUCOSE BLD STRIP.AUTO-MCNC: 166 MG/DL (ref 70–108)
GLUCOSE BLD STRIP.AUTO-MCNC: 175 MG/DL (ref 70–108)
GLUCOSE BLD STRIP.AUTO-MCNC: 257 MG/DL (ref 70–108)
GLUCOSE SERPL-MCNC: 207 MG/DL (ref 70–108)
HCT VFR BLD AUTO: 22.9 % (ref 42–52)
HCT VFR BLD AUTO: 25 % (ref 42–52)
HGB BLD-MCNC: 6.9 GM/DL (ref 14–18)
HGB BLD-MCNC: 7.5 GM/DL (ref 14–18)
MAGNESIUM SERPL-MCNC: 2.3 MG/DL (ref 1.6–2.4)
MCH RBC QN AUTO: 28.6 PG (ref 26–33)
MCHC RBC AUTO-ENTMCNC: 30 GM/DL (ref 32.2–35.5)
MCV RBC AUTO: 95.4 FL (ref 80–94)
PLATELET # BLD AUTO: 500 THOU/MM3 (ref 130–400)
PMV BLD AUTO: 9.9 FL (ref 9.4–12.4)
POTASSIUM SERPL-SCNC: 4.9 MEQ/L (ref 3.5–5.2)
POTASSIUM SERPL-SCNC: 5.3 MEQ/L (ref 3.5–5.2)
RBC # BLD AUTO: 2.62 MILL/MM3 (ref 4.7–6.1)
SODIUM SERPL-SCNC: 138 MEQ/L (ref 135–145)
WBC # BLD AUTO: 11.1 THOU/MM3 (ref 4.8–10.8)

## 2024-04-12 PROCEDURE — 6370000000 HC RX 637 (ALT 250 FOR IP): Performed by: PHYSICIAN ASSISTANT

## 2024-04-12 PROCEDURE — 85027 COMPLETE CBC AUTOMATED: CPT

## 2024-04-12 PROCEDURE — 6370000000 HC RX 637 (ALT 250 FOR IP): Performed by: NURSE PRACTITIONER

## 2024-04-12 PROCEDURE — 83735 ASSAY OF MAGNESIUM: CPT

## 2024-04-12 PROCEDURE — 1200000003 HC TELEMETRY R&B

## 2024-04-12 PROCEDURE — 85018 HEMOGLOBIN: CPT

## 2024-04-12 PROCEDURE — 93005 ELECTROCARDIOGRAM TRACING: CPT | Performed by: INTERNAL MEDICINE

## 2024-04-12 PROCEDURE — 6370000000 HC RX 637 (ALT 250 FOR IP)

## 2024-04-12 PROCEDURE — 99233 SBSQ HOSP IP/OBS HIGH 50: CPT | Performed by: INTERNAL MEDICINE

## 2024-04-12 PROCEDURE — 82948 REAGENT STRIP/BLOOD GLUCOSE: CPT

## 2024-04-12 PROCEDURE — 85014 HEMATOCRIT: CPT

## 2024-04-12 PROCEDURE — 84132 ASSAY OF SERUM POTASSIUM: CPT

## 2024-04-12 PROCEDURE — 80048 BASIC METABOLIC PNL TOTAL CA: CPT

## 2024-04-12 PROCEDURE — 2580000003 HC RX 258: Performed by: PHYSICIAN ASSISTANT

## 2024-04-12 PROCEDURE — 36415 COLL VENOUS BLD VENIPUNCTURE: CPT

## 2024-04-12 RX ORDER — INSULIN GLARGINE 100 [IU]/ML
5 INJECTION, SOLUTION SUBCUTANEOUS EVERY MORNING
Status: DISCONTINUED | OUTPATIENT
Start: 2024-04-12 | End: 2024-04-14

## 2024-04-12 RX ORDER — SODIUM CHLORIDE 9 MG/ML
INJECTION, SOLUTION INTRAVENOUS PRN
Status: DISCONTINUED | OUTPATIENT
Start: 2024-04-12 | End: 2024-04-15

## 2024-04-12 RX ADMIN — INSULIN LISPRO 2 UNITS: 100 INJECTION, SOLUTION INTRAVENOUS; SUBCUTANEOUS at 16:43

## 2024-04-12 RX ADMIN — QUETIAPINE FUMARATE 25 MG: 25 TABLET ORAL at 21:10

## 2024-04-12 RX ADMIN — Medication 6 MG: at 20:17

## 2024-04-12 RX ADMIN — APIXABAN 5 MG: 5 TABLET, FILM COATED ORAL at 07:34

## 2024-04-12 RX ADMIN — ASPIRIN 81 MG: 81 TABLET, COATED ORAL at 07:33

## 2024-04-12 RX ADMIN — INSULIN GLARGINE 5 UNITS: 100 INJECTION, SOLUTION SUBCUTANEOUS at 09:12

## 2024-04-12 RX ADMIN — METOPROLOL TARTRATE 25 MG: 25 TABLET, FILM COATED ORAL at 07:34

## 2024-04-12 RX ADMIN — ATORVASTATIN CALCIUM 20 MG: 20 TABLET, FILM COATED ORAL at 07:33

## 2024-04-12 RX ADMIN — DONEPEZIL HYDROCHLORIDE 10 MG: 10 TABLET, FILM COATED ORAL at 07:33

## 2024-04-12 RX ADMIN — SODIUM CHLORIDE, PRESERVATIVE FREE 10 ML: 5 INJECTION INTRAVENOUS at 20:17

## 2024-04-12 RX ADMIN — PANTOPRAZOLE SODIUM 40 MG: 40 TABLET, DELAYED RELEASE ORAL at 05:29

## 2024-04-12 RX ADMIN — PANTOPRAZOLE SODIUM 40 MG: 40 TABLET, DELAYED RELEASE ORAL at 16:43

## 2024-04-12 RX ADMIN — METOPROLOL TARTRATE 12.5 MG: 25 TABLET, FILM COATED ORAL at 20:16

## 2024-04-12 RX ADMIN — APIXABAN 5 MG: 5 TABLET, FILM COATED ORAL at 20:16

## 2024-04-12 ASSESSMENT — PAIN SCALES - GENERAL: PAINLEVEL_OUTOF10: 0

## 2024-04-12 NOTE — CARE COORDINATION
4/12/24, 10:42 AM EDT    DISCHARGE ON GOING EVALUATION    [facility] day: 4  Location: [facility]24/024-A Reason for admit: Acute blood loss anemia [D62]  Chest pain, unspecified type [R07.9]  Acute anemia [D64.9]     Procedures: 4/3 EGD:  Gastritis present. Multiple gastric polyps: 2 larger polyps on stalks with eroded surfaces (removed with hot snare) which could have bleed with anti coagulation.     Imaging since last note: No    Barriers to Discharge: Medically ready for discharge. Await precert for SNF.    PCP: Diego Silva Jr., DO  Readmission Risk Score: 10.9%    Patient Goals/Plan/Treatment Preferences: SW following for placement. Await return of precert.

## 2024-04-12 NOTE — CARE COORDINATION
4/12/24, 12:08 PM EDT    DISCHARGE PLANNING EVALUATION    Spoke with granddaughter Ilda regarding precert.  [facility] at [facility] has been sending updates to insurance. Ilda will transport at discharge, she would like called to arrange transport time with precert is approved.     1:37 PM  Received call from Zina at [facility] [employer], insurance intends to deny precert, has offered peer to peer.  Call need made by 4:30 today.  Call [phone], option 4.  Reference # [id number].  Dr Jose Angel Ramírez notified.    Spoke with granddaughter Ilda, informed of peer to peer.

## 2024-04-12 NOTE — PROGRESS NOTES
Pt is an 88y.o. male, on bipap in 8B-24.  visited due to a Rapid Response alert called, providing silent prayer in hallway for pt and careteam. Situation was brief and settled down shortly.     04/12/24 1507   Encounter Summary   Encounter Overview/Reason  Crisis   Service Provided For: Patient not available   Referral/Consult From: Nursing Supervisor/Manager   Support System Spouse;Family members   Last Encounter  04/12/24   Complexity of Encounter Low   Begin Time 0926   End Time  0928   Total Time Calculated 2 min   Crisis   Type Rapid Response   Assessment/Intervention/Outcome   Assessment Unable to assess   Intervention Prayer (assurance of)/[place]   Outcome Did not respond

## 2024-04-12 NOTE — PROCEDURES
EKG completed in rapid response   
The procedure was terminated.  The patient tolerated the procedure well, taken to GI lab recovery area in stable condition.    IMPRESSION:    1. Distal esophagitis.  2. Gastritis and gastric polyps.  As described, the 2 larger ones on stalks were removed.  Pictures are pending.    PLAN:    1. Use Protonix b.i.d.  2. Resume diet.  3. Estimated blood loss less than 10 mL.  4. Await pathology.  5. His wife reports, he had a colonoscopy approximately a year ago.  We will try and hold off repeating this at this time.  6. Maybe, able to resume Eliquis tomorrow.          EMILY JUNG MD      D:  04/03/2024 15:56:05     T:  04/03/2024 20:14:15     EDILSONS/LUCRECAI  Job #:  [id number]     Doc#:  [id number]    CC:   Patricia Siddiqui MD

## 2024-04-12 NOTE — PROGRESS NOTES
@ 9:10 writer help set up patient breakfast tray.  Rapid Respond was  called @ 9:15 due to changes in mental status.  Patient was unresponsive, not responding to verbal commands. Blood sugar 175, B/p 80/60 HR 70.    Vitals @ 9:22 139/73, able to follow commands,  patient is back in bed. Telemetry re-start. Will continue to monitor.

## 2024-04-12 NOTE — PROGRESS NOTES
Pt was in bed and alone at the time of the visit. She was dealing with acute blood loss anemia. She was encouraged and blessed.    04/12/24 1712   Encounter Summary   Service Provided For: Patient   Referral/Consult From: [facility]   Support System Family members   Last Encounter  04/12/24   Complexity of Encounter Low   Begin Time 1612   End Time  1618   Total Time Calculated 6 min   Spiritual/Emotional needs   Type Spiritual Support   Assessment/Intervention/Outcome   Assessment Calm   Intervention Empowerment

## 2024-04-12 NOTE — PROGRESS NOTES
Medicine Progress Note    Patient:  Sherwin Phelps    Unit/Bed:8B-24/024-A  YOB: 1935  MRN: [id number]   Acct: [id number]   PCP: Diego Silva Jr.,   Date of Admission: 4/1/2024    Hospital Course     Briefly, pt Sherwin Phelps is a 88 y.o. male with a PMH of Alzheimer's Dementia / CVA / DVT&PE / NIRDMT2 who presented with GIB. EGD 04/03/24: distal esophagitis / gastric polyps x2 (large) removed. 04/05/24 Working on obtaining records for colonoscopy. No further episodes of bleeding. Working on placement    Assessment / Plan     #Non-Variceal Lower vs Upper GIB: Resolved  Bleeding source: Unknown. No prior h/o EGD/Colonoscopy in EMR however pt's wife states he had a colonoscopy w/i the last year. 1 unit pRBC 04/01/24 - Hgb has been steady b/t 7-8 g/dL. EGD 04/03/24: distal esophagitis / gastric polyps x2 (large) removed. Pathology: microscopic examination demonstrates ulcerated and inflamed hyperplastic gastric polyps. There is no evidence of dysplasia or malignancy. Pt's precipitous drop over the course of 2 months from 15 g/dL --> 6.0 g/dL is not well-accounted for by findings on EGD. He will likely need further evaluation outpatient.   Plan:  -GI Consulted for GIB; appreciate recs    +1 month f/u w/ Missy Spencer APRN    +Signed off 04/05/24  -PO Pantoprazole 40 mg BID   -H&H PRN  -Type and Screen; Prepare 2U pRBC  -Transfuse if Hgb <7.0 mg/dL and/or s/s of acute anemia    #Acute on Subacute Macrocytic Anemia 2/2 ABLA: Stable  Hgb 6.0 g/dL on baseline 15 g/dL.  fL. Anemia workup performed  04/02/24  demonstrated Reticulocyte Index: 1.09, hypoproliferative, Serum iron levels: 40 ug/dL, Serum ferritin: 26 ng/mL, Serum iron saturation: 10%, Serum IBC: 411 ug/dL, Serum soluble transferrin receptor: not obtained, B12: 506 pg/mL, and Folate: 12.1 ng/mL. Consistent with CHAPIN from ABLA.  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  DM Nephropathy Screening: mAlb:Cr not performed previously  DM Retinopathy Screening: Unable to find documentation  DM Neuropathy Screening: Bilateral LE sensation intact to pinprick  Plan:  -Hold ALL Non-insulin diabetic agents  -Low Dose SSI Algorithm  -Insulin lantus 5 units every morning  -Hypoglycemia protocol  -POCT glucose QID while NPO then qAC/HS when eating  -Diabetes education, diet, and exercise  -Recommend yearly optometry appointments for retinopathy screening  -Recommend yearly mAlb:sCr for nephropathy screening when patient is stable  -Recommend yearly foot exams    #DVT & PE: Stable. Pt had CTA Chest before 02/2024 at [facility] demonstrating b/l PE and had vascular study showing R CFA / DFA / PA / PV DVTs and was started on Eliquis. AC as above  #Alzheimer's Dementia: Stable. Continue donepezil.   #Insomnia: Stable. Continue quetiapine.   #Non-obstructive CAD: Stable. C 06/19/20: 50-60% stenosis of LAD. Continue simvastatin. ASA as above.   #Mild-Moderate AS: Stable. TTE 06/15/20 demonstrated FRANK 1.47 cm^2 / mgrad 15 mmHg / pvel 2.6 m/s. Noted.   #Mild MR: Stable. Noted on TTE 06/15/20.   #CVA: Stable. NCCT Head 02/08/24 demonstrated old R temporoparietal infarct. ASA / Statin as above  #YOLY on CPAP: Stable. Continue CPAP.   #HTN: Stable. Continue metoprolol tartrate.   #HLD: Stable. Continue statin.   #VDD: Stable. Continue supplementation.     Dispo: home w/ wife; has universal HH, needs new orders for RN, PT, OT, aid. Has DME.     Fluids: Encourage PO fluid intake  Electrolyte: Replete as needed   Nutrition: ADULT DIET; Regular; Low Fat (less than or equal to 50 gm/day)  GI: PPI    Lines/Tubes:      Peripheral IV 04/01/24 Right Antecubital (Active)   Site Assessment Clean, dry & intact 04/02/24 0830   Line Status Infusing 04/02/24 0830   Line Care Connections checked and tightened

## 2024-04-13 LAB
ABO: NORMAL
ANTIBODY SCREEN: NORMAL
GLUCOSE BLD STRIP.AUTO-MCNC: 124 MG/DL (ref 70–108)
GLUCOSE BLD STRIP.AUTO-MCNC: 164 MG/DL (ref 70–108)
GLUCOSE BLD STRIP.AUTO-MCNC: 175 MG/DL (ref 70–108)
GLUCOSE BLD STRIP.AUTO-MCNC: 229 MG/DL (ref 70–108)
HCT VFR BLD AUTO: 26.6 % (ref 42–52)
HGB BLD-MCNC: 8.2 GM/DL (ref 14–18)
RH FACTOR: NORMAL

## 2024-04-13 PROCEDURE — 1200000003 HC TELEMETRY R&B

## 2024-04-13 PROCEDURE — 6370000000 HC RX 637 (ALT 250 FOR IP): Performed by: NURSE PRACTITIONER

## 2024-04-13 PROCEDURE — 86900 BLOOD TYPING SEROLOGIC ABO: CPT

## 2024-04-13 PROCEDURE — 99232 SBSQ HOSP IP/OBS MODERATE 35: CPT | Performed by: INTERNAL MEDICINE

## 2024-04-13 PROCEDURE — 6370000000 HC RX 637 (ALT 250 FOR IP): Performed by: PHYSICIAN ASSISTANT

## 2024-04-13 PROCEDURE — 36430 TRANSFUSION BLD/BLD COMPNT: CPT

## 2024-04-13 PROCEDURE — 36415 COLL VENOUS BLD VENIPUNCTURE: CPT

## 2024-04-13 PROCEDURE — 86850 RBC ANTIBODY SCREEN: CPT

## 2024-04-13 PROCEDURE — 86901 BLOOD TYPING SEROLOGIC RH(D): CPT

## 2024-04-13 PROCEDURE — 85018 HEMOGLOBIN: CPT

## 2024-04-13 PROCEDURE — 86923 COMPATIBILITY TEST ELECTRIC: CPT

## 2024-04-13 PROCEDURE — 93010 ELECTROCARDIOGRAM REPORT: CPT | Performed by: NUCLEAR MEDICINE

## 2024-04-13 PROCEDURE — 2580000003 HC RX 258: Performed by: PHYSICIAN ASSISTANT

## 2024-04-13 PROCEDURE — 85014 HEMATOCRIT: CPT

## 2024-04-13 PROCEDURE — 82948 REAGENT STRIP/BLOOD GLUCOSE: CPT

## 2024-04-13 PROCEDURE — 6370000000 HC RX 637 (ALT 250 FOR IP)

## 2024-04-13 PROCEDURE — P9016 RBC LEUKOCYTES REDUCED: HCPCS

## 2024-04-13 RX ADMIN — PANTOPRAZOLE SODIUM 40 MG: 40 TABLET, DELAYED RELEASE ORAL at 16:06

## 2024-04-13 RX ADMIN — INSULIN LISPRO 1 UNITS: 100 INJECTION, SOLUTION INTRAVENOUS; SUBCUTANEOUS at 12:25

## 2024-04-13 RX ADMIN — DOCUSATE SODIUM 100 MG: 50 LIQUID ORAL at 08:14

## 2024-04-13 RX ADMIN — METOPROLOL TARTRATE 12.5 MG: 25 TABLET, FILM COATED ORAL at 08:07

## 2024-04-13 RX ADMIN — APIXABAN 5 MG: 5 TABLET, FILM COATED ORAL at 20:37

## 2024-04-13 RX ADMIN — DONEPEZIL HYDROCHLORIDE 10 MG: 10 TABLET, FILM COATED ORAL at 08:17

## 2024-04-13 RX ADMIN — ASPIRIN 81 MG: 81 TABLET, COATED ORAL at 08:14

## 2024-04-13 RX ADMIN — QUETIAPINE FUMARATE 25 MG: 25 TABLET ORAL at 20:37

## 2024-04-13 RX ADMIN — Medication 6 MG: at 20:38

## 2024-04-13 RX ADMIN — ATORVASTATIN CALCIUM 20 MG: 20 TABLET, FILM COATED ORAL at 08:08

## 2024-04-13 RX ADMIN — INSULIN GLARGINE 5 UNITS: 100 INJECTION, SOLUTION SUBCUTANEOUS at 08:14

## 2024-04-13 RX ADMIN — SODIUM CHLORIDE, PRESERVATIVE FREE 10 ML: 5 INJECTION INTRAVENOUS at 08:08

## 2024-04-13 RX ADMIN — APIXABAN 5 MG: 5 TABLET, FILM COATED ORAL at 08:07

## 2024-04-13 RX ADMIN — PANTOPRAZOLE SODIUM 40 MG: 40 TABLET, DELAYED RELEASE ORAL at 05:36

## 2024-04-13 RX ADMIN — SODIUM CHLORIDE, PRESERVATIVE FREE 10 ML: 5 INJECTION INTRAVENOUS at 20:37

## 2024-04-13 ASSESSMENT — PAIN SCALES - GENERAL: PAINLEVEL_OUTOF10: 0

## 2024-04-13 NOTE — PROGRESS NOTES
Patient and granddaughter reported patient's red flip phone missing from room. Patient's wife states she does not have phone. Called ED and campus police.

## 2024-04-13 NOTE — SIGNIFICANT EVENT
Notified by RN that patient had episode of unresponsive last 5 minutes when he was ambulating to the bathroom.  Per RN patient had multiple episodes like this in the morning with rapid response had been called earlier today.  Patient was admitted for GI bleed.  Last hemoglobin 7.5.   -Repeat hemoglobin 6.9.  Discussed with patient about blood transfusion.  All risks and benefits have been answered.    - Patient giving consent for blood transfusion  -Transfuse 1 unit red blood cell

## 2024-04-13 NOTE — PROGRESS NOTES
Message sent to Dr. Ramirez:    [phone] From: [facility] [address] RE: HERNAN STEF Hi, Pt had another episode of unresponsiveness that last about 3-5 minutes ambulating to the bathroom. I checked his blood sugar which was 155, and he started to respond shortly after. They had called a rapid during day shift on him for the same thing, and per day shift nurse this has been the 5th time this has happened since his stay, and they have not did any workup. Just wanted to see if you wanted to order anything?

## 2024-04-13 NOTE — PROGRESS NOTES
Internal Medicine Progress Note    Patient:  hSerwin Phelps    Unit/Bed:8B-24/024-A  YOB: 1935  MRN: [id number]   Acct: [id number]   PCP: Diego Silva Jr.,   Date of Admission: 4/1/2024    Hospital Course     Briefly, pt Sherwin Phelps is a 88 y.o. male with a PMH of Alzheimer's Dementia / CVA / DVT&PE / NIRDMT2 who presented with GIB. EGD 04/03/24: distal esophagitis / gastric polyps x2 (large) removed. 04/05/24 Working on obtaining records for colonoscopy. No further episodes of bleeding. Working on placement.  Another episode of brief loss of consciousness.  Blood transfusion was given because of low H&H patient denies any issues today is eating lunch on my arrival    Assessment / Plan     #Non-Variceal Lower vs Upper GIB: Resolved  Bleeding source: Unknown. No prior h/o EGD/Colonoscopy in EMR however pt's wife states he had a colonoscopy w/i the last year. 1 unit pRBC 04/01/24 - Hgb has been steady b/t 7-8 g/dL. EGD 04/03/24: distal esophagitis / gastric polyps x2 (large) removed. Pathology: microscopic examination demonstrates ulcerated and inflamed hyperplastic gastric polyps. There is no evidence of dysplasia or malignancy. Pt's precipitous drop over the course of 2 months from 15 g/dL --> 6.0 g/dL is not well-accounted for by findings on EGD. He will likely need further evaluation outpatient.   Plan:  -GI Consulted for GIB; appreciate recs    +1 month f/u w/ Missy BRISENO    +Signed off 04/05/24  -PO Pantoprazole 40 mg BID   -H&H PRN  -Type and Screen; Prepare 2U pRBC  -Transfuse if Hgb <7.0 mg/dL and/or s/s of acute anemia    #Acute on Subacute Macrocytic Anemia 2/2 ABLA: Stable  Hgb 6.0 g/dL on baseline 15 g/dL.  fL. Anemia workup performed  04/02/24  demonstrated Reticulocyte Index: 1.09, hypoproliferative, Serum iron levels: 40 ug/dL, Serum ferritin: 26 ng/mL, Serum iron saturation: 10%, Serum IBC: 411 ug/dL, Serum soluble transferrin receptor: not obtained, B12: 506  pg/mL, and Folate: 12.1 ng/mL. Consistent with CHAPIN from ABLA.  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  All the above medications are on hold  DM Nephropathy Screening: mAlb:Cr not performed previously  DM Retinopathy Screening: Unable to find documentation  DM Neuropathy Screening: Bilateral LE sensation intact to pinprick    Plan:    -Hold ALL Non-insulin diabetic agents  -Low Dose SSI Algorithm  -Insulin lantus 5 units every morning  -Hypoglycemia protocol  -POCT glucose QID while NPO then qAC/HS when eating  -Diabetes education, diet, and exercise  -Recommend yearly optometry appointments for retinopathy screening  -Recommend yearly mAlb:sCr for nephropathy screening when patient is stable      #DVT & PE: Stable. Pt had CTA Chest before 02/2024 at [facility] demonstrating b/l PE and had vascular study showing R CFA / DFA / PA / PV DVTs and was started on Eliquis. AC as above  #Alzheimer's Dementia: Stable. Continue donepezil.     #Insomnia: Stable. Continue quetiapine.   #Non-obstructive CAD: Stable. C 06/19/20: 50-60% stenosis of LAD. Continue simvastatin. ASA as above.   #Mild-Moderate AS: Stable. TTE 06/15/20 demonstrated FRANK 1.47 cm^2 / mgrad 15 mmHg / pvel 2.6 m/s. Noted.   #Mild MR: Stable. Noted on TTE 06/15/20.   #CVA: Stable. NCCT Head 02/08/24 demonstrated old R temporoparietal infarct. ASA / Statin as above  #YOLY on CPAP: Stable. Continue CPAP.   #HTN: Stable. Continue metoprolol tartrate.   #HLD: Stable. Continue statin.   #VDD: Stable. Continue supplementation.     Blood sugars have been 229, 124, 155, 165 and 250.  H&H has been 8.2/26.6    Dispo: To nursing home precertification pending    Fluids: Encourage PO fluid intake  Electrolyte: Replete as needed   Nutrition: ADULT DIET; Regular; Low Fat (less than or equal to 50

## 2024-04-14 LAB
DEPRECATED RDW RBC AUTO: 53.8 FL (ref 35–45)
ERYTHROCYTE [DISTWIDTH] IN BLOOD BY AUTOMATED COUNT: 15.6 % (ref 11.5–14.5)
GLUCOSE BLD STRIP.AUTO-MCNC: 147 MG/DL (ref 70–108)
GLUCOSE BLD STRIP.AUTO-MCNC: 168 MG/DL (ref 70–108)
GLUCOSE BLD STRIP.AUTO-MCNC: 205 MG/DL (ref 70–108)
GLUCOSE BLD STRIP.AUTO-MCNC: 209 MG/DL (ref 70–108)
HCT VFR BLD AUTO: 27.3 % (ref 42–52)
HGB BLD-MCNC: 8.3 GM/DL (ref 14–18)
MCH RBC QN AUTO: 28.9 PG (ref 26–33)
MCHC RBC AUTO-ENTMCNC: 30.4 GM/DL (ref 32.2–35.5)
MCV RBC AUTO: 95.1 FL (ref 80–94)
PLATELET # BLD AUTO: 527 THOU/MM3 (ref 130–400)
PMV BLD AUTO: 9.9 FL (ref 9.4–12.4)
RBC # BLD AUTO: 2.87 MILL/MM3 (ref 4.7–6.1)
WBC # BLD AUTO: 8.9 THOU/MM3 (ref 4.8–10.8)

## 2024-04-14 PROCEDURE — 6370000000 HC RX 637 (ALT 250 FOR IP): Performed by: PHYSICIAN ASSISTANT

## 2024-04-14 PROCEDURE — 36415 COLL VENOUS BLD VENIPUNCTURE: CPT

## 2024-04-14 PROCEDURE — 6370000000 HC RX 637 (ALT 250 FOR IP)

## 2024-04-14 PROCEDURE — 2580000003 HC RX 258: Performed by: PHYSICIAN ASSISTANT

## 2024-04-14 PROCEDURE — 6370000000 HC RX 637 (ALT 250 FOR IP): Performed by: NURSE PRACTITIONER

## 2024-04-14 PROCEDURE — 82948 REAGENT STRIP/BLOOD GLUCOSE: CPT

## 2024-04-14 PROCEDURE — 99232 SBSQ HOSP IP/OBS MODERATE 35: CPT | Performed by: INTERNAL MEDICINE

## 2024-04-14 PROCEDURE — 1200000003 HC TELEMETRY R&B

## 2024-04-14 PROCEDURE — 85027 COMPLETE CBC AUTOMATED: CPT

## 2024-04-14 RX ORDER — MEMANTINE HYDROCHLORIDE 5 MG/1
5 TABLET ORAL DAILY
Status: DISCONTINUED | OUTPATIENT
Start: 2024-04-14 | End: 2024-04-16 | Stop reason: HOSPADM

## 2024-04-14 RX ORDER — PIOGLITAZONEHYDROCHLORIDE 15 MG/1
15 TABLET ORAL DAILY
Status: DISCONTINUED | OUTPATIENT
Start: 2024-04-14 | End: 2024-04-14

## 2024-04-14 RX ADMIN — METOPROLOL TARTRATE 12.5 MG: 25 TABLET, FILM COATED ORAL at 09:53

## 2024-04-14 RX ADMIN — INSULIN GLARGINE 5 UNITS: 100 INJECTION, SOLUTION SUBCUTANEOUS at 09:51

## 2024-04-14 RX ADMIN — DONEPEZIL HYDROCHLORIDE 10 MG: 10 TABLET, FILM COATED ORAL at 09:54

## 2024-04-14 RX ADMIN — QUETIAPINE FUMARATE 25 MG: 25 TABLET ORAL at 21:09

## 2024-04-14 RX ADMIN — MEMANTINE HYDROCHLORIDE 5 MG: 5 TABLET ORAL at 14:03

## 2024-04-14 RX ADMIN — PANTOPRAZOLE SODIUM 40 MG: 40 TABLET, DELAYED RELEASE ORAL at 05:51

## 2024-04-14 RX ADMIN — METOPROLOL TARTRATE 12.5 MG: 25 TABLET, FILM COATED ORAL at 21:08

## 2024-04-14 RX ADMIN — ASPIRIN 81 MG: 81 TABLET, COATED ORAL at 09:51

## 2024-04-14 RX ADMIN — SODIUM CHLORIDE, PRESERVATIVE FREE 10 ML: 5 INJECTION INTRAVENOUS at 09:54

## 2024-04-14 RX ADMIN — INSULIN LISPRO 1 UNITS: 100 INJECTION, SOLUTION INTRAVENOUS; SUBCUTANEOUS at 12:36

## 2024-04-14 RX ADMIN — PANTOPRAZOLE SODIUM 40 MG: 40 TABLET, DELAYED RELEASE ORAL at 14:05

## 2024-04-14 RX ADMIN — INSULIN LISPRO 1 UNITS: 100 INJECTION, SOLUTION INTRAVENOUS; SUBCUTANEOUS at 21:09

## 2024-04-14 RX ADMIN — Medication 6 MG: at 21:09

## 2024-04-14 RX ADMIN — SODIUM CHLORIDE, PRESERVATIVE FREE 10 ML: 5 INJECTION INTRAVENOUS at 21:09

## 2024-04-14 RX ADMIN — APIXABAN 5 MG: 5 TABLET, FILM COATED ORAL at 21:08

## 2024-04-14 RX ADMIN — METFORMIN HYDROCHLORIDE 500 MG: 500 TABLET ORAL at 18:28

## 2024-04-14 RX ADMIN — APIXABAN 5 MG: 5 TABLET, FILM COATED ORAL at 09:53

## 2024-04-14 RX ADMIN — DOCUSATE SODIUM 100 MG: 50 LIQUID ORAL at 09:51

## 2024-04-14 RX ADMIN — ATORVASTATIN CALCIUM 20 MG: 20 TABLET, FILM COATED ORAL at 09:52

## 2024-04-14 ASSESSMENT — PAIN SCALES - GENERAL
PAINLEVEL_OUTOF10: 0
PAINLEVEL_OUTOF10: 0

## 2024-04-14 NOTE — PROGRESS NOTES
Medicine Progress Note    Patient:  Sherwin Phelps    Unit/Bed:8B-24/024-A  YOB: 1935  MRN: [id number]   Acct: [id number]   PCP: Diego Silva Jr.,   Date of Admission: 4/1/2024    Hospital Course     Briefly, pt Sherwin Phelps is a 88 y.o. male with a PMH of Alzheimer's Dementia / CVA / DVT&PE / NIRDMT2 who presented with GIB. EGD 04/03/24: distal esophagitis / gastric polyps x2 (large) removed. 04/05/24 Working on obtaining records for colonoscopy. No further episodes of bleeding. Working on placement. 04/09/24 - rapid response called as pt became unresponsive. Not on telemetry. Woke up. EKG wnl. BG wnl. Labs wnl. 04/12/24 - rapid response called a pt became unresponsive again. Telemetry not on despite order placed in system. EKG wnl. BG wnl. Labs wnl. 04/13/24 rapid response called as pt became unresponsive again for 5 minutes. Hgb checked and was 6.9 g/dL. 1 unit pRBC given. Telemetry wnl.     Assessment / Plan     #Acute Intermittent Episodes of Unresponsiveness: Active  Pt continued to exhibit episodes of unresponsiveness during the hospitalization. Unfortunately telemetry was not used during 2 of them. Follow up labs, BG, EKG have been wnl. Of note, pt was pale after one episode but as soon as he woke up, his color returned. Given h/o alzheimer's dementia which is nearing late stage, I suspect this is due to dysautonomia result in probable sinus pauses.   Plan:   -Cardiology consulted; appreciate recs  -Palliative Care Consulted Appreciate recs  -Continuous telemetry - so far, non-revealing  -EKG STAT PRN when unresponsiveness occurs    #Non-Variceal Lower vs Upper GIB: Resolving  Bleeding source: Unknown. No prior h/o EGD/Colonoscopy in EMR however pt's wife states he had a colonoscopy w/i the last year. 1 unit pRBC 04/01/24 - Hgb has been steady b/t 7-8 g/dL. EGD 04/03/24: distal esophagitis / gastric polyps x2 (large) removed. Pathology: microscopic examination demonstrates  potassium chloride **OR** potassium alternative oral replacement **OR** potassium chloride, magnesium sulfate, ondansetron **OR** ondansetron, polyethylene glycol, acetaminophen **OR** acetaminophen    Physical Exam:     Physical Exam  Constitutional:       Appearance: Normal appearance. He is obese. He is not diaphoretic.   HENT:      Head: Normocephalic and atraumatic.      Mouth/Throat:      Mouth: Mucous membranes are moist.      Pharynx: Oropharynx is clear. No oropharyngeal exudate or posterior oropharyngeal erythema.   Eyes:      General: No scleral icterus.     Extraocular Movements: Extraocular movements intact.      Pupils: Pupils are equal, round, and reactive to light.   Cardiovascular:      Rate and Rhythm: Normal rate and regular rhythm.      Pulses: Normal pulses.      Heart sounds: Murmur heard.      Systolic murmur is present with a grade of 1/6.      No friction rub. No gallop.   Pulmonary:      Effort: Pulmonary effort is normal.      Breath sounds: Normal breath sounds. No wheezing, rhonchi or rales.   Abdominal:      General: Bowel sounds are normal.      Palpations: Abdomen is soft.      Tenderness: There is no abdominal tenderness. There is no guarding or rebound.   Musculoskeletal:      Cervical back: Normal range of motion and neck supple.      Right lower leg: No edema.      Left lower leg: No edema.   Skin:     General: Skin is warm and dry.      Capillary Refill: Capillary refill takes less than 2 seconds.   Neurological:      General: No focal deficit present.      Mental Status: He is alert and oriented to person, place, and time. Mental status is at baseline.   Psychiatric:         Mood and Affect: Mood normal.         Behavior: Behavior normal.       Labs:     Recent Labs     04/12/24  0933 04/12/24  2315 04/13/24  0820   WBC 11.1*  --   --    HGB 7.5* 6.9* 8.2*   HCT 25.0* 22.9* 26.6*   *  --   --    MPV 9.9  --   --        Recent Labs     04/12/24  0933 04/12/24  1223   NA

## 2024-04-14 NOTE — PLAN OF CARE
Problem: Discharge Planning  Goal: Discharge to home or other facility with appropriate resources  Outcome: Progressing  Flowsheets (Taken 4/14/2024 1720)  Discharge to home or other facility with appropriate resources: Identify barriers to discharge with patient and caregiver  Note: Patient planning on going to ECF at discharge.     Problem: Safety - Adult  Goal: Free from fall injury  Outcome: Progressing  Flowsheets (Taken 4/14/2024 1720)  Free From Fall Injury: Instruct family/caregiver on patient safety  Note: Patient educated on safety precautions. Bed/chair alarm on. Call light in reach. Patient voices understanding on education provided.      Problem: ABCDS Injury Assessment  Goal: Absence of physical injury  Outcome: Progressing  Flowsheets (Taken 4/14/2024 1720)  Absence of Physical Injury: Implement safety measures based on patient assessment  Note: Patient educated on safety precautions. Bed/chair alarm on. Call light in reach. Patient voices understanding on education provided     Problem: Skin/Tissue Integrity  Goal: Absence of new skin breakdown  Description: 1.  Monitor for areas of redness and/or skin breakdown  2.  Assess vascular access sites hourly  3.  Every 4-6 hours minimum:  Change oxygen saturation probe site  4.  Every 4-6 hours:  If on nasal continuous positive airway pressure, respiratory therapy assess nares and determine need for appliance change or resting period.  Outcome: Progressing  Note: No new skin breakdown noted at this time. Patient educated on Q2 hour repositioning. Incontinent care provided to prevent skin breakdown. Call light in reach     Problem: Pain  Goal: Verbalizes/displays adequate comfort level or baseline comfort level  Outcome: Progressing  Flowsheets (Taken 4/14/2024 1720)  Verbalizes/displays adequate comfort level or baseline comfort level:   Encourage patient to monitor pain and request assistance   Assess pain using appropriate pain scale  Note: Educated on

## 2024-04-15 LAB
DEPRECATED RDW RBC AUTO: 53.2 FL (ref 35–45)
ERYTHROCYTE [DISTWIDTH] IN BLOOD BY AUTOMATED COUNT: 15.4 % (ref 11.5–14.5)
GLUCOSE BLD STRIP.AUTO-MCNC: 131 MG/DL (ref 70–108)
GLUCOSE BLD STRIP.AUTO-MCNC: 152 MG/DL (ref 70–108)
GLUCOSE BLD STRIP.AUTO-MCNC: 170 MG/DL (ref 70–108)
GLUCOSE BLD STRIP.AUTO-MCNC: 201 MG/DL (ref 70–108)
HCT VFR BLD AUTO: 25.2 % (ref 42–52)
HGB BLD-MCNC: 7.6 GM/DL (ref 14–18)
MCH RBC QN AUTO: 28.6 PG (ref 26–33)
MCHC RBC AUTO-ENTMCNC: 30.2 GM/DL (ref 32.2–35.5)
MCV RBC AUTO: 94.7 FL (ref 80–94)
PLATELET # BLD AUTO: 414 THOU/MM3 (ref 130–400)
PMV BLD AUTO: 9.8 FL (ref 9.4–12.4)
RBC # BLD AUTO: 2.66 MILL/MM3 (ref 4.7–6.1)
WBC # BLD AUTO: 8.9 THOU/MM3 (ref 4.8–10.8)

## 2024-04-15 PROCEDURE — 97535 SELF CARE MNGMENT TRAINING: CPT

## 2024-04-15 PROCEDURE — 6370000000 HC RX 637 (ALT 250 FOR IP): Performed by: PHYSICIAN ASSISTANT

## 2024-04-15 PROCEDURE — 97530 THERAPEUTIC ACTIVITIES: CPT

## 2024-04-15 PROCEDURE — 85027 COMPLETE CBC AUTOMATED: CPT

## 2024-04-15 PROCEDURE — 36415 COLL VENOUS BLD VENIPUNCTURE: CPT

## 2024-04-15 PROCEDURE — 2580000003 HC RX 258: Performed by: PHYSICIAN ASSISTANT

## 2024-04-15 PROCEDURE — 6370000000 HC RX 637 (ALT 250 FOR IP): Performed by: NURSE PRACTITIONER

## 2024-04-15 PROCEDURE — 1200000003 HC TELEMETRY R&B

## 2024-04-15 PROCEDURE — 82948 REAGENT STRIP/BLOOD GLUCOSE: CPT

## 2024-04-15 PROCEDURE — 6370000000 HC RX 637 (ALT 250 FOR IP)

## 2024-04-15 PROCEDURE — 99232 SBSQ HOSP IP/OBS MODERATE 35: CPT | Performed by: INTERNAL MEDICINE

## 2024-04-15 RX ORDER — FERROUS SULFATE 325(65) MG
325 TABLET ORAL EVERY OTHER DAY
Status: DISCONTINUED | OUTPATIENT
Start: 2024-04-15 | End: 2024-04-16 | Stop reason: HOSPADM

## 2024-04-15 RX ORDER — DOCUSATE SODIUM 100 MG/1
100 CAPSULE, LIQUID FILLED ORAL DAILY
Status: DISCONTINUED | OUTPATIENT
Start: 2024-04-15 | End: 2024-04-16 | Stop reason: HOSPADM

## 2024-04-15 RX ORDER — SENNA AND DOCUSATE SODIUM 50; 8.6 MG/1; MG/1
1 TABLET, FILM COATED ORAL NIGHTLY
Status: DISCONTINUED | OUTPATIENT
Start: 2024-04-15 | End: 2024-04-16 | Stop reason: HOSPADM

## 2024-04-15 RX ADMIN — Medication 6 MG: at 20:45

## 2024-04-15 RX ADMIN — MEMANTINE HYDROCHLORIDE 5 MG: 5 TABLET ORAL at 08:05

## 2024-04-15 RX ADMIN — SODIUM CHLORIDE, PRESERVATIVE FREE 10 ML: 5 INJECTION INTRAVENOUS at 08:18

## 2024-04-15 RX ADMIN — PANTOPRAZOLE SODIUM 40 MG: 40 TABLET, DELAYED RELEASE ORAL at 16:10

## 2024-04-15 RX ADMIN — METFORMIN HYDROCHLORIDE 500 MG: 500 TABLET ORAL at 16:10

## 2024-04-15 RX ADMIN — PANTOPRAZOLE SODIUM 40 MG: 40 TABLET, DELAYED RELEASE ORAL at 06:01

## 2024-04-15 RX ADMIN — FERROUS SULFATE TAB 325 MG (65 MG ELEMENTAL FE) 325 MG: 325 (65 FE) TAB at 16:11

## 2024-04-15 RX ADMIN — ASPIRIN 81 MG: 81 TABLET, COATED ORAL at 08:03

## 2024-04-15 RX ADMIN — APIXABAN 5 MG: 5 TABLET, FILM COATED ORAL at 20:45

## 2024-04-15 RX ADMIN — METOPROLOL TARTRATE 12.5 MG: 25 TABLET, FILM COATED ORAL at 20:45

## 2024-04-15 RX ADMIN — ATORVASTATIN CALCIUM 20 MG: 20 TABLET, FILM COATED ORAL at 07:56

## 2024-04-15 RX ADMIN — DONEPEZIL HYDROCHLORIDE 10 MG: 10 TABLET, FILM COATED ORAL at 07:56

## 2024-04-15 RX ADMIN — SODIUM CHLORIDE, PRESERVATIVE FREE 10 ML: 5 INJECTION INTRAVENOUS at 20:46

## 2024-04-15 RX ADMIN — QUETIAPINE FUMARATE 25 MG: 25 TABLET ORAL at 20:45

## 2024-04-15 RX ADMIN — DOCUSATE SODIUM 100 MG: 100 CAPSULE, LIQUID FILLED ORAL at 08:07

## 2024-04-15 RX ADMIN — METOPROLOL TARTRATE 12.5 MG: 25 TABLET, FILM COATED ORAL at 07:56

## 2024-04-15 RX ADMIN — INSULIN LISPRO 1 UNITS: 100 INJECTION, SOLUTION INTRAVENOUS; SUBCUTANEOUS at 13:13

## 2024-04-15 RX ADMIN — METFORMIN HYDROCHLORIDE 500 MG: 500 TABLET ORAL at 08:04

## 2024-04-15 RX ADMIN — APIXABAN 5 MG: 5 TABLET, FILM COATED ORAL at 07:56

## 2024-04-15 ASSESSMENT — PAIN SCALES - GENERAL
PAINLEVEL_OUTOF10: 0
PAINLEVEL_OUTOF10: 0

## 2024-04-15 NOTE — PLAN OF CARE
Problem: Discharge Planning  Goal: Discharge to home or other facility with appropriate resources  4/15/2024 0010 by Shawna Vegas RN  Outcome: Progressing  Flowsheets (Taken 4/14/2024 2104)  Discharge to home or other facility with appropriate resources:   Identify barriers to discharge with patient and caregiver   Arrange for needed discharge resources and transportation as appropriate   Identify discharge learning needs (meds, wound care, etc)   Refer to discharge planning if patient needs post-hospital services based on physician order or complex needs related to functional status, cognitive ability or social support system  4/14/2024 1720 by Ashleigh Barrientos RN  Outcome: Progressing  Flowsheets (Taken 4/14/2024 1720)  Discharge to home or other facility with appropriate resources: Identify barriers to discharge with patient and caregiver  Note: Patient planning on going to ECF at discharge.     Problem: Safety - Adult  Goal: Free from fall injury  4/15/2024 0010 by Shawna Vegas RN  Outcome: Progressing  Flowsheets (Taken 4/14/2024 1720 by Ashleigh Barrientos RN)  Free From Fall Injury: Instruct family/caregiver on patient safety  4/14/2024 1720 by Ashleigh Barrientos RN  Outcome: Progressing  Flowsheets (Taken 4/14/2024 1720)  Free From Fall Injury: Instruct family/caregiver on patient safety  Note: Patient educated on safety precautions. Bed/chair alarm on. Call light in reach. Patient voices understanding on education provided.      Problem: ABCDS Injury Assessment  Goal: Absence of physical injury  4/15/2024 0010 by Shawna Vegas RN  Outcome: Progressing  Flowsheets (Taken 4/14/2024 1720 by Ashleigh Barrientos RN)  Absence of Physical Injury: Implement safety measures based on patient assessment  4/14/2024 1720 by Ashleigh Barrientos RN  Outcome: Progressing  Flowsheets (Taken 4/14/2024 1720)  Absence of Physical Injury: Implement safety measures based on patient assessment  Note: Patient educated on  safety precautions. Bed/chair alarm on. Call light in reach. Patient voices understanding on education provided     Problem: Chronic Conditions and Co-morbidities  Goal: Patient's chronic conditions and co-morbidity symptoms are monitored and maintained or improved  4/15/2024 0010 by Shawna Vegas, RN  Outcome: Progressing  Flowsheets (Taken 4/14/2024 2104)  Care Plan - Patient's Chronic Conditions and Co-Morbidity Symptoms are Monitored and Maintained or Improved:   Monitor and assess patient's chronic conditions and comorbid symptoms for stability, deterioration, or improvement   Collaborate with multidisciplinary team to address chronic and comorbid conditions and prevent exacerbation or deterioration   Update acute care plan with appropriate goals if chronic or comorbid symptoms are exacerbated and prevent overall improvement and discharge  4/14/2024 1720 by Ashleigh Barrientos RN  Outcome: Progressing  Flowsheets (Taken 4/14/2024 1720)  Care Plan - Patient's Chronic Conditions and Co-Morbidity Symptoms are Monitored and Maintained or Improved: Monitor and assess patient's chronic conditions and comorbid symptoms for stability, deterioration, or improvement  Note: Monitoring chronic conditions. Abnormal results will be reported to provider. Call light in reach.      Problem: Skin/Tissue Integrity  Goal: Absence of new skin breakdown  Description: 1.  Monitor for areas of redness and/or skin breakdown  2.  Assess vascular access sites hourly  3.  Every 4-6 hours minimum:  Change oxygen saturation probe site  4.  Every 4-6 hours:  If on nasal continuous positive airway pressure, respiratory therapy assess nares and determine need for appliance change or resting period.  4/15/2024 0010 by Shawna Vegas, RN  Outcome: Progressing  Note: Routine skin assessments, purposeful hourly rounding. Ambulation and up to chair encouraged as tolerated and appropriate. Pt encouraged and assisted to turn at least every 2 hours. Bath

## 2024-04-15 NOTE — PLAN OF CARE
Attempted to call [id number] for status update. Was on hold for 1 hour and 30 minutes. Left a voicemail.    Anticipate call tomorrow hopefully.    Signed:  Dr. Jose Angel Ramírez MD  Internal Medicine, PGY-3, Chief Resident  04/15/24  4:40 PM    Staff: Vito Bonilla MD

## 2024-04-15 NOTE — PROGRESS NOTES
Physician Progress Note      PATIENT:               HERNAN FINCH  CSN #:                  [id number]  :                       1935  ADMIT DATE:       2024 5:04 PM  DISCH DATE:  RESPONDING  PROVIDER #:        MARGAUX RAMÍREZ          QUERY TEXT:    Dr Burnham, Dr Ramírez,    Patient admitted with GI bleeding, noted to have EGD 24: distal   esophagitis / gastric polyps x2 (large) removed.  If possible, please document   in progress notes and discharge summary the cause of the GI bleeding:    The medical record reflects the following:  Risk Factors: GIB  Clinical Indicators: Per GI-EGD-2 larger polyps on stalks with eroded surfaces    (removed with hot snare)  which could have bleed with anti coagulation  Treatment: EGD    Emma An BSN, RN  Options provided:  -- GI bleeding due to gastric polyps  -- GI bleeding due to distal esophagitis  -- Other - I will add my own diagnosis  -- Disagree - Not applicable / Not valid  -- Disagree - Clinically unable to determine / Unknown  -- Refer to Clinical Documentation Reviewer    PROVIDER RESPONSE TEXT:    GI Bleed due to unknown source.    Query created by: Ana An on 4/15/2024 8:08 AM      Electronically signed by:  MARGAUX RAMÍREZ 4/15/2024 8:30 AM

## 2024-04-15 NOTE — CARE COORDINATION
4/15/24, 3:22 PM EDT    DISCHARGE PLANNING EVALUATION    Spoke with patient and wife, explained that insurance has intent to deny, sent for peer to peer.  Dr. Ramírez called insurance Friday was told no physician was available for peer to peer.  Dr Ramírez has called again today, waiting for return call from insurance physician.  If denial is upheld, wife is agreeable to take home with HH.  Patient is current with [facility] , will need new orders, nursing, aide, PT & OT.  If peer to peer is completed and denial overturned, patient will be able to go to [facility].

## 2024-04-15 NOTE — PROGRESS NOTES
Medicine Progress Note    Patient:  Sherwin Phelps    Unit/Bed:8B-24/024-A  YOB: 1935  MRN: [id number]   Acct: [id number]   PCP: Diego Silva Jr.,   Date of Admission: 4/1/2024    Hospital Course     Briefly, pt Sherwin Phelps is a 88 y.o. male with a PMH of Alzheimer's Dementia / CVA / DVT&PE / NIRDMT2 who presented with GIB. EGD 04/03/24: distal esophagitis / gastric polyps x2 (large) removed. 04/05/24 Working on obtaining records for colonoscopy. No further episodes of bleeding. Working on placement. 04/09/24 - rapid response called as pt became unresponsive. Not on telemetry. Woke up. EKG wnl. BG wnl. Labs wnl. 04/12/24 - rapid response called a pt became unresponsive again. Telemetry not on despite order placed in system. EKG wnl. BG wnl. Labs wnl. 04/13/24 rapid response called as pt became unresponsive again for 5 minutes. Hgb checked and was 6.9 g/dL. 1 unit pRBC given. Telemetry wnl. [id number] today.    Assessment / Plan     #Acute Intermittent Episodes of Unresponsiveness: Active  Pt continued to exhibit episodes of unresponsiveness during the hospitalization. Unfortunately telemetry was not used during 2 of them. Follow up labs, BG, EKG have been wnl. Of note, pt was pale after one episode but as soon as he woke up, his color returned. Given h/o alzheimer's dementia which is nearing late stage, I suspect this is due to dysautonomia result in probable sinus pauses.   Plan:   -Cardiology consulted; appreciate recs  -Palliative Care Consulted Appreciate recs  -Continuous telemetry - so far, non-revealing  -EKG STAT PRN when unresponsiveness occurs    #Non-Variceal Lower vs Upper GIB: Resolving  Bleeding source: Unknown. No prior h/o EGD/Colonoscopy in EMR however pt's wife states he had a colonoscopy w/i the last year. 1 unit pRBC 04/01/24 - Hgb has been steady b/t 7-8 g/dL. EGD 04/03/24: distal esophagitis / gastric polyps x2 (large) removed. Pathology: microscopic examination  potassium alternative oral replacement **OR** potassium chloride, magnesium sulfate, ondansetron **OR** ondansetron, polyethylene glycol, acetaminophen **OR** acetaminophen    Physical Exam:     Physical Exam  Constitutional:       Appearance: Normal appearance. He is obese. He is not diaphoretic.   HENT:      Head: Normocephalic and atraumatic.      Mouth/Throat:      Mouth: Mucous membranes are moist.      Pharynx: Oropharynx is clear. No oropharyngeal exudate or posterior oropharyngeal erythema.   Eyes:      General: No scleral icterus.     Extraocular Movements: Extraocular movements intact.      Pupils: Pupils are equal, round, and reactive to light.   Cardiovascular:      Rate and Rhythm: Normal rate and regular rhythm.      Pulses: Normal pulses.      Heart sounds: Murmur heard.      Systolic murmur is present with a grade of 1/6.      No friction rub. No gallop.   Pulmonary:      Effort: Pulmonary effort is normal.      Breath sounds: Normal breath sounds. No wheezing, rhonchi or rales.   Abdominal:      General: Bowel sounds are normal.      Palpations: Abdomen is soft.      Tenderness: There is no abdominal tenderness. There is no guarding or rebound.   Musculoskeletal:      Cervical back: Normal range of motion and neck supple.      Right lower leg: No edema.      Left lower leg: No edema.   Skin:     General: Skin is warm and dry.      Capillary Refill: Capillary refill takes less than 2 seconds.   Neurological:      General: No focal deficit present.      Mental Status: He is alert and oriented to person, place, and time. Mental status is at baseline.   Psychiatric:         Mood and Affect: Mood normal.         Behavior: Behavior normal.       Labs:     Recent Labs     04/12/24  0933 04/12/24  2315 04/13/24  0820 04/14/24  1035 04/15/24  0601   WBC 11.1*  --   --  8.9 8.9   HGB 7.5* 6.9* 8.2* 8.3* 7.6*   HCT 25.0* 22.9* 26.6* 27.3* 25.2*   *  --   --  527* 414*   MPV 9.9  --   --  9.9 9.8

## 2024-04-15 NOTE — PROGRESS NOTES
Comprehensive Nutrition Assessment    Type and Reason for Visit: Initial, RD Nutrition Re-Screen/LOS    Nutrition Recommendations/Plan:   Continue diet as ordered.      Malnutrition Assessment:  Malnutrition Status: No malnutrition     Nutrition Assessment:    Pt. At low nutrition risk per RD evaluation. PMHx Alzheimer's dementia, T2DM, DVT, HLD, HTN .  Pre-cert has been started for patient to discharge to SNF.     Nutrition Related Findings:    Patient/ Family Comments: Per patient his appetite has been good lately. He has been liking the food here. He reports that he doesn't look any different and hasn't lost any weight recently.   Edema: none,per flowsheet  GI Function: LBM 04/14/24 per flowsheet  Wound: None      Labs:   Lab Results   Component Value Date    LABA1C 6.0 04/05/2024    LABA1C 6.1 04/03/2024    LABA1C 6.8 11/21/2022     No results for input(s): \"NA\", \"K\", \"BICARB\", \"CL\", \"GLUCOSE\", \"BUN\", \"CREATININE\", \"MG\", \"PHOS\" in the last 72 hours.  Medications: memantine, metformin, metoprolol, insulin, pantoprazole, statin    Current Nutrition Intake & Therapies:    Recent PO intake: %  Recent Supplement Intake: None Ordered    - Current intake likely meets estimated needs.   ADULT DIET; Regular; Low Fat (less than or equal to 50 gm/day)    Anthropometric Measures:  Height: 175.3 cm (5' 9\")  Ideal Body Weight (IBW): 160 lbs  Admission Body Kai: 77.6 kg (171 lb) (4/14, no edema)  Current Body Weight: 77.6 kg (171 lb) (4/14, no edema)  BMI: Body mass index is 25.34 kg/m².  BMI Classification: Normal (18.5-24.9)     Wt Readings from Last 10 Encounters:   04/14/24 77.8 kg (171 lb 9.6 oz)   03/28/24 79.4 kg (175 lb)   03/14/24 81.2 kg (179 lb)   09/12/23 85.7 kg (189 lb)   03/13/23 86.1 kg (189 lb 12.8 oz)   02/15/23 85.7 kg (189 lb)   02/09/23 87.1 kg (192 lb)   01/17/23 82.6 kg (182 lb)   07/14/22 84.8 kg (187 lb)   04/14/22 87.1 kg (192 lb)     Estimated Daily Nutrient Needs:  Energy (kcal/day): 72.7

## 2024-04-15 NOTE — PROGRESS NOTES
[facility]  [facility] MED SURG 8AB  Occupational Therapy  Daily Note  Time:   Time In: 1129  Time Out: 1152  Timed Code Treatment Minutes: 23 Minutes  Minutes: 23          Date: 4/15/2024  Patient Name: Sherwin Phelps,   Gender: male      Room: 8B-24/024-A  MRN: [id number]  : 1935  (88 y.o.)  Referring Practitioner: Jose Angel Ramírez MD  Diagnosis: acute blood loss anemia  Additional Pertinent Hx: Per ER note on 2024:88 y.o. male who presents today for chest pain shortness of breath.  Patient states he had a yesterday he had a day.  Patient states he feels fine now.  Patient states that it is mostly in his left shoulder.  Had no overt chest pain.  It was in the left scapular area.  Patient did not have any pain when EMS arrived. 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 on  at [facility] after a fall    Restrictions/Precautions:  Restrictions/Precautions: Fall Risk, General Precautions  Position Activity Restriction  Hip Precautions: No hip flexion > 90 degrees  Other position/activity restrictions: hx of dementia per chart, reports hx of  R THR     Social/Functional History:  Lives With: Family (wife & daughter)  Type of Home: Apartment  Home Layout: One level  Home Access: Level entry  Home Equipment: Walker, 4 wheeled, Wheelchair-manual   Bathroom Shower/Tub: Tub/Shower unit  Bathroom Equipment: Grab bars in shower, Tub transfer bench       ADL Assistance: Needs assistance  Ambulation Assistance: Needs assistance  Transfer Assistance: Needs assistance    Active : No  Patient's  Info: Uses [facility] on aging     Additional Comments: Pt reports needing A since sx in Feb. Pt reports using at walker PLOF. Reports frequent falls.    SUBJECTIVE: Patient supine in bed upon arrival; agreeable to therapy this date.  Patient pleasant and cooperative throughout session.     PAIN: 0/10:      Vitals: Vitals not assessed per clinical judgement, see nursing flowsheet    COGNITION: Decreased

## 2024-04-16 ENCOUNTER — HOSPITAL ENCOUNTER (INPATIENT)
Age: 89
Discharge: HOME | DRG: 377 | End: 2024-04-18
Payer: MEDICARE

## 2024-04-16 VITALS
OXYGEN SATURATION: 99 % | TEMPERATURE: 98 F | HEART RATE: 89 BPM | RESPIRATION RATE: 18 BRPM | DIASTOLIC BLOOD PRESSURE: 66 MMHG | SYSTOLIC BLOOD PRESSURE: 110 MMHG | BODY MASS INDEX: 25.42 KG/M2 | WEIGHT: 171.6 LBS | HEIGHT: 69 IN

## 2024-04-16 DIAGNOSIS — I95.9 HYPOTENSION, UNSPECIFIED HYPOTENSION TYPE: ICD-10-CM
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
## 2024-04-16 DIAGNOSIS — I25.10 CORONARY ARTERY DISEASE INVOLVING NATIVE CORONARY ARTERY OF NATIVE HEART WITHOUT ANGINA PECTORIS: ICD-10-CM

## 2024-04-16 LAB
ECHO BSA: 2.01 M2
GLUCOSE BLD STRIP.AUTO-MCNC: 177 MG/DL (ref 70–108)
GLUCOSE BLD STRIP.AUTO-MCNC: 184 MG/DL (ref 70–108)

## 2024-04-16 PROCEDURE — 6370000000 HC RX 637 (ALT 250 FOR IP): Performed by: NURSE PRACTITIONER

## 2024-04-16 PROCEDURE — 6370000000 HC RX 637 (ALT 250 FOR IP): Performed by: PHYSICIAN ASSISTANT

## 2024-04-16 PROCEDURE — 93270 REMOTE 30 DAY ECG REV/REPORT: CPT

## 2024-04-16 PROCEDURE — 6370000000 HC RX 637 (ALT 250 FOR IP)

## 2024-04-16 PROCEDURE — 99239 HOSP IP/OBS DSCHRG MGMT >30: CPT | Performed by: INTERNAL MEDICINE

## 2024-04-16 PROCEDURE — 82948 REAGENT STRIP/BLOOD GLUCOSE: CPT

## 2024-04-16 PROCEDURE — 6360000002 HC RX W HCPCS: Performed by: PHYSICIAN ASSISTANT

## 2024-04-16 PROCEDURE — 97535 SELF CARE MNGMENT TRAINING: CPT

## 2024-04-16 RX ORDER — FERROUS SULFATE 325(65) MG
325 TABLET ORAL EVERY OTHER DAY
Qty: 30 TABLET | Refills: 3 | Status: SHIPPED | OUTPATIENT
Start: 2024-04-17

## 2024-04-16 RX ORDER — MEMANTINE HYDROCHLORIDE 5 MG/1
5 TABLET ORAL DAILY
Qty: 60 TABLET | Refills: 3 | Status: SHIPPED | OUTPATIENT
Start: 2024-04-17

## 2024-04-16 RX ADMIN — METFORMIN HYDROCHLORIDE 500 MG: 500 TABLET ORAL at 07:58

## 2024-04-16 RX ADMIN — ATORVASTATIN CALCIUM 20 MG: 20 TABLET, FILM COATED ORAL at 07:58

## 2024-04-16 RX ADMIN — PANTOPRAZOLE SODIUM 40 MG: 40 TABLET, DELAYED RELEASE ORAL at 05:26

## 2024-04-16 RX ADMIN — METOPROLOL TARTRATE 12.5 MG: 25 TABLET, FILM COATED ORAL at 07:59

## 2024-04-16 RX ADMIN — APIXABAN 5 MG: 5 TABLET, FILM COATED ORAL at 07:58

## 2024-04-16 RX ADMIN — DONEPEZIL HYDROCHLORIDE 10 MG: 10 TABLET, FILM COATED ORAL at 07:58

## 2024-04-16 RX ADMIN — ASPIRIN 81 MG: 81 TABLET, COATED ORAL at 07:59

## 2024-04-16 RX ADMIN — ONDANSETRON 4 MG: 2 INJECTION INTRAMUSCULAR; INTRAVENOUS at 10:58

## 2024-04-16 RX ADMIN — MEMANTINE HYDROCHLORIDE 5 MG: 5 TABLET ORAL at 07:58

## 2024-04-16 ASSESSMENT — PAIN SCALES - GENERAL: PAINLEVEL_OUTOF10: 0

## 2024-04-16 NOTE — PROGRESS NOTES
Pt. Discharged home with wife and juliater, Pt. Holter monitor placed before discharge. Discharge education provided for the pt. And questions answered with wife at bedside.

## 2024-04-16 NOTE — CARE COORDINATION
4/16/24, 9:37 AM EDT    DISCHARGE PLANNING EVALUATION    Spoke with granddaughter Ilda regarding discharge plan.  Advised of attempts at peer to peer, no response.  Will discharge home and resume [facility], nursing, aide, PT & OT.  Ilda will talk with her sister about transporting. Requested her sister call RN to arrange time of discharge.    Left message with Lillian at [facility], advised of issues with peer to peer.  Will be discharging patient home with HH instead of skilled at [facility].    Called [facility] spoke with Theresa, informed patient discharging home today will need to resume HH.  New HH orders being placed.  Will fax AVS and new orders when completed.     4/16/24, 10:07 AM EDT    Patient goals/plan/ treatment preferences discussed by  and .  Patient goals/plan/ treatment preferences reviewed with patient/ family.  Patient/ family verbalize understanding of discharge plan and are in agreement with goal/plan/treatment preferences.  Understanding was demonstrated using the teach back method.  AVS provided by RN at time of discharge, which includes all necessary medical information pertaining to the patients current course of illness, treatment, post-discharge goals of care, and treatment preferences.     Services At/After Discharge: Home Health, Aide services, Nursing service, OT, and PT       Patient discharged home with wife and resume [facility].

## 2024-04-16 NOTE — PROGRESS NOTES
[place]  [facility] MED SURG 8AB  Occupational Therapy  Daily Note  Time:   Time In: 814  Time Out: 837  Timed Code Treatment Minutes: 23 Minutes  Minutes: 23          Date: 2024  Patient Name: Sherwin Phelps,   Gender: male      Room: 8B-24/024-A  MRN: [id number]  : 1935  (88 y.o.)  Referring Practitioner: Jose Angel Ramírez MD  Diagnosis: acute blood loss anemia  Additional Pertinent Hx: Per ER note on 2024:88 y.o. male who presents today for chest pain shortness of breath.  Patient states he had a yesterday he had a day.  Patient states he feels fine now.  Patient states that it is mostly in his left shoulder.  Had no overt chest pain.  It was in the left scapular area.  Patient did not have any pain when EMS arrived. 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 on  at [facility] after a fall    Restrictions/Precautions:  Restrictions/Precautions: Fall Risk, General Precautions  Position Activity Restriction  Hip Precautions: No hip flexion > 90 degrees  Other position/activity restrictions: hx of dementia per chart, reports hx of  R THR     Social/Functional History:  Lives With: Family (wife & daughter)  Type of Home: Apartment  Home Layout: One level  Home Access: Level entry  Home Equipment: Walker, 4 wheeled, Wheelchair-manual   Bathroom Shower/Tub: Tub/Shower unit  Bathroom Equipment: Grab bars in shower, Tub transfer bench       ADL Assistance: Needs assistance  Ambulation Assistance: Needs assistance  Transfer Assistance: Needs assistance    Active : No  Patient's  Info: Uses [facility] on aging     Additional Comments: Pt reports needing A since sx in Feb. Pt reports using at walker PLOF. Reports frequent falls.    SUBJECTIVE: RN ok'ed session. Pt sitting in bedside chair.pt pleasant and agreeable to OT session.    PAIN: denies    Vitals: Vitals not assessed per clinical judgement, see nursing flowsheet    COGNITION: Slow Processing    ADL:   Grooming: Contact Guard  Assistance.  Pt tolerated > 2 min standing with 1 UE release. Pt demo'ed BLE weakness during activity and required increase time to complete d/t reduced activity pace.  Lower Extremity Dressing: Contact Guard Assistance.  Pt able to thread BLE through depends seated on STS. Pt required mod encouragement to do himself. Pt then completed hip mgmt to don over hip with 1 UE release from RW.   Toileting: Minimal Assistance.  To clean bottom after BM this date. Pt demo'ed incontinence of stool in his depends this date. Pt also required A to remove depends to prevent BM from falling out of depends. Pt required min vc to doff depends before sitting.  Toilet Transfer: Contact Guard Assistance. With min vc for hand placement,<> STS. .    IADL:   Not Tested    BALANCE:  Sitting Balance:  Supervision.    Standing Balance: Contact Guard Assistance. Pt tolerated > 2 min with 1 UE during grooming task,    BED MOBILITY:  Not Tested    TRANSFERS:  Sit to Stand:  Contact Guard Assistance.    Stand to Sit: Contact Guard Assistance, with increased time for completion, cues for hand placement.      FUNCTIONAL MOBILITY:  Assistive Device: Rolling Walker  Assist Level:  Contact Guard Assistance.   Distance: To and from bathroom  Slow paced steps     AM-[facility] Inpatient Daily Activity Raw Score: 18  AM-[facility] Inpatient ADL T-Scale Score : 38.66  ADL Inpatient CMS 0-100% Score: 46.65    Modified [place] Scale:  Not Applicable    ASSESSMENT:     Activity Tolerance:  Patient tolerance of  treatment: Good treatment tolerance      Discharge Recommendations: Subacute/skilled nursing facility  Equipment Recommendations: Other: monitor pending progress  Plan: Times Per Week: 5x  Current Treatment Recommendations: Functional mobility training, Balance training, Self-Care / ADL, Endurance training, Strengthening, Safety education & training    Education:  Learners: Patient  Role of OT, Plan of Care, ADL's, Energy Conservation, Reviewed Prior Education,

## 2024-04-16 NOTE — CARE COORDINATION
4/16/24, 7:18 AM EDT    DISCHARGE ON GOING EVALUATION    [facility] day: 8  Location: [facility]24/024-A Reason for admit: Acute blood loss anemia [D62]  Chest pain, unspecified type [R07.9]  Acute anemia [D64.9]     Procedures: 4/3 EGD:  Gastritis present. Multiple gastric polyps: 2 larger polyps on stalks with eroded surfaces (removed with hot snare) which could have bleed with anti coagulation.        Imaging since last note: No    Barriers to Discharge: Medically ready. Await approval for SNF. Await Peer to Peer.     PCP: Diego Silva Jr., DO  Readmission Risk Score: 14.4%    Patient Goals/Plan/Treatment Preferences: SW following. Awaiting Peer to Peer outcome.       Attempts for Peer to Peer unsuccessful. Discharge order in place. Pt to return home with spouse and cont [facility]. Copy of IMM delivered.    Pt verbalized understanding and gave permission for possible discharge within 4 hours of receiving IMM.

## 2024-04-16 NOTE — DISCHARGE SUMMARY
Hospital Medicine Discharge Summary      Patient Identification:   Sherwin Phelps   : 1935  MRN: [id number]   Account: [id number]      Patient's PCP: Diego Silva Jr.,     Admit Date: 2024     Discharge Date:   24    Admitting Physician: Ben Gamboa MD     Discharge Physician: Jose Angel Ramírez MD       Hospital Course:   Sherwin Phelps is a 88 y.o. male admitted to [facility] on 2024 w/  with a PMH of Alzheimer's Dementia / CVA / DVT&PE / NIRDMT2 who presented with GIB. EGD 24: distal esophagitis / gastric polyps x2 (large) removed. 24 Working on obtaining records for colonoscopy. No further episodes of bleeding. Working on placement. 24 - rapid response called as pt became unresponsive. Not on telemetry. Woke up. EKG wnl. BG wnl. Labs wnl. 24 - rapid response called a pt became unresponsive again. Telemetry not on despite order placed in system. EKG wnl. BG wnl. Labs wnl. 24 rapid response called as pt became unresponsive again for 5 minutes. Hgb checked and was 6.9 g/dL. 1 unit pRBC given. Telemetry wnl. Ocvr-ak-psao delayed. Apparently, they lost the documentation. Will attempt expedited [id number] today.     The patient was stable for discharge - all consultants were contacted and in agreement with plan for discharge.  Appropriate follow up appointment was arranged prior to discharge.     Please see below or view chart for more details from hospital course.     Discharge Diagnoses:    #Acute Intermittent Episodes of Unresponsiveness: Active  Pt continued to exhibit episodes of unresponsiveness during the hospitalization. Unfortunately telemetry was not used during 2 of them. Follow up labs, BG, EKG have been wnl. Of note, pt was pale after one episode but as soon as he woke up, his color returned. Given h/o alzheimer's dementia which is nearing late stage, I suspect this is due to dysautonomia result in probable sinus pauses.  with normal heart size. No pulmonary    edema.   No consolidation or pleural effusion.      This document has been electronically signed by: Lavelle Riley MD on    04/01/2024 06:19 PM             Consults:     STR ED TO IP CONSULT  IP CONSULT TO SPIRITUAL SERVICES  IP CONSULT TO GI  IP CONSULT TO SPIRITUAL SERVICES  IP CONSULT TO SOCIAL WORK  IP CONSULT TO SPIRITUAL SERVICES  IP CONSULT TO HOME CARE NEEDS    Disposition:    [x] Home w/ home health       [] TCU       [] Rehab       [] Psych       [] SNF       [] Long Term Care Facility       [] Other-    Condition at Discharge: Stable    Code Status:  Full Code     Patient Instructions:    Discharge lab work: CBC / BMP in 1 week; f/u with PCP + specialists  Activity: activity as tolerated  Diet: ADULT DIET; Regular; Low Fat (less than or equal to 50 gm/day)      Follow-up visits:   [facility]  [address] [place] [address]  [phone]        Missy Spencer, APRN - CNP  [facility] [id number] [phone]    Schedule an appointment as soon as possible for a visit in 1 month(s)      Diego Silva Jr., DO  [address]  [facility] [address] [phone]    Follow up           Discharge Medications:        Medication List        START taking these medications      ferrous sulfate 325 (65 Fe) MG tablet  Commonly known as: IRON 325  Take 1 tablet by mouth every other day  Start taking on: April 17, 2024     memantine 5 MG tablet  Commonly known as: NAMENDA  Take 1 tablet by mouth daily  Start taking on: April 17, 2024     metoprolol tartrate 25 MG tablet  Commonly known as: LOPRESSOR  Take 0.5 tablets by mouth in the morning and at bedtime  Replaces: metoprolol succinate 25 MG extended release tablet     pantoprazole 40 MG tablet  Commonly known as: PROTONIX  Take 1 tablet by mouth 2 times daily (before meals)            CONTINUE taking these medications      aspirin 81 MG EC tablet     docusate 50 MG/5ML

## 2024-04-17 LAB
EKG ATRIAL RATE: 73 BPM
EKG P AXIS: 50 DEGREES
EKG P-R INTERVAL: 182 MS
EKG Q-T INTERVAL: 442 MS
EKG QRS DURATION: 94 MS
EKG QTC CALCULATION (BAZETT): 486 MS
EKG R AXIS: 11 DEGREES
EKG T AXIS: 98 DEGREES
EKG VENTRICULAR RATE: 73 BPM

## 2024-04-24 ENCOUNTER — NURSE ONLY (OUTPATIENT)
Dept: UROLOGY | Age: 89
End: 2024-04-24

## 2024-04-24 NOTE — PROGRESS NOTES
DIAGNOSIS/INDICATION:  OAB    Pt saw janes today for [employer] rep visit. See scanned [employer] Office Note under Media tab in chart.

## 2024-04-26 NOTE — PROGRESS NOTES
"I called pt and gave him positive covid results per BAM.  Pt states sx started 6/14 and is interested in paxlovid tx.  Pt states he is still c/o cough and \"plugged up in his chest\"  Pt was then transferred to scheduling for appt virtual visit with BAM today.  Eldon Pike CMA  " I have personally verified, reviewed, and approved these actions.

## 2024-05-10 LAB — ECHO BSA: 2.01 M2

## 2024-05-31 ENCOUNTER — HOSPITAL ENCOUNTER (OUTPATIENT)
Dept: CT IMAGING | Age: 89
Discharge: HOME | End: 2024-05-31
Attending: RADIOLOGY

## 2024-05-31 DIAGNOSIS — Z00.6 ENCOUNTER FOR EXAMINATION FOR NORMAL COMPARISON OR CONTROL IN CLINICAL RESEARCH PROGRAM: ICD-10-CM

## 2024-06-17 ENCOUNTER — HOSPITAL ENCOUNTER (OUTPATIENT)
Dept: ULTRASOUND IMAGING | Age: 89
Discharge: HOME | End: 2024-06-17
Attending: RADIOLOGY

## 2024-06-17 DIAGNOSIS — Z00.6 ENCOUNTER FOR EXAMINATION FOR NORMAL COMPARISON OR CONTROL IN CLINICAL RESEARCH PROGRAM: ICD-10-CM

## 2024-09-12 ENCOUNTER — OFFICE VISIT (OUTPATIENT)
Dept: UROLOGY | Age: 89
End: 2024-09-12
Payer: MEDICARE

## 2024-09-12 VITALS
WEIGHT: 176 LBS | BODY MASS INDEX: 26.07 KG/M2 | SYSTOLIC BLOOD PRESSURE: 128 MMHG | HEIGHT: 69 IN | HEART RATE: 78 BPM | DIASTOLIC BLOOD PRESSURE: 68 MMHG

## 2024-09-12 DIAGNOSIS — R35.0 URINARY FREQUENCY: Primary | ICD-10-CM

## 2024-09-12 LAB
BILIRUBIN, URINE: NEGATIVE
BLOOD URINE, POC: NEGATIVE
CHARACTER, URINE: CLEAR
COLOR, UA: YELLOW
GLUCOSE URINE: NEGATIVE MG/DL
KETONES, URINE: NEGATIVE
LEUKOCYTE CLUMPS, URINE: NEGATIVE
NITRITE, URINE: NEGATIVE
PH, URINE: 5.5 (ref 5–9)
POST VOID RESIDUAL (PVR): 208 ML
PROTEIN, URINE: NEGATIVE MG/DL
SPECIFIC GRAVITY UA: 1.02 (ref 1–1.03)
UROBILINOGEN, URINE: 1 EU/DL (ref 0–1)

## 2024-09-12 PROCEDURE — 51798 US URINE CAPACITY MEASURE: CPT

## 2024-09-12 PROCEDURE — 1123F ACP DISCUSS/DSCN MKR DOCD: CPT

## 2024-09-12 PROCEDURE — 81003 URINALYSIS AUTO W/O SCOPE: CPT

## 2024-09-12 PROCEDURE — 99214 OFFICE O/P EST MOD 30 MIN: CPT

## 2024-09-12 ASSESSMENT — ENCOUNTER SYMPTOMS: CONSTIPATION: 1

## 2024-10-21 ENCOUNTER — OFFICE VISIT (OUTPATIENT)
Dept: CARDIOLOGY CLINIC | Age: 89
End: 2024-10-21
Payer: MEDICARE

## 2024-10-21 VITALS
HEIGHT: 69 IN | BODY MASS INDEX: 25.99 KG/M2 | DIASTOLIC BLOOD PRESSURE: 82 MMHG | HEART RATE: 103 BPM | SYSTOLIC BLOOD PRESSURE: 132 MMHG

## 2024-10-21 DIAGNOSIS — I25.10 CORONARY ARTERY DISEASE INVOLVING NATIVE CORONARY ARTERY OF NATIVE HEART WITHOUT ANGINA PECTORIS: ICD-10-CM

## 2024-10-21 DIAGNOSIS — I35.0 NONRHEUMATIC AORTIC VALVE STENOSIS: Primary | ICD-10-CM

## 2024-10-21 PROCEDURE — 99214 OFFICE O/P EST MOD 30 MIN: CPT | Performed by: PHYSICIAN ASSISTANT

## 2024-10-21 PROCEDURE — 1123F ACP DISCUSS/DSCN MKR DOCD: CPT | Performed by: PHYSICIAN ASSISTANT

## 2024-10-21 NOTE — PROGRESS NOTES
[facility] [place] [facility]  [facility] CARDIOLOGY  [address]  [address]  [facility] [address]  Dept: [phone]  Dept Fax: [phone]  Loc: [phone]    Chief Complaint   Patient presents with    Follow-up     Patient with a history of nonobstructive coronary artery disease, mild to moderate aortic stenosis and recent bilateral PEs presents for follow-up after recent hospitalization.  Patient states he was told during his hospitalization a lot of his symptoms were due to the fact that he had severe anemia.  He has some intermittent shortness of breath.  He denies any chest pain or palpitations.  He has not any issues with fluid retention such as peripheral edema or weight gain.  Cardiologist:  Dr. Pearl        General:   No fever, no chills, No fatigue or weight loss  Pulmonary:   Intermittent shortness of breath   cardiac:    Denies recent chest pain   GI:     No nausea or vomiting, no abdominal pain  Neuro:    No dizziness or light headedness  Musculoskeletal:  No recent active issues  Extremities:   No edema, good peripheral pulses      Past Medical History:   Diagnosis Date    Alzheimer's dementia ([facility])     Arthritis     Cerebral artery occlusion with cerebral infarction ([facility])     weakness right hand    DM (diabetes mellitus) ([facility])     DVT (deep venous thrombosis) ([facility])     Hyperlipidemia     Hypertension     Pulmonary embolism ([facility])     Stroke ([facility])     Urinary incontinence     Urinary retention        No Known Allergies    Current Outpatient Medications   Medication Sig Dispense Refill    metoprolol tartrate (LOPRESSOR) 25 MG tablet Take 0.5 tablets by mouth in the morning and at bedtime 60 tablet 3    memantine (NAMENDA) 5 MG tablet Take 1 tablet by mouth daily 60 tablet 3    ferrous sulfate (IRON 325) 325 (65 Fe) MG tablet Take 1 tablet by mouth every other day 30 tablet 3    pantoprazole (PROTONIX) 40 MG tablet Take 1 tablet by mouth 2 times daily (before meals)

## 2024-11-01 ENCOUNTER — HOSPITAL ENCOUNTER (OUTPATIENT)
Age: 89
Discharge: HOME | End: 2024-11-03
Attending: PHYSICIAN ASSISTANT
Payer: MEDICARE

## 2024-11-01 VITALS
BODY MASS INDEX: 26.07 KG/M2 | WEIGHT: 176 LBS | DIASTOLIC BLOOD PRESSURE: 82 MMHG | HEIGHT: 69 IN | SYSTOLIC BLOOD PRESSURE: 132 MMHG

## 2024-11-01 DIAGNOSIS — I35.0 NONRHEUMATIC AORTIC VALVE STENOSIS: ICD-10-CM

## 2024-11-01 LAB
ECHO AO ASC DIAM: 3.3 CM
ECHO AO ASCENDING AORTA INDEX: 1.68 CM/M2
ECHO AV AREA PEAK VELOCITY: 0.7 CM2
ECHO AV AREA VTI: 0.8 CM2
ECHO AV AREA/BSA PEAK VELOCITY: 0.4 CM2/M2
ECHO AV AREA/BSA VTI: 0.4 CM2/M2
ECHO AV CUSP MM: 0.9 CM
ECHO AV MEAN GRADIENT: 37 MMHG
ECHO AV MEAN VELOCITY: 2.8 M/S
ECHO AV PEAK GRADIENT: 62 MMHG
ECHO AV PEAK VELOCITY: 4 M/S
ECHO AV VELOCITY RATIO: 0.25
ECHO AV VTI: 87.1 CM
ECHO BSA: 1.97 M2
ECHO EST RA PRESSURE: 10 MMHG
ECHO LA AREA 2C: 21.2 CM2
ECHO LA AREA 4C: 21.1 CM2
ECHO LA DIAMETER INDEX: 1.94 CM/M2
ECHO LA DIAMETER: 3.8 CM
ECHO LA MAJOR AXIS: 5.2 CM
ECHO LA MINOR AXIS: 5.7 CM
ECHO LA VOL BP: 70 ML (ref 18–58)
ECHO LA VOL MOD A2C: 66 ML (ref 18–58)
ECHO LA VOL MOD A4C: 67 ML (ref 18–58)
ECHO LA VOL/BSA BIPLANE: 36 ML/M2 (ref 16–34)
ECHO LA VOLUME INDEX MOD A2C: 34 ML/M2 (ref 16–34)
ECHO LA VOLUME INDEX MOD A4C: 34 ML/M2 (ref 16–34)
ECHO LV E' LATERAL VELOCITY: 5.4 CM/S
ECHO LV E' SEPTAL VELOCITY: 4.5 CM/S
ECHO LV EJECTION FRACTION BIPLANE: 53 % (ref 55–100)
ECHO LV FRACTIONAL SHORTENING: 28 % (ref 28–44)
ECHO LV INTERNAL DIMENSION DIASTOLE INDEX: 2.76 CM/M2
ECHO LV INTERNAL DIMENSION DIASTOLIC: 5.4 CM (ref 4.2–5.9)
ECHO LV INTERNAL DIMENSION SYSTOLIC INDEX: 1.99 CM/M2
ECHO LV INTERNAL DIMENSION SYSTOLIC: 3.9 CM
ECHO LV ISOVOLUMETRIC RELAXATION TIME (IVRT): 53 MS
ECHO LV IVSD: 1.1 CM (ref 0.6–1)
ECHO LV MASS 2D: 234.8 G (ref 88–224)
ECHO LV MASS INDEX 2D: 119.8 G/M2 (ref 49–115)
ECHO LV POSTERIOR WALL DIASTOLIC: 1.1 CM (ref 0.6–1)
ECHO LV RELATIVE WALL THICKNESS RATIO: 0.41
ECHO LVOT AREA: 2.8 CM2
ECHO LVOT AV VTI INDEX: 0.26
ECHO LVOT DIAM: 1.9 CM
ECHO LVOT MEAN GRADIENT: 2 MMHG
ECHO LVOT PEAK GRADIENT: 4 MMHG
ECHO LVOT PEAK VELOCITY: 1 M/S
ECHO LVOT STROKE VOLUME INDEX: 33.1 ML/M2
ECHO LVOT SV: 64.9 ML
ECHO LVOT VTI: 22.9 CM
ECHO MV A VELOCITY: 1.6 M/S
ECHO MV AREA VTI: 1.3 CM2
ECHO MV E DECELERATION TIME (DT): 446 MS
ECHO MV E VELOCITY: 1.22 M/S
ECHO MV E/A RATIO: 0.76
ECHO MV E/E' LATERAL: 22.59
ECHO MV E/E' RATIO (AVERAGED): 24.85
ECHO MV E/E' SEPTAL: 27.11
ECHO MV LVOT VTI INDEX: 2.1
ECHO MV MAX VELOCITY: 1.8 M/S
ECHO MV MEAN GRADIENT: 5 MMHG
ECHO MV MEAN VELOCITY: 1 M/S
ECHO MV PEAK GRADIENT: 13 MMHG
ECHO MV VTI: 48.2 CM
ECHO PV MAX VELOCITY: 0.7 M/S
ECHO PV PEAK GRADIENT: 2 MMHG
ECHO RIGHT VENTRICULAR SYSTOLIC PRESSURE (RVSP): 30 MMHG
ECHO RV INTERNAL DIMENSION: 2.7 CM
ECHO RV TAPSE: 1.7 CM (ref 1.7–?)
ECHO TV E WAVE: 0.6 M/S
ECHO TV REGURGITANT MAX VELOCITY: 2.25 M/S
ECHO TV REGURGITANT PEAK GRADIENT: 20 MMHG

## 2024-11-01 PROCEDURE — 93306 TTE W/DOPPLER COMPLETE: CPT

## 2024-11-01 PROCEDURE — 93306 TTE W/DOPPLER COMPLETE: CPT | Performed by: NUCLEAR MEDICINE

## 2024-11-04 ENCOUNTER — TELEPHONE (OUTPATIENT)
Dept: CARDIOLOGY CLINIC | Age: 89
End: 2024-11-04

## 2024-11-04 NOTE — TELEPHONE ENCOUNTER
Tell him his AV got worse over the last few years   See if he wants to come in for appointment or if he wants to see Darrell and discuss options of possible TAVR   Make sure Tom get this too as he saw the patient and ordered the echo and I have not seen the patient in a while

## 2024-11-05 NOTE — TELEPHONE ENCOUNTER
Patient and spouse called in to confirm this appt and their granddaughter will be coming with him also. Tere.

## 2024-11-05 NOTE — TELEPHONE ENCOUNTER
Spoke to patient, he would like to discuss with Dr Pearl. Appt scheduled for tomorrow.  He has to check with his grand-daughter to make sure appt will work for her as she is their . He will call back if appointment does not work.

## 2024-11-06 ENCOUNTER — OFFICE VISIT (OUTPATIENT)
Dept: CARDIOLOGY CLINIC | Age: 89
End: 2024-11-06

## 2024-11-06 ENCOUNTER — TELEPHONE (OUTPATIENT)
Dept: CARDIOLOGY CLINIC | Age: 89
End: 2024-11-06

## 2024-11-06 VITALS
SYSTOLIC BLOOD PRESSURE: 112 MMHG | HEART RATE: 88 BPM | BODY MASS INDEX: 25.99 KG/M2 | DIASTOLIC BLOOD PRESSURE: 70 MMHG | HEIGHT: 69 IN

## 2024-11-06 DIAGNOSIS — I35.0 NONRHEUMATIC AORTIC VALVE STENOSIS: ICD-10-CM

## 2024-11-06 DIAGNOSIS — I25.10 CORONARY ARTERY DISEASE INVOLVING NATIVE CORONARY ARTERY OF NATIVE HEART WITHOUT ANGINA PECTORIS: ICD-10-CM

## 2024-11-06 DIAGNOSIS — I10 PRIMARY HYPERTENSION: Primary | ICD-10-CM

## 2024-11-06 DIAGNOSIS — R06.09 DYSPNEA ON EXERTION: ICD-10-CM

## 2024-11-06 DIAGNOSIS — I35.0 NONRHEUMATIC AORTIC VALVE STENOSIS: Primary | ICD-10-CM

## 2024-11-06 NOTE — TELEPHONE ENCOUNTER
Dr. Pearl requesting an appointment with Dr. Ramírez for TAVR.   Call granddaazam Aguilar to schedule at [phone].

## 2024-11-06 NOTE — PROGRESS NOTES
Patient here for follow up after ECHO.   
modification and medical management  Thank you for allowing me to participate in the care of your patient. Please don't hesitate to contact me regarding any further issues related to the patient care    Orders Placed:  No orders of the defined types were placed in this encounter.      Prescribed:  No orders of the defined types were placed in this encounter.         Discussed use, benefit, and side effects of prescribed medications. All patient questions answered. Pt voicedunderstanding. Instructed to continue current medications, diet and exercise. Continue risk factor modification and medical management. Patient agreed with treatment plan. Follow up as directed.    Electronically signedby Patricia Siddiqui MD on 11/6/2024 at 12:04 PM

## 2024-12-10 ENCOUNTER — TELEPHONE (OUTPATIENT)
Dept: CARDIOLOGY CLINIC | Age: 89
End: 2024-12-10

## 2024-12-10 NOTE — TELEPHONE ENCOUNTER
Patient sees Dr Ramírez on 12/12/24    Pre-TAVR workup:    Referring Provider: Patricia Siddiqui MD    History: Alzheimer's dementia, DM, DVT, HLD, HTN, PE, CVA    ECHO: Obtained on 11/1/24 with the below results:      EKG: Obtained on 4/14/24 resulting in NSR     Cardiac Catheterization:  needs performed    CHF appointment: needs referral    Dental Clearance: teeth vs dentures?    Labs: 5/13/24

## 2024-12-11 NOTE — PATIENT INSTRUCTIONS
Your nurses today were MARY Avila and MILTON Carreon  Your provider today was Dr. Ramírez  Phone number: [phone]      You may receive a survey regarding the care you received during your visit.  Your input is valuable to us.  We encourage you to complete and return your survey.  We hope you will choose us in the future for your healthcare needs.

## 2024-12-12 ENCOUNTER — OFFICE VISIT (OUTPATIENT)
Dept: CARDIOLOGY CLINIC | Age: 89
End: 2024-12-12
Payer: MEDICARE

## 2024-12-12 ENCOUNTER — TELEPHONE (OUTPATIENT)
Dept: CARDIOLOGY CLINIC | Age: 89
End: 2024-12-12

## 2024-12-12 VITALS
SYSTOLIC BLOOD PRESSURE: 122 MMHG | HEIGHT: 69 IN | BODY MASS INDEX: 25.99 KG/M2 | DIASTOLIC BLOOD PRESSURE: 78 MMHG | HEART RATE: 96 BPM

## 2024-12-12 DIAGNOSIS — I35.0 SEVERE AORTIC STENOSIS: Primary | ICD-10-CM

## 2024-12-12 PROCEDURE — 1123F ACP DISCUSS/DSCN MKR DOCD: CPT | Performed by: INTERNAL MEDICINE

## 2024-12-12 PROCEDURE — 99215 OFFICE O/P EST HI 40 MIN: CPT | Performed by: INTERNAL MEDICINE

## 2024-12-12 PROCEDURE — 1159F MED LIST DOCD IN RCRD: CPT | Performed by: INTERNAL MEDICINE

## 2024-12-12 NOTE — TELEPHONE ENCOUNTER
L&R Heart Cath scheduled 12/30/2024 at 1:00 pm.  Spoke to Ayah.  Placed on provider's schedule.  Order faxed.

## 2024-12-12 NOTE — PROGRESS NOTES
Patient here for consult, referral from [facility] for severe AS    ECHO 11/01/2024      
would preclude TAVR, that this will need to be evaluated prior to proceeding with a percutaneous approach.  Further, a unified heart team approach will be performed prior to proceeding with TAVR which includes the patient's primary care givers, cardiologist, and the entire structural heart team (interventionalist, CT surgeons, structural heart NP).       The patient and family appeared to understand everything, all of their questions were answered to their satisfaction and they are agreeable to proceed with further evaluation for TAVR.       Thank you for allowing us to participate in the care of this patient.  Please do not hesitate to call us with questions.       Assessment & Plan         Disposition:  TAVR         Discussed symptoms needing emergency care or those which require further medical attention.   Discussed diet/exercise/BP/weight loss/health lifestyle choices/lipids; the patient understands the goals and will try to comply.    The patient (or guardian, if applicable) and other individuals in attendance with the patient were advised that Artificial Intelligence will be utilized during this visit to record, process the conversation to generate a clinical note, and support improvement of the AI technology. The patient (or guardian, if applicable) and other individuals in attendance at the appointment consented to the use of AI, including the recording.      Electronically signed by Kaushal Ramírez MD   12/12/2024 at 11:41 AM EST

## 2024-12-12 NOTE — TELEPHONE ENCOUNTER
Orders received from Dr. Ramírez to obtain a CV/CT Surgery appointment, TAVR CTA, Cardiac Catheterization, Dental Clearance and CHF appointment for optimization.    CT/CV Surgery appointment scheduled on 12/17/24  CTA scheduled on 1/7/25 at 1300  Cardiac Catheterization scheduled with Dr. Pearl on 12/30/24 at 1300 with an arrival time of 1100  CHF appointment scheduled on 1/7/25 at 1130  Dental Appointment to be scheduled by pt's grand daughter Ilda.  She will contact us with appt date and time.  Plan is for patient to be scheduled with Dr. Newell in [place].  Clearance form aghs been faxed to [phone]    [facility].     Jael, can you please schedule patient's heart cath with Dr. Pearl?      Dr. Ramírez, please agree.

## 2024-12-17 ENCOUNTER — OFFICE VISIT (OUTPATIENT)
Dept: CARDIOTHORACIC SURGERY | Age: 89
End: 2024-12-17
Payer: MEDICARE

## 2024-12-17 VITALS
OXYGEN SATURATION: 95 % | HEIGHT: 69 IN | WEIGHT: 188 LBS | DIASTOLIC BLOOD PRESSURE: 69 MMHG | BODY MASS INDEX: 27.85 KG/M2 | HEART RATE: 97 BPM | SYSTOLIC BLOOD PRESSURE: 118 MMHG

## 2024-12-17 DIAGNOSIS — I35.0 AORTIC VALVE STENOSIS, ETIOLOGY OF CARDIAC VALVE DISEASE UNSPECIFIED: Primary | ICD-10-CM

## 2024-12-17 PROCEDURE — 1123F ACP DISCUSS/DSCN MKR DOCD: CPT | Performed by: PHYSICIAN ASSISTANT

## 2024-12-17 PROCEDURE — 1159F MED LIST DOCD IN RCRD: CPT | Performed by: PHYSICIAN ASSISTANT

## 2024-12-17 PROCEDURE — 1160F RVW MEDS BY RX/DR IN RCRD: CPT | Performed by: PHYSICIAN ASSISTANT

## 2024-12-17 PROCEDURE — 99204 OFFICE O/P NEW MOD 45 MIN: CPT | Performed by: PHYSICIAN ASSISTANT

## 2024-12-17 NOTE — PROGRESS NOTES
Pulmonary embolism (HCC)    YOLY (obstructive sleep apnea)    Hypertension    Hyperlipidemia    Dementia (HCC)    Coronary atherosclerosis    Aortic valve stenosis    Chest pain    Hx of deep venous thrombosis    Severe aortic stenosis     Assessment:   Severe Symptomatic Aortic Stenosis    Plan: 12/17/24  89 y.o. year-old male with severe symptomatic aortic stenosis.  Patient would clearly benefit from TAVR for reasons of age, frailty, STS score, and other co-morbidities. Recommend proceed with evaluation for TAVR.    Issues discussed in detail with the patient, who understands and has no further questions. Time spent with patient: 72 minutes, of which more than 50% was spent counseling/coordinating the patient's care.    The plan of care was discussed in detail with Dr. Daniel

## 2024-12-26 NOTE — PRE-PROCEDURE INSTRUCTIONS
No answer, left message to be Notified of Heart Cath procedure arrival time 1100 on Monday  [place] on 2nd floor of [facility]  NPO after midnight  Bring drivers license and insurance information  Wear comfortable clean clothes  Shower morning of and night before with liquid antibacterial soap  Remove jewelry   May have to stay overnight if have PTCA/stent - bring small overnight bag  Bring medications in original bottles - may take am meds with small amount of water.    Do not take any diabetic meds day of procedure.  Made aware of visitors limit to 2 at a time  Follow all instructions given by your physician  Please notify doctor office if you need to cancel or reschedule your procedure   needed at discharge  If you use CPap or BiPap for sleep apnea, please bring machine with you  Leave valuables at home

## 2024-12-27 ENCOUNTER — PREP FOR PROCEDURE (OUTPATIENT)
Dept: CARDIOLOGY | Age: 89
End: 2024-12-27

## 2024-12-27 RX ORDER — SODIUM CHLORIDE 0.9 % (FLUSH) 0.9 %
5-40 SYRINGE (ML) INJECTION EVERY 12 HOURS SCHEDULED
Status: CANCELLED | OUTPATIENT
Start: 2024-12-27

## 2024-12-27 RX ORDER — SODIUM CHLORIDE 0.9 % (FLUSH) 0.9 %
5-40 SYRINGE (ML) INJECTION PRN
Status: CANCELLED | OUTPATIENT
Start: 2024-12-27

## 2024-12-27 RX ORDER — SODIUM CHLORIDE 9 MG/ML
INJECTION, SOLUTION INTRAVENOUS CONTINUOUS
Status: CANCELLED | OUTPATIENT
Start: 2024-12-27

## 2024-12-27 RX ORDER — ASPIRIN 325 MG
325 TABLET ORAL ONCE
Status: CANCELLED | OUTPATIENT
Start: 2024-12-27 | End: 2024-12-27

## 2024-12-27 RX ORDER — NITROGLYCERIN 0.4 MG/1
0.4 TABLET SUBLINGUAL EVERY 5 MIN PRN
Status: CANCELLED | OUTPATIENT
Start: 2024-12-27

## 2024-12-27 RX ORDER — SODIUM CHLORIDE 9 MG/ML
INJECTION, SOLUTION INTRAVENOUS PRN
Status: CANCELLED | OUTPATIENT
Start: 2024-12-27

## 2024-12-30 ENCOUNTER — HOSPITAL ENCOUNTER (OUTPATIENT)
Age: 89
Discharge: HOME | End: 2024-12-31
Attending: NUCLEAR MEDICINE | Admitting: NUCLEAR MEDICINE
Payer: MEDICARE

## 2024-12-30 DIAGNOSIS — I35.0 SEVERE AORTIC STENOSIS: ICD-10-CM

## 2024-12-30 PROBLEM — I25.10 CAD IN NATIVE ARTERY: Status: ACTIVE | Noted: 2024-12-30

## 2024-12-30 LAB
ABO: NORMAL
ACTIVATED CLOTTING TIME: 164 SECONDS (ref 1–150)
ACTIVATED CLOTTING TIME: 199 SECONDS (ref 1–150)
ACTIVATED CLOTTING TIME: 216 SECONDS (ref 1–150)
ANION GAP SERPL CALC-SCNC: 11 MEQ/L (ref 8–16)
ANTIBODY SCREEN: NORMAL
BDY SITE: ABNORMAL
BDY SITE: ABNORMAL
BDY SITE: NORMAL
BUN SERPL-MCNC: 17 MG/DL (ref 7–22)
CALCIUM SERPL-MCNC: 8.8 MG/DL (ref 8.5–10.5)
CHLORIDE SERPL-SCNC: 104 MEQ/L (ref 98–111)
CHOLEST SERPL-MCNC: 146 MG/DL (ref 100–199)
CO2 SERPL-SCNC: 25 MEQ/L (ref 23–33)
COLLECTED BY:: ABNORMAL
COLLECTED BY:: ABNORMAL
COLLECTED BY:: NORMAL
CREAT SERPL-MCNC: 0.7 MG/DL (ref 0.4–1.2)
DEPRECATED MEAN GLUCOSE BLD GHB EST-ACNC: 159 MG/DL (ref 70–126)
DEPRECATED RDW RBC AUTO: 50.1 FL (ref 35–45)
ECHO BSA: 2.16 M2
ECHO BSA: 2.16 M2
EKG ATRIAL RATE: 84 BPM
EKG ATRIAL RATE: 91 BPM
EKG P AXIS: 42 DEGREES
EKG P AXIS: 79 DEGREES
EKG P-R INTERVAL: 178 MS
EKG P-R INTERVAL: 180 MS
EKG Q-T INTERVAL: 402 MS
EKG Q-T INTERVAL: 420 MS
EKG QRS DURATION: 86 MS
EKG QRS DURATION: 90 MS
EKG QTC CALCULATION (BAZETT): 475 MS
EKG QTC CALCULATION (BAZETT): 516 MS
EKG R AXIS: 12 DEGREES
EKG R AXIS: 64 DEGREES
EKG T AXIS: 6 DEGREES
EKG T AXIS: 69 DEGREES
EKG VENTRICULAR RATE: 84 BPM
EKG VENTRICULAR RATE: 91 BPM
ERYTHROCYTE [DISTWIDTH] IN BLOOD BY AUTOMATED COUNT: 13.6 % (ref 11.5–14.5)
GFR SERPL CREATININE-BSD FRML MDRD: 89 ML/MIN/1.73M2
GLUCOSE BLD STRIP.AUTO-MCNC: 149 MG/DL (ref 70–108)
GLUCOSE SERPL-MCNC: 125 MG/DL (ref 70–108)
HBA1C MFR BLD HPLC: 7.3 % (ref 4.4–6.4)
HCT VFR BLD AUTO: 36.2 % (ref 42–52)
HDLC SERPL-MCNC: 40 MG/DL
HGB BLD-MCNC: 11.7 GM/DL (ref 14–18)
INR PPP: 1 (ref 0.85–1.13)
LDLC SERPL CALC-MCNC: 77 MG/DL
MCH RBC QN AUTO: 32.5 PG (ref 26–33)
MCHC RBC AUTO-ENTMCNC: 32.3 GM/DL (ref 32.2–35.5)
MCV RBC AUTO: 100.6 FL (ref 80–94)
PLATELET # BLD AUTO: 277 THOU/MM3 (ref 130–400)
PMV BLD AUTO: 9.6 FL (ref 9.4–12.4)
POTASSIUM SERPL-SCNC: 3.8 MEQ/L (ref 3.5–5.2)
RBC # BLD AUTO: 3.6 MILL/MM3 (ref 4.7–6.1)
RH FACTOR: NORMAL
SAO2 % BLD: 65 % (ref 94–97)
SAO2 % BLD: 71 % (ref 94–97)
SAO2 % BLD: 95 % (ref 94–97)
SODIUM SERPL-SCNC: 140 MEQ/L (ref 135–145)
TRIGL SERPL-MCNC: 143 MG/DL (ref 0–199)
WBC # BLD AUTO: 9.1 THOU/MM3 (ref 4.8–10.8)

## 2024-12-30 PROCEDURE — 99152 MOD SED SAME PHYS/QHP 5/>YRS: CPT | Performed by: NUCLEAR MEDICINE

## 2024-12-30 PROCEDURE — 83036 HEMOGLOBIN GLYCOSYLATED A1C: CPT

## 2024-12-30 PROCEDURE — 82810 BLOOD GASES O2 SAT ONLY: CPT

## 2024-12-30 PROCEDURE — C1894 INTRO/SHEATH, NON-LASER: HCPCS | Performed by: NUCLEAR MEDICINE

## 2024-12-30 PROCEDURE — 99153 MOD SED SAME PHYS/QHP EA: CPT | Performed by: NUCLEAR MEDICINE

## 2024-12-30 PROCEDURE — 6370000000 HC RX 637 (ALT 250 FOR IP): Performed by: INTERNAL MEDICINE

## 2024-12-30 PROCEDURE — 6360000004 HC RX CONTRAST MEDICATION: Performed by: NUCLEAR MEDICINE

## 2024-12-30 PROCEDURE — C1725 CATH, TRANSLUMIN NON-LASER: HCPCS | Performed by: NUCLEAR MEDICINE

## 2024-12-30 PROCEDURE — 6360000002 HC RX W HCPCS: Performed by: NUCLEAR MEDICINE

## 2024-12-30 PROCEDURE — 85610 PROTHROMBIN TIME: CPT

## 2024-12-30 PROCEDURE — 85027 COMPLETE CBC AUTOMATED: CPT

## 2024-12-30 PROCEDURE — C1874 STENT, COATED/COV W/DEL SYS: HCPCS | Performed by: NUCLEAR MEDICINE

## 2024-12-30 PROCEDURE — 93005 ELECTROCARDIOGRAM TRACING: CPT | Performed by: INTERNAL MEDICINE

## 2024-12-30 PROCEDURE — 2709999900 HC NON-CHARGEABLE SUPPLY: Performed by: NUCLEAR MEDICINE

## 2024-12-30 PROCEDURE — 93460 R&L HRT ART/VENTRICLE ANGIO: CPT | Performed by: NUCLEAR MEDICINE

## 2024-12-30 PROCEDURE — 85347 COAGULATION TIME ACTIVATED: CPT

## 2024-12-30 PROCEDURE — 7100000011 HC PHASE II RECOVERY - ADDTL 15 MIN: Performed by: NUCLEAR MEDICINE

## 2024-12-30 PROCEDURE — 86901 BLOOD TYPING SEROLOGIC RH(D): CPT

## 2024-12-30 PROCEDURE — 93010 ELECTROCARDIOGRAM REPORT: CPT | Performed by: INTERNAL MEDICINE

## 2024-12-30 PROCEDURE — 36415 COLL VENOUS BLD VENIPUNCTURE: CPT

## 2024-12-30 PROCEDURE — 2500000003 HC RX 250 WO HCPCS: Performed by: NUCLEAR MEDICINE

## 2024-12-30 PROCEDURE — 82948 REAGENT STRIP/BLOOD GLUCOSE: CPT

## 2024-12-30 PROCEDURE — 7100000010 HC PHASE II RECOVERY - FIRST 15 MIN: Performed by: NUCLEAR MEDICINE

## 2024-12-30 PROCEDURE — 93005 ELECTROCARDIOGRAM TRACING: CPT | Performed by: NURSE PRACTITIONER

## 2024-12-30 PROCEDURE — C1769 GUIDE WIRE: HCPCS | Performed by: NUCLEAR MEDICINE

## 2024-12-30 PROCEDURE — 92928 PRQ TCAT PLMT NTRAC ST 1 LES: CPT | Performed by: NUCLEAR MEDICINE

## 2024-12-30 PROCEDURE — C1887 CATHETER, GUIDING: HCPCS | Performed by: NUCLEAR MEDICINE

## 2024-12-30 PROCEDURE — C9600 PERC DRUG-EL COR STENT SING: HCPCS | Performed by: INTERNAL MEDICINE

## 2024-12-30 PROCEDURE — 86900 BLOOD TYPING SEROLOGIC ABO: CPT

## 2024-12-30 PROCEDURE — 80048 BASIC METABOLIC PNL TOTAL CA: CPT

## 2024-12-30 PROCEDURE — 6360000002 HC RX W HCPCS: Performed by: INTERNAL MEDICINE

## 2024-12-30 PROCEDURE — 86850 RBC ANTIBODY SCREEN: CPT

## 2024-12-30 PROCEDURE — 2580000003 HC RX 258: Performed by: NURSE PRACTITIONER

## 2024-12-30 PROCEDURE — 92928 PRQ TCAT PLMT NTRAC ST 1 LES: CPT | Performed by: INTERNAL MEDICINE

## 2024-12-30 PROCEDURE — 80061 LIPID PANEL: CPT

## 2024-12-30 RX ORDER — GLUCAGON 1 MG/ML
1 KIT INJECTION PRN
Status: DISCONTINUED | OUTPATIENT
Start: 2024-12-30 | End: 2024-12-31 | Stop reason: HOSPADM

## 2024-12-30 RX ORDER — POLYETHYLENE GLYCOL 3350 17 G/17G
17 POWDER, FOR SOLUTION ORAL DAILY PRN
Status: DISCONTINUED | OUTPATIENT
Start: 2024-12-30 | End: 2024-12-31 | Stop reason: HOSPADM

## 2024-12-30 RX ORDER — DOCUSATE SODIUM 100 MG/1
100 CAPSULE, LIQUID FILLED ORAL EVERY OTHER DAY
Status: DISCONTINUED | OUTPATIENT
Start: 2025-01-01 | End: 2024-12-31 | Stop reason: HOSPADM

## 2024-12-30 RX ORDER — SODIUM CHLORIDE 9 MG/ML
INJECTION, SOLUTION INTRAVENOUS CONTINUOUS
Status: DISCONTINUED | OUTPATIENT
Start: 2024-12-30 | End: 2024-12-31 | Stop reason: HOSPADM

## 2024-12-30 RX ORDER — METOPROLOL TARTRATE 25 MG/1
12.5 TABLET, FILM COATED ORAL 2 TIMES DAILY
Status: DISCONTINUED | OUTPATIENT
Start: 2024-12-30 | End: 2024-12-31 | Stop reason: HOSPADM

## 2024-12-30 RX ORDER — SODIUM CHLORIDE 9 MG/ML
INJECTION, SOLUTION INTRAVENOUS PRN
Status: DISCONTINUED | OUTPATIENT
Start: 2024-12-30 | End: 2024-12-31 | Stop reason: HOSPADM

## 2024-12-30 RX ORDER — ASPIRIN 325 MG
325 TABLET ORAL ONCE
Status: DISCONTINUED | OUTPATIENT
Start: 2024-12-30 | End: 2024-12-30

## 2024-12-30 RX ORDER — PANTOPRAZOLE SODIUM 40 MG/1
40 TABLET, DELAYED RELEASE ORAL
Status: DISCONTINUED | OUTPATIENT
Start: 2024-12-30 | End: 2024-12-31 | Stop reason: HOSPADM

## 2024-12-30 RX ORDER — NITROGLYCERIN 0.4 MG/1
0.4 TABLET SUBLINGUAL EVERY 5 MIN PRN
Status: DISCONTINUED | OUTPATIENT
Start: 2024-12-30 | End: 2024-12-30 | Stop reason: HOSPADM

## 2024-12-30 RX ORDER — GLIPIZIDE 5 MG/1
2.5 TABLET ORAL
Status: DISCONTINUED | OUTPATIENT
Start: 2024-12-31 | End: 2024-12-31 | Stop reason: HOSPADM

## 2024-12-30 RX ORDER — FENTANYL CITRATE 50 UG/ML
INJECTION, SOLUTION INTRAMUSCULAR; INTRAVENOUS PRN
Status: DISCONTINUED | OUTPATIENT
Start: 2024-12-30 | End: 2024-12-30 | Stop reason: HOSPADM

## 2024-12-30 RX ORDER — MEMANTINE HYDROCHLORIDE 5 MG/1
5 TABLET ORAL DAILY
Status: DISCONTINUED | OUTPATIENT
Start: 2024-12-31 | End: 2024-12-31 | Stop reason: HOSPADM

## 2024-12-30 RX ORDER — IOPAMIDOL 755 MG/ML
INJECTION, SOLUTION INTRAVASCULAR PRN
Status: DISCONTINUED | OUTPATIENT
Start: 2024-12-30 | End: 2024-12-30 | Stop reason: HOSPADM

## 2024-12-30 RX ORDER — SODIUM CHLORIDE 0.9 % (FLUSH) 0.9 %
5-40 SYRINGE (ML) INJECTION PRN
Status: DISCONTINUED | OUTPATIENT
Start: 2024-12-30 | End: 2024-12-30 | Stop reason: HOSPADM

## 2024-12-30 RX ORDER — HEPARIN SODIUM 1000 [USP'U]/ML
INJECTION, SOLUTION INTRAVENOUS; SUBCUTANEOUS PRN
Status: DISCONTINUED | OUTPATIENT
Start: 2024-12-30 | End: 2024-12-30 | Stop reason: HOSPADM

## 2024-12-30 RX ORDER — ACETAMINOPHEN 325 MG/1
650 TABLET ORAL EVERY 4 HOURS PRN
Status: DISCONTINUED | OUTPATIENT
Start: 2024-12-30 | End: 2024-12-31 | Stop reason: HOSPADM

## 2024-12-30 RX ORDER — FERROUS SULFATE 325(65) MG
325 TABLET ORAL EVERY OTHER DAY
Status: DISCONTINUED | OUTPATIENT
Start: 2024-12-30 | End: 2024-12-31 | Stop reason: HOSPADM

## 2024-12-30 RX ORDER — DONEPEZIL HYDROCHLORIDE 10 MG/1
10 TABLET, FILM COATED ORAL NIGHTLY
Status: DISCONTINUED | OUTPATIENT
Start: 2024-12-30 | End: 2024-12-31 | Stop reason: HOSPADM

## 2024-12-30 RX ORDER — ASPIRIN 81 MG/1
81 TABLET ORAL DAILY
Status: DISCONTINUED | OUTPATIENT
Start: 2024-12-31 | End: 2024-12-31 | Stop reason: HOSPADM

## 2024-12-30 RX ORDER — ATORVASTATIN CALCIUM 20 MG/1
20 TABLET, FILM COATED ORAL DAILY
Status: DISCONTINUED | OUTPATIENT
Start: 2024-12-31 | End: 2024-12-30

## 2024-12-30 RX ORDER — SODIUM CHLORIDE 0.9 % (FLUSH) 0.9 %
5-40 SYRINGE (ML) INJECTION EVERY 12 HOURS SCHEDULED
Status: DISCONTINUED | OUTPATIENT
Start: 2024-12-30 | End: 2024-12-31 | Stop reason: HOSPADM

## 2024-12-30 RX ORDER — SODIUM CHLORIDE 9 MG/ML
INJECTION, SOLUTION INTRAVENOUS CONTINUOUS
Status: DISCONTINUED | OUTPATIENT
Start: 2024-12-30 | End: 2024-12-30 | Stop reason: HOSPADM

## 2024-12-30 RX ORDER — VITAMIN B COMPLEX
2000 TABLET ORAL DAILY
Status: DISCONTINUED | OUTPATIENT
Start: 2024-12-30 | End: 2024-12-31 | Stop reason: HOSPADM

## 2024-12-30 RX ORDER — METFORMIN HYDROCHLORIDE 750 MG/1
1500 TABLET, EXTENDED RELEASE ORAL
Status: DISCONTINUED | OUTPATIENT
Start: 2024-12-30 | End: 2024-12-31 | Stop reason: HOSPADM

## 2024-12-30 RX ORDER — SODIUM CHLORIDE 0.9 % (FLUSH) 0.9 %
5-40 SYRINGE (ML) INJECTION PRN
Status: DISCONTINUED | OUTPATIENT
Start: 2024-12-30 | End: 2024-12-31 | Stop reason: HOSPADM

## 2024-12-30 RX ORDER — QUETIAPINE FUMARATE 25 MG/1
25 TABLET, FILM COATED ORAL NIGHTLY
Status: DISCONTINUED | OUTPATIENT
Start: 2024-12-30 | End: 2024-12-31 | Stop reason: HOSPADM

## 2024-12-30 RX ORDER — CLOPIDOGREL 300 MG/1
TABLET, FILM COATED ORAL PRN
Status: DISCONTINUED | OUTPATIENT
Start: 2024-12-30 | End: 2024-12-30 | Stop reason: HOSPADM

## 2024-12-30 RX ORDER — DEXTROSE MONOHYDRATE 100 MG/ML
INJECTION, SOLUTION INTRAVENOUS CONTINUOUS PRN
Status: DISCONTINUED | OUTPATIENT
Start: 2024-12-30 | End: 2024-12-31 | Stop reason: HOSPADM

## 2024-12-30 RX ORDER — MIDAZOLAM HYDROCHLORIDE 1 MG/ML
INJECTION, SOLUTION INTRAMUSCULAR; INTRAVENOUS PRN
Status: DISCONTINUED | OUTPATIENT
Start: 2024-12-30 | End: 2024-12-30 | Stop reason: HOSPADM

## 2024-12-30 RX ORDER — SODIUM CHLORIDE 9 MG/ML
INJECTION, SOLUTION INTRAVENOUS PRN
Status: DISCONTINUED | OUTPATIENT
Start: 2024-12-30 | End: 2024-12-30 | Stop reason: HOSPADM

## 2024-12-30 RX ORDER — ATORVASTATIN CALCIUM 40 MG/1
40 TABLET, FILM COATED ORAL DAILY
Status: DISCONTINUED | OUTPATIENT
Start: 2024-12-30 | End: 2024-12-31 | Stop reason: HOSPADM

## 2024-12-30 RX ORDER — ATORVASTATIN CALCIUM 40 MG/1
40 TABLET, FILM COATED ORAL DAILY
Status: DISCONTINUED | OUTPATIENT
Start: 2024-12-30 | End: 2024-12-30

## 2024-12-30 RX ORDER — DOCUSATE SODIUM 100 MG/1
100 CAPSULE, LIQUID FILLED ORAL EVERY OTHER DAY
COMMUNITY

## 2024-12-30 RX ORDER — CLOPIDOGREL BISULFATE 75 MG/1
75 TABLET ORAL DAILY
Status: DISCONTINUED | OUTPATIENT
Start: 2024-12-31 | End: 2024-12-31 | Stop reason: HOSPADM

## 2024-12-30 RX ORDER — SODIUM CHLORIDE 0.9 % (FLUSH) 0.9 %
5-40 SYRINGE (ML) INJECTION EVERY 12 HOURS SCHEDULED
Status: DISCONTINUED | OUTPATIENT
Start: 2024-12-30 | End: 2024-12-30 | Stop reason: HOSPADM

## 2024-12-30 RX ADMIN — SODIUM CHLORIDE: 9 INJECTION, SOLUTION INTRAVENOUS at 12:02

## 2024-12-30 RX ADMIN — QUETIAPINE FUMARATE 25 MG: 25 TABLET ORAL at 22:53

## 2024-12-30 RX ADMIN — PANTOPRAZOLE SODIUM 40 MG: 40 TABLET, DELAYED RELEASE ORAL at 18:23

## 2024-12-30 RX ADMIN — DONEPEZIL HYDROCHLORIDE 10 MG: 10 TABLET, FILM COATED ORAL at 23:14

## 2024-12-30 RX ADMIN — ATORVASTATIN CALCIUM 40 MG: 40 TABLET, FILM COATED ORAL at 18:22

## 2024-12-30 RX ADMIN — METOPROLOL TARTRATE 12.5 MG: 25 TABLET, FILM COATED ORAL at 22:53

## 2024-12-30 NOTE — FLOWSHEET NOTE
Received from cath lab. Right arterial radial cath site stable. Vasc band and armboard cont. Right brachial venous site stable. Venous sheath to 0.9 normal saline cont. Family present. Pt denies pain or needs. Resting with easy resp. Taking sips of water.

## 2024-12-30 NOTE — PLAN OF CARE
Problem: Pain  Goal: Verbalizes/displays adequate comfort level or baseline comfort level  12/30/2024 1735 by Ruddy Frausto, RN  Outcome: Progressing  12/30/2024 1151 by Ruddy Frausto, RN  Outcome: Progressing     Problem: Safety - Adult  Goal: Free from fall injury  12/30/2024 1735 by Ruddy Frausto, RN  Outcome: Progressing  12/30/2024 1151 by Ruddy Frausto, RN  Outcome: Progressing     Problem: ABCDS Injury Assessment  Goal: Absence of physical injury  12/30/2024 1735 by Ruddy Frausto, RN  Outcome: Progressing  12/30/2024 1151 by Ruddy Frausto, RN  Outcome: Progressing

## 2024-12-30 NOTE — FLOWSHEET NOTE
Right brachial venous site dressed with gauze over qucikclot and tegaderm over gauze. Site soft and stable.

## 2024-12-30 NOTE — FLOWSHEET NOTE
Patient admitted to 2E11  via wheelchair for heart cath  Patient NPO. Patient accompanied by wife and granddaughter.  Vital signs obtained.   Assessment and data collection intiated.   Oriented to room.  Policies and procedures for 2E explained.   All questions answered with no further questions at this time.   Fall prevention and safety precautions discussed with patient.     Explained patients right to have family, representative or physician notified of their admission.  Patient has Declined for physician to be notified.  Patient has Declined for family/representative to be notified.

## 2024-12-30 NOTE — FLOWSHEET NOTE
5 Czech venous catheter removed from right brachial vein per Gibran Esparza. Manual pressure with quickclot initiated. 0.9 normal saline to right hand iv catheter external male urinary catheter with clear yellow urine to wall suction and collection cannister.

## 2024-12-30 NOTE — PLAN OF CARE
Problem: Pain  Goal: Verbalizes/displays adequate comfort level or baseline comfort level  Outcome: Progressing     Problem: Safety - Adult  Goal: Free from fall injury  Outcome: Progressing     Problem: ABCDS Injury Assessment  Goal: Absence of physical injury  Outcome: Progressing   Pt to have heart cath today

## 2024-12-30 NOTE — H&P
therapy use last 5 days  []No []Yes  Coumadin Use Last 5 Days []No []Yes  Other anticoagulant use last 5 days  []No []Yes  No current facility-administered medications for this encounter.    Current Outpatient Medications:     metoprolol tartrate (LOPRESSOR) 25 MG tablet, Take 0.5 tablets by mouth in the morning and at bedtime, Disp: 60 tablet, Rfl: 3    memantine (NAMENDA) 5 MG tablet, Take 1 tablet by mouth daily, Disp: 60 tablet, Rfl: 3    ferrous sulfate (IRON 325) 325 (65 Fe) MG tablet, Take 1 tablet by mouth every other day, Disp: 30 tablet, Rfl: 3    pantoprazole (PROTONIX) 40 MG tablet, Take 1 tablet by mouth 2 times daily (before meals), Disp: 60 tablet, Rfl: 2    polyethylene glycol (GLYCOLAX) 17 g packet, Take 1 packet by mouth daily as needed for Constipation, Disp: , Rfl:     aspirin 81 MG EC tablet, Take 1 tablet by mouth daily, Disp: , Rfl:     docusate (COLACE) 50 MG/5ML liquid, Take 5 mLs by mouth daily, Disp: , Rfl:     ELIQUIS 5 MG TABS tablet, Take 1 tablet by mouth 2 times daily, Disp: , Rfl:     Cyanocobalamin (VITAMIN B-12 IJ), Inject as directed, Disp: , Rfl:     Cholecalciferol (VITAMIN D3) 50 MCG (2000 UT) CAPS, Take by mouth, Disp: , Rfl:     QUEtiapine (SEROQUEL) 25 MG tablet, Take 1 tablet by mouth nightly, Disp: , Rfl:     donepezil (ARICEPT ODT) 10 MG disintegrating tablet, Take 1 tablet by mouth nightly, Disp: , Rfl:     glimepiride (AMARYL) 2 MG tablet, Take 1 tablet by mouth daily (with breakfast), Disp: 30 tablet, Rfl: 3    simvastatin (ZOCOR) 40 MG tablet, Take 1 tablet by mouth nightly, Disp: , Rfl:     metFORMIN (GLUCOPHAGE-XR) 750 MG extended release tablet, Take 2 tablets by mouth Daily with supper 2 tab  Instructed to hold for surgery, Disp: , Rfl:   Prior to Admission medications    Medication Sig Start Date End Date Taking? Authorizing Provider   metoprolol tartrate (LOPRESSOR) 25 MG tablet Take 0.5 tablets by mouth in the morning and at bedtime 4/16/24   Jose Angel Ramírez,

## 2024-12-31 VITALS
TEMPERATURE: 97.5 F | OXYGEN SATURATION: 95 % | BODY MASS INDEX: 29.92 KG/M2 | SYSTOLIC BLOOD PRESSURE: 176 MMHG | RESPIRATION RATE: 16 BRPM | DIASTOLIC BLOOD PRESSURE: 80 MMHG | HEART RATE: 85 BPM | WEIGHT: 209 LBS | HEIGHT: 70 IN

## 2024-12-31 LAB
ANION GAP SERPL CALC-SCNC: 15 MEQ/L (ref 8–16)
BUN SERPL-MCNC: 13 MG/DL (ref 7–22)
CALCIUM SERPL-MCNC: 8.5 MG/DL (ref 8.5–10.5)
CHLORIDE SERPL-SCNC: 103 MEQ/L (ref 98–111)
CO2 SERPL-SCNC: 21 MEQ/L (ref 23–33)
CREAT SERPL-MCNC: 0.7 MG/DL (ref 0.4–1.2)
DEPRECATED RDW RBC AUTO: 50.9 FL (ref 35–45)
ERYTHROCYTE [DISTWIDTH] IN BLOOD BY AUTOMATED COUNT: 13.6 % (ref 11.5–14.5)
GFR SERPL CREATININE-BSD FRML MDRD: 89 ML/MIN/1.73M2
GLUCOSE BLD STRIP.AUTO-MCNC: 130 MG/DL (ref 70–108)
GLUCOSE SERPL-MCNC: 109 MG/DL (ref 70–108)
HCT VFR BLD AUTO: 36.1 % (ref 42–52)
HGB BLD-MCNC: 11.7 GM/DL (ref 14–18)
MCH RBC QN AUTO: 32.8 PG (ref 26–33)
MCHC RBC AUTO-ENTMCNC: 32.4 GM/DL (ref 32.2–35.5)
MCV RBC AUTO: 101.1 FL (ref 80–94)
PLATELET # BLD AUTO: 286 THOU/MM3 (ref 130–400)
PMV BLD AUTO: 10.2 FL (ref 9.4–12.4)
POTASSIUM SERPL-SCNC: 4.1 MEQ/L (ref 3.5–5.2)
RBC # BLD AUTO: 3.57 MILL/MM3 (ref 4.7–6.1)
SODIUM SERPL-SCNC: 139 MEQ/L (ref 135–145)
WBC # BLD AUTO: 9.8 THOU/MM3 (ref 4.8–10.8)

## 2024-12-31 PROCEDURE — 85027 COMPLETE CBC AUTOMATED: CPT

## 2024-12-31 PROCEDURE — 2500000003 HC RX 250 WO HCPCS: Performed by: INTERNAL MEDICINE

## 2024-12-31 PROCEDURE — 80048 BASIC METABOLIC PNL TOTAL CA: CPT

## 2024-12-31 PROCEDURE — 36415 COLL VENOUS BLD VENIPUNCTURE: CPT

## 2024-12-31 PROCEDURE — 82948 REAGENT STRIP/BLOOD GLUCOSE: CPT

## 2024-12-31 PROCEDURE — 99232 SBSQ HOSP IP/OBS MODERATE 35: CPT | Performed by: STUDENT IN AN ORGANIZED HEALTH CARE EDUCATION/TRAINING PROGRAM

## 2024-12-31 PROCEDURE — 2580000003 HC RX 258: Performed by: INTERNAL MEDICINE

## 2024-12-31 PROCEDURE — 6370000000 HC RX 637 (ALT 250 FOR IP): Performed by: INTERNAL MEDICINE

## 2024-12-31 RX ORDER — CLOPIDOGREL BISULFATE 75 MG/1
75 TABLET ORAL DAILY
Qty: 30 TABLET | Refills: 3 | Status: SHIPPED | OUTPATIENT
Start: 2024-12-31

## 2024-12-31 RX ORDER — NITROGLYCERIN 0.4 MG/1
0.4 TABLET SUBLINGUAL EVERY 5 MIN PRN
Qty: 25 TABLET | Refills: 3 | Status: SHIPPED | OUTPATIENT
Start: 2024-12-31

## 2024-12-31 RX ORDER — ATORVASTATIN CALCIUM 40 MG/1
40 TABLET, FILM COATED ORAL DAILY
Qty: 30 TABLET | Refills: 3 | Status: SHIPPED | OUTPATIENT
Start: 2024-12-31

## 2024-12-31 RX ADMIN — SODIUM CHLORIDE: 9 INJECTION, SOLUTION INTRAVENOUS at 02:38

## 2024-12-31 RX ADMIN — CLOPIDOGREL BISULFATE 75 MG: 75 TABLET ORAL at 08:12

## 2024-12-31 RX ADMIN — PANTOPRAZOLE SODIUM 40 MG: 40 TABLET, DELAYED RELEASE ORAL at 06:53

## 2024-12-31 RX ADMIN — SODIUM CHLORIDE, PRESERVATIVE FREE 10 ML: 5 INJECTION INTRAVENOUS at 09:30

## 2024-12-31 RX ADMIN — ASPIRIN 81 MG: 81 TABLET, COATED ORAL at 08:12

## 2024-12-31 RX ADMIN — ATORVASTATIN CALCIUM 40 MG: 40 TABLET, FILM COATED ORAL at 08:13

## 2024-12-31 RX ADMIN — ACETAMINOPHEN 650 MG: 325 TABLET ORAL at 06:55

## 2024-12-31 RX ADMIN — METOPROLOL TARTRATE 12.5 MG: 25 TABLET, FILM COATED ORAL at 08:13

## 2024-12-31 ASSESSMENT — PAIN SCALES - GENERAL: PAINLEVEL_OUTOF10: 6

## 2024-12-31 NOTE — PROGRESS NOTES
Cardiology Progress Note      Patient:  Sherwin Phelps  YOB: 1935  MRN: [id number]   Acct: [id number]  Admit Date:  12/30/2024  Primary Cardiologist: Patricia Siddiqui MD    Patient presented for OP elective LHC in workup for TAVR, now s/p PCI to LAD    Subjective (Events in last 24 hours):   Pt awake, alert. NAD. Denies cp or sob, no swelling. D/w patient about importance of plavix and asa for new heart stents. D/w patient about cardiac rehab will reach out. Recommend this. They understand to monitor and watch cath site for any new or worsening pain/swelling/etc and return to ED if experiencing any. They will f/u in 1-3 weeks in the office per scheduling.     Plavix, eliquis, asa x 1 month then stop aspirin unless told otherwise.     Right radial-minimal ecchymosis, mildly tender, no edema, NVI, good radial pulse     Objective:   BP (!) 140/99   Pulse 87   Temp 97.9 °F (36.6 °C) (Oral)   Resp 16   Ht 1.765 m (5' 9.5\")   Wt 94.8 kg (208 lb 15.9 oz)   SpO2 95%   BMI 30.42 kg/m²      vss  TELEMETRY: nsr     Physical Exam:  General Appearance: alert and oriented to person, place and time, in no acute distress  Cardiovascular: normal rate, regular rhythm, normal S1 and S2, no murmurs, rubs, clicks, or gallops, distal pulses intact, no carotid bruits, no JVD  Pulmonary/Chest: clear to auscultation bilaterally- no wheezes, rales or rhonchi, normal air movement, no respiratory distress  Abdomen: soft, non-tender, non-distended, normal bowel sounds, no masses   Extremities: no cyanosis, clubbing or edema, pulse   Skin: warm and dry, no rash or erythema  Neurological: alert, oriented, normal speech, no focal findings or movement disorder noted    Medications:    [Held by provider] apixaban  5 mg Oral BID    [Held by provider] metFORMIN  1,500 mg Oral Dinner    QUEtiapine  25 mg Oral Nightly    pantoprazole  40 mg Oral BID AC    metoprolol tartrate  12.5 mg Oral BID    memantine  5 mg Oral Daily

## 2024-12-31 NOTE — DISCHARGE INSTRUCTIONS
F/u per Dr Ramírez's orders for valve workup.     Cont eliquis and plavix and aspirin for 1 month. After 1 month stop aspirin and continue plavix and eliquis.     Discharge Instructions for Radial Heart Catherization    1.  Take it easy for 3-4 days.  2.  No driving for 2 days.  3.  No lifting of 5 lbs or more for 5 days with the affected arm.  4.  Can shower after 24 hours.  5.  Remove arm board after 24 hours.  6.  Apply a band aid to the insertion site daily for 5 days.  May apply antibiotic ointment if desired, but not necessary.  Wash site daily with soap and water.  7.  No creams, ointments, or powders near the insertion site.   8.  No tub baths, swimming, hot tubs, or hand washing dishes for 1 week.  9.  Watch for signs of infection (redness, warmth, swelling, or pus drainage) or coolness of extremity and call physician if this occurs  10.  If bleeding occurs from insertion site, apply pressure and call 911.

## 2024-12-31 NOTE — PLAN OF CARE
Problem: Pain  Goal: Verbalizes/displays adequate comfort level or baseline comfort level  12/31/2024 0257 by Zac Alvarez RN  Outcome: Progressing  12/30/2024 1735 by Ruddy Frausto RN  Outcome: Progressing     Problem: Safety - Adult  Goal: Free from fall injury  12/31/2024 0257 by Zac Alvarez RN  Outcome: Progressing  12/30/2024 1735 by Ruddy Frausto RN  Outcome: Progressing     Problem: ABCDS Injury Assessment  Goal: Absence of physical injury  12/31/2024 0257 by Zac Alvarez RN  Outcome: Progressing  12/30/2024 1735 by Ruddy Frausto RN  Outcome: Progressing     Problem: Chronic Conditions and Co-morbidities  Goal: Patient's chronic conditions and co-morbidity symptoms are monitored and maintained or improved  Outcome: Progressing     Problem: Discharge Planning  Goal: Discharge to home or other facility with appropriate resources  Outcome: Progressing     Problem: Skin/Tissue Integrity  Goal: Absence of new skin breakdown  Description: 1.  Monitor for areas of redness and/or skin breakdown  2.  Assess vascular access sites hourly  3.  Every 4-6 hours minimum:  Change oxygen saturation probe site  4.  Every 4-6 hours:  If on nasal continuous positive airway pressure, respiratory therapy assess nares and determine need for appliance change or resting period.  Outcome: Progressing

## 2024-12-31 NOTE — PROGRESS NOTES
Inpatient Cardiac Rehabilitation Consult    Received consult for Phase II Cardiac Rehabilitation.  Patient needs cardiac rehab due to PCI on 12/31/24. Pt needs TAVR. Pre-TAVR appt scheduled for 1/7/25. Will f/u with pt after TAVR procedure to start cardiac rehab.

## 2024-12-31 NOTE — PROGRESS NOTES
Spiritual Health History and Assessment/Progress Note  [facility]    (P) Advance Care Planning,  ,  ,      Name: Sherwin Phelps MRN: [id number]    Age: 89 y.o.     Sex: male   Language: English   Jehovah's witness: Non-Congregational   Severe aortic stenosis     Date: 12/31/2024            Total Time Calculated: (P) 5 min              Spiritual Assessment began in [facility]        Referral/Consult From: (P) Nurse   Encounter Overview/Reason: (P) Advance Care Planning  Service Provided For: (P) Patient, Family, Patient and family together, Significant other    Jeanna, Belief, Meaning:   Patient identifies as spiritual  Family/Friends identify as spiritual      Importance and Influence:  Patient has spiritual/personal beliefs that influence decisions regarding their health  Family/Friends have spiritual/personal beliefs that influence decisions regarding the patient's health    Community:  Patient feels well-supported. Support system includes: Spouse/Partner and Children  Family/Friends feel well-supported. Support system includes: Extended family    Assessment and Plan of Care:     Patient Interventions include: Assisted in Advance Care Planning conversation  Family/Friends Interventions include: Assisted in Advance Care Planning conversation    Patient Plan of Care: Spiritual Care available upon further referral  Family/Friends Plan of Care: Spiritual Care available upon further referral    Electronically signed by Chaplain Nidhi on 12/31/2024 at 10:49 AM

## 2024-12-31 NOTE — ACP (ADVANCE CARE PLANNING)
Advance Care Planning     Advance Care Planning Inpatient Note  [facility] Department    Today's Date: 12/31/2024  Unit: [facility]    Received request from HealthCare Provider.  Upon review of chart and communication with care team, patient's decision making abilities are not in question.. Patient, Spouse, and Child/Children was/were present in the room during visit.    Goals of ACP Conversation:  Discuss advance care planning documents  Facilitate a discussion related to patient's goals of care as they align with the patient's values and beliefs.    Health Care Decision Makers:     No healthcare decision makers have been documented.    Summary:  No Decision Maker named by patient at this time    Advance Care Planning Documents (Patient Wishes):  Healthcare Power of /Advance Directive Appointment of Health Care Agent     Assessment:  The patient declined completing the file. The patient's daughter however was very interested in     Interventions:  Patient DECLINED ACP conversation    Care Preferences Communicated:   No    Outcomes/Plan:  ACP Discussion: Refused    Electronically signed by Chaplain Nidhi on 12/31/2024 at 10:54 AM

## 2025-01-07 ENCOUNTER — OFFICE VISIT (OUTPATIENT)
Dept: CARDIOLOGY CLINIC | Age: 89
End: 2025-01-07
Payer: MEDICARE

## 2025-01-07 ENCOUNTER — HOSPITAL ENCOUNTER (OUTPATIENT)
Dept: CT IMAGING | Age: 89
Discharge: HOME | End: 2025-01-07
Payer: MEDICARE

## 2025-01-07 VITALS
WEIGHT: 189.6 LBS | HEART RATE: 93 BPM | DIASTOLIC BLOOD PRESSURE: 72 MMHG | OXYGEN SATURATION: 96 % | BODY MASS INDEX: 27.6 KG/M2 | SYSTOLIC BLOOD PRESSURE: 122 MMHG

## 2025-01-07 DIAGNOSIS — I35.0 SEVERE AORTIC STENOSIS: ICD-10-CM

## 2025-01-07 DIAGNOSIS — I50.32 CHRONIC HEART FAILURE WITH PRESERVED EJECTION FRACTION (HCC): Primary | ICD-10-CM

## 2025-01-07 DIAGNOSIS — I25.10 ATHEROSCLEROSIS OF NATIVE CORONARY ARTERY OF NATIVE HEART WITHOUT ANGINA PECTORIS: ICD-10-CM

## 2025-01-07 PROCEDURE — 1123F ACP DISCUSS/DSCN MKR DOCD: CPT | Performed by: NURSE PRACTITIONER

## 2025-01-07 PROCEDURE — 6360000004 HC RX CONTRAST MEDICATION: Performed by: INTERNAL MEDICINE

## 2025-01-07 PROCEDURE — 1160F RVW MEDS BY RX/DR IN RCRD: CPT | Performed by: NURSE PRACTITIONER

## 2025-01-07 PROCEDURE — 71275 CT ANGIOGRAPHY CHEST: CPT

## 2025-01-07 PROCEDURE — 1159F MED LIST DOCD IN RCRD: CPT | Performed by: NURSE PRACTITIONER

## 2025-01-07 PROCEDURE — 70498 CT ANGIOGRAPHY NECK: CPT

## 2025-01-07 PROCEDURE — 74174 CTA ABD&PLVS W/CONTRAST: CPT

## 2025-01-07 PROCEDURE — 99214 OFFICE O/P EST MOD 30 MIN: CPT | Performed by: NURSE PRACTITIONER

## 2025-01-07 RX ORDER — FUROSEMIDE 20 MG/1
TABLET ORAL
Qty: 30 TABLET | Refills: 1 | Status: SHIPPED | OUTPATIENT
Start: 2025-01-07

## 2025-01-07 RX ORDER — IOPAMIDOL 755 MG/ML
125 INJECTION, SOLUTION INTRAVASCULAR
Status: COMPLETED | OUTPATIENT
Start: 2025-01-07 | End: 2025-01-07

## 2025-01-07 RX ORDER — CYANOCOBALAMIN 1000 UG/ML
1000 INJECTION, SOLUTION INTRAMUSCULAR; SUBCUTANEOUS
COMMUNITY

## 2025-01-07 RX ORDER — PIOGLITAZONE 30 MG/1
30 TABLET ORAL DAILY
COMMUNITY

## 2025-01-07 RX ADMIN — IOPAMIDOL 125 ML: 755 INJECTION, SOLUTION INTRAVENOUS at 13:43

## 2025-01-07 ASSESSMENT — ENCOUNTER SYMPTOMS
SHORTNESS OF BREATH: 0
ABDOMINAL DISTENTION: 0
COUGH: 0

## 2025-01-07 NOTE — PATIENT INSTRUCTIONS
You may receive a survey regarding the care you received during your visit.  Your input is valuable to us.  We encourage you to complete and return your survey.  We hope you will choose us in the future for your healthcare needs.    Your nurses today were Elisa Granados and Kayley.  Office hours:   Mon-Thurs 8-4:30  Friday 8-12  Phone: [phone]    Continue:  Continue current medications  Daily weights and record  Fluid restriction of 2 Liters per day  Limit sodium in diet to around 7229-5950 mg/day  Monitor BP    Call the Heart Failure Clinic for any of the following symptoms:   Weight gain of 3 pounds in 1 day or 5 pounds in 1 week  Increased shortness of breath  Shortness of breath while laying down  Chest pain  Swelling in feet, ankles or legs  Bloating in abdomen  Fatigue

## 2025-01-07 NOTE — PROGRESS NOTES
Heart Failure Clinic       Visit Date: 1/7/2025  Cardiologist:  Dr. Ryanne Ramírez  Primary Care Physician: Dr. Silva, Diego Harden Jr.,   Referred by: Darrell    Sherwin Phelps is a 89 y.o. male who presents today for:  Chief Complaint   Patient presents with    Valvular Heart Disease       HPI:   Sherwin Phelps is a 89 y.o. male who presents to the office for a patient visit in the heart failure clinic.  Accompanied by wife Tico Stanton Ilda    TYPE HF: HFpEF    Cause:   Valves:  Severe AS (FRANK 0.8, mean gradient 37 mmHg)   Mild MR, mild TR  Device: no  HX: CAD s/p PCI (12/2024), Alzheimer's dementia, DM, DVT/PE (Eliquis), HLD, HTN, CVA(2011), hip fx    Dry Wt:  189#    TAVR workup   Cath last week w/ PCI   RH pressures mild elevated - RA 7, PCW 21  Today 1/2025 - 189#  Wheelchair today - uses walker at home.   BP good today - has not been checking at home - used to run low, been off Lopressor for about year.   Grddtr notes some mild left ankle edema recent - good today.  Denies SOB, CP, orthopnea (sleeps in bed), bloating.    Little cough noted per wife = fairly nonproductive  Some mild crackles noted left base.      Past Medical History:   Diagnosis Date    Alzheimer's dementia (HCC)     Arthritis     Cerebral artery occlusion with cerebral infarction (HCC)     weakness right hand    DM (diabetes mellitus) (HCC)     DVT (deep venous thrombosis) (HCC)     Hyperlipidemia     Hypertension     Pulmonary embolism (HCC)     Stroke (HCC)     Urinary incontinence     Urinary retention      Past Surgical History:   Procedure Laterality Date    CARDIAC CATHETERIZATION  06/19/2020    CARDIAC PROCEDURE N/A 12/30/2024    Left and right heart cath / coronary angiography w grafts performed by Patricia Siddiqui MD at [facility] CARDIAC CATH LAB    CARDIAC PROCEDURE N/A 12/30/2024    Percutaneous coronary intervention performed by Blake Brownlee MD at [facility] CARDIAC CATH LAB    CARDIOVASCULAR STRESS TEST      CYSTOSCOPY N/A

## 2025-01-07 NOTE — PROGRESS NOTES
Pre Op TAVR Cardiac Rehab Education    Sherwin was brought to Cardiac Rehab by Structural Heart staff.  Discussed the importance of Cardiac Rehab with Sherwin.  Reviewed cardiac rehab class times.  Patient questions answered.  I explained we will contact at home to schedule evaluation appointment after TAVR procedure. Cardiac Rehab brochure given.

## 2025-01-07 NOTE — TELEPHONE ENCOUNTER
Patient follows with an oral surgeon tomorrow with [facility] in [place] for a consultation.    Phone: [phone]  Fax: [phone]

## 2025-01-07 NOTE — TELEPHONE ENCOUNTER
Dr Pearl,    Patient needs clearance for dental extractions.Patient is on Brilinta and Eliquis. Patient recently had PCI on 12/30/24.

## 2025-01-08 ENCOUNTER — TELEPHONE (OUTPATIENT)
Dept: CARDIOLOGY CLINIC | Age: 89
End: 2025-01-08

## 2025-01-09 NOTE — TELEPHONE ENCOUNTER
Pt had 2 extractions completed 1/9.  Dental clearance was received from Dr. Lino Alvarez and scanned into chart.

## 2025-01-16 NOTE — TELEPHONE ENCOUNTER
Patient had recent teeth extractions and is heading back to the oral surgeon right now due to increased bleeding in his gums. He is on triple therapy for DVT/PE as well as recent PCI in December. Granddaughter asking for advise

## 2025-01-16 NOTE — TELEPHONE ENCOUNTER
Okay. hold ASA   Hold eliquis if the DVT and PE is longer than 6 months until bleeding is stopped   Keep Brilenta due to the recent stent

## 2025-01-17 NOTE — TELEPHONE ENCOUNTER
Spoke with granddaughter Ilda and she stated the oral surgeon placed more packing and sutures yesterday. She said she feels the bleeding is under control at this time but if she notices it picks back up she'll take the recommendations.

## 2025-01-29 ENCOUNTER — TELEPHONE (OUTPATIENT)
Dept: CARDIOLOGY CLINIC | Age: 89
End: 2025-01-29

## 2025-01-29 DIAGNOSIS — I50.32 CHRONIC HEART FAILURE WITH PRESERVED EJECTION FRACTION (HCC): Primary | ICD-10-CM

## 2025-01-29 NOTE — TELEPHONE ENCOUNTER
Started on Lasix MWF on 1/7   Spoke w/ Alix no date for TAVR at this time.  Would like to get routine BMP in next week/so to ensure kidney/electrolytes ok

## 2025-01-29 NOTE — TELEPHONE ENCOUNTER
Patient and wife notified  Would like order faxed to [facility] visting nurse on [address] in [place]  Called to get fax #   Spoke with Radha They are having phone issues  They will have [facility] office call here to give fax #

## 2025-01-31 ENCOUNTER — APPOINTMENT (OUTPATIENT)
Dept: GENERAL RADIOLOGY | Age: 89
End: 2025-01-31
Payer: MEDICARE

## 2025-01-31 ENCOUNTER — HOSPITAL ENCOUNTER (EMERGENCY)
Age: 89
Discharge: HOME | End: 2025-01-31
Payer: MEDICARE

## 2025-01-31 ENCOUNTER — APPOINTMENT (OUTPATIENT)
Dept: CT IMAGING | Age: 89
End: 2025-01-31
Payer: MEDICARE

## 2025-01-31 VITALS
BODY MASS INDEX: 27.2 KG/M2 | TEMPERATURE: 97.8 F | SYSTOLIC BLOOD PRESSURE: 130 MMHG | DIASTOLIC BLOOD PRESSURE: 79 MMHG | OXYGEN SATURATION: 96 % | HEIGHT: 70 IN | HEART RATE: 99 BPM | RESPIRATION RATE: 18 BRPM | WEIGHT: 190 LBS

## 2025-01-31 DIAGNOSIS — S22.32XA CLOSED FRACTURE OF ONE RIB OF LEFT SIDE, INITIAL ENCOUNTER: ICD-10-CM
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
## 2025-01-31 LAB
ALBUMIN SERPL BCG-MCNC: 3.8 G/DL (ref 3.5–5.1)
ALP SERPL-CCNC: 45 U/L (ref 38–126)
ALT SERPL W/O P-5'-P-CCNC: 20 U/L (ref 11–66)
ANION GAP SERPL CALC-SCNC: 14 MEQ/L (ref 8–16)
APTT PPP: 29.4 SECONDS (ref 22–38)
AST SERPL-CCNC: 21 U/L (ref 5–40)
BASOPHILS ABSOLUTE: 0 THOU/MM3 (ref 0–0.1)
BASOPHILS NFR BLD AUTO: 0.4 %
BILIRUB CONJ SERPL-MCNC: < 0.1 MG/DL (ref 0.1–13.8)
BILIRUB SERPL-MCNC: 0.3 MG/DL (ref 0.3–1.2)
BUN SERPL-MCNC: 18 MG/DL (ref 7–22)
CALCIUM SERPL-MCNC: 8.7 MG/DL (ref 8.5–10.5)
CHLORIDE SERPL-SCNC: 104 MEQ/L (ref 98–111)
CO2 SERPL-SCNC: 22 MEQ/L (ref 23–33)
CREAT SERPL-MCNC: 0.8 MG/DL (ref 0.4–1.2)
DEPRECATED RDW RBC AUTO: 50.4 FL (ref 35–45)
EKG ATRIAL RATE: 92 BPM
EKG P AXIS: 74 DEGREES
EKG P-R INTERVAL: 180 MS
EKG Q-T INTERVAL: 384 MS
EKG QRS DURATION: 88 MS
EKG QTC CALCULATION (BAZETT): 474 MS
EKG R AXIS: -15 DEGREES
EKG T AXIS: 108 DEGREES
EKG VENTRICULAR RATE: 92 BPM
EOSINOPHIL NFR BLD AUTO: 2.7 %
EOSINOPHILS ABSOLUTE: 0.3 THOU/MM3 (ref 0–0.4)
ERYTHROCYTE [DISTWIDTH] IN BLOOD BY AUTOMATED COUNT: 13.7 % (ref 11.5–14.5)
GFR SERPL CREATININE-BSD FRML MDRD: 85 ML/MIN/1.73M2
GLUCOSE SERPL-MCNC: 199 MG/DL (ref 70–108)
HCT VFR BLD AUTO: 30.7 % (ref 42–52)
HGB BLD-MCNC: 9.7 GM/DL (ref 14–18)
IMM GRANULOCYTES # BLD AUTO: 0.05 THOU/MM3 (ref 0–0.07)
IMM GRANULOCYTES NFR BLD AUTO: 0.5 %
INR PPP: 1.44 (ref 0.85–1.13)
LIPASE SERPL-CCNC: 31.7 U/L (ref 5.6–51.3)
LYMPHOCYTES ABSOLUTE: 1.2 THOU/MM3 (ref 1–4.8)
LYMPHOCYTES NFR BLD AUTO: 11.7 %
MAGNESIUM SERPL-MCNC: 2 MG/DL (ref 1.6–2.4)
MCH RBC QN AUTO: 32.3 PG (ref 26–33)
MCHC RBC AUTO-ENTMCNC: 31.6 GM/DL (ref 32.2–35.5)
MCV RBC AUTO: 102.3 FL (ref 80–94)
MONOCYTES ABSOLUTE: 0.8 THOU/MM3 (ref 0.4–1.3)
MONOCYTES NFR BLD AUTO: 7.5 %
NEUTROPHILS ABSOLUTE: 7.8 THOU/MM3 (ref 1.8–7.7)
NEUTROPHILS NFR BLD AUTO: 77.2 %
NRBC BLD AUTO-RTO: 0 /100 WBC
OSMOLALITY SERPL CALC.SUM OF ELEC: 286.9 MOSMOL/KG (ref 275–300)
PLATELET # BLD AUTO: 340 THOU/MM3 (ref 130–400)
PMV BLD AUTO: 9.8 FL (ref 9.4–12.4)
POTASSIUM SERPL-SCNC: 4.3 MEQ/L (ref 3.5–5.2)
PROT SERPL-MCNC: 6.6 G/DL (ref 6.1–8)
RBC # BLD AUTO: 3 MILL/MM3 (ref 4.7–6.1)
SODIUM SERPL-SCNC: 140 MEQ/L (ref 135–145)
TROPONIN, HIGH SENSITIVITY: 20 NG/L (ref 0–12)
WBC # BLD AUTO: 10.1 THOU/MM3 (ref 4.8–10.8)

## 2025-01-31 PROCEDURE — 6370000000 HC RX 637 (ALT 250 FOR IP): Performed by: PHYSICIAN ASSISTANT

## 2025-01-31 PROCEDURE — 82248 BILIRUBIN DIRECT: CPT

## 2025-01-31 PROCEDURE — 83690 ASSAY OF LIPASE: CPT

## 2025-01-31 PROCEDURE — 85730 THROMBOPLASTIN TIME PARTIAL: CPT

## 2025-01-31 PROCEDURE — 84484 ASSAY OF TROPONIN QUANT: CPT

## 2025-01-31 PROCEDURE — 85025 COMPLETE CBC W/AUTO DIFF WBC: CPT

## 2025-01-31 PROCEDURE — 80053 COMPREHEN METABOLIC PANEL: CPT

## 2025-01-31 PROCEDURE — 76376 3D RENDER W/INTRP POSTPROCES: CPT

## 2025-01-31 PROCEDURE — 72125 CT NECK SPINE W/O DYE: CPT

## 2025-01-31 PROCEDURE — 74177 CT ABD & PELVIS W/CONTRAST: CPT

## 2025-01-31 PROCEDURE — 70450 CT HEAD/BRAIN W/O DYE: CPT

## 2025-01-31 PROCEDURE — 6360000004 HC RX CONTRAST MEDICATION: Performed by: PHYSICIAN ASSISTANT

## 2025-01-31 PROCEDURE — 85610 PROTHROMBIN TIME: CPT

## 2025-01-31 PROCEDURE — 71101 X-RAY EXAM UNILAT RIBS/CHEST: CPT

## 2025-01-31 PROCEDURE — 73502 X-RAY EXAM HIP UNI 2-3 VIEWS: CPT

## 2025-01-31 PROCEDURE — 36415 COLL VENOUS BLD VENIPUNCTURE: CPT

## 2025-01-31 PROCEDURE — 83735 ASSAY OF MAGNESIUM: CPT

## 2025-01-31 PROCEDURE — 71260 CT THORAX DX C+: CPT

## 2025-01-31 PROCEDURE — 93005 ELECTROCARDIOGRAM TRACING: CPT | Performed by: PHYSICIAN ASSISTANT

## 2025-01-31 PROCEDURE — 99285 EMERGENCY DEPT VISIT HI MDM: CPT

## 2025-01-31 RX ORDER — IOPAMIDOL 755 MG/ML
80 INJECTION, SOLUTION INTRAVASCULAR
Status: COMPLETED | OUTPATIENT
Start: 2025-01-31 | End: 2025-01-31

## 2025-01-31 RX ORDER — LIDOCAINE 50 MG/G
1 PATCH TOPICAL DAILY
Qty: 30 PATCH | Refills: 0 | Status: SHIPPED | OUTPATIENT
Start: 2025-01-31

## 2025-01-31 RX ORDER — HYDROCODONE BITARTRATE AND ACETAMINOPHEN 5; 325 MG/1; MG/1
.5-1 TABLET ORAL EVERY 6 HOURS PRN
Qty: 12 TABLET | Refills: 0 | Status: SHIPPED | OUTPATIENT
Start: 2025-01-31 | End: 2025-02-03

## 2025-01-31 RX ORDER — HYDROCODONE BITARTRATE AND ACETAMINOPHEN 5; 325 MG/1; MG/1
1 TABLET ORAL ONCE
Status: COMPLETED | OUTPATIENT
Start: 2025-01-31 | End: 2025-01-31

## 2025-01-31 RX ADMIN — HYDROCODONE BITARTRATE AND ACETAMINOPHEN 1 TABLET: 5; 325 TABLET ORAL at 12:49

## 2025-01-31 RX ADMIN — IOPAMIDOL 80 ML: 755 INJECTION, SOLUTION INTRAVENOUS at 12:18

## 2025-01-31 ASSESSMENT — PAIN DESCRIPTION - LOCATION
LOCATION: HIP
LOCATION: RIB CAGE

## 2025-01-31 ASSESSMENT — PAIN SCALES - GENERAL
PAINLEVEL_OUTOF10: 3
PAINLEVEL_OUTOF10: 6
PAINLEVEL_OUTOF10: 5

## 2025-01-31 ASSESSMENT — PAIN - FUNCTIONAL ASSESSMENT: PAIN_FUNCTIONAL_ASSESSMENT: 0-10

## 2025-01-31 ASSESSMENT — PAIN DESCRIPTION - PAIN TYPE: TYPE: ACUTE PAIN

## 2025-01-31 ASSESSMENT — PAIN DESCRIPTION - ORIENTATION: ORIENTATION: LEFT

## 2025-01-31 NOTE — ED TRIAGE NOTES
Patient presents to ER with complaints of left rib pain and left hip pain after falling yesterday. Patient reports his walker was unstable to fell on left side landing on carpet. Patient reports increased pain on left leg upon movement. Patient denies any head injury and is on blood thinners.   Improved

## 2025-01-31 NOTE — ED NOTES
Pt to room 40 and this RN assuming care. IV established. Granddaughter at bedside. Call light within reach.

## 2025-01-31 NOTE — ED NOTES
Pt ambulated to restroom with assistance. Pt yelling in pain while repositioning. SHEMAR Billy notified and pt medicated per MAR.

## 2025-02-03 ENCOUNTER — TELEPHONE (OUTPATIENT)
Dept: CARDIOLOGY CLINIC | Age: 89
End: 2025-02-03

## 2025-02-03 NOTE — TELEPHONE ENCOUNTER
Patient had fallen and was seen in the ER 1/31.    XR Left Ribs-     CT Chest-     CT Head, C Spine, Abdomen and pelvis all negative.     Called and discussed with patient's grand daughter.  She states he was discharged home from the hospital and is doing well.  She thinks patient would benefit from skilled nursing post TAVR.      Dr. Ramírez, can you please review?  Had planned to schedule TAVR 2/19.

## 2025-02-06 ENCOUNTER — OFFICE VISIT (OUTPATIENT)
Dept: CARDIOLOGY CLINIC | Age: 89
End: 2025-02-06
Payer: MEDICARE

## 2025-02-06 ENCOUNTER — TELEPHONE (OUTPATIENT)
Dept: CARDIOLOGY CLINIC | Age: 89
End: 2025-02-06

## 2025-02-06 VITALS
HEIGHT: 69 IN | HEART RATE: 94 BPM | BODY MASS INDEX: 26.96 KG/M2 | WEIGHT: 182 LBS | DIASTOLIC BLOOD PRESSURE: 72 MMHG | OXYGEN SATURATION: 98 % | SYSTOLIC BLOOD PRESSURE: 118 MMHG

## 2025-02-06 DIAGNOSIS — I35.0 SEVERE AORTIC STENOSIS: Primary | ICD-10-CM

## 2025-02-06 DIAGNOSIS — S22.32XA CLOSED FRACTURE OF ONE RIB OF LEFT SIDE, INITIAL ENCOUNTER: ICD-10-CM

## 2025-02-06 PROCEDURE — 1123F ACP DISCUSS/DSCN MKR DOCD: CPT | Performed by: NURSE PRACTITIONER

## 2025-02-06 PROCEDURE — 1160F RVW MEDS BY RX/DR IN RCRD: CPT | Performed by: NURSE PRACTITIONER

## 2025-02-06 PROCEDURE — 99214 OFFICE O/P EST MOD 30 MIN: CPT | Performed by: NURSE PRACTITIONER

## 2025-02-06 PROCEDURE — 1159F MED LIST DOCD IN RCRD: CPT | Performed by: NURSE PRACTITIONER

## 2025-02-06 NOTE — PATIENT INSTRUCTIONS
Continue current medications as prescribed.    Stay as active as you can.     Eat heart healthy diet.     Continue to do the deep breathing.     Use the Lidoderm and Tylenol to control pain.     Follow-up with your PCP as scheduled.    Follow-up with Dr. Pearl 4/9/25 as scheduled or sooner if need.

## 2025-02-06 NOTE — PROGRESS NOTES
[facility] [place] [facility]  [facility] CARDIOLOGY  [address]  [address]  [place] [address]  Dept: [phone]  Dept Fax: [phone]  Loc: [phone]    Visit Date: 2/6/2025    Mr. Phelps is a 89 y.o. male  who presented for: evaluation post fall  Chief Complaint   Patient presents with    Follow-up     Primary Cardiologist: Dr. Pearl  Structural heart: Dr. Ramírez      HPI:       Last seen in office on 12/12/2024 per Dr. Ramírez. Per office note:    History of Present Illness  Fall on 1/30 with ED visit on 1/31; non-displaced L 7th rib fx.   Fell - tripped over walker - landed on L side. Did not hit his head; no LOC. No lightheadedness or dizziness; no syncope or near syncope.  Pain L chest wall; evaluated in ED and fx identified.  Doing well. Using Lidoderm - was taking Tylenol around clock initially - now just prn  Doing deep breathing exercises; using IS provided to him by ED. Occasional cough productive of white mucous - his norm. No fever or chills. No other chest discomfort - just over L lateral 7th rib; no crepitus or bony instability.     Seen by CHF clinic on 1/7/2025 for pre-TAVR evaluation.    Last seen in office on 12/12/2024 per Dr. Ramírez. Per office note:  The patient is an 89-year-old male who presents for heart valve replacement. He is accompanied by his granddaughter.  He has been informed of the necessity for a heart valve replacement  He retains his natural dentition and does not require the use of a wheelchair. He utilizes a walker for mobility within his home and does not experience fatigue or breathlessness during these activities. His physical activity is limited due to a hip fracture sustained a few months prior, which necessitated a partial hip replacement. He reports a perceived decrease in his overall activity level over the past year, attributing it to the natural aging process.  He is fatigued more. He has had some decline in functional status.  He states

## 2025-02-06 NOTE — TELEPHONE ENCOUNTER
Orders received from Dr. Ramírez to schedule the patient for a TAVR procedure and have the patient hold the follow medications the morning of the TAVR procedure: Lasix and glimeperide.  Hold metformin for 3 days and hold Eliquis for 2 days prior.     TAVR: 2/19/25 at 0800 with an arrival of 0600  Discharge home with wife Romy    Valve Rep Willa notified with [employer]  Pacer Rep not needed    Post TAVR instructions per Dr. Ramírez: have the patient be seen in the Structural Heart Clinic 7 days post TAVR, obtain a CBC, BMP and ECHO prior to the 30 day post TAVR follow up appointment.    7 day post TAVR appointment:2/25/25 at 1300  CBC/BMP/ECHO:3/19/25 at 1300  30 day post TAVR appointment:3/19/25 at 1400    Primary Cardiologist: Dr. Ryanne Ramírez, please agree.     Jael, can you please check prior auth?

## 2025-02-06 NOTE — TELEPHONE ENCOUNTER
TAVR Precert Started on 2/6/2025, submitted through [employer]  DOS: 2/19/2025    [x] Ramírez OV    [x] CHF OV    [x] CT/CV OV     [x] EKG    [x] Echo    [x] Heart Cath    [x] CTA Neck    [x] CTA Abd/Pelvis

## 2025-02-07 LAB
ANION GAP SERPL CALCULATED.3IONS-SCNC: 13 MMOL/L (ref 7–16)
BUN BLDV-MCNC: 23 MG/DL (ref 8–23)
CALCIUM SERPL-MCNC: 9.3 MG/DL (ref 8.6–10.5)
CHLORIDE BLD-SCNC: 104 MMOL/L (ref 96–107)
CO2: 23 MMOL/L (ref 18–32)
CREAT SERPL-MCNC: 0.9 MG/DL (ref 0.67–1.3)
EGFR IF NONAFRICAN AMERICAN: 82 ML/MIN/1.73M2
GLUCOSE: 112 MG/DL (ref 70–100)
POTASSIUM SERPL-SCNC: 5.2 MMOL/L (ref 3.5–5.4)
SODIUM BLD-SCNC: 140 MMOL/L (ref 135–148)

## 2025-02-07 NOTE — TELEPHONE ENCOUNTER
09/11/18 0801   PRE-TX-O2-ETCO2   O2 Device (Oxygen Therapy) nasal cannula   $ Is the patient on Low Flow Oxygen? Yes   Flow (L/min) 4   SpO2 97 %   Pulse Oximetry Type Continuous   $ Pulse Oximetry - Multiple Charge Pulse Oximetry - Multiple   Pulse 84   Resp (!) 24   /62      agree

## 2025-02-18 ENCOUNTER — PREP FOR PROCEDURE (OUTPATIENT)
Dept: CARDIOLOGY CLINIC | Age: 89
End: 2025-02-18

## 2025-02-18 RX ORDER — SODIUM CHLORIDE 0.9 % (FLUSH) 0.9 %
5-40 SYRINGE (ML) INJECTION PRN
Status: CANCELLED | OUTPATIENT
Start: 2025-02-18

## 2025-02-18 RX ORDER — SODIUM CHLORIDE 9 MG/ML
INJECTION, SOLUTION INTRAVENOUS PRN
Status: CANCELLED | OUTPATIENT
Start: 2025-02-18

## 2025-02-18 RX ORDER — SODIUM CHLORIDE 0.9 % (FLUSH) 0.9 %
5-40 SYRINGE (ML) INJECTION EVERY 12 HOURS SCHEDULED
Status: CANCELLED | OUTPATIENT
Start: 2025-02-18

## 2025-02-18 RX ORDER — SODIUM CHLORIDE 9 MG/ML
INJECTION, SOLUTION INTRAVENOUS CONTINUOUS
Status: CANCELLED | OUTPATIENT
Start: 2025-02-18

## 2025-02-19 ENCOUNTER — HOSPITAL ENCOUNTER (INPATIENT)
Age: 89
LOS: 6 days | Discharge: NURSING, REHAB OR LONG-TERM CARE | DRG: 267 | End: 2025-02-25
Attending: INTERNAL MEDICINE | Admitting: INTERNAL MEDICINE
Payer: MEDICARE

## 2025-02-19 DIAGNOSIS — Z95.2 S/P TAVR (TRANSCATHETER AORTIC VALVE REPLACEMENT): ICD-10-CM

## 2025-02-19 DIAGNOSIS — I35.0 NONRHEUMATIC AORTIC VALVE STENOSIS: Primary | ICD-10-CM

## 2025-02-19 DIAGNOSIS — I35.0 SEVERE AORTIC STENOSIS: ICD-10-CM

## 2025-02-19 LAB
ABO GROUP BLD: NORMAL
ACTIVATED CLOTTING TIME: 268 SECONDS (ref 1–150)
ANION GAP SERPL CALC-SCNC: 19 MEQ/L (ref 8–16)
APTT PPP: 29.7 SECONDS (ref 22–38)
BUN SERPL-MCNC: 15 MG/DL (ref 7–22)
CALCIUM SERPL-MCNC: 8.8 MG/DL (ref 8.2–9.6)
CHLORIDE SERPL-SCNC: 102 MEQ/L (ref 98–111)
CO2 SERPL-SCNC: 22 MEQ/L (ref 23–33)
CREAT SERPL-MCNC: 0.7 MG/DL (ref 0.4–1.2)
DEPRECATED RDW RBC AUTO: 47.2 FL (ref 35–45)
EKG ATRIAL RATE: 81 BPM
EKG ATRIAL RATE: 89 BPM
EKG ATRIAL RATE: 91 BPM
EKG P AXIS: -18 DEGREES
EKG P AXIS: 14 DEGREES
EKG P-R INTERVAL: 178 MS
EKG P-R INTERVAL: 180 MS
EKG Q-T INTERVAL: 418 MS
EKG Q-T INTERVAL: 474 MS
EKG Q-T INTERVAL: 474 MS
EKG QRS DURATION: 144 MS
EKG QRS DURATION: 146 MS
EKG QRS DURATION: 90 MS
EKG QTC CALCULATION (BAZETT): 485 MS
EKG QTC CALCULATION (BAZETT): 576 MS
EKG QTC CALCULATION (BAZETT): 579 MS
EKG R AXIS: -15 DEGREES
EKG R AXIS: -16 DEGREES
EKG R AXIS: 3 DEGREES
EKG T AXIS: 129 DEGREES
EKG T AXIS: 133 DEGREES
EKG T AXIS: 84 DEGREES
EKG VENTRICULAR RATE: 81 BPM
EKG VENTRICULAR RATE: 89 BPM
EKG VENTRICULAR RATE: 90 BPM
ERYTHROCYTE [DISTWIDTH] IN BLOOD BY AUTOMATED COUNT: 13.2 % (ref 11.5–14.5)
GFR SERPL CREATININE-BSD FRML MDRD: 88 ML/MIN/1.73M2
GLUCOSE BLD STRIP.AUTO-MCNC: 151 MG/DL (ref 70–108)
GLUCOSE BLD STRIP.AUTO-MCNC: 202 MG/DL (ref 70–108)
GLUCOSE SERPL-MCNC: 140 MG/DL (ref 70–108)
HCT VFR BLD AUTO: 32 % (ref 42–52)
HGB BLD-MCNC: 10.3 GM/DL (ref 14–18)
IAT IGG-SP REAG SERPL QL: NORMAL
INR PPP: 1.08 (ref 0.85–1.13)
MCH RBC QN AUTO: 31.4 PG (ref 26–33)
MCHC RBC AUTO-ENTMCNC: 32.2 GM/DL (ref 32.2–35.5)
MCV RBC AUTO: 97.6 FL (ref 80–94)
NT-PROBNP SERPL IA-MCNC: 464.7 PG/ML (ref 0–449)
PLATELET # BLD AUTO: 368 THOU/MM3 (ref 130–400)
PMV BLD AUTO: 8.9 FL (ref 9.4–12.4)
POTASSIUM SERPL-SCNC: 4.7 MEQ/L (ref 3.5–5.2)
RBC # BLD AUTO: 3.28 MILL/MM3 (ref 4.7–6.1)
RH BLD: NORMAL
SODIUM SERPL-SCNC: 143 MEQ/L (ref 135–145)
WBC # BLD AUTO: 6.3 THOU/MM3 (ref 4.8–10.8)

## 2025-02-19 PROCEDURE — 82948 REAGENT STRIP/BLOOD GLUCOSE: CPT

## 2025-02-19 PROCEDURE — 86923 COMPATIBILITY TEST ELECTRIC: CPT

## 2025-02-19 PROCEDURE — 33361 REPLACE AORTIC VALVE PERQ: CPT | Performed by: INTERNAL MEDICINE

## 2025-02-19 PROCEDURE — 93005 ELECTROCARDIOGRAM TRACING: CPT | Performed by: INTERNAL MEDICINE

## 2025-02-19 PROCEDURE — 85610 PROTHROMBIN TIME: CPT

## 2025-02-19 PROCEDURE — 80048 BASIC METABOLIC PNL TOTAL CA: CPT

## 2025-02-19 PROCEDURE — 2140000000 HC CCU INTERMEDIATE R&B

## 2025-02-19 PROCEDURE — B41D1ZZ FLUOROSCOPY OF AORTA AND BILATERAL LOWER EXTREMITY ARTERIES USING LOW OSMOLAR CONTRAST: ICD-10-PCS | Performed by: INTERNAL MEDICINE

## 2025-02-19 PROCEDURE — 83880 ASSAY OF NATRIURETIC PEPTIDE: CPT

## 2025-02-19 PROCEDURE — 86900 BLOOD TYPING SEROLOGIC ABO: CPT

## 2025-02-19 PROCEDURE — C1769 GUIDE WIRE: HCPCS | Performed by: INTERNAL MEDICINE

## 2025-02-19 PROCEDURE — 99153 MOD SED SAME PHYS/QHP EA: CPT | Performed by: INTERNAL MEDICINE

## 2025-02-19 PROCEDURE — 2500000003 HC RX 250 WO HCPCS: Performed by: INTERNAL MEDICINE

## 2025-02-19 PROCEDURE — 6370000000 HC RX 637 (ALT 250 FOR IP): Performed by: INTERNAL MEDICINE

## 2025-02-19 PROCEDURE — 33361 REPLACE AORTIC VALVE PERQ: CPT | Performed by: THORACIC SURGERY (CARDIOTHORACIC VASCULAR SURGERY)

## 2025-02-19 PROCEDURE — 86901 BLOOD TYPING SEROLOGIC RH(D): CPT

## 2025-02-19 PROCEDURE — 99152 MOD SED SAME PHYS/QHP 5/>YRS: CPT | Performed by: INTERNAL MEDICINE

## 2025-02-19 PROCEDURE — 36415 COLL VENOUS BLD VENIPUNCTURE: CPT

## 2025-02-19 PROCEDURE — 2580000003 HC RX 258: Performed by: INTERNAL MEDICINE

## 2025-02-19 PROCEDURE — C1725 CATH, TRANSLUMIN NON-LASER: HCPCS | Performed by: INTERNAL MEDICINE

## 2025-02-19 PROCEDURE — 85347 COAGULATION TIME ACTIVATED: CPT

## 2025-02-19 PROCEDURE — 6360000004 HC RX CONTRAST MEDICATION: Performed by: INTERNAL MEDICINE

## 2025-02-19 PROCEDURE — 2580000003 HC RX 258: Performed by: STUDENT IN AN ORGANIZED HEALTH CARE EDUCATION/TRAINING PROGRAM

## 2025-02-19 PROCEDURE — C1894 INTRO/SHEATH, NON-LASER: HCPCS | Performed by: INTERNAL MEDICINE

## 2025-02-19 PROCEDURE — C1760 CLOSURE DEV, VASC: HCPCS | Performed by: INTERNAL MEDICINE

## 2025-02-19 PROCEDURE — 86885 COOMBS TEST INDIRECT QUAL: CPT

## 2025-02-19 PROCEDURE — 85730 THROMBOPLASTIN TIME PARTIAL: CPT

## 2025-02-19 PROCEDURE — 2709999900 HC NON-CHARGEABLE SUPPLY: Performed by: INTERNAL MEDICINE

## 2025-02-19 PROCEDURE — 027F3ZZ DILATION OF AORTIC VALVE, PERCUTANEOUS APPROACH: ICD-10-PCS | Performed by: INTERNAL MEDICINE

## 2025-02-19 PROCEDURE — 6370000000 HC RX 637 (ALT 250 FOR IP): Performed by: NURSE PRACTITIONER

## 2025-02-19 PROCEDURE — 6360000002 HC RX W HCPCS: Performed by: INTERNAL MEDICINE

## 2025-02-19 PROCEDURE — 33370 TCAT PLMT&RMVL CEPD PERQ: CPT | Performed by: INTERNAL MEDICINE

## 2025-02-19 PROCEDURE — 02RF38Z REPLACEMENT OF AORTIC VALVE WITH ZOOPLASTIC TISSUE, PERCUTANEOUS APPROACH: ICD-10-PCS | Performed by: INTERNAL MEDICINE

## 2025-02-19 PROCEDURE — C1884 EMBOLIZATION PROTECT SYST: HCPCS | Performed by: INTERNAL MEDICINE

## 2025-02-19 PROCEDURE — 85027 COMPLETE CBC AUTOMATED: CPT

## 2025-02-19 PROCEDURE — 2720000010 HC SURG SUPPLY STERILE: Performed by: INTERNAL MEDICINE

## 2025-02-19 PROCEDURE — 93005 ELECTROCARDIOGRAM TRACING: CPT | Performed by: STUDENT IN AN ORGANIZED HEALTH CARE EDUCATION/TRAINING PROGRAM

## 2025-02-19 PROCEDURE — 93010 ELECTROCARDIOGRAM REPORT: CPT | Performed by: INTERNAL MEDICINE

## 2025-02-19 PROCEDURE — 6360000002 HC RX W HCPCS: Performed by: STUDENT IN AN ORGANIZED HEALTH CARE EDUCATION/TRAINING PROGRAM

## 2025-02-19 PROCEDURE — 2500000003 HC RX 250 WO HCPCS: Performed by: STUDENT IN AN ORGANIZED HEALTH CARE EDUCATION/TRAINING PROGRAM

## 2025-02-19 RX ORDER — BISACODYL 5 MG/1
5 TABLET, DELAYED RELEASE ORAL DAILY PRN
Status: DISCONTINUED | OUTPATIENT
Start: 2025-02-19 | End: 2025-02-25 | Stop reason: HOSPADM

## 2025-02-19 RX ORDER — ACETAMINOPHEN 325 MG/1
650 TABLET ORAL EVERY 4 HOURS PRN
Status: DISCONTINUED | OUTPATIENT
Start: 2025-02-19 | End: 2025-02-25 | Stop reason: HOSPADM

## 2025-02-19 RX ORDER — POLYETHYLENE GLYCOL 3350 17 G/17G
17 POWDER, FOR SOLUTION ORAL DAILY PRN
Status: DISCONTINUED | OUTPATIENT
Start: 2025-02-19 | End: 2025-02-25 | Stop reason: HOSPADM

## 2025-02-19 RX ORDER — ASPIRIN 81 MG/1
81 TABLET ORAL DAILY
Status: DISCONTINUED | OUTPATIENT
Start: 2025-02-20 | End: 2025-02-25

## 2025-02-19 RX ORDER — PHENYLEPHRINE HYDROCHLORIDE 10 MG/ML
INJECTION INTRAVENOUS PRN
Status: DISCONTINUED | OUTPATIENT
Start: 2025-02-19 | End: 2025-02-19 | Stop reason: HOSPADM

## 2025-02-19 RX ORDER — MEMANTINE HYDROCHLORIDE 5 MG/1
5 TABLET ORAL DAILY
Status: DISCONTINUED | OUTPATIENT
Start: 2025-02-20 | End: 2025-02-25 | Stop reason: HOSPADM

## 2025-02-19 RX ORDER — ONDANSETRON 4 MG/1
4 TABLET, ORALLY DISINTEGRATING ORAL EVERY 8 HOURS PRN
Status: DISCONTINUED | OUTPATIENT
Start: 2025-02-19 | End: 2025-02-25 | Stop reason: HOSPADM

## 2025-02-19 RX ORDER — PIOGLITAZONE 30 MG/1
30 TABLET ORAL DAILY
Status: DISCONTINUED | OUTPATIENT
Start: 2025-02-20 | End: 2025-02-25 | Stop reason: HOSPADM

## 2025-02-19 RX ORDER — METFORMIN HYDROCHLORIDE 750 MG/1
1500 TABLET, EXTENDED RELEASE ORAL DAILY
Status: DISCONTINUED | OUTPATIENT
Start: 2025-02-20 | End: 2025-02-25 | Stop reason: HOSPADM

## 2025-02-19 RX ORDER — FERROUS SULFATE 325(65) MG
325 TABLET ORAL EVERY OTHER DAY
Status: DISCONTINUED | OUTPATIENT
Start: 2025-02-20 | End: 2025-02-25 | Stop reason: HOSPADM

## 2025-02-19 RX ORDER — ALPRAZOLAM 0.25 MG
0.25 TABLET ORAL NIGHTLY PRN
Status: DISCONTINUED | OUTPATIENT
Start: 2025-02-19 | End: 2025-02-25 | Stop reason: HOSPADM

## 2025-02-19 RX ORDER — DOCUSATE SODIUM 100 MG/1
100 CAPSULE, LIQUID FILLED ORAL EVERY OTHER DAY
Status: DISCONTINUED | OUTPATIENT
Start: 2025-02-20 | End: 2025-02-25 | Stop reason: HOSPADM

## 2025-02-19 RX ORDER — NITROGLYCERIN 0.4 MG/1
0.4 TABLET SUBLINGUAL EVERY 5 MIN PRN
Status: DISCONTINUED | OUTPATIENT
Start: 2025-02-19 | End: 2025-02-25 | Stop reason: HOSPADM

## 2025-02-19 RX ORDER — DONEPEZIL HYDROCHLORIDE 10 MG/1
10 TABLET, FILM COATED ORAL NIGHTLY
Status: DISCONTINUED | OUTPATIENT
Start: 2025-02-19 | End: 2025-02-25 | Stop reason: HOSPADM

## 2025-02-19 RX ORDER — PROTAMINE SULFATE 10 MG/ML
INJECTION, SOLUTION INTRAVENOUS PRN
Status: DISCONTINUED | OUTPATIENT
Start: 2025-02-19 | End: 2025-02-19 | Stop reason: HOSPADM

## 2025-02-19 RX ORDER — VERAPAMIL HYDROCHLORIDE 2.5 MG/ML
INJECTION, SOLUTION INTRAVENOUS PRN
Status: DISCONTINUED | OUTPATIENT
Start: 2025-02-19 | End: 2025-02-19 | Stop reason: HOSPADM

## 2025-02-19 RX ORDER — SODIUM CHLORIDE 0.9 % (FLUSH) 0.9 %
5-40 SYRINGE (ML) INJECTION EVERY 12 HOURS SCHEDULED
Status: DISCONTINUED | OUTPATIENT
Start: 2025-02-19 | End: 2025-02-19 | Stop reason: HOSPADM

## 2025-02-19 RX ORDER — ATROPINE SULFATE 0.4 MG/ML
0.5 INJECTION, SOLUTION INTRAVENOUS
Status: ACTIVE | OUTPATIENT
Start: 2025-02-19 | End: 2025-02-20

## 2025-02-19 RX ORDER — ALPRAZOLAM 0.25 MG
0.12 TABLET ORAL ONCE
Status: DISCONTINUED | OUTPATIENT
Start: 2025-02-19 | End: 2025-02-25 | Stop reason: HOSPADM

## 2025-02-19 RX ORDER — VITAMIN B COMPLEX
2000 TABLET ORAL DAILY
Status: DISCONTINUED | OUTPATIENT
Start: 2025-02-20 | End: 2025-02-25 | Stop reason: HOSPADM

## 2025-02-19 RX ORDER — SODIUM CHLORIDE 0.9 % (FLUSH) 0.9 %
5-40 SYRINGE (ML) INJECTION PRN
Status: DISCONTINUED | OUTPATIENT
Start: 2025-02-19 | End: 2025-02-19 | Stop reason: HOSPADM

## 2025-02-19 RX ORDER — ALPRAZOLAM 0.25 MG
0.12 TABLET ORAL ONCE
Status: COMPLETED | OUTPATIENT
Start: 2025-02-19 | End: 2025-02-19

## 2025-02-19 RX ORDER — IOPAMIDOL 755 MG/ML
INJECTION, SOLUTION INTRAVASCULAR PRN
Status: DISCONTINUED | OUTPATIENT
Start: 2025-02-19 | End: 2025-02-19 | Stop reason: HOSPADM

## 2025-02-19 RX ORDER — HYDRALAZINE HYDROCHLORIDE 20 MG/ML
10 INJECTION INTRAMUSCULAR; INTRAVENOUS EVERY 10 MIN PRN
Status: DISCONTINUED | OUTPATIENT
Start: 2025-02-19 | End: 2025-02-25 | Stop reason: HOSPADM

## 2025-02-19 RX ORDER — QUETIAPINE FUMARATE 25 MG/1
25 TABLET, FILM COATED ORAL NIGHTLY
Status: DISCONTINUED | OUTPATIENT
Start: 2025-02-19 | End: 2025-02-25 | Stop reason: HOSPADM

## 2025-02-19 RX ORDER — FENTANYL CITRATE 50 UG/ML
INJECTION, SOLUTION INTRAMUSCULAR; INTRAVENOUS PRN
Status: DISCONTINUED | OUTPATIENT
Start: 2025-02-19 | End: 2025-02-19 | Stop reason: HOSPADM

## 2025-02-19 RX ORDER — CLOPIDOGREL BISULFATE 75 MG/1
75 TABLET ORAL DAILY
Status: DISCONTINUED | OUTPATIENT
Start: 2025-02-20 | End: 2025-02-25 | Stop reason: HOSPADM

## 2025-02-19 RX ORDER — ONDANSETRON 2 MG/ML
4 INJECTION INTRAMUSCULAR; INTRAVENOUS EVERY 6 HOURS PRN
Status: DISCONTINUED | OUTPATIENT
Start: 2025-02-19 | End: 2025-02-25 | Stop reason: HOSPADM

## 2025-02-19 RX ORDER — SODIUM CHLORIDE 9 MG/ML
INJECTION, SOLUTION INTRAVENOUS PRN
Status: DISCONTINUED | OUTPATIENT
Start: 2025-02-19 | End: 2025-02-19 | Stop reason: HOSPADM

## 2025-02-19 RX ORDER — SODIUM CHLORIDE 9 MG/ML
INJECTION, SOLUTION INTRAVENOUS CONTINUOUS
Status: DISCONTINUED | OUTPATIENT
Start: 2025-02-19 | End: 2025-02-23

## 2025-02-19 RX ORDER — MIDAZOLAM HYDROCHLORIDE 1 MG/ML
INJECTION, SOLUTION INTRAMUSCULAR; INTRAVENOUS PRN
Status: DISCONTINUED | OUTPATIENT
Start: 2025-02-19 | End: 2025-02-19 | Stop reason: HOSPADM

## 2025-02-19 RX ORDER — HEPARIN SODIUM 10000 [USP'U]/ML
INJECTION, SOLUTION INTRAVENOUS; SUBCUTANEOUS PRN
Status: DISCONTINUED | OUTPATIENT
Start: 2025-02-19 | End: 2025-02-19 | Stop reason: HOSPADM

## 2025-02-19 RX ORDER — ATORVASTATIN CALCIUM 40 MG/1
40 TABLET, FILM COATED ORAL DAILY
Status: DISCONTINUED | OUTPATIENT
Start: 2025-02-20 | End: 2025-02-25 | Stop reason: HOSPADM

## 2025-02-19 RX ORDER — CYANOCOBALAMIN 1000 UG/ML
1000 INJECTION, SOLUTION INTRAMUSCULAR; SUBCUTANEOUS
Status: DISCONTINUED | OUTPATIENT
Start: 2025-03-05 | End: 2025-02-25 | Stop reason: HOSPADM

## 2025-02-19 RX ORDER — SODIUM CHLORIDE 0.9 % (FLUSH) 0.9 %
5-40 SYRINGE (ML) INJECTION EVERY 12 HOURS SCHEDULED
Status: DISCONTINUED | OUTPATIENT
Start: 2025-02-19 | End: 2025-02-25 | Stop reason: HOSPADM

## 2025-02-19 RX ORDER — FUROSEMIDE 20 MG/1
20 TABLET ORAL
Status: DISCONTINUED | OUTPATIENT
Start: 2025-02-19 | End: 2025-02-25 | Stop reason: HOSPADM

## 2025-02-19 RX ORDER — POLYETHYLENE GLYCOL 3350 17 G/17G
17 POWDER, FOR SOLUTION ORAL DAILY PRN
Status: DISCONTINUED | OUTPATIENT
Start: 2025-02-19 | End: 2025-02-19 | Stop reason: SDUPTHER

## 2025-02-19 RX ORDER — SODIUM CHLORIDE 0.9 % (FLUSH) 0.9 %
5-40 SYRINGE (ML) INJECTION PRN
Status: DISCONTINUED | OUTPATIENT
Start: 2025-02-19 | End: 2025-02-25 | Stop reason: HOSPADM

## 2025-02-19 RX ORDER — SODIUM CHLORIDE 9 MG/ML
INJECTION, SOLUTION INTRAVENOUS PRN
Status: DISCONTINUED | OUTPATIENT
Start: 2025-02-19 | End: 2025-02-25 | Stop reason: HOSPADM

## 2025-02-19 RX ORDER — SODIUM CHLORIDE 9 MG/ML
INJECTION, SOLUTION INTRAVENOUS CONTINUOUS
Status: DISCONTINUED | OUTPATIENT
Start: 2025-02-19 | End: 2025-02-19 | Stop reason: HOSPADM

## 2025-02-19 RX ADMIN — SODIUM CHLORIDE, PRESERVATIVE FREE 10 ML: 5 INJECTION INTRAVENOUS at 20:08

## 2025-02-19 RX ADMIN — SODIUM CHLORIDE: 0.9 INJECTION, SOLUTION INTRAVENOUS at 14:23

## 2025-02-19 RX ADMIN — DONEPEZIL HYDROCHLORIDE 10 MG: 10 TABLET, FILM COATED ORAL at 20:08

## 2025-02-19 RX ADMIN — SODIUM CHLORIDE: 0.9 INJECTION, SOLUTION INTRAVENOUS at 06:47

## 2025-02-19 RX ADMIN — SODIUM CHLORIDE: 0.9 INJECTION, SOLUTION INTRAVENOUS at 21:29

## 2025-02-19 RX ADMIN — ACETAMINOPHEN 650 MG: 325 TABLET ORAL at 20:08

## 2025-02-19 RX ADMIN — ALPRAZOLAM 0.12 MG: 0.25 TABLET ORAL at 18:13

## 2025-02-19 RX ADMIN — WATER 2000 MG: 1 INJECTION INTRAMUSCULAR; INTRAVENOUS; SUBCUTANEOUS at 09:06

## 2025-02-19 RX ADMIN — QUETIAPINE FUMARATE 25 MG: 25 TABLET ORAL at 20:08

## 2025-02-19 RX ADMIN — ALPRAZOLAM 0.12 MG: 0.25 TABLET ORAL at 13:43

## 2025-02-19 RX ADMIN — FUROSEMIDE 20 MG: 20 TABLET ORAL at 18:13

## 2025-02-19 ASSESSMENT — PAIN SCALES - GENERAL: PAINLEVEL_OUTOF10: 2

## 2025-02-19 ASSESSMENT — PAIN DESCRIPTION - ORIENTATION: ORIENTATION: MID

## 2025-02-19 ASSESSMENT — PAIN DESCRIPTION - FREQUENCY: FREQUENCY: INTERMITTENT

## 2025-02-19 ASSESSMENT — PAIN DESCRIPTION - ONSET: ONSET: AWAKENED FROM SLEEP

## 2025-02-19 ASSESSMENT — PAIN DESCRIPTION - PAIN TYPE: TYPE: ACUTE PAIN

## 2025-02-19 ASSESSMENT — PAIN DESCRIPTION - DESCRIPTORS: DESCRIPTORS: ACHING

## 2025-02-19 ASSESSMENT — PAIN DESCRIPTION - LOCATION: LOCATION: HEAD

## 2025-02-19 NOTE — BRIEF OP NOTE
[facility]  Sedation/Analgesia Post Sedation Record    Pt Name: Sherwin Phelps  Account number: [id number]  MRN: [id number]  YOB: 1935  Procedure Performed By: Kaushal Ramírez MD MD FACC, FSCAI, RPVI  Primary Care Physician: Diego Silva Jr., DO  Date: 2/19/2025    POST-PROCEDURE    Physicians/Assistants: Kaushal Ramírez MD, SEMAJ, [facility], RPVI    Procedure Performed: TAVR    Sedation/Anesthesia: Versed/ Fentanyl and 2% xylocaine local anesthesia.      Estimated Blood Loss: < 50 ml.     Specimens Removed: None         Disposition of Specimen: N/A        Complications: No Immediate Complications.       Post-procedure Diagnosis/Findings:       Navitor 27 mm      Electronically signed by Kaushal Ramírez MD on 2/19/25 at 12:04 PM EST   Interventional Cardiology

## 2025-02-19 NOTE — PLAN OF CARE
Problem: Chronic Conditions and Co-morbidities  Goal: Patient's chronic conditions and co-morbidity symptoms are monitored and maintained or improved  Outcome: Progressing  Flowsheets (Taken 2/19/2025 1532)  Care Plan - Patient's Chronic Conditions and Co-Morbidity Symptoms are Monitored and Maintained or Improved:   Monitor and assess patient's chronic conditions and comorbid symptoms for stability, deterioration, or improvement   Collaborate with multidisciplinary team to address chronic and comorbid conditions and prevent exacerbation or deterioration   Update acute care plan with appropriate goals if chronic or comorbid symptoms are exacerbated and prevent overall improvement and discharge

## 2025-02-19 NOTE — CARE COORDINATION
Case Management Assessment Initial Evaluation    Date/Time of Evaluation: 2/19/2025 11:38 AM  Assessment Completed by: Zhanna Madrid RN    If patient is discharged prior to next notation, then this note serves as note for discharge by case management.    Patient Name: Sherwin Phelps                   YOB: 1935  Diagnosis: Severe aortic stenosis [I35.0]                   Date / Time: 2/19/2025  5:56 AM  Location: Cath Pool Room/     Patient Admission Status: Inpatient   Readmission Risk Low 0-14, Mod 15-19), High > 20: Readmission Risk Score: 13.1    Current PCP: Diego Silva Jr., DO  Health Care Decision Makers:     Additional Case Management Notes: Here for elective procedure, TAVR to be done today with Dr. Ramírez. ECHO to be done in the am.  Anticipate discharge tomorrow if medically ready and after ECHO is read by Dr. Ramírez.     Procedure:   2/19 TAVR procedure with Dr. Ramírez.   2/20 ECHO: to be done.     Patient Goals/Plan/Treatment Preferences: Met w/ Sherwin and his family. Verified insurance and PCP. He lives at home w/ his wife and adult daughter. They assist with household chores. His granddaughter provides all transportation. He has needed DME, listed below.  Sherwin's granddaughter states they talked with the TAVR coordinator Alix on getting a precert to [facility] at discharge. Discussed needing therapy recommendations and the precert process. If patient does too well with therapy, family is comfortable taking Sherwin home w/ new . SW consult placed.      02/19/25 0800   Service Assessment   Patient Orientation Alert and Oriented   Cognition Alert   History Provided By Patient   Primary Caregiver Self   Accompanied By/Relationship wife, son and granddaughter   Support Systems Spouse/Significant Other;Children;Family Members   Patient's Healthcare Decision Maker is: Patient Declined (Legal Next of Kin Remains as Decision Maker)   PCP Verified by CM Yes   Last Visit to PCP Within

## 2025-02-19 NOTE — H&P
[facility]  Sedation/Analgesia History & Physical    Pt Name: Sherwin Phelps  Account number: [id number]  MRN: [id number]  YOB: 1935  Provider Performing Procedure: Kaushal Ramírez MD MD  Referring Provider: Kaushal Ramírez MD   Primary Care Physician: Diego Silva Jr., DO  Date: 2/19/2025    PRE-PROCEDURE    Code Status: FULL CODE  Brief History/Pre-Procedure Diagnosis:   Severe AS  NYHA III CHF    Consent: : I have discussed with the patient risks, benefits, and alternatives (and relevant risks, benefits, and side effects related to alternatives or not receiving care), and likelihood of the success.   The patient and/or representative appear to understand and agree to proceed.  The discussion encompasses risks, benefits, and side effects related to the alternatives and the risks related to not receiving the proposed care, treatment, and services.     The indication, risks and benefits of the procedure and possible therapeutic consequences and alternatives were discussed with the patient. The patient was given the opportunity to ask questions and to have them answered to his/her satisfaction. Risks of the procedure include but are not limited to the following: Bleeding, hematoma including retroperitoneal hemmorhage, infection, pain and discomfort, injury to the aorta and other blood vessels, rhythm disturbance, low blood pressure, myocardial infarction, stroke, kidney damage/failure, myocardial perforation, allergic reactions to sedatives/contrast material, loss of pulse/vascular compromise requiring surgery, aneurysm/pseudoaneurysm formation, possible loss of a limb/hand/leg, needing blood transfusion, requiring emergent open heart surgery or emergent coronary intervention, the need for intubation/respiratory support, the requirement for defibrillation/cardioversion, and death. Alternatives to and omission of the suggested procedure were discussed. The patient had no

## 2025-02-19 NOTE — OP NOTE
DATE: 02/19/25    PATIENT: Sherwin Phelps    MRN: [id number]     Acct: [id number]    PREOP DIAGNOSIS:   Severe aortic stenosis    Postop diagnosis:  Severe aortic stenosis    Procedure:  Transcatheter aortic valve replacement with an Abbott 27 mm Navitor prosthesis    OPERATORS:  Kaushal Ramírez MD; Pedro Daniel MD    ESTIMATED BLOOD LOSS: 50 ml    A pre-procedure discussion was conducted with the patient to confirm/exclude candidacy for open surgical salvage in case of procedure failure.    After informed consent was obtained from the patient, the patient was brought to the hybrid operating room and prepped in sterile fashion. Infiltration of the bilateral inguinal regions was accomplished with 2% lidocaine. Using micropuncture and modified Seldinger technique, a 6 Fr sheath was inserted into the right internal jugular vein. A 5 Fr pacemaker was inserted into position under fluoroscopic guidance into the right ventricle, with verification of appropriate pacing thresholds. The Laurel embolic protection device was deployed via the right radial artery per Dr. Ramírez.    Using the same technique under fluoroscopic guidance, a 5 Fr sheath was inserted into the left common femoral artery.  Similarly, a 4 Fr sheath, followed after contrast verification by a 5 Fr sheath was inserted into the right common femoral artery. A Supra Core wire was used to traverse the aortic arch.  We then performed standard preclose technique by deploying a Perclose in orthogonal fashion and leaving it incomplete. A 14 Fr sheath was inserted over the wire under guidance. The patient was heparinized to an ACT above 250 seconds.     A straight pigtail was placed via the left femoral artery and aortography was performed in a coplanar view to ensure alignment.  The aortic valve was crossed using a straight wire through an AL1 catheter. The guidewire was changed to a J-tipped wire, on which was inserted an angled pigtail.  Direct hemodynamic

## 2025-02-19 NOTE — PLAN OF CARE
Problem: Cardiovascular - Adult  Goal: Maintains optimal cardiac output and hemodynamic stability  Outcome: Progressing  Flowsheets (Taken 2/19/2025 5807)  Maintains optimal cardiac output and hemodynamic stability:   Monitor blood pressure and heart rate   Monitor urine output and notify Licensed Independent Practitioner for values outside of normal range   Assess for signs of decreased cardiac output

## 2025-02-20 ENCOUNTER — APPOINTMENT (OUTPATIENT)
Age: 89
DRG: 267 | End: 2025-02-20
Attending: INTERNAL MEDICINE
Payer: MEDICARE

## 2025-02-20 ENCOUNTER — APPOINTMENT (OUTPATIENT)
Dept: GENERAL RADIOLOGY | Age: 89
DRG: 267 | End: 2025-02-20
Attending: INTERNAL MEDICINE
Payer: MEDICARE

## 2025-02-20 LAB
ANION GAP SERPL CALC-SCNC: 19 MEQ/L (ref 8–16)
APTT PPP: 28.3 SECONDS (ref 22–38)
BUN SERPL-MCNC: 13 MG/DL (ref 7–22)
CALCIUM SERPL-MCNC: 8.4 MG/DL (ref 8.2–9.6)
CHLORIDE SERPL-SCNC: 101 MEQ/L (ref 98–111)
CO2 SERPL-SCNC: 22 MEQ/L (ref 23–33)
CREAT SERPL-MCNC: 0.6 MG/DL (ref 0.4–1.2)
DEPRECATED RDW RBC AUTO: 47.1 FL (ref 35–45)
ECHO AO ASC DIAM: 3.9 CM
ECHO AO ASCENDING AORTA INDEX: 2.02 CM/M2
ECHO AR MAX VEL PISA: 3.3 M/S
ECHO AV ACCELERATION TIME: 102 MS
ECHO AV AREA PEAK VELOCITY: 2.4 CM2
ECHO AV AREA VTI: 2.9 CM2
ECHO AV AREA/BSA PEAK VELOCITY: 1.2 CM2/M2
ECHO AV AREA/BSA VTI: 1.5 CM2/M2
ECHO AV MEAN GRADIENT: 6 MMHG
ECHO AV MEAN VELOCITY: 1.2 M/S
ECHO AV PEAK GRADIENT: 11 MMHG
ECHO AV PEAK VELOCITY: 1.7 M/S
ECHO AV REGURGITANT PHT: 800 MS
ECHO AV VELOCITY RATIO: 0.76
ECHO AV VTI: 27 CM
ECHO BSA: 1.94 M2
ECHO BSA: 1.94 M2
ECHO EST RA PRESSURE: 10 MMHG
ECHO LA AREA 2C: 18.7 CM2
ECHO LA AREA 4C: 20.2 CM2
ECHO LA DIAMETER INDEX: 2.23 CM/M2
ECHO LA DIAMETER: 4.3 CM
ECHO LA MAJOR AXIS: 5.8 CM
ECHO LA MINOR AXIS: 5 CM
ECHO LA VOL BP: 60 ML (ref 18–58)
ECHO LA VOL MOD A2C: 58 ML (ref 18–58)
ECHO LA VOL MOD A4C: 55 ML (ref 18–58)
ECHO LA VOL/BSA BIPLANE: 31 ML/M2 (ref 16–34)
ECHO LA VOLUME INDEX MOD A2C: 30 ML/M2 (ref 16–34)
ECHO LA VOLUME INDEX MOD A4C: 28 ML/M2 (ref 16–34)
ECHO LV EF PHYSICIAN: 55 %
ECHO LV FRACTIONAL SHORTENING: 27 % (ref 28–44)
ECHO LV INTERNAL DIMENSION DIASTOLE INDEX: 2.33 CM/M2
ECHO LV INTERNAL DIMENSION DIASTOLIC: 4.5 CM (ref 4.2–5.9)
ECHO LV INTERNAL DIMENSION SYSTOLIC INDEX: 1.71 CM/M2
ECHO LV INTERNAL DIMENSION SYSTOLIC: 3.3 CM
ECHO LV ISOVOLUMETRIC RELAXATION TIME (IVRT): 99 MS
ECHO LV IVSD: 1 CM (ref 0.6–1)
ECHO LV MASS 2D: 153.3 G (ref 88–224)
ECHO LV MASS INDEX 2D: 79.4 G/M2 (ref 49–115)
ECHO LV POSTERIOR WALL DIASTOLIC: 1 CM (ref 0.6–1)
ECHO LV RELATIVE WALL THICKNESS RATIO: 0.44
ECHO LVOT AREA: 3.1 CM2
ECHO LVOT AV VTI INDEX: 0.93
ECHO LVOT DIAM: 2 CM
ECHO LVOT MEAN GRADIENT: 4 MMHG
ECHO LVOT PEAK GRADIENT: 6 MMHG
ECHO LVOT PEAK VELOCITY: 1.3 M/S
ECHO LVOT STROKE VOLUME INDEX: 40.8 ML/M2
ECHO LVOT SV: 78.8 ML
ECHO LVOT VTI: 25.1 CM
ECHO MV A VELOCITY: 1.66 M/S
ECHO MV E DECELERATION TIME (DT): 327 MS
ECHO MV E VELOCITY: 1.14 M/S
ECHO MV E/A RATIO: 0.69
ECHO MV REGURGITANT PEAK GRADIENT: 88 MMHG
ECHO MV REGURGITANT PEAK VELOCITY: 4.7 M/S
ECHO PV MAX VELOCITY: 0.7 M/S
ECHO PV PEAK GRADIENT: 2 MMHG
ECHO RIGHT VENTRICULAR SYSTOLIC PRESSURE (RVSP): 52 MMHG
ECHO RV INTERNAL DIMENSION: 3.1 CM
ECHO RV TAPSE: 1.7 CM (ref 1.7–?)
ECHO TV E WAVE: 0.5 M/S
ECHO TV REGURGITANT MAX VELOCITY: 3.23 M/S
ECHO TV REGURGITANT PEAK GRADIENT: 42 MMHG
EKG ATRIAL RATE: 90 BPM
EKG ATRIAL RATE: 92 BPM
EKG P AXIS: 100 DEGREES
EKG P AXIS: 67 DEGREES
EKG P-R INTERVAL: 178 MS
EKG P-R INTERVAL: 188 MS
EKG Q-T INTERVAL: 394 MS
EKG Q-T INTERVAL: 394 MS
EKG QRS DURATION: 94 MS
EKG QRS DURATION: 98 MS
EKG QTC CALCULATION (BAZETT): 481 MS
EKG QTC CALCULATION (BAZETT): 487 MS
EKG R AXIS: -37 DEGREES
EKG R AXIS: -7 DEGREES
EKG T AXIS: 108 DEGREES
EKG T AXIS: 112 DEGREES
EKG VENTRICULAR RATE: 90 BPM
EKG VENTRICULAR RATE: 92 BPM
ERYTHROCYTE [DISTWIDTH] IN BLOOD BY AUTOMATED COUNT: 13.2 % (ref 11.5–14.5)
GFR SERPL CREATININE-BSD FRML MDRD: > 90 ML/MIN/1.73M2
GLUCOSE BLD STRIP.AUTO-MCNC: 133 MG/DL (ref 70–108)
GLUCOSE SERPL-MCNC: 130 MG/DL (ref 70–108)
HCT VFR BLD AUTO: 29.4 % (ref 42–52)
HGB BLD-MCNC: 9.6 GM/DL (ref 14–18)
INR PPP: 1.07 (ref 0.85–1.13)
MCH RBC QN AUTO: 31.7 PG (ref 26–33)
MCHC RBC AUTO-ENTMCNC: 32.7 GM/DL (ref 32.2–35.5)
MCV RBC AUTO: 97 FL (ref 80–94)
PLATELET # BLD AUTO: 276 THOU/MM3 (ref 130–400)
PMV BLD AUTO: 9.2 FL (ref 9.4–12.4)
POTASSIUM SERPL-SCNC: 3.7 MEQ/L (ref 3.5–5.2)
RBC # BLD AUTO: 3.03 MILL/MM3 (ref 4.7–6.1)
SODIUM SERPL-SCNC: 142 MEQ/L (ref 135–145)
WBC # BLD AUTO: 10.9 THOU/MM3 (ref 4.8–10.8)

## 2025-02-20 PROCEDURE — 99222 1ST HOSP IP/OBS MODERATE 55: CPT | Performed by: NURSE PRACTITIONER

## 2025-02-20 PROCEDURE — 93010 ELECTROCARDIOGRAM REPORT: CPT | Performed by: INTERNAL MEDICINE

## 2025-02-20 PROCEDURE — 2140000000 HC CCU INTERMEDIATE R&B

## 2025-02-20 PROCEDURE — 97116 GAIT TRAINING THERAPY: CPT

## 2025-02-20 PROCEDURE — 80048 BASIC METABOLIC PNL TOTAL CA: CPT

## 2025-02-20 PROCEDURE — 6370000000 HC RX 637 (ALT 250 FOR IP): Performed by: NURSE PRACTITIONER

## 2025-02-20 PROCEDURE — 93005 ELECTROCARDIOGRAM TRACING: CPT | Performed by: INTERNAL MEDICINE

## 2025-02-20 PROCEDURE — 85730 THROMBOPLASTIN TIME PARTIAL: CPT

## 2025-02-20 PROCEDURE — 93306 TTE W/DOPPLER COMPLETE: CPT

## 2025-02-20 PROCEDURE — 93005 ELECTROCARDIOGRAM TRACING: CPT | Performed by: NURSE PRACTITIONER

## 2025-02-20 PROCEDURE — 36415 COLL VENOUS BLD VENIPUNCTURE: CPT

## 2025-02-20 PROCEDURE — 2500000003 HC RX 250 WO HCPCS: Performed by: INTERNAL MEDICINE

## 2025-02-20 PROCEDURE — 97166 OT EVAL MOD COMPLEX 45 MIN: CPT

## 2025-02-20 PROCEDURE — 82948 REAGENT STRIP/BLOOD GLUCOSE: CPT

## 2025-02-20 PROCEDURE — 71045 X-RAY EXAM CHEST 1 VIEW: CPT

## 2025-02-20 PROCEDURE — 85027 COMPLETE CBC AUTOMATED: CPT

## 2025-02-20 PROCEDURE — 97535 SELF CARE MNGMENT TRAINING: CPT

## 2025-02-20 PROCEDURE — 6370000000 HC RX 637 (ALT 250 FOR IP): Performed by: INTERNAL MEDICINE

## 2025-02-20 PROCEDURE — 85610 PROTHROMBIN TIME: CPT

## 2025-02-20 PROCEDURE — 93306 TTE W/DOPPLER COMPLETE: CPT | Performed by: INTERNAL MEDICINE

## 2025-02-20 PROCEDURE — 97163 PT EVAL HIGH COMPLEX 45 MIN: CPT

## 2025-02-20 RX ADMIN — Medication 2000 UNITS: at 09:20

## 2025-02-20 RX ADMIN — QUETIAPINE FUMARATE 25 MG: 25 TABLET ORAL at 20:43

## 2025-02-20 RX ADMIN — PIOGLITAZONE 30 MG: 30 TABLET ORAL at 13:54

## 2025-02-20 RX ADMIN — ASPIRIN 81 MG: 81 TABLET, COATED ORAL at 09:20

## 2025-02-20 RX ADMIN — ACETAMINOPHEN 650 MG: 325 TABLET ORAL at 13:53

## 2025-02-20 RX ADMIN — SODIUM CHLORIDE, PRESERVATIVE FREE 10 ML: 5 INJECTION INTRAVENOUS at 20:44

## 2025-02-20 RX ADMIN — POTASSIUM BICARBONATE 40 MEQ: 782 TABLET, EFFERVESCENT ORAL at 09:21

## 2025-02-20 RX ADMIN — ALPRAZOLAM 0.25 MG: 0.25 TABLET ORAL at 22:11

## 2025-02-20 RX ADMIN — DONEPEZIL HYDROCHLORIDE 10 MG: 10 TABLET, FILM COATED ORAL at 20:43

## 2025-02-20 RX ADMIN — ACETAMINOPHEN 650 MG: 325 TABLET ORAL at 09:18

## 2025-02-20 RX ADMIN — ACETAMINOPHEN 650 MG: 325 TABLET ORAL at 02:20

## 2025-02-20 RX ADMIN — SODIUM CHLORIDE, PRESERVATIVE FREE 10 ML: 5 INJECTION INTRAVENOUS at 09:21

## 2025-02-20 RX ADMIN — MEMANTINE 5 MG: 5 TABLET ORAL at 09:20

## 2025-02-20 RX ADMIN — CLOPIDOGREL BISULFATE 75 MG: 75 TABLET ORAL at 09:20

## 2025-02-20 RX ADMIN — ATORVASTATIN CALCIUM 40 MG: 40 TABLET, FILM COATED ORAL at 09:20

## 2025-02-20 RX ADMIN — DOCUSATE SODIUM 100 MG: 100 CAPSULE, LIQUID FILLED ORAL at 09:20

## 2025-02-20 RX ADMIN — FERROUS SULFATE TAB 325 MG (65 MG ELEMENTAL FE) 325 MG: 325 (65 FE) TAB at 09:20

## 2025-02-20 ASSESSMENT — PAIN DESCRIPTION - LOCATION
LOCATION: NECK
LOCATION: CHEST;ARM
LOCATION: CHEST
LOCATION: HEAD

## 2025-02-20 ASSESSMENT — PAIN DESCRIPTION - DESCRIPTORS
DESCRIPTORS: SHARP
DESCRIPTORS: ACHING
DESCRIPTORS: SHARP

## 2025-02-20 ASSESSMENT — PAIN SCALES - GENERAL
PAINLEVEL_OUTOF10: 5
PAINLEVEL_OUTOF10: 5
PAINLEVEL_OUTOF10: 3
PAINLEVEL_OUTOF10: 5

## 2025-02-20 ASSESSMENT — PAIN DESCRIPTION - ORIENTATION
ORIENTATION: LEFT
ORIENTATION: RIGHT
ORIENTATION: LEFT

## 2025-02-20 ASSESSMENT — PAIN - FUNCTIONAL ASSESSMENT
PAIN_FUNCTIONAL_ASSESSMENT: PREVENTS OR INTERFERES SOME ACTIVE ACTIVITIES AND ADLS
PAIN_FUNCTIONAL_ASSESSMENT: PREVENTS OR INTERFERES SOME ACTIVE ACTIVITIES AND ADLS

## 2025-02-20 NOTE — PROCEDURES
PROCEDURE NOTE  Date: 2/20/2025   Name: Sherwin Phelps  YOB: 1935    Procedures  12 lead EKG completed. Results handed to Eileen Zuluaga CET

## 2025-02-20 NOTE — PLAN OF CARE
Problem: Chronic Conditions and Co-morbidities  Goal: Patient's chronic conditions and co-morbidity symptoms are monitored and maintained or improved  Outcome: Progressing     Problem: Cardiovascular - Adult  Goal: Maintains optimal cardiac output and hemodynamic stability  Outcome: Progressing  Flowsheets (Taken 2/20/2025 1012)  Maintains optimal cardiac output and hemodynamic stability:   Monitor blood pressure and heart rate   Monitor urine output and notify Licensed Independent Practitioner for values outside of normal range   Assess for signs of decreased cardiac output   Administer fluid and/or volume expanders as ordered

## 2025-02-20 NOTE — DISCHARGE INSTRUCTIONS
Cleanse wounds with Hibiclens soap and water. Pat incision dry. Keep wounds open to air after Band-Aids are removed and keep dry.   A small amount of bloody or clear drainage is normal. Call if there is any extensive bruising, oozing, swelling or hardness of the site.  Watch for signs of infections: redness, incision hot to the touch, fever greater than 101 degrees, swelling at the groin or incision site, or discolored drainage from your incision.  When coughing, sneezing, straining for a bowel movement, vomiting or dry heaving please place manual pressure over the bilateral groin sites to give support to the sutures. If pressure is not placed the chance of a spontaneous bleed could occur which could be serious.     NUTRITION:   Low fat, low salt diet (guidelines for Heart Healthy eating)  Limit caffeine to 1-2 cups per day (coffee, tea, chocolate, soda)  Eat high fiber to avoid constipation and straining during bowel movements (fresh veggies and fruit, whole grain)  Limit alcohol to two servings a day ( 8 oz beer, 1 oz liquor, 4 oz wine)    HEALTHY HABITS:  No Smoking!!!! If you need help to stop smoking please call your doctor.  Attempt to achieve and maintain your ideal body weight.  We suggest that you walk as much as you can, but do not exhaust yourself.  Set a goal and work your way up to it at a paced rate.     MEDICATIONS:  Take your pills as ordered at time of discharge.   Do not stop taking any medication without first discussing it with your doctor.   Avoid all over the counter medications, herbal, and natural supplements unless you have discussed them with your doctor.    It is important to follow your doctor's orders, especially if blood thinning drugs are prescribed.  Your doctor will monitor your medicine and advise you when or if you can stop taking it.          WHO DO YOU NEED TO TELL ABOUT YOUR IMPLANTED VALVE?   Regular check-ups by your cardiologist are very important.  It is easier for

## 2025-02-20 NOTE — CARE COORDINATION
DISCHARGE PLANNING EVALUATION  2/20/25, 2:06 PM EST    Reason for Referral: Want [facility] at discharge.   Decision Maker: Patients spouse is helping with decision making.    Current Services: None  New Services Requested: [facility] for rehab  Family/ Social/ Home environment: From home with spouse Romy.  She takes care of the house.  Her daughter Elissa and grandchild Ilda are very supportive.  Ilda takes patients to most of their appointments.    Payment Source:[employer] Medicare  Transportation at Discharge: Unsure, family?  Post-acute (PAC) provider list was provided to patient. Patient was informed of their freedom to choose PAC provider. Discussed and offered to show the patient the relevant PAC Providers quality and resource use measures on Medicare Compare web site via computer based on patient's goals of care and treatment preferences. Questions regarding selection process were answered.      Teach Back Method used with Sherwin his spouse and grandchild Ilda regarding care plan and discharge planning.   Patient and family verbalized understanding of the plan of care and contribute to goal setting.       Patient preferences and discharge plan: Sherwin and his family would like him to go to [facility] for rehab at discharge.  SW called and made a referral to [facility] in admissions.      Electronically signed by IRIS Lopez on 2/20/2025 at 2:06 PM

## 2025-02-20 NOTE — PROCEDURES
PROCEDURE NOTE  Date: 2/20/2025   Name: Sherwin Phelps  YOB: 1935    Procedures        EKG was completed and handed to RN   Negative

## 2025-02-21 LAB
ANION GAP SERPL CALC-SCNC: 18 MEQ/L (ref 8–16)
BUN SERPL-MCNC: 14 MG/DL (ref 7–22)
CALCIUM SERPL-MCNC: 8.4 MG/DL (ref 8.2–9.6)
CHLORIDE SERPL-SCNC: 99 MEQ/L (ref 98–111)
CO2 SERPL-SCNC: 21 MEQ/L (ref 23–33)
CREAT SERPL-MCNC: 0.6 MG/DL (ref 0.4–1.2)
EKG ATRIAL RATE: 92 BPM
EKG P AXIS: 93 DEGREES
EKG P-R INTERVAL: 190 MS
EKG Q-T INTERVAL: 394 MS
EKG QRS DURATION: 98 MS
EKG QTC CALCULATION (BAZETT): 487 MS
EKG R AXIS: -37 DEGREES
EKG T AXIS: 115 DEGREES
EKG VENTRICULAR RATE: 92 BPM
GFR SERPL CREATININE-BSD FRML MDRD: > 90 ML/MIN/1.73M2
GLUCOSE SERPL-MCNC: 133 MG/DL (ref 70–108)
POTASSIUM SERPL-SCNC: 4.1 MEQ/L (ref 3.5–5.2)
SODIUM SERPL-SCNC: 138 MEQ/L (ref 135–145)

## 2025-02-21 PROCEDURE — 93005 ELECTROCARDIOGRAM TRACING: CPT | Performed by: INTERNAL MEDICINE

## 2025-02-21 PROCEDURE — 6370000000 HC RX 637 (ALT 250 FOR IP): Performed by: INTERNAL MEDICINE

## 2025-02-21 PROCEDURE — 97530 THERAPEUTIC ACTIVITIES: CPT

## 2025-02-21 PROCEDURE — 2500000003 HC RX 250 WO HCPCS: Performed by: INTERNAL MEDICINE

## 2025-02-21 PROCEDURE — 80048 BASIC METABOLIC PNL TOTAL CA: CPT

## 2025-02-21 PROCEDURE — 93010 ELECTROCARDIOGRAM REPORT: CPT | Performed by: INTERNAL MEDICINE

## 2025-02-21 PROCEDURE — 36415 COLL VENOUS BLD VENIPUNCTURE: CPT

## 2025-02-21 PROCEDURE — 2140000000 HC CCU INTERMEDIATE R&B

## 2025-02-21 RX ADMIN — DONEPEZIL HYDROCHLORIDE 10 MG: 10 TABLET, FILM COATED ORAL at 19:43

## 2025-02-21 RX ADMIN — QUETIAPINE FUMARATE 25 MG: 25 TABLET ORAL at 19:43

## 2025-02-21 RX ADMIN — ATORVASTATIN CALCIUM 40 MG: 40 TABLET, FILM COATED ORAL at 08:54

## 2025-02-21 RX ADMIN — ACETAMINOPHEN 650 MG: 325 TABLET ORAL at 06:09

## 2025-02-21 RX ADMIN — SODIUM CHLORIDE, PRESERVATIVE FREE 10 ML: 5 INJECTION INTRAVENOUS at 19:43

## 2025-02-21 RX ADMIN — Medication 2000 UNITS: at 08:54

## 2025-02-21 RX ADMIN — MEMANTINE 5 MG: 5 TABLET ORAL at 08:54

## 2025-02-21 RX ADMIN — ASPIRIN 81 MG: 81 TABLET, COATED ORAL at 08:54

## 2025-02-21 RX ADMIN — SODIUM CHLORIDE, PRESERVATIVE FREE 10 ML: 5 INJECTION INTRAVENOUS at 08:54

## 2025-02-21 RX ADMIN — FUROSEMIDE 20 MG: 20 TABLET ORAL at 18:10

## 2025-02-21 RX ADMIN — CLOPIDOGREL BISULFATE 75 MG: 75 TABLET ORAL at 08:54

## 2025-02-21 RX ADMIN — PIOGLITAZONE 30 MG: 30 TABLET ORAL at 09:40

## 2025-02-21 ASSESSMENT — PAIN DESCRIPTION - LOCATION: LOCATION: GENERALIZED

## 2025-02-21 ASSESSMENT — PAIN DESCRIPTION - DESCRIPTORS: DESCRIPTORS: ACHING

## 2025-02-21 ASSESSMENT — PAIN SCALES - GENERAL: PAINLEVEL_OUTOF10: 3

## 2025-02-21 NOTE — PROCEDURES
PROCEDURE NOTE  Date: 2/21/2025   Name: Sherwin Phelps  YOB: 1935    Procedures  12 lead EKG completed. Results handed to Nancy HERNANDEZ.

## 2025-02-21 NOTE — CARE COORDINATION
2/21/25, 3:27 PM EST    DISCHARGE PLANNING EVALUATION    Spoke with [facility].  They still have not decided if they can accept.  Spoke with [facility] who then put GAYE on with the director of nursing.  After a long discussion GAYE agreed to call back on Monday for a decision.  Called hannah Gonsales and told her that if we do not have an answer from [facility] by Monday we will need to consider other nursing facilities.  She voiced understanding.

## 2025-02-21 NOTE — CARE COORDINATION
2/21/25, 2:54 PM EST    DISCHARGE ON GOING EVALUATION    [facility] day: 2  Location: [facility]25/025-A Reason for admit: Severe aortic stenosis [I35.0]     Procedures:   2/19 TAVR  2/20 ECHO: EF 50-55%. Appropriately positioned transcatheter bioprosthetic valve that is well-seated. AV mean gradient is 6 mmHg. No regurgitation. Trace paravalvular regurgitation. No stenosis. AV mean gradient is 6 mmHg. AV peak gradient is 11 mmHg. AV peak velocity is 1.7 m/s. AV area by continuity VTI is 2.9 cm2.     Imaging since last note:   2/20 CXR: Negative    Barriers to Discharge: Cardiology following. PT/OT. TAVR 2/19. Asa/Plavix. PO lasix. Metformin unheld. SNF precert.     PCP: Diego Silva Jr.,   Readmission Risk Score: 13.7    Patient Goals/Plan/Treatment Preferences: From home w/ wife and adult daughter. Referral to [facility]; awaiting decision. Will require precert.

## 2025-02-21 NOTE — PLAN OF CARE
Problem: Safety - Adult  Goal: Free from fall injury  Outcome: Progressing  Flowsheets (Taken 2/21/2025 1041)  Free From Fall Injury: Instruct family/caregiver on patient safety     Problem: Chronic Conditions and Co-morbidities  Goal: Patient's chronic conditions and co-morbidity symptoms are monitored and maintained or improved  Outcome: Progressing  Flowsheets (Taken 2/21/2025 1041)  Care Plan - Patient's Chronic Conditions and Co-Morbidity Symptoms are Monitored and Maintained or Improved: Monitor and assess patient's chronic conditions and comorbid symptoms for stability, deterioration, or improvement     Problem: Discharge Planning  Goal: Discharge to home or other facility with appropriate resources  Outcome: Progressing  Flowsheets (Taken 2/21/2025 1041)  Discharge to home or other facility with appropriate resources: Identify barriers to discharge with patient and caregiver     Problem: Cardiovascular - Adult  Goal: Maintains optimal cardiac output and hemodynamic stability  Outcome: Progressing  Flowsheets (Taken 2/21/2025 1041)  Maintains optimal cardiac output and hemodynamic stability: Monitor blood pressure and heart rate     Problem: Cardiovascular - Adult  Goal: Absence of cardiac dysrhythmias or at baseline  Outcome: Progressing  Flowsheets (Taken 2/21/2025 1041)  Absence of cardiac dysrhythmias or at baseline: Monitor cardiac rate and rhythm     Problem: Safety - Medical Restraint  Goal: Remains free of injury from restraints (Restraint for Interference with Medical Device)  Description: INTERVENTIONS:  1. Determine that other, less restrictive measures have been tried or would not be effective before applying the restraint  2. Evaluate the patient's condition at the time of restraint application  3. Inform patient/family regarding the reason for restraint  4. Q2H: Monitor safety, psychosocial status, comfort, nutrition and hydration  Outcome: Progressing  Flowsheets (Taken 2/21/2025

## 2025-02-22 PROCEDURE — 99232 SBSQ HOSP IP/OBS MODERATE 35: CPT | Performed by: INTERNAL MEDICINE

## 2025-02-22 PROCEDURE — 2500000003 HC RX 250 WO HCPCS: Performed by: INTERNAL MEDICINE

## 2025-02-22 PROCEDURE — 2140000000 HC CCU INTERMEDIATE R&B

## 2025-02-22 PROCEDURE — 6370000000 HC RX 637 (ALT 250 FOR IP): Performed by: INTERNAL MEDICINE

## 2025-02-22 PROCEDURE — 93005 ELECTROCARDIOGRAM TRACING: CPT | Performed by: INTERNAL MEDICINE

## 2025-02-22 RX ADMIN — MEMANTINE 5 MG: 5 TABLET ORAL at 09:58

## 2025-02-22 RX ADMIN — FERROUS SULFATE TAB 325 MG (65 MG ELEMENTAL FE) 325 MG: 325 (65 FE) TAB at 09:58

## 2025-02-22 RX ADMIN — PIOGLITAZONE 30 MG: 30 TABLET ORAL at 09:58

## 2025-02-22 RX ADMIN — DONEPEZIL HYDROCHLORIDE 10 MG: 10 TABLET, FILM COATED ORAL at 19:34

## 2025-02-22 RX ADMIN — QUETIAPINE FUMARATE 25 MG: 25 TABLET ORAL at 19:34

## 2025-02-22 RX ADMIN — ATORVASTATIN CALCIUM 40 MG: 40 TABLET, FILM COATED ORAL at 09:58

## 2025-02-22 RX ADMIN — CLOPIDOGREL BISULFATE 75 MG: 75 TABLET ORAL at 09:58

## 2025-02-22 RX ADMIN — SODIUM CHLORIDE, PRESERVATIVE FREE 10 ML: 5 INJECTION INTRAVENOUS at 19:34

## 2025-02-22 RX ADMIN — DOCUSATE SODIUM 100 MG: 100 CAPSULE, LIQUID FILLED ORAL at 09:58

## 2025-02-22 RX ADMIN — Medication 2000 UNITS: at 09:58

## 2025-02-22 RX ADMIN — METFORMIN HYDROCHLORIDE 1500 MG: 750 TABLET, EXTENDED RELEASE ORAL at 09:58

## 2025-02-22 RX ADMIN — ASPIRIN 81 MG: 81 TABLET, COATED ORAL at 09:58

## 2025-02-22 RX ADMIN — SODIUM CHLORIDE, PRESERVATIVE FREE 10 ML: 5 INJECTION INTRAVENOUS at 10:04

## 2025-02-22 NOTE — PROCEDURES
PROCEDURE NOTE  Date: 2/22/2025   Name: Sherwin Phelps  YOB: 1935    Procedures        EKG was completed and handed to RN

## 2025-02-22 NOTE — PLAN OF CARE
Problem: Safety - Adult  Goal: Free from fall injury  Outcome: Progressing  Flowsheets (Taken 2/21/2025 1041 by Alyssa Rivera RN)  Free From Fall Injury: Instruct family/caregiver on patient safety     Problem: Chronic Conditions and Co-morbidities  Goal: Patient's chronic conditions and co-morbidity symptoms are monitored and maintained or improved  Outcome: Progressing  Flowsheets (Taken 2/21/2025 1041 by Alyssa Rivera RN)  Care Plan - Patient's Chronic Conditions and Co-Morbidity Symptoms are Monitored and Maintained or Improved: Monitor and assess patient's chronic conditions and comorbid symptoms for stability, deterioration, or improvement     Problem: Discharge Planning  Goal: Discharge to home or other facility with appropriate resources  Outcome: Progressing  Flowsheets (Taken 2/21/2025 1041 by Alyssa Rivera RN)  Discharge to home or other facility with appropriate resources: Identify barriers to discharge with patient and caregiver     Problem: Cardiovascular - Adult  Goal: Maintains optimal cardiac output and hemodynamic stability  Outcome: Progressing  Flowsheets (Taken 2/21/2025 1041 by Alyssa Rivera RN)  Maintains optimal cardiac output and hemodynamic stability: Monitor blood pressure and heart rate  Goal: Absence of cardiac dysrhythmias or at baseline  Outcome: Progressing  Flowsheets (Taken 2/21/2025 1041 by Alyssa Rivera RN)  Absence of cardiac dysrhythmias or at baseline: Monitor cardiac rate and rhythm     Problem: Safety - Medical Restraint  Goal: Remains free of injury from restraints (Restraint for Interference with Medical Device)  Description: INTERVENTIONS:  1. Determine that other, less restrictive measures have been tried or would not be effective before applying the restraint  2. Evaluate the patient's condition at the time of restraint application  3. Inform patient/family regarding the reason for restraint  4. Q2H: Monitor safety, psychosocial status, comfort, nutrition and

## 2025-02-23 LAB
EKG ATRIAL RATE: 92 BPM
EKG ATRIAL RATE: 97 BPM
EKG P AXIS: 88 DEGREES
EKG P AXIS: 90 DEGREES
EKG P-R INTERVAL: 180 MS
EKG P-R INTERVAL: 190 MS
EKG Q-T INTERVAL: 382 MS
EKG Q-T INTERVAL: 388 MS
EKG QRS DURATION: 98 MS
EKG QRS DURATION: 98 MS
EKG QTC CALCULATION (BAZETT): 479 MS
EKG QTC CALCULATION (BAZETT): 485 MS
EKG R AXIS: -46 DEGREES
EKG R AXIS: -5 DEGREES
EKG T AXIS: 113 DEGREES
EKG T AXIS: 121 DEGREES
EKG VENTRICULAR RATE: 92 BPM
EKG VENTRICULAR RATE: 97 BPM

## 2025-02-23 PROCEDURE — 93010 ELECTROCARDIOGRAM REPORT: CPT | Performed by: INTERNAL MEDICINE

## 2025-02-23 PROCEDURE — 2500000003 HC RX 250 WO HCPCS: Performed by: INTERNAL MEDICINE

## 2025-02-23 PROCEDURE — 6370000000 HC RX 637 (ALT 250 FOR IP): Performed by: INTERNAL MEDICINE

## 2025-02-23 PROCEDURE — 93005 ELECTROCARDIOGRAM TRACING: CPT | Performed by: INTERNAL MEDICINE

## 2025-02-23 PROCEDURE — 2140000000 HC CCU INTERMEDIATE R&B

## 2025-02-23 PROCEDURE — 99232 SBSQ HOSP IP/OBS MODERATE 35: CPT | Performed by: INTERNAL MEDICINE

## 2025-02-23 RX ADMIN — QUETIAPINE FUMARATE 25 MG: 25 TABLET ORAL at 21:30

## 2025-02-23 RX ADMIN — CLOPIDOGREL BISULFATE 75 MG: 75 TABLET ORAL at 10:31

## 2025-02-23 RX ADMIN — DONEPEZIL HYDROCHLORIDE 10 MG: 10 TABLET, FILM COATED ORAL at 21:30

## 2025-02-23 RX ADMIN — Medication 2000 UNITS: at 10:31

## 2025-02-23 RX ADMIN — MEMANTINE 5 MG: 5 TABLET ORAL at 10:31

## 2025-02-23 RX ADMIN — ATORVASTATIN CALCIUM 40 MG: 40 TABLET, FILM COATED ORAL at 10:31

## 2025-02-23 RX ADMIN — ASPIRIN 81 MG: 81 TABLET, COATED ORAL at 10:31

## 2025-02-23 RX ADMIN — PIOGLITAZONE 30 MG: 30 TABLET ORAL at 10:31

## 2025-02-23 RX ADMIN — METFORMIN HYDROCHLORIDE 1500 MG: 750 TABLET, EXTENDED RELEASE ORAL at 10:31

## 2025-02-23 NOTE — PLAN OF CARE
Problem: Safety - Adult  Goal: Free from fall injury  Outcome: Progressing  Flowsheets (Taken 2/23/2025 1652)  Free From Fall Injury: Instruct family/caregiver on patient safety     Problem: Discharge Planning  Goal: Discharge to home or other facility with appropriate resources  Outcome: Progressing  Flowsheets (Taken 2/23/2025 1652)  Discharge to home or other facility with appropriate resources: Identify barriers to discharge with patient and caregiver     Problem: Cardiovascular - Adult  Goal: Maintains optimal cardiac output and hemodynamic stability  Outcome: Progressing  Flowsheets (Taken 2/23/2025 1652)  Maintains optimal cardiac output and hemodynamic stability:   Monitor blood pressure and heart rate   Assess for signs of decreased cardiac output   Administer fluid and/or volume expanders as ordered   Administer vasoactive medications as ordered     Problem: Cardiovascular - Adult  Goal: Absence of cardiac dysrhythmias or at baseline  Outcome: Progressing  Flowsheets (Taken 2/23/2025 1652)  Absence of cardiac dysrhythmias or at baseline:   Monitor cardiac rate and rhythm   Assess for signs of decreased cardiac output   Administer antiarrhythmia medication and electrolyte replacement as ordered     Problem: Pain  Goal: Verbalizes/displays adequate comfort level or baseline comfort level  Outcome: Progressing  Flowsheets (Taken 2/23/2025 1652)  Verbalizes/displays adequate comfort level or baseline comfort level:   Encourage patient to monitor pain and request assistance   Assess pain using appropriate pain scale   Administer analgesics based on type and severity of pain and evaluate response   Implement non-pharmacological measures as appropriate and evaluate response     Problem: ABCDS Injury Assessment  Goal: Absence of physical injury  Outcome: Progressing  Flowsheets (Taken 2/23/2025 1652)  Absence of Physical Injury: Implement safety measures based on patient assessment     Care plan reviewed with

## 2025-02-23 NOTE — PROCEDURES
PROCEDURE NOTE  Date: 2/23/2025   Name: Sherwin Phelps  YOB: 1935    Procedures  12 lead EKG completed. Results handed to Gregoria HERNANDEZ.

## 2025-02-23 NOTE — PLAN OF CARE
Problem: Safety - Adult  Goal: Free from fall injury  Outcome: Progressing  Flowsheets (Taken 2/22/2025 2317)  Free From Fall Injury: Instruct family/caregiver on patient safety     Problem: Chronic Conditions and Co-morbidities  Goal: Patient's chronic conditions and co-morbidity symptoms are monitored and maintained or improved  Outcome: Progressing  Flowsheets (Taken 2/22/2025 2317)  Care Plan - Patient's Chronic Conditions and Co-Morbidity Symptoms are Monitored and Maintained or Improved: Monitor and assess patient's chronic conditions and comorbid symptoms for stability, deterioration, or improvement     Problem: Discharge Planning  Goal: Discharge to home or other facility with appropriate resources  Outcome: Progressing  Flowsheets (Taken 2/22/2025 2317)  Discharge to home or other facility with appropriate resources:   Identify barriers to discharge with patient and caregiver   Identify discharge learning needs (meds, wound care, etc)   Arrange for needed discharge resources and transportation as appropriate   Refer to discharge planning if patient needs post-hospital services based on physician order or complex needs related to functional status, cognitive ability or social support system     Problem: Cardiovascular - Adult  Goal: Maintains optimal cardiac output and hemodynamic stability  Outcome: Progressing  Flowsheets (Taken 2/22/2025 2317)  Maintains optimal cardiac output and hemodynamic stability:   Monitor blood pressure and heart rate   Assess for signs of decreased cardiac output     Problem: Cardiovascular - Adult  Goal: Absence of cardiac dysrhythmias or at baseline  Outcome: Progressing  Flowsheets (Taken 2/22/2025 2317)  Absence of cardiac dysrhythmias or at baseline:   Monitor cardiac rate and rhythm   Assess for signs of decreased cardiac output     Problem: Pain  Goal: Verbalizes/displays adequate comfort level or baseline comfort level  Outcome: Progressing  Flowsheets (Taken 2/22/2025

## 2025-02-24 PROCEDURE — 2140000000 HC CCU INTERMEDIATE R&B

## 2025-02-24 PROCEDURE — 6370000000 HC RX 637 (ALT 250 FOR IP): Performed by: INTERNAL MEDICINE

## 2025-02-24 PROCEDURE — 97530 THERAPEUTIC ACTIVITIES: CPT

## 2025-02-24 PROCEDURE — 97116 GAIT TRAINING THERAPY: CPT

## 2025-02-24 PROCEDURE — 2500000003 HC RX 250 WO HCPCS: Performed by: INTERNAL MEDICINE

## 2025-02-24 PROCEDURE — 97535 SELF CARE MNGMENT TRAINING: CPT

## 2025-02-24 PROCEDURE — 99222 1ST HOSP IP/OBS MODERATE 55: CPT | Performed by: NURSE PRACTITIONER

## 2025-02-24 RX ADMIN — METFORMIN HYDROCHLORIDE 1500 MG: 750 TABLET, EXTENDED RELEASE ORAL at 10:12

## 2025-02-24 RX ADMIN — MEMANTINE 5 MG: 5 TABLET ORAL at 10:12

## 2025-02-24 RX ADMIN — ASPIRIN 81 MG: 81 TABLET, COATED ORAL at 10:12

## 2025-02-24 RX ADMIN — SODIUM CHLORIDE, PRESERVATIVE FREE 10 ML: 5 INJECTION INTRAVENOUS at 10:13

## 2025-02-24 RX ADMIN — QUETIAPINE FUMARATE 25 MG: 25 TABLET ORAL at 19:59

## 2025-02-24 RX ADMIN — CLOPIDOGREL BISULFATE 75 MG: 75 TABLET ORAL at 10:12

## 2025-02-24 RX ADMIN — DONEPEZIL HYDROCHLORIDE 10 MG: 10 TABLET, FILM COATED ORAL at 19:59

## 2025-02-24 RX ADMIN — PIOGLITAZONE 30 MG: 30 TABLET ORAL at 10:12

## 2025-02-24 RX ADMIN — FUROSEMIDE 20 MG: 20 TABLET ORAL at 18:28

## 2025-02-24 RX ADMIN — Medication 2000 UNITS: at 10:12

## 2025-02-24 RX ADMIN — FERROUS SULFATE TAB 325 MG (65 MG ELEMENTAL FE) 325 MG: 325 (65 FE) TAB at 10:12

## 2025-02-24 RX ADMIN — DOCUSATE SODIUM 100 MG: 100 CAPSULE, LIQUID FILLED ORAL at 10:12

## 2025-02-24 RX ADMIN — ATORVASTATIN CALCIUM 40 MG: 40 TABLET, FILM COATED ORAL at 10:12

## 2025-02-24 NOTE — PLAN OF CARE
Problem: Safety - Adult  Goal: Free from fall injury  Outcome: Progressing  Flowsheets (Taken 2/24/2025 1505)  Free From Fall Injury: Instruct family/caregiver on patient safety     Problem: Chronic Conditions and Co-morbidities  Goal: Patient's chronic conditions and co-morbidity symptoms are monitored and maintained or improved  Outcome: Progressing  Flowsheets (Taken 2/24/2025 1505)  Care Plan - Patient's Chronic Conditions and Co-Morbidity Symptoms are Monitored and Maintained or Improved: Monitor and assess patient's chronic conditions and comorbid symptoms for stability, deterioration, or improvement     Problem: Discharge Planning  Goal: Discharge to home or other facility with appropriate resources  Outcome: Progressing  Flowsheets (Taken 2/24/2025 1505)  Discharge to home or other facility with appropriate resources: Identify barriers to discharge with patient and caregiver     Problem: Cardiovascular - Adult  Goal: Maintains optimal cardiac output and hemodynamic stability  Outcome: Progressing  Flowsheets (Taken 2/24/2025 1505)  Maintains optimal cardiac output and hemodynamic stability: Monitor blood pressure and heart rate     Problem: Cardiovascular - Adult  Goal: Absence of cardiac dysrhythmias or at baseline  Outcome: Progressing  Note: Ongoing assessment & interventions provided throughout shift.  Patient on continuous telemetry monitoring, heart tones, vitals & pulses checked at least 3 times per shift & PRN. Vitals within acceptable limits.  Peripheral pulses palpable.      Problem: Safety - Medical Restraint  Goal: Remains free of injury from restraints (Restraint for Interference with Medical Device)  Description: INTERVENTIONS:  1. Determine that other, less restrictive measures have been tried or would not be effective before applying the restraint  2. Evaluate the patient's condition at the time of restraint application  3. Inform patient/family regarding the reason for restraint  4. Q2H:

## 2025-02-24 NOTE — CARE COORDINATION
2/24/25, 3:08 PM EST    DISCHARGE ON GOING EVALUATION    [facility] day: 5  Location: [facility]25/025-A Reason for admit: Severe aortic stenosis [I35.0]     Procedures:   2/19 TAVR  2/20 ECHO: EF 50-55%. Appropriately positioned transcatheter bioprosthetic valve that is well-seated. AV mean gradient is 6 mmHg. No regurgitation. Trace paravalvular regurgitation. No stenosis. AV mean gradient is 6 mmHg. AV peak gradient is 11 mmHg. AV peak velocity is 1.7 m/s. AV area by continuity VTI is 2.9 cm2.      Imaging since last note: none     Barriers to Discharge: Cardiology following. PT/OT- SNF precert started today. Medically ready.     PCP: Diego Silva Jr., DO  Readmission Risk Score: 10.8    Patient Goals/Plan/Treatment Preferences: Accepted to [facility]. Precert started today. SW following. g

## 2025-02-24 NOTE — PLAN OF CARE
Problem: Safety - Adult  Goal: Free from fall injury  2/24/2025 0036 by Noy Santamaria RN  Outcome: Progressing  Flowsheets (Taken 2/24/2025 0036)  Free From Fall Injury: Instruct family/caregiver on patient safety     Problem: Chronic Conditions and Co-morbidities  Goal: Patient's chronic conditions and co-morbidity symptoms are monitored and maintained or improved  Outcome: Progressing  Flowsheets (Taken 2/24/2025 0036)  Care Plan - Patient's Chronic Conditions and Co-Morbidity Symptoms are Monitored and Maintained or Improved:   Monitor and assess patient's chronic conditions and comorbid symptoms for stability, deterioration, or improvement   Collaborate with multidisciplinary team to address chronic and comorbid conditions and prevent exacerbation or deterioration     Problem: Discharge Planning  Goal: Discharge to home or other facility with appropriate resources  2/24/2025 0036 by Noy Santamaria RN  Outcome: Progressing  Flowsheets (Taken 2/24/2025 0036)  Discharge to home or other facility with appropriate resources:   Identify barriers to discharge with patient and caregiver   Identify discharge learning needs (meds, wound care, etc)   Refer to discharge planning if patient needs post-hospital services based on physician order or complex needs related to functional status, cognitive ability or social support system   Arrange for needed discharge resources and transportation as appropriate     Problem: Cardiovascular - Adult  Goal: Maintains optimal cardiac output and hemodynamic stability  2/24/2025 0036 by Noy Santamaria RN  Outcome: Progressing  Flowsheets (Taken 2/24/2025 0036)  Maintains optimal cardiac output and hemodynamic stability:   Monitor blood pressure and heart rate   Assess for signs of decreased cardiac output     Problem: Cardiovascular - Adult  Goal: Absence of cardiac dysrhythmias or at baseline  2/24/2025 0036 by Noy Santamaria RN  Outcome: Progressing  Flowsheets

## 2025-02-24 NOTE — CARE COORDINATION
2/24/25, 10:26 AM EST    DISCHARGE PLANNING EVALUATION    Called and spouse with Orestes the  at [facility]. He told SW that he thinks that they can accept as long as the patient did not have a sitter over the weekend.  The patient did not have any issues.  He will have Genesis call back to discuss starting the insurance precert.      Update 11:45 am: Left a message for Genesis in admissions at [facility].  We are still waiting to hear if they can accept Sherwin at discharge.      Update 12:44 pm: Spoke with Genesis in admissions at [facility] this morning.  They are able to accept and will start the precert as soon as therapy notes are in.      Update 3:06 pm: Spoke with Genesis in admissions at [facility].  They started the precert today.

## 2025-02-25 ENCOUNTER — HOSPITAL ENCOUNTER (INPATIENT)
Age: 89
Discharge: HOME | DRG: 267 | End: 2025-02-27
Attending: INTERNAL MEDICINE
Payer: MEDICARE

## 2025-02-25 ENCOUNTER — TELEPHONE (OUTPATIENT)
Dept: CARDIOLOGY CLINIC | Age: 89
End: 2025-02-25

## 2025-02-25 VITALS
BODY MASS INDEX: 25.37 KG/M2 | RESPIRATION RATE: 18 BRPM | WEIGHT: 171.3 LBS | HEART RATE: 105 BPM | HEIGHT: 69 IN | SYSTOLIC BLOOD PRESSURE: 132 MMHG | DIASTOLIC BLOOD PRESSURE: 66 MMHG | OXYGEN SATURATION: 99 % | TEMPERATURE: 97.9 F

## 2025-02-25 DIAGNOSIS — Z95.2 HISTORY OF TRANSCATHETER AORTIC VALVE REPLACEMENT (TAVR): Primary | ICD-10-CM

## 2025-02-25 DIAGNOSIS — Z95.2 S/P TAVR (TRANSCATHETER AORTIC VALVE REPLACEMENT): ICD-10-CM

## 2025-02-25 LAB
ANION GAP SERPL CALC-SCNC: 16 MEQ/L (ref 8–16)
BUN SERPL-MCNC: 18 MG/DL (ref 7–22)
CALCIUM SERPL-MCNC: 9.5 MG/DL (ref 8.2–9.6)
CHLORIDE SERPL-SCNC: 98 MEQ/L (ref 98–111)
CO2 SERPL-SCNC: 23 MEQ/L (ref 23–33)
CREAT SERPL-MCNC: 0.7 MG/DL (ref 0.4–1.2)
ECHO BSA: 1.94 M2
EKG ATRIAL RATE: 95 BPM
EKG P AXIS: 97 DEGREES
EKG P-R INTERVAL: 186 MS
EKG Q-T INTERVAL: 380 MS
EKG QRS DURATION: 102 MS
EKG QTC CALCULATION (BAZETT): 477 MS
EKG R AXIS: -34 DEGREES
EKG T AXIS: 115 DEGREES
EKG VENTRICULAR RATE: 95 BPM
GFR SERPL CREATININE-BSD FRML MDRD: 88 ML/MIN/1.73M2
GLUCOSE SERPL-MCNC: 135 MG/DL (ref 70–108)
MAGNESIUM SERPL-MCNC: 2.1 MG/DL (ref 1.6–2.4)
POTASSIUM SERPL-SCNC: 4.6 MEQ/L (ref 3.5–5.2)
SODIUM SERPL-SCNC: 137 MEQ/L (ref 135–145)

## 2025-02-25 PROCEDURE — 83735 ASSAY OF MAGNESIUM: CPT

## 2025-02-25 PROCEDURE — 93005 ELECTROCARDIOGRAM TRACING: CPT | Performed by: NURSE PRACTITIONER

## 2025-02-25 PROCEDURE — 2500000003 HC RX 250 WO HCPCS: Performed by: INTERNAL MEDICINE

## 2025-02-25 PROCEDURE — 6370000000 HC RX 637 (ALT 250 FOR IP): Performed by: INTERNAL MEDICINE

## 2025-02-25 PROCEDURE — 93270 REMOTE 30 DAY ECG REV/REPORT: CPT

## 2025-02-25 PROCEDURE — 6370000000 HC RX 637 (ALT 250 FOR IP): Performed by: NURSE PRACTITIONER

## 2025-02-25 PROCEDURE — 36415 COLL VENOUS BLD VENIPUNCTURE: CPT

## 2025-02-25 PROCEDURE — 80048 BASIC METABOLIC PNL TOTAL CA: CPT

## 2025-02-25 PROCEDURE — 99239 HOSP IP/OBS DSCHRG MGMT >30: CPT | Performed by: NURSE PRACTITIONER

## 2025-02-25 RX ORDER — METOPROLOL SUCCINATE 25 MG/1
12.5 TABLET, EXTENDED RELEASE ORAL DAILY
Qty: 30 TABLET | Refills: 3 | Status: SHIPPED | OUTPATIENT
Start: 2025-02-25

## 2025-02-25 RX ORDER — METOPROLOL SUCCINATE 25 MG/1
12.5 TABLET, EXTENDED RELEASE ORAL DAILY
Status: DISCONTINUED | OUTPATIENT
Start: 2025-02-25 | End: 2025-02-25 | Stop reason: HOSPADM

## 2025-02-25 RX ADMIN — APIXABAN 5 MG: 5 TABLET, FILM COATED ORAL at 13:06

## 2025-02-25 RX ADMIN — CLOPIDOGREL BISULFATE 75 MG: 75 TABLET ORAL at 08:58

## 2025-02-25 RX ADMIN — METOPROLOL SUCCINATE 12.5 MG: 25 TABLET, FILM COATED, EXTENDED RELEASE ORAL at 13:06

## 2025-02-25 RX ADMIN — ASPIRIN 81 MG: 81 TABLET, COATED ORAL at 08:58

## 2025-02-25 RX ADMIN — METFORMIN HYDROCHLORIDE 1500 MG: 750 TABLET, EXTENDED RELEASE ORAL at 08:58

## 2025-02-25 RX ADMIN — MEMANTINE 5 MG: 5 TABLET ORAL at 08:59

## 2025-02-25 RX ADMIN — Medication 2000 UNITS: at 08:58

## 2025-02-25 RX ADMIN — ATORVASTATIN CALCIUM 40 MG: 40 TABLET, FILM COATED ORAL at 08:58

## 2025-02-25 RX ADMIN — SODIUM CHLORIDE, PRESERVATIVE FREE 10 ML: 5 INJECTION INTRAVENOUS at 08:57

## 2025-02-25 RX ADMIN — PIOGLITAZONE 30 MG: 30 TABLET ORAL at 08:58

## 2025-02-25 NOTE — PROCEDURES
PROCEDURE NOTE  Date: 2/25/2025   Name: Sherwin Phelps  YOB: 1935    Procedures EKG completed, given to Marina HERNANDEZ

## 2025-02-25 NOTE — PROGRESS NOTES
Structural Heart/Cardiology Progress Note    2025 1:24 PM    Procedure:  TAVR 2025        POD #:  4    Subjective:    Stable overnight  No complaints  ECG getting better with narrowing QRS    Vital Signs: /68   Pulse 91   Temp 98.2 °F (36.8 °C) (Oral)   Resp 18   Ht 1.753 m (5' 9\")   Wt 77.1 kg (170 lb)   SpO2 100%   BMI 25.10 kg/m²    Temp (24hrs), Av.4 °F (36.9 °C), Min:98.1 °F (36.7 °C), Max:98.7 °F (37.1 °C)      Weight - Scale: 77.1 kg (170 lb)       PULSE OXIMETRY RANGE: SpO2  Av.2 %  Min: 95 %  Max: 100 %  SUPPLEMENTAL O2: O2 Flow Rate (L/min): 1 L/min     Labs:   CBC:     No results for input(s): \"WBC\", \"HGB\", \"HCT\", \"MCV\", \"PLT\" in the last 72 hours.    BMP:  Recent Labs     25  0514      K 4.1   CL 99   CO2 21*   BUN 14   CREATININE 0.6     Last HgA1C:    Lab Results   Component Value Date    LABA1C 7.3 (H) 2024     PT/INR:   No results for input(s): \"PROTIME\", \"INR\" in the last 72 hours.    APTT:   No results for input(s): \"APTT\" in the last 72 hours.        Intake/Output Summary (Last 24 hours) at 2025 1324  Last data filed at 2025 1941  Gross per 24 hour   Intake 150 ml   Output --   Net 150 ml       Scheduled Meds:    sodium chloride flush  5-40 mL IntraVENous 2 times per day    aspirin  81 mg Oral Daily    metFORMIN  1,500 mg Oral Daily    QUEtiapine  25 mg Oral Nightly    donepezil  10 mg Oral Nightly    Vitamin D  2,000 Units Oral Daily    memantine  5 mg Oral Daily    ferrous sulfate  325 mg Oral Every Other Day    docusate sodium  100 mg Oral Every Other Day    atorvastatin  40 mg Oral Daily    clopidogrel  75 mg Oral Daily    pioglitazone  30 mg Oral Daily    [START ON 3/5/2025] cyanocobalamin  1,000 mcg IntraMUSCular Q30 Days    furosemide  20 mg Oral Once per day on     ALPRAZolam  0.125 mg Oral Once       ROS: All neg unless specifically mentioned in subjective section.     Exam:   General Appearance: alert 
  Structural Heart/Cardiology Progress Note    2025 2:20 PM    Procedure:  TAVR 2025        POD #:  1    Subjective:  The pt is resting comfortably in bed on 3B, alert, and in no acute distress. Hemodynamically stable. NSR 80 -90s. The pt did complain of mild upper L chest discomfort this AM. Area mildly TTP. No physical abnormality visible or palpable. EKG was done and no acute change. CxR was obtained and no evidence of acute process was found. He denied SOB, palpitations, fever, chills, N/V/D. He was tolerating po liquids and solid foods and voiding without issue. No pain in groin sites and no numbness or paresthesia in his LE. He is yet to be OOB.     Vital Signs: /67   Pulse 97   Temp 97.9 °F (36.6 °C) (Oral)   Resp 18   Ht 1.753 m (5' 9\")   Wt 77.1 kg (170 lb)   SpO2 96%   BMI 25.10 kg/m²    Temp (24hrs), Av.9 °F (36.6 °C), Min:97.9 °F (36.6 °C), Max:98 °F (36.7 °C)      Weight - Scale: 77.1 kg (170 lb)       PULSE OXIMETRY RANGE: SpO2  Av.8 %  Min: 96 %  Max: 100 %  SUPPLEMENTAL O2: O2 Flow Rate (L/min): 1 L/min     Labs:   CBC:     Recent Labs     25  0632 25  0543   WBC 6.3 10.9*   HGB 10.3* 9.6*   HCT 32.0* 29.4*   MCV 97.6* 97.0*    276     BMP:  Recent Labs     25  0632 25  0543    142   K 4.7 3.7    101   CO2 22* 22*   BUN 15 13   CREATININE 0.7 0.6     Last HgA1C:    Lab Results   Component Value Date    LABA1C 7.3 (H) 2024     PT/INR:   Recent Labs     25  0632 25  0543   INR 1.08 1.07     APTT:   Recent Labs     2532 25  0543   APTT 29.7 28.3         Intake/Output Summary (Last 24 hours) at 2025 1420  Last data filed at 2025 1319  Gross per 24 hour   Intake 790 ml   Output 1550 ml   Net -760 ml       Scheduled Meds:    sodium chloride flush  5-40 mL IntraVENous 2 times per day    aspirin  81 mg Oral Daily    [Held by provider] metFORMIN  1,500 mg Oral Daily    QUEtiapine  25 mg Oral 
  Structural Heart/Cardiology Progress Note    2025 4:34 AM    Procedure:  TAVR 2025        POD #:  3    Subjective:    Feels well this AM  No issues  No bleeding  Can do all ADLS.  Hgb stable    Vital Signs: BP (!) 151/78   Pulse 80   Temp 98.4 °F (36.9 °C) (Axillary)   Resp 19   Ht 1.753 m (5' 9\")   Wt 77.1 kg (170 lb)   SpO2 96%   BMI 25.10 kg/m²    Temp (24hrs), Av.1 °F (36.7 °C), Min:97.8 °F (36.6 °C), Max:98.4 °F (36.9 °C)      Weight - Scale: 77.1 kg (170 lb)       PULSE OXIMETRY RANGE: SpO2  Av.4 %  Min: 93 %  Max: 98 %  SUPPLEMENTAL O2: O2 Flow Rate (L/min): 1 L/min     Labs:   CBC:     Recent Labs     25  0632 25   WBC 6.3 10.9*   HGB 10.3* 9.6*   HCT 32.0* 29.4*   MCV 97.6* 97.0*    276     BMP:  Recent Labs     25  0632 25  0543 25  0514    142 138   K 4.7 3.7 4.1    101 99   CO2 22* 22* 21*   BUN 15 13 14   CREATININE 0.7 0.6 0.6     Last HgA1C:    Lab Results   Component Value Date    LABA1C 7.3 (H) 2024     PT/INR:   Recent Labs     25  0632 25  0543   INR 1.08 1.07     APTT:   Recent Labs     25  0632 25  0543   APTT 29.7 28.3         Intake/Output Summary (Last 24 hours) at 2025 0434  Last data filed at 2025 1800  Gross per 24 hour   Intake 640 ml   Output --   Net 640 ml       Scheduled Meds:    sodium chloride flush  5-40 mL IntraVENous 2 times per day    aspirin  81 mg Oral Daily    metFORMIN  1,500 mg Oral Daily    QUEtiapine  25 mg Oral Nightly    donepezil  10 mg Oral Nightly    Vitamin D  2,000 Units Oral Daily    memantine  5 mg Oral Daily    ferrous sulfate  325 mg Oral Every Other Day    docusate sodium  100 mg Oral Every Other Day    atorvastatin  40 mg Oral Daily    clopidogrel  75 mg Oral Daily    pioglitazone  30 mg Oral Daily    [START ON 3/5/2025] cyanocobalamin  1,000 mcg IntraMUSCular Q30 Days    furosemide  20 mg Oral Once per day on     
  Structural Heart/Cardiology Progress Note    2025 4:50 AM  Late entry for patient seen on 2025 at 0750 AM    Procedure:  TAVR         POD #:  2     Subjective:      Up in chair, bright, pleasant, talkative. Looks well. Reports feels well. Little residual soreness upper L anterior shoulder into chest wall. Minimal TTP. Denies other chest discomfort or dyspnea. No lightheadedness or dizziness when up. Tolerating meds, diet and voiding without issue. Has been up with help in room and short distance in lopez. Tolerated well. Bilateral groin sites without bleeding or hematoma. R IJ site without bleeding or swelling. VSS; heart rate and rhythm stable.  Patient wanting ECF placement at discharge as does not have anyone to stay with him. CM coordinating this request; insurance requiring additional LOS to qualify for placement.      The pt is resting comfortably in bed on 3B, alert, and in no acute distress. Hemodynamically stable. NSR 80 -90s. The pt did complain of mild upper L chest discomfort this AM. Area mildly TTP. No physical abnormality visible or palpable. EKG was done and no acute change. CxR was obtained and no evidence of acute process was found. He denied SOB, palpitations, fever, chills, N/V/D. He was tolerating po liquids and solid foods and voiding without issue. No pain in groin sites and no numbness or paresthesia in his LE. He is yet to be OOB.     Vital Signs: BP (!) 151/78   Pulse 80   Temp 98.4 °F (36.9 °C) (Axillary)   Resp 19   Ht 1.753 m (5' 9\")   Wt 77.1 kg (170 lb)   SpO2 96%   BMI 25.10 kg/m²    Temp (24hrs), Av.1 °F (36.7 °C), Min:97.8 °F (36.6 °C), Max:98.4 °F (36.9 °C)      Weight - Scale: 77.1 kg (170 lb)       PULSE OXIMETRY RANGE: SpO2  Av.4 %  Min: 93 %  Max: 98 %  SUPPLEMENTAL O2: O2 Flow Rate (L/min): 1 L/min     Labs:   CBC:     Recent Labs     25  0632 25  0543   WBC 6.3 10.9*   HGB 10.3* 9.6*   HCT 32.0* 29.4*   MCV 97.6* 97.0* 
  Structural Heart/Cardiology Progress Note    2025 4:54 PM    Procedure:  TAVR 2025        POD #:  5    Subjective:          Stable over night  Slept well  Eating and drinking - unsure if he has had a BM since admission  Denies SOB, chest pressure, bloating or lower leg swelling  On Lasix MWF - home dose  Weight stable  ? I/O      Stable overnight  No complaints  ECG getting better with narrowing QRS    Vital Signs: BP (!) 142/73   Pulse 92   Temp 98.4 °F (36.9 °C) (Oral)   Resp 18   Ht 1.753 m (5' 9\")   Wt 77.7 kg (171 lb 4.8 oz)   SpO2 97%   BMI 25.30 kg/m²    Temp (24hrs), Av.2 °F (36.8 °C), Min:98 °F (36.7 °C), Max:98.4 °F (36.9 °C)      Weight - Scale: 77.7 kg (171 lb 4.8 oz)       PULSE OXIMETRY RANGE: SpO2  Av %  Min: 95 %  Max: 100 %  SUPPLEMENTAL O2: O2 Flow Rate (L/min): 1 L/min     Labs:   CBC:     No results for input(s): \"WBC\", \"HGB\", \"HCT\", \"MCV\", \"PLT\" in the last 72 hours.    BMP:  No results for input(s): \"NA\", \"K\", \"CL\", \"CO2\", \"PHOS\", \"BUN\", \"CREATININE\", \"MG\" in the last 72 hours.    Invalid input(s): \"CA\"    Last HgA1C:    Lab Results   Component Value Date    LABA1C 7.3 (H) 2024     PT/INR:   No results for input(s): \"PROTIME\", \"INR\" in the last 72 hours.    APTT:   No results for input(s): \"APTT\" in the last 72 hours.        Intake/Output Summary (Last 24 hours) at 2025 1650  Last data filed at 2025 0536  Gross per 24 hour   Intake 300 ml   Output 250 ml   Net 50 ml       Scheduled Meds:    sodium chloride flush  5-40 mL IntraVENous 2 times per day    aspirin  81 mg Oral Daily    metFORMIN  1,500 mg Oral Daily    QUEtiapine  25 mg Oral Nightly    donepezil  10 mg Oral Nightly    Vitamin D  2,000 Units Oral Daily    memantine  5 mg Oral Daily    ferrous sulfate  325 mg Oral Every Other Day    docusate sodium  100 mg Oral Every Other Day    atorvastatin  40 mg Oral Daily    clopidogrel  75 mg Oral Daily    pioglitazone  30 mg Oral Daily    
 [facility]  INPATIENT PHYSICAL THERAPY  DAILY NOTE  [facility] CCU-STEPDOWN 3B - 3B-25/025-A      Discharge Recommendations: Subacute/Skilled Nursing Facility  Equipment Recommendations: No  cont to monitor for needs            Time In: 1319  Time Out: 1343  Timed Code Treatment Minutes: 24 Minutes  Minutes: 24          Date: 2025  Patient Name: Sherwin Phelps,  Gender:  male        MRN: [id number]  : 1935  (89 y.o.)     Referring Practitioner: ALYSSA Wilde CNP  Diagnosis: Severe aortic stenosis  Additional Pertinent Hx: S/P TAVR with Dr. Ramírez . has a past medical history of Alzheimer's dementia (HCC), Arthritis, CAD (coronary artery disease), Cerebral artery occlusion with cerebral infarction (HCC), DM (diabetes mellitus) (HCC), DVT (deep venous thrombosis) (HCC), Hyperlipidemia, Hypertension, Pulmonary embolism (HCC), Stroke (HCC), Urinary incontinence, and Urinary retention.hip surgery (Right, 2024)     Prior Level of Function:  Lives With: Spouse, Daughter  Type of Home: Apartment  Home Layout: One level  Home Access: Level entry  Home Equipment: Walker - Rolling, Rollator, Wheelchair - Manual   Bathroom Shower/Tub: Tub/Shower unit, Shower chair with back  Bathroom Toilet: Handicap height  Bathroom Equipment: Grab bars in shower  Bathroom Accessibility: Accessible    Prior Level of Assist for ADLs: Independent  Prior Level of Assist for Homemaking: Independent  Homemaking Responsibilities: No  Prior Level of Assist for Transfers: Independent  Active : No  Additional Comments: independent PTA with use of 2ww, family complete meals and laundry  Prior Level of Assist for Ambulation: Independent household ambulator, with or without device  Has the patient had two or more falls in the past year or any fall with injury in the past year?: Yes    Restrictions/Precautions:  Restrictions/Precautions: Fall Risk     SUBJECTIVE: RN approved therapy session. Patient seated in BS chair 
0600  Pt admitted to  2E05 per wheelchair for TAVR.  Pt NPO. Patient accompanied by spouse and granddaughter, Ilda.   Vital signs obtained.  Assessment and data collection initiated.   Oriented to room.   Policies and procedures for 2E explained   All questions answered with no further questions at this time.   Fall prevention and safety precautions discussed with patient.   Optiview coccyx pad applied to coccyx as preventative   1020  to procedure per bed   Wife and family to bring pt belongings to new room post procedure   3B 25   
CLINICAL PHARMACY: DISCHARGE MED RECONCILIATION/REVIEW    [facility] Select Patient?: No  Total # of Interventions Recommended: 0   -   Total # Interventions Accepted: 0  Intervention Severity:   - Level 1 Intervention Present?: No   - Level 2 #: 0   - Level 3 #: 0   Time Spent (min): 30    Niurka Prieto, Pharmacy Student PharmD Candidate  2/25/2025 at 11:47 AM  
[facility]  STRZ CCU-STEPDOWN 3B  Occupational Therapy  Daily Note    Discharge Recommendations: ECF with OT  Equipment Recommendations: No defer to next level of care      Time In: 1449  Time Out: 1506  Timed Code Treatment Minutes: 17 Minutes  Minutes: 17          Date: 2025  Patient Name: Sherwin Phelps,   Gender: male      Room: [address] [place] [address]  MRN: [id number]  : 1935  (89 y.o.)  Referring Practitioner: April Wilde APRN - CNP  Diagnosis: Severe aortic stenosis  Additional Pertinent Hx: S/P TAVR with Dr. Ramírez     Restrictions/Precautions:  Restrictions/Precautions: Fall Risk     Social/Functional History:  Lives With: Spouse, Daughter  Type of Home: Apartment  Home Layout: One level  Home Access: Level entry  Home Equipment: Walker - Rolling, Rollator, Wheelchair - Manual   Bathroom Shower/Tub: Tub/Shower unit, Shower chair with back  Bathroom Toilet: Handicap height  Bathroom Equipment: Grab bars in shower  Bathroom Accessibility: Accessible       Prior Level of Assist for ADLs: Independent  Prior Level of Assist for Homemaking: Independent  Homemaking Responsibilities: No  Prior Level of Assist for Transfers: Independent  Prior Level of Assist for Ambulation: Independent household ambulator, with or without device  Has the patient had two or more falls in the past year or any fall with injury in the past year?: Yes    Active : No  Patient's  Info: granddaughter; COA     Additional Comments: independent PTA with use of 2ww, family complete meals and laundry    SUBJECTIVE: Nurse Alyssa andres session, Up in chair upon arrival, agreeable to OT session, wife present    PAIN: 0/10:     Vitals: Vitals not assessed per clinical judgement, see nursing flowsheet    COGNITION: Decreased Recall, Decreased Insight, Impaired Memory, and Decreased Problem Solving    ADL:   Grooming: with set-up.  Combed hair sitting in bedside chair  Footwear Management: Maximum Assistance.  
Complex Cardiology Note:   Patient seen post TAVR. Resting in bed. Has been restless with his legs post procedure. No bleeding at groin sites. Fem-stops in place over sites with pressure only to hold device in place. AT 2+ bilaterally.   Denies chest discomfort or dyspnea. AP S & R, S1S2 - no murmur noted. Few scattered rhonchi over mainstem bronchus; no crackles and no distress.  VSS, heart rate and rhythm stable; no evidence of pacing or AVB. TVPM remains intact on standby rate per RIJ.  Will continue to follow. Routine post TAVR care and restrictions.     Further recommendations based on results and clinical course. If any issues, please contact the Complex Cardiology/Structural Heart Service via Structural Heart on [employer].  (FINN Bone - CNP on call).    
Echo completed at bedside.  Echo given to Dr. Ramírez.  
Got report from primary RN. Pt is resting in bed with unlabored respirations. Call light is within reach and no concerns voiced at this time.    DAYANNA SUN  
Inpatient Cardiac Rehabilitation Consult    Received consult for Phase II Cardiac Rehabilitation.  Patient needs cardiac rehab due to TAVR on 2/19/25.  Importance of Cardiac Rehab discussed with patient.  Reviewed cardiac rehab class times.  Patient questions answered.  We will contact patient at home to schedule evaluation appointment.  Patient going to [facility] at discharge.  Cardiac Rehab brochure given.    
Occupational Therapy  [facility]  STRZ CCU-STEPDOWN 3B  Occupational Therapy  Daily Note    Discharge Recommendations: Subacute/skilled nursing facility  Equipment Recommendations: No defer to next level of care      Time In: 1154  Time Out: 1217  Timed Code Treatment Minutes: 23 Minutes  Minutes: 23          Date: 2025  Patient Name: Sherwin Phelps,   Gender: male      Room: [address] [place] [address]  MRN: [id number]  : 1935  (89 y.o.)  Referring Practitioner: April Wilde APRN - CNP  Diagnosis: Severe aortic stenosis  Additional Pertinent Hx: S/P TAVR with Dr. Ramírez     Restrictions/Precautions:  Restrictions/Precautions: Fall Risk     Social/Functional History:  Lives With: Spouse, Daughter  Type of Home: Apartment  Home Layout: One level  Home Access: Level entry  Home Equipment: Walker - Rolling, Rollator, Wheelchair - Manual   Bathroom Shower/Tub: Tub/Shower unit, Shower chair with back  Bathroom Toilet: Handicap height  Bathroom Equipment: Grab bars in shower  Bathroom Accessibility: Accessible       Prior Level of Assist for ADLs: Independent  Prior Level of Assist for Homemaking: Independent  Homemaking Responsibilities: No  Prior Level of Assist for Transfers: Independent  Prior Level of Assist for Ambulation: Independent household ambulator, with or without device  Has the patient had two or more falls in the past year or any fall with injury in the past year?: Yes    Active : No  Patient's  Info: granddaughter; COA     Additional Comments: independent PTA with use of 2ww, family complete meals and laundry    SUBJECTIVE: Pt sitting in chair with wife present. Pt pleasant and agreeable to OT tx.     PAIN: 0/10:     Vitals: Nurse checked vitals prior to session    COGNITION: Slow Processing, Decreased Insight, and Decreased Problem Solving    ADL:   Grooming: Contact Guard Assistance.  Pt demos oral hygiene while standing sink side with CGA.   Lower Extremity Dressing: 
Report called to [facility]. Patient has event monitor on and sent with box and phone number to call for questions. Patient discharged by stretcher with LACP. Copy of AVS given to granddaughter Ilda.   
Reported off to primary RN. Pt is resting in bed with unlabored respirations. Call light is within reach and no concerns voiced.    DAYANNA SUN  
[facility]  INPATIENT OCCUPATIONAL THERAPY  STRZ CCU-STEPDOWN 3B  EVALUATION      Discharge Recommendations: Subacute/Skilled Nursing Facility  Equipment Recommendations: No defer to next level of care      Time In: 935  Time Out: 953  Timed Code Treatment Minutes: 9 Minutes  Minutes: 18          Date: 2025  Patient Name: Sherwin Phelps,   Gender: male      MRN: [id number]  : 1935  (89 y.o.)  Referring Practitioner: April Wilde APRN - CNP  Diagnosis: Severe aortic stenosis  Additional Pertinent Hx: S/P TAVR with Dr. Ramírez     Restrictions/Precautions:  Restrictions/Precautions: Fall Risk    Subjective  Chart Reviewed: Yes, Orders, Progress Notes, History and Physical, Imaging  Patient assessed for rehabilitation services?: Yes  Family / Caregiver Present: No    Subjective: RN approved OT session, patient in bed eating breakfast with live sitter present. pleasant, agreeable and O&A X4    Pain: States 3/10 chest pain, states he has a broken rib     Vitals: Nurse checked vitals prior to session    Social/Functional History:  Lives With: Spouse, Daughter  Type of Home: Apartment  Home Layout: One level  Home Access: Level entry  Home Equipment: Walker - Rolling, Rollator, Wheelchair - Manual   Bathroom Shower/Tub: Tub/Shower unit, Shower chair with back  Bathroom Toilet: Handicap height  Bathroom Equipment: Grab bars in shower  Bathroom Accessibility: Accessible       Prior Level of Assist for ADLs: Independent  Prior Level of Assist for Homemaking: Independent  Homemaking Responsibilities: No  Prior Level of Assist for Transfers: Independent  Prior Level of Assist for Ambulation: Independent household ambulator, with or without device  Has the patient had two or more falls in the past year or any fall with injury in the past year?: Yes    Active : No  Patient's  Info: granddaughter; COA     Additional Comments: independent PTA with use of 2ww, family complete meals 
Spiritual Health History and Assessment/Progress Note  [facility]    (P) Initial Encounter,  ,  ,      Name: Sherwin Phelps MRN: [id number]    Age: 89 y.o.     Sex: male   Language: English   Pentecostal: Non-Scientology   Severe aortic stenosis     Date: 2/24/2025            Total Time Calculated: (P) 10 min              Spiritual Assessment began in STR[facility] CCU-STEPDOWN 3B        Referral/Consult From: (P) Rounding   Encounter Overview/Reason: (P) Initial Encounter  Service Provided For: (P) Patient    Jeanna, Belief, Meaning:   Patient identifies as spiritual, is connected with a jeanna tradition or spiritual practice, and has beliefs or practices that help with coping during difficult times  Family/Friends No family/friends present      Importance and Influence:  Patient has no beliefs influential to healthcare decision-making identified during this visit  Family/Friends No family/friends present    Community:  Patient feels well-supported. Support system includes: Spouse/Partner and Extended family  Family/Friends No family/friends present    Assessment and Plan of Care:     Patient Interventions include: Facilitated expression of thoughts and feelings  Family/Friends Interventions include: No family/friends present    Patient Plan of Care: Spiritual Care available upon further referral  Family/Friends Plan of Care: No family/friends present    Electronically signed by Chaplain Henok on 2/24/2025 at 6:10 PM    
Responsibilities: No  Prior Level of Assist for Transfers: Independent  Prior Level of Assist for Ambulation: Independent household ambulator, with or without device  Has the patient had two or more falls in the past year or any fall with injury in the past year?: Yes    Active : No     Additional Comments: independent PTA with use of 2ww, family complete meals and laundry    OBJECTIVE:  Range of Motion:  Bilateral Lower Extremity: WFL    Strength:  Bilateral Lower Extremity: Impaired - RLE grossly 3- to 3/5 with hx of hip sx in 2024, LLE grossly 3/5. Per dtr pt inc shuffing especially since hip fx    Balance:  Static Sitting Balance:  Contact Guard Assistance, X 1, edge of chair/bed  Static Standing Balance: Moderate Assistance, to minAx1 at RW, fwd posture and tendency for post lean, cues for midline and to keep walker closer for safety    Bed Mobility:  Rolling to Right: Moderate Assistance, X 1   Supine to Sit: Moderate Assistance, X 1  Scooting: Moderate Assistance, X 1, with head of bed flat, with rail,      Transfers:  Sit to Stand: Moderate Assistance, X 1  Stand to Sit:Moderate Assistance, X 1  Cues for wt shift fwd and hand placement  Ambulation:  Moderate Assistance, to minAx1. SBA of another for safety  Distance: 2'x1, 8'x1  Surface: Level Tile  Device: Rolling Walker  Gait Deviations: Forward Flexed Posture, Slow Glenda, Decreased Step Length Bilaterally, Decreased Heel Strike Bilaterally, Narrow Base of Support, Unsteady Gait, and RLE shuffling more than LLE per dtr with hx of right hip fx. Tactile and vcs throughout amb for more erect posture, keep walker closer, inc step length. Pt required chair to be brought behind him to sit after 8' amb due to fatigue.    Stairs:  Not Tested    Exercise:  None    Functional Outcome Measures:  [facility] (6 CLICK) BASIC MOBILITY     AM-PAC Inpatient Mobility without Stair Climbing Raw Score : 10  AM-PAC Inpatient without Stair Climbing T-Scale Score : 34.07

## 2025-02-25 NOTE — CARE COORDINATION
2/25/25, 8:00 AM EST    Patient goals/plan/ treatment preferences discussed by  and .  Patient goals/plan/ treatment preferences reviewed with patient/ family.  Patient/ family verbalize understanding of discharge plan and are in agreement with goal/plan/treatment preferences.  Understanding was demonstrated using the teach back method.  AVS provided by RN at time of discharge, which includes all necessary medical information pertaining to the patients current course of illness, treatment, post-discharge goals of care, and treatment preferences.     Services At/After Discharge: Skilled Nursing Facility (SNF), Aide services, In ambulance, Nursing service, OT, and PT       Spoke with Genesis at [facility].  Sherwin's precert has been approved for today.  He will be discharged to the facility under his [employer] Medicare benefit.  He will be transported by ambulance at 2 pm.  Discharge instructions and transport forms are attached to blue discharge packet. Spoke with grandchild Ilda.  She will update the rest of the family.  Called Genesis at [facility] to make her aware of discharge time.

## 2025-02-25 NOTE — DISCHARGE SUMMARY
Structural Heart/Cardiology Discharge Summary       Name:  Sherwin Phelps  YOB: 1935  Medical Record Number:  [id number]    Date of Admission:  2/19/2025  Date of Discharge:  2/25/2025    Admitting physician: Kaushal Ramírez MD  Discharge to: Long-Term Acute Care  Condition at d/c: Stable    Time spent on discharge: >60 minutes    Hospital Problem List:  Patient Active Problem List   Diagnosis    Abnormal stress test    Delirious    Leukocytosis    BPH (benign prostatic hyperplasia)    Urinary retention due to benign prostatic hyperplasia    E. coli UTI    Type 2 diabetes mellitus with other specified complication (HCC)    History of CVA (cerebrovascular accident)    COVID-19 ruled out by laboratory testing    Acute blood loss anemia    Pulmonary embolism (HCC)    YOLY (obstructive sleep apnea)    Hypertension    Hyperlipidemia    Dementia (HCC)    Coronary atherosclerosis    Aortic valve stenosis    Chest pain    Hx of deep venous thrombosis    Severe aortic stenosis    CAD in native artery       Procedures: TAVR 2/19/2025    Hospital Course: Underwent successful TAVR on 2/19/2025.  Hgb remained stable post procedure. Pt kept d/t family request of SNF. Morning of 2/25 pt's rhythm noted to be in bigeminy, resolved on own. EKG done and reviewed, electrolytes stable. Will discharge on event monitor w/ low dose toprol at 12.5.     2/25  Stable over night  Up in bed eating this morning  Up walking in [place] w/ nurse  Planning transport arranged for 2 this afternoon to [facility]  Good BM yesterday  UOP 1700/24hrs  Nursed noted pt in Bigeminy this am - asymptomatic  Pt denies chest discomfort, SOB, or palpitations.   Denies feeling lightheaded/dizzy    2/24  Stable over night  Slept well  Eating and drinking - unsure if he has had a BM since admission  Denies SOB, chest pressure, bloating or lower leg swelling  On Lasix MWF - home dose  Weight stable  ? I/O     2/23  Stable overnight  No complaints  ECG

## 2025-02-25 NOTE — TELEPHONE ENCOUNTER
Orders received from Dr. Ramírez to schedule the patient for a 1 year post TAVR follow up appointment with an ECHO, CBC, and BMP.    ECHO scheduled for  at .  Appointment is scheduled with Mamie/April for  at .    Dr. Ramírez, please agree.

## 2025-02-25 NOTE — DISCHARGE INSTR - COC
Continuity of Care Form    Patient Name: Sherwin Phelps   :  1935  MRN:  [id number]    Admit date:  2025  Discharge date:  2025    Code Status Order: Full Code   Advance Directives:   Advance Care Flowsheet Documentation             Admitting Physician:  Kaushal Ramírez MD  PCP: Diego Silva Jr.,     Discharging Nurse: Marina HERNANDEZ   Discharging Hospital Unit/Room#: 3B-25/025-A  Discharging Unit Phone Number: [phone]    Emergency Contact:   Extended Emergency Contact Information  Primary Emergency Contact: ANT PHELPS  Address: [address] [place] [address] [facility]  Home Phone: [phone]  Mobile Phone: [phone]  Relation: Spouse  Preferred language: English   needed? No  Secondary Emergency Contact: Elissa Frederick  Home Phone: [phone]  Relation: Child    Past Surgical History:  Past Surgical History:   Procedure Laterality Date    CARDIAC CATHETERIZATION  2020    CARDIAC PROCEDURE N/A 2024    Left and right heart cath / coronary angiography w grafts performed by Patricia Siddiqui MD at [facility] CARDIAC CATH LAB    CARDIAC PROCEDURE N/A 2024    Percutaneous coronary intervention performed by Blake Brownlee MD at [facility] CARDIAC CATH LAB    CARDIOVASCULAR STRESS TEST      CYSTOSCOPY N/A 2020    CYSTOSCOPY, GREENLIGHT PHOTOVAPORIZATION OF PROSTATE performed by Jose Rahman MD at [facility] OR    CYSTOSCOPY N/A 02/15/2023    Cystoscopy Bladder Botox Injection 100 units performed by Jose Rahman MD at [facility] OR    EYE SURGERY      HIP SURGERY Right 2024    JOINT REPLACEMENT      total right knee    KNEE ARTHROPLASTY Right     STIMULATOR SURGERY N/A 2021    STAGE I INTERSTIM performed by Jose Rahman MD at [facility] OR    STIMULATOR SURGERY N/A 10/05/2021    STAGE 2 INTERSTIM performed by Jose Rahman MD at [facility] OR    TONSILLECTOMY      UPPER GASTROINTESTINAL ENDOSCOPY N/A 4/3/2024    ESOPHAGOGASTRODUODENOSCOPY POLYP SNARE performed by

## 2025-02-25 NOTE — PLAN OF CARE
Problem: Safety - Adult  Goal: Free from fall injury  2/25/2025 0053 by Janet Lobo RN  Outcome: Progressing  Flowsheets (Taken 2/25/2025 0053)  Free From Fall Injury: Instruct family/caregiver on patient safety  Note: Bed locked & in low position, call light in reach, side-rails up x2, bed/chair alarm utilized, non-slip socks on when ambulating, reminded patient to use call light to call for assistance.      Problem: Chronic Conditions and Co-morbidities  Goal: Patient's chronic conditions and co-morbidity symptoms are monitored and maintained or improved  2/25/2025 0053 by Janet Lobo RN  Outcome: Progressing  Flowsheets (Taken 2/25/2025 0053)  Care Plan - Patient's Chronic Conditions and Co-Morbidity Symptoms are Monitored and Maintained or Improved:   Monitor and assess patient's chronic conditions and comorbid symptoms for stability, deterioration, or improvement   Collaborate with multidisciplinary team to address chronic and comorbid conditions and prevent exacerbation or deterioration   Update acute care plan with appropriate goals if chronic or comorbid symptoms are exacerbated and prevent overall improvement and discharge     Problem: Discharge Planning  Goal: Discharge to home or other facility with appropriate resources  2/25/2025 0053 by Janet Lobo RN  Outcome: Progressing  Flowsheets (Taken 2/25/2025 0053)  Discharge to home or other facility with appropriate resources: Identify barriers to discharge with patient and caregiver     Problem: Cardiovascular - Adult  Goal: Maintains optimal cardiac output and hemodynamic stability  2/25/2025 0053 by Janet Lobo RN  Outcome: Progressing  Flowsheets (Taken 2/25/2025 0053)  Maintains optimal cardiac output and hemodynamic stability: Monitor blood pressure and heart rate  Note: Ongoing assessment & interventions provided throughout shift.  Patient on continuous telemetry monitoring, heart tones, vitals & pulses checked at least 3 times per

## 2025-03-06 ENCOUNTER — OFFICE VISIT (OUTPATIENT)
Dept: CARDIOLOGY CLINIC | Age: 89
End: 2025-03-06
Payer: MEDICARE

## 2025-03-06 VITALS
WEIGHT: 176.38 LBS | HEART RATE: 90 BPM | OXYGEN SATURATION: 98 % | SYSTOLIC BLOOD PRESSURE: 122 MMHG | DIASTOLIC BLOOD PRESSURE: 62 MMHG | BODY MASS INDEX: 26.05 KG/M2

## 2025-03-06 DIAGNOSIS — Z91.89 AT RISK FOR FLUID VOLUME OVERLOAD: ICD-10-CM

## 2025-03-06 DIAGNOSIS — I50.32 CHRONIC DIASTOLIC CONGESTIVE HEART FAILURE, NYHA CLASS 2 (HCC): ICD-10-CM

## 2025-03-06 DIAGNOSIS — Z95.2 S/P TAVR (TRANSCATHETER AORTIC VALVE REPLACEMENT): Primary | ICD-10-CM

## 2025-03-06 DIAGNOSIS — I25.10 ATHEROSCLEROSIS OF NATIVE CORONARY ARTERY OF NATIVE HEART WITHOUT ANGINA PECTORIS: ICD-10-CM

## 2025-03-06 PROCEDURE — 99214 OFFICE O/P EST MOD 30 MIN: CPT | Performed by: NURSE PRACTITIONER

## 2025-03-06 PROCEDURE — 1159F MED LIST DOCD IN RCRD: CPT | Performed by: NURSE PRACTITIONER

## 2025-03-06 PROCEDURE — 1124F ACP DISCUSS-NO DSCNMKR DOCD: CPT | Performed by: NURSE PRACTITIONER

## 2025-03-06 PROCEDURE — 1160F RVW MEDS BY RX/DR IN RCRD: CPT | Performed by: NURSE PRACTITIONER

## 2025-03-06 PROCEDURE — 93000 ELECTROCARDIOGRAM COMPLETE: CPT | Performed by: NURSE PRACTITIONER

## 2025-03-06 RX ORDER — AMOXICILLIN AND CLAVULANATE POTASSIUM 500; 125 MG/1; MG/1
1 TABLET, FILM COATED ORAL 2 TIMES DAILY
COMMUNITY
End: 2025-03-13

## 2025-03-06 ASSESSMENT — ENCOUNTER SYMPTOMS
ABDOMINAL DISTENTION: 0
VOMITING: 0
SHORTNESS OF BREATH: 0
NAUSEA: 0
COUGH: 1

## 2025-03-06 NOTE — PATIENT INSTRUCTIONS
You may receive a survey regarding the care you received during your visit.  Your input is valuable to us.  We encourage you to complete and return your survey.  We hope you will choose us in the future for your healthcare needs.    Your nurses today were Blanka.  Office hours:   Mon-Thurs 8-4:30  Friday 8-12  Phone: [phone]    Continue:  Continue current medications  Daily weights and record  Fluid restriction of 2 Liters per day  Limit sodium in diet to around 7345-8925 mg/day  Monitor BP  Activity as tolerated     Call the Heart Failure Clinic for any of the following symptoms:   Weight gain of -3 pounds in 1 day or 5 pounds in 1 week  Increased shortness of breath  Shortness of breath while laying down  Cough  Chest pain  Swelling in feet, ankles or legs  Bloating in abdomen  Fatigue        Repeat blood work prior to next appointment     No med changes today     Continue diet/fluid adherence  Continue daily wts.  F/U w/ Cardiology  F/U in clinic in 30 days balwinder/ Mamie

## 2025-03-06 NOTE — PROGRESS NOTES
Heart Failure Clinic       Visit Date: 3/6/2025  Cardiologist:  Dr. Pearl  Primary Care Physician: Dr. Silva, Diego Harden Jr.,     Sherwin Phelps is a 89 y.o. male who presents today for:  Chief Complaint   Patient presents with    Follow-up     7 day post TAVR       HPI:     TYPE HF: HFpEF 50-55% 2025   Cause: structural  Device:   HX: Severe AS s/p TAVR 2025, CAD s/p PCI to LAD 2024, YOLY, DM,   Dry Wt:  189 on 1/2025, 176 on 3/6/25      Sherwin Phelps is a 89 y.o. male who presents to the office for a follow f/u patient visit in the heart failure clinic.    Concerns today: here today for his 1 week f/u post TAVR. Pt at SNF for another 7 days for rehab (family's request post procedure). EKG reviewed - QRS 96. Event monitor on d/t restarted of Toprol d/t bigeminy PVCs post procedure.     Patient has:  Chest Pain: no  SOB: no  Orthopnea/PND: no    YOLY: no  Edema: no  Fatigue: chronic  Abdominal bloating: no  Cough: chronic  Appetite: good    Activity: ADLs performed w/o EATON, Functional status at base line   Diet: Educated     Patient follows:      Hospitalization:              Past Medical History:   Diagnosis Date    Alzheimer's dementia (HCC)     Arthritis     CAD (coronary artery disease)     Cerebral artery occlusion with cerebral infarction (HCC)     weakness right hand    DM (diabetes mellitus) (HCC)     DVT (deep venous thrombosis) (HCC)     Hyperlipidemia     Hypertension     Pulmonary embolism (HCC)     Stroke ([facility])     Urinary incontinence     Urinary retention     UTI (urinary tract infection) 03/2025     Past Surgical History:   Procedure Laterality Date    CARDIAC CATHETERIZATION  06/19/2020    CARDIAC PROCEDURE N/A 12/30/2024    Left and right heart cath / coronary angiography w grafts performed by Patricia Siddiqui MD at [facility] CARDIAC CATH LAB    CARDIAC PROCEDURE N/A 12/30/2024    Percutaneous coronary intervention performed by Blake Brownlee MD at [facility] CARDIAC CATH LAB    CARDIOVASCULAR

## 2025-03-15 LAB — ECHO BSA: 1.94 M2

## 2025-03-17 ENCOUNTER — RESULTS FOLLOW-UP (OUTPATIENT)
Age: 89
End: 2025-03-17

## 2025-03-19 ENCOUNTER — OFFICE VISIT (OUTPATIENT)
Dept: CARDIOLOGY CLINIC | Age: 89
End: 2025-03-19

## 2025-03-19 ENCOUNTER — HOSPITAL ENCOUNTER (OUTPATIENT)
Age: 89
Discharge: HOME | End: 2025-03-21
Attending: INTERNAL MEDICINE
Payer: MEDICARE

## 2025-03-19 ENCOUNTER — HOSPITAL ENCOUNTER (OUTPATIENT)
Age: 89
Discharge: HOME | End: 2025-03-19
Attending: INTERNAL MEDICINE
Payer: MEDICARE

## 2025-03-19 VITALS
BODY MASS INDEX: 25.18 KG/M2 | WEIGHT: 170 LBS | HEIGHT: 69 IN | DIASTOLIC BLOOD PRESSURE: 72 MMHG | SYSTOLIC BLOOD PRESSURE: 118 MMHG

## 2025-03-19 VITALS
WEIGHT: 171 LBS | DIASTOLIC BLOOD PRESSURE: 78 MMHG | OXYGEN SATURATION: 98 % | BODY MASS INDEX: 25.33 KG/M2 | HEART RATE: 104 BPM | HEIGHT: 69 IN | RESPIRATION RATE: 18 BRPM | SYSTOLIC BLOOD PRESSURE: 100 MMHG

## 2025-03-19 DIAGNOSIS — Z95.2 S/P TAVR (TRANSCATHETER AORTIC VALVE REPLACEMENT): Primary | ICD-10-CM

## 2025-03-19 DIAGNOSIS — I35.0 SEVERE AORTIC STENOSIS: ICD-10-CM

## 2025-03-19 DIAGNOSIS — I25.10 ATHEROSCLEROSIS OF NATIVE CORONARY ARTERY OF NATIVE HEART WITHOUT ANGINA PECTORIS: ICD-10-CM

## 2025-03-19 DIAGNOSIS — I50.32 CHRONIC DIASTOLIC CONGESTIVE HEART FAILURE, NYHA CLASS 2 (HCC): ICD-10-CM

## 2025-03-19 LAB
ANION GAP SERPL CALC-SCNC: 14 MEQ/L (ref 8–16)
BUN SERPL-MCNC: 27 MG/DL (ref 8–23)
CALCIUM SERPL-MCNC: 9.5 MG/DL (ref 8.8–10.2)
CHLORIDE SERPL-SCNC: 102 MEQ/L (ref 98–111)
CO2 SERPL-SCNC: 21 MEQ/L (ref 22–29)
CREAT SERPL-MCNC: 1.1 MG/DL (ref 0.7–1.2)
DEPRECATED RDW RBC AUTO: 51.3 FL (ref 35–45)
ECHO AO ASC DIAM: 3.4 CM
ECHO AO ASCENDING AORTA INDEX: 1.76 CM/M2
ECHO AV AREA PEAK VELOCITY: 1.9 CM2
ECHO AV AREA VTI: 2 CM2
ECHO AV AREA/BSA PEAK VELOCITY: 1 CM2/M2
ECHO AV AREA/BSA VTI: 1 CM2/M2
ECHO AV MEAN GRADIENT: 7 MMHG
ECHO AV MEAN VELOCITY: 1.2 M/S
ECHO AV PEAK GRADIENT: 14 MMHG
ECHO AV PEAK VELOCITY: 1.9 M/S
ECHO AV VELOCITY RATIO: 0.68
ECHO AV VTI: 25.6 CM
ECHO BSA: 1.94 M2
ECHO EST RA PRESSURE: 5 MMHG
ECHO LA AREA 2C: 15.7 CM2
ECHO LA AREA 4C: 17.3 CM2
ECHO LA DIAMETER INDEX: 1.71 CM/M2
ECHO LA DIAMETER: 3.3 CM
ECHO LA MAJOR AXIS: 5.7 CM
ECHO LA MINOR AXIS: 4.9 CM
ECHO LA VOL BP: 44 ML (ref 18–58)
ECHO LA VOL MOD A2C: 41 ML (ref 18–58)
ECHO LA VOL MOD A4C: 41 ML (ref 18–58)
ECHO LA VOL/BSA BIPLANE: 23 ML/M2 (ref 16–34)
ECHO LA VOLUME INDEX MOD A2C: 21 ML/M2 (ref 16–34)
ECHO LA VOLUME INDEX MOD A4C: 21 ML/M2 (ref 16–34)
ECHO LV E' LATERAL VELOCITY: 6.7 CM/S
ECHO LV E' SEPTAL VELOCITY: 5 CM/S
ECHO LV EF PHYSICIAN: 55 %
ECHO LV FRACTIONAL SHORTENING: 30 % (ref 28–44)
ECHO LV INTERNAL DIMENSION DIASTOLE INDEX: 2.28 CM/M2
ECHO LV INTERNAL DIMENSION DIASTOLIC: 4.4 CM (ref 4.2–5.9)
ECHO LV INTERNAL DIMENSION SYSTOLIC INDEX: 1.61 CM/M2
ECHO LV INTERNAL DIMENSION SYSTOLIC: 3.1 CM
ECHO LV ISOVOLUMETRIC RELAXATION TIME (IVRT): 48 MS
ECHO LV IVSD: 1.3 CM (ref 0.6–1)
ECHO LV MASS 2D: 180 G (ref 88–224)
ECHO LV MASS INDEX 2D: 93.2 G/M2 (ref 49–115)
ECHO LV POSTERIOR WALL DIASTOLIC: 1 CM (ref 0.6–1)
ECHO LV RELATIVE WALL THICKNESS RATIO: 0.45
ECHO LVOT AREA: 2.8 CM2
ECHO LVOT AV VTI INDEX: 0.7
ECHO LVOT DIAM: 1.9 CM
ECHO LVOT MEAN GRADIENT: 3 MMHG
ECHO LVOT PEAK GRADIENT: 6 MMHG
ECHO LVOT PEAK VELOCITY: 1.3 M/S
ECHO LVOT STROKE VOLUME INDEX: 26.1 ML/M2
ECHO LVOT SV: 50.4 ML
ECHO LVOT VTI: 17.8 CM
ECHO MV A VELOCITY: 1.89 M/S
ECHO MV E DECELERATION TIME (DT): 111 MS
ECHO MV E VELOCITY: 0.96 M/S
ECHO MV E/A RATIO: 0.51
ECHO MV E/E' LATERAL: 14.33
ECHO MV E/E' RATIO (AVERAGED): 16.76
ECHO MV E/E' SEPTAL: 19.2
ECHO PV MAX VELOCITY: 1 M/S
ECHO PV PEAK GRADIENT: 4 MMHG
ECHO RIGHT VENTRICULAR SYSTOLIC PRESSURE (RVSP): 32 MMHG
ECHO RV INTERNAL DIMENSION: 2.9 CM
ECHO RV TAPSE: 1.7 CM (ref 1.7–?)
ECHO TV E WAVE: 0.5 M/S
ECHO TV REGURGITANT MAX VELOCITY: 2.58 M/S
ECHO TV REGURGITANT PEAK GRADIENT: 27 MMHG
ERYTHROCYTE [DISTWIDTH] IN BLOOD BY AUTOMATED COUNT: 14 % (ref 11.5–14.5)
GFR SERPL CREATININE-BSD FRML MDRD: 64 ML/MIN/1.73M2
GLUCOSE SERPL-MCNC: 185 MG/DL (ref 74–109)
HCT VFR BLD AUTO: 34.8 % (ref 42–52)
HGB BLD-MCNC: 10.7 GM/DL (ref 14–18)
MCH RBC QN AUTO: 31 PG (ref 26–33)
MCHC RBC AUTO-ENTMCNC: 30.7 GM/DL (ref 32.2–35.5)
MCV RBC AUTO: 100.9 FL (ref 80–94)
PLATELET # BLD AUTO: 363 THOU/MM3 (ref 130–400)
PMV BLD AUTO: 9.7 FL (ref 9.4–12.4)
POTASSIUM SERPL-SCNC: 4.2 MEQ/L (ref 3.5–5.2)
RBC # BLD AUTO: 3.45 MILL/MM3 (ref 4.7–6.1)
SODIUM SERPL-SCNC: 137 MEQ/L (ref 135–145)
WBC # BLD AUTO: 9.7 THOU/MM3 (ref 4.8–10.8)

## 2025-03-19 PROCEDURE — 93306 TTE W/DOPPLER COMPLETE: CPT | Performed by: INTERNAL MEDICINE

## 2025-03-19 PROCEDURE — 93306 TTE W/DOPPLER COMPLETE: CPT

## 2025-03-19 PROCEDURE — 36415 COLL VENOUS BLD VENIPUNCTURE: CPT

## 2025-03-19 PROCEDURE — 85027 COMPLETE CBC AUTOMATED: CPT

## 2025-03-19 PROCEDURE — 80048 BASIC METABOLIC PNL TOTAL CA: CPT

## 2025-03-19 RX ORDER — ATORVASTATIN CALCIUM 40 MG/1
40 TABLET, FILM COATED ORAL DAILY
Qty: 90 TABLET | Refills: 3 | Status: SHIPPED | OUTPATIENT
Start: 2025-03-19

## 2025-03-19 RX ORDER — CLOPIDOGREL BISULFATE 75 MG/1
75 TABLET ORAL DAILY
Qty: 90 TABLET | Refills: 3 | Status: SHIPPED | OUTPATIENT
Start: 2025-03-19

## 2025-03-19 RX ORDER — FUROSEMIDE 20 MG/1
TABLET ORAL
Qty: 45 TABLET | Refills: 1 | Status: SHIPPED | OUTPATIENT
Start: 2025-03-19

## 2025-03-19 NOTE — PATIENT INSTRUCTIONS
Have the labs as ordered.     Continue current medications as prescribed.    Continue current therapies.     Stay as active as you can.     Eat heart healthy diet. Eat several small meals a day.  Stay well hydrated.     Follow-up with your PCP as scheduled.    Follow-up with Dr. Pearl in July as scheduled or sooner if need.     Follow-up with April for 1 year TAVR follow-up as scheduled or sooner if need.       Office hours:   Mon-Thurs 8-4:30  Friday 8-12  Phone: [phone]    Continue:  Continue current medications  Daily weights and record  Fluid restriction of 2 Liters per day  Limit sodium in diet to around 3469-8643 mg/day  Monitor BP    Call the Heart Failure Clinic for any of the following symptoms:   Weight gain of 3 pounds in 1 day or 5 pounds in 1 week  Increased shortness of breath  Shortness of breath while laying down  Chest pain  Swelling in feet, ankles or legs  Bloating in abdomen  Fatigue

## 2025-03-19 NOTE — PROGRESS NOTES
Sherwin Phelps (:  1935) is a 89 y.o. male, Established patient, here for 30 day post TAVR evaluation.     Primary Cardiologist: Dr. Pearl  Structural heart: Dr. Ramírez    Valvular Heart Disease    HPI: s/p TAVR 2025. Uncomplicated course. D/c with event monitor for bigeminal PVCs post procedure and on low dose Toprol of 12.5.  To SNF at d/c per family request. Had recent fall in January with L rib fx.   Seen 3/6/2025 for 1 week post TAVR follow-up. Stable, no fluid on exam. No rhythm concerns.            3/19/2025: 30 day post TAVR follow-up:  Assessment & Plan  S/p TAVR 2025  Chronic diastolic HF, NYHA class 2; pEF 50 - 55% 2025  ASHD        1. Post-TAVR follow-up.  Presents for 30 day post TAVR evaluation accompanied by grand-daughter. REports overall doing better. Went to ECF for therapies post TAVR per his and family choice. Is now home and performing ADL without issue. Using walker to ambulate. No falls. Had not been eating up to his normal - drinking ENSURE. Some concern for higher heart rates and possible dehydration prior to d/c from ECF with consideration to stop Lasix and increase Toprol dose. No changes were made and was directed to address at appointment today.   He was discharged post TAVR with event monitor for frequent PVCs while hospitalized. No issues at 1 week follow-up TAVR visit. His heart rate is slightly elevated today, ranging from the high 80s to low 90s, with rates to low 100s with activity. His weight has decreased from 188 in December to 171 today. The event monitor did not reveal any atrial fibrillation, but it did show PVC's approximately 4 percent of the time. His heart rate remained well-controlled during the monitoring period. Continues on Toprol XL 12.5 mg QD.  30 day post TAVR echo done today with results pending. Labwork also pending.   He continues with [facility] and therapies. He is advised to complete his home health therapy and inform us upon its completion.

## 2025-03-21 ENCOUNTER — TELEPHONE (OUTPATIENT)
Dept: CARDIOLOGY CLINIC | Age: 89
End: 2025-03-21

## 2025-03-30 ENCOUNTER — TELEPHONE (OUTPATIENT)
Dept: CARDIOLOGY CLINIC | Age: 89
End: 2025-03-30

## 2025-03-30 NOTE — TELEPHONE ENCOUNTER
Called Ilda to see how he is doing with the lasix just 3 times a week. Also to review post TAVR echo findings - no concerns. Asked her to call office with update on Monday or Tuesday.   Mamie Simms, FINN - CNP

## 2025-08-27 ENCOUNTER — OFFICE VISIT (OUTPATIENT)
Dept: CARDIOLOGY CLINIC | Age: 89
End: 2025-08-27
Payer: COMMERCIAL

## 2025-08-27 VITALS
HEART RATE: 76 BPM | BODY MASS INDEX: 25.64 KG/M2 | SYSTOLIC BLOOD PRESSURE: 134 MMHG | HEIGHT: 69 IN | DIASTOLIC BLOOD PRESSURE: 75 MMHG

## 2025-08-27 DIAGNOSIS — Z95.2 HISTORY OF TRANSCATHETER AORTIC VALVE REPLACEMENT (TAVR): ICD-10-CM

## 2025-08-27 DIAGNOSIS — I25.10 CAD IN NATIVE ARTERY: ICD-10-CM

## 2025-08-27 DIAGNOSIS — I50.32 CHRONIC DIASTOLIC CHF (CONGESTIVE HEART FAILURE) (HCC): Primary | ICD-10-CM

## 2025-08-27 DIAGNOSIS — I35.0 SEVERE AORTIC STENOSIS: ICD-10-CM

## 2025-08-27 PROCEDURE — 1124F ACP DISCUSS-NO DSCNMKR DOCD: CPT | Performed by: STUDENT IN AN ORGANIZED HEALTH CARE EDUCATION/TRAINING PROGRAM

## 2025-08-27 PROCEDURE — 1159F MED LIST DOCD IN RCRD: CPT | Performed by: STUDENT IN AN ORGANIZED HEALTH CARE EDUCATION/TRAINING PROGRAM

## 2025-08-27 PROCEDURE — 99214 OFFICE O/P EST MOD 30 MIN: CPT | Performed by: STUDENT IN AN ORGANIZED HEALTH CARE EDUCATION/TRAINING PROGRAM

## 2025-08-27 RX ORDER — PANTOPRAZOLE SODIUM 40 MG/1
40 TABLET, DELAYED RELEASE ORAL DAILY
COMMUNITY

## (undated) DEVICE — BREAST HERNIA PACK: Brand: MEDLINE INDUSTRIES, INC.

## (undated) DEVICE — PENCIL SMK EVAC ALL IN 1 DSGN ENH VISIBILITY IMPROVED AIR

## (undated) DEVICE — HEAD POSITIONER, SURGICAL: Brand: DEROYAL

## (undated) DEVICE — C-ARMOR C-ARM EQUIPMENT COVERS CLEAR STERILE UNIVERSAL FIT 12 PER CASE: Brand: C-ARMOR

## (undated) DEVICE — DRAINBAG,ANTI-REFLUX TOWER,L/F,2000ML,LL: Brand: MEDLINE

## (undated) DEVICE — GLOVE ORANGE PI 7 1/2   MSG9075

## (undated) DEVICE — GOWN,SIRUS,NON REINFRCD,LARGE,SET IN SL: Brand: MEDLINE

## (undated) DEVICE — Z DISCONTINUED BY MEDLINE USE 2711682 TRAY SKIN PREP DRY W/ PREM GLV

## (undated) DEVICE — GAUZE,SPONGE,4"X4",12PLY,STERILE,LF,2'S: Brand: MEDLINE

## (undated) DEVICE — DRESSING TRNSPAR W8XL12IN FLM SURESITE 123

## (undated) DEVICE — PREP SOL PVP IODINE 4%  4 OZ/BTL

## (undated) DEVICE — ADHESIVE SKIN CLSR 0.7ML TOP DERMBND ADV

## (undated) DEVICE — Device: Brand: SINGLE USE INJECTOR NM-221C-0427

## (undated) DEVICE — SUTURE MCRYL + SZ 3-0 L27IN ABSRB UD PS1 L24MM 3/8 CIR REV MCP936H

## (undated) DEVICE — SOLUTION SURG PREP POV IOD 7.5% 4 OZ

## (undated) DEVICE — STIMULATOR NERVE 4.32 MM PERC EXTN KT INTERSTIM QUAD

## (undated) DEVICE — CYSTO PACK: Brand: MEDLINE INDUSTRIES, INC.

## (undated) DEVICE — SHEET, T, LAPAROTOMY, STERILE: Brand: MEDLINE

## (undated) DEVICE — 3M™ STERI-STRIP™ COMPOUND BENZOIN TINCTURE 40 BAGS/CARTON 4 CARTONS/CASE C1544: Brand: 3M™ STERI-STRIP™

## (undated) DEVICE — SPONGE GZ W4XL4IN COT 12 PLY TYP VII WVN C FLD DSGN

## (undated) DEVICE — JCKSON TBL POSTNER NO HD REST: Brand: MEDLINE INDUSTRIES, INC.

## (undated) DEVICE — NEUROSTIMULATOR EXT SM LTWT SGL BTTN H2O RESIST WIRELESS

## (undated) DEVICE — DRESSING TRNSPAR W5XL4.5IN FLM SHT SEMIPERMEABLE WIND

## (undated) DEVICE — SYRINGE MED 10ML LUERLOCK TIP W/O SFTY DISP

## (undated) DEVICE — Z INACTIVE USE 2660664 SOLUTION IRRIG 3000ML 0.9% SOD CHL USP UROMATIC PLAS CONT

## (undated) DEVICE — CATHETER URETH 22FR 30CC BLLN F 3 W SPEC M RND TIP TWO

## (undated) DEVICE — BIOGUARD A/W CLEANING ADAPTER

## (undated) DEVICE — SUTURE VCRL + SZ 3-0 L27IN ABSRB UD L26MM SH 1/2 CIR VCP416H

## (undated) DEVICE — GLOVE ORANGE PI 7   MSG9070

## (undated) DEVICE — PROGRAMMER PATIENT SMART INTERSTIM

## (undated) DEVICE — LASER FIBER: Brand: MOXY